# Patient Record
Sex: MALE | Race: WHITE | Employment: OTHER | URBAN - METROPOLITAN AREA
[De-identification: names, ages, dates, MRNs, and addresses within clinical notes are randomized per-mention and may not be internally consistent; named-entity substitution may affect disease eponyms.]

---

## 2020-01-13 ENCOUNTER — OFFICE VISIT (OUTPATIENT)
Dept: UROLOGY | Age: 70
End: 2020-01-13
Payer: MEDICARE

## 2020-01-13 VITALS
HEART RATE: 69 BPM | HEIGHT: 68 IN | WEIGHT: 222.8 LBS | DIASTOLIC BLOOD PRESSURE: 71 MMHG | BODY MASS INDEX: 33.77 KG/M2 | SYSTOLIC BLOOD PRESSURE: 139 MMHG

## 2020-01-13 PROCEDURE — 3017F COLORECTAL CA SCREEN DOC REV: CPT | Performed by: UROLOGY

## 2020-01-13 PROCEDURE — G8484 FLU IMMUNIZE NO ADMIN: HCPCS | Performed by: UROLOGY

## 2020-01-13 PROCEDURE — G8428 CUR MEDS NOT DOCUMENT: HCPCS | Performed by: UROLOGY

## 2020-01-13 PROCEDURE — 99204 OFFICE O/P NEW MOD 45 MIN: CPT | Performed by: UROLOGY

## 2020-01-13 PROCEDURE — 1123F ACP DISCUSS/DSCN MKR DOCD: CPT | Performed by: UROLOGY

## 2020-01-13 PROCEDURE — G8417 CALC BMI ABV UP PARAM F/U: HCPCS | Performed by: UROLOGY

## 2020-01-13 PROCEDURE — 4040F PNEUMOC VAC/ADMIN/RCVD: CPT | Performed by: UROLOGY

## 2020-01-13 PROCEDURE — 4004F PT TOBACCO SCREEN RCVD TLK: CPT | Performed by: UROLOGY

## 2020-01-13 RX ORDER — BUPROPION HYDROCHLORIDE 200 MG/1
200 TABLET, EXTENDED RELEASE ORAL
COMMUNITY

## 2020-01-13 RX ORDER — ASPIRIN 325 MG
325 TABLET ORAL
COMMUNITY

## 2020-01-13 RX ORDER — TRAZODONE HYDROCHLORIDE 150 MG/1
150 TABLET ORAL NIGHTLY
COMMUNITY
Start: 2019-12-14

## 2020-01-13 RX ORDER — MIRTAZAPINE 45 MG/1
TABLET, FILM COATED ORAL
Status: ON HOLD | COMMUNITY
Start: 2019-12-14 | End: 2022-04-25 | Stop reason: ALTCHOICE

## 2020-01-13 RX ORDER — CLONAZEPAM 2 MG/1
TABLET ORAL
COMMUNITY
Start: 2019-12-14

## 2020-01-13 RX ORDER — CIPROFLOXACIN 500 MG/1
500 TABLET, FILM COATED ORAL 2 TIMES DAILY
Qty: 6 TABLET | Refills: 0 | Status: SHIPPED | OUTPATIENT
Start: 2020-01-13 | End: 2020-01-16

## 2020-01-13 RX ORDER — TAMSULOSIN HYDROCHLORIDE 0.4 MG/1
CAPSULE ORAL
Status: ON HOLD | COMMUNITY
Start: 2019-12-14 | End: 2020-03-16 | Stop reason: HOSPADM

## 2020-01-13 ASSESSMENT — ENCOUNTER SYMPTOMS
SHORTNESS OF BREATH: 0
CONSTIPATION: 0
WHEEZING: 0
DIARRHEA: 0
COUGH: 0
EYE REDNESS: 0
VOMITING: 0
EYE PAIN: 0
NAUSEA: 0
ABDOMINAL PAIN: 0
BACK PAIN: 0

## 2020-01-13 NOTE — PROGRESS NOTES
I-PSS SCORE[de-identified] 1  How would you feel if you were to spend the rest of your life with your urinary condition?: Pleased    Last BUN and creatinine:  Lab Results   Component Value Date    BUN 12 01/06/2012     Lab Results   Component Value Date    CREATININE 0.69 01/06/2012       Additional Lab/Culture results: none    Imaging Reviewed during this Office Visit: none  (results were independently reviewed by physician and radiology report verified)    PAST MEDICAL, FAMILY AND SOCIAL HISTORY:  Past Medical History:   Diagnosis Date    Anxiety     Hyperlipidemia      Past Surgical History:   Procedure Laterality Date    FOOT TENDON SURGERY      KNEE SURGERY      SPINE SURGERY      WRIST SURGERY       Family History   Problem Relation Age of Onset    Cancer Father     Diabetes Paternal Grandmother      Outpatient Medications Marked as Taking for the 1/13/20 encounter (Office Visit) with Lila Justice MD   Medication Sig Dispense Refill    traZODone (DESYREL) 150 MG tablet TAKE 1 TABLET BY MOUTH AT BEDTIME      tamsulosin (FLOMAX) 0.4 MG capsule TAKE 1 CAPSULE BY MOUTH ONCE DAILY      mirtazapine (REMERON) 45 MG tablet TAKE 1 TABLET BY MOUTH AT BEDTIME      clonazePAM (KLONOPIN) 2 MG tablet TAKE 1 TABLET BY MOUTH TWICE DAILY      buPROPion (WELLBUTRIN SR) 200 MG extended release tablet Take 200 mg by mouth      MAGNESIUM PO Take 1 tablet by mouth      aspirin 325 MG tablet Take 325 mg by mouth      Calcium Carbonate Antacid (CALCIUM CARBONATE PO) Take 1 tablet by mouth      Multiple Vitamins-Minerals (MULTIVITAMIN ADULT PO) Take 1 capsule by mouth      SIMVASTATIN PO Take 40 mg by mouth      ciprofloxacin (CIPRO) 500 MG tablet Take 1 tablet by mouth 2 times daily for 3 days Start one day prior to prostate biopsy 6 tablet 0        Hydrocodone-acetaminophen  Social History     Tobacco Use   Smoking Status Current Every Day Smoker   Smokeless Tobacco Never Used      (If patient a smoker, smoking cessation counseling offered)   Social History     Substance and Sexual Activity   Alcohol Use Not Currently       REVIEW OF SYSTEMS:  Review of Systems    Physical Exam:    This a 77 y.o. male   Vitals:    01/13/20 0923   BP: 139/71   Pulse: 69     Body mass index is 33.88 kg/m². Physical Exam  Constitutional: Patient in no acute distress. Neuro: Alert and oriented to person, place and time. Psych: Mood normal, affect normal  Skin: No rash noted  HEENT: Head: Normocephalic and atraumatic  Conjunctivae and EOM are normal. Pupils are equal, round  Nose: Normal  Right External Ear: Normal; Left External Ear: Normal  Mouth: Mucosa Moist  Neck: Supple  Lungs:Respiratory effort is normal  Cardiovascular: Warm & Pink  Abdomen: Soft, non-tender, non-distendedwith no CVA,  No flank tenderness,  Orhepatosplenomegaly   Lymphatics: No palpable lymphadenopathy. Bladder non-tender and not distended. Musculoskeletal: Normal gait and station  Penis normal and circumcised  Urethral meatus normal  Scrotal exam normal  Testicles normal bilaterally  Epididymis normal bilaterally  No evidence of inguinal hernia  Normal rectal tone with no masses  Prostate:nodule at apex      Assessment and Plan      1. Elevated PSA    2. Nodular prostate    3. Benign prostatic hyperplasia with nocturia           Plan:   Prostate biopsy in the office       Prescriptions Ordered:  Orders Placed This Encounter   Medications    ciprofloxacin (CIPRO) 500 MG tablet     Sig: Take 1 tablet by mouth 2 times daily for 3 days Start one day prior to prostate biopsy     Dispense:  6 tablet     Refill:  0      Orders Placed:  No orders of the defined types were placed in this encounter. Nathaly Hernandez MD    Agree with the ROS entered by the MA.

## 2020-01-16 ENCOUNTER — TELEPHONE (OUTPATIENT)
Dept: UROLOGY | Age: 70
End: 2020-01-16

## 2020-01-27 ENCOUNTER — HOSPITAL ENCOUNTER (OUTPATIENT)
Age: 70
Setting detail: SPECIMEN
Discharge: HOME | End: 2020-01-27
Payer: MEDICARE

## 2020-01-27 ENCOUNTER — PROCEDURE VISIT (OUTPATIENT)
Dept: UROLOGY | Age: 70
End: 2020-01-27
Payer: COMMERCIAL

## 2020-01-27 VITALS
HEIGHT: 68 IN | WEIGHT: 222.88 LBS | DIASTOLIC BLOOD PRESSURE: 78 MMHG | SYSTOLIC BLOOD PRESSURE: 132 MMHG | BODY MASS INDEX: 33.78 KG/M2 | TEMPERATURE: 98.7 F

## 2020-01-27 PROCEDURE — 76872 US TRANSRECTAL: CPT | Performed by: UROLOGY

## 2020-01-27 PROCEDURE — 55700 PR BIOPSY OF PROSTATE,NEEDLE/PUNCH: CPT | Performed by: UROLOGY

## 2020-01-30 LAB — SURGICAL PATHOLOGY REPORT: NORMAL

## 2020-02-03 ENCOUNTER — OFFICE VISIT (OUTPATIENT)
Dept: UROLOGY | Age: 70
End: 2020-02-03
Payer: MEDICARE

## 2020-02-03 VITALS
WEIGHT: 222.88 LBS | HEIGHT: 68 IN | SYSTOLIC BLOOD PRESSURE: 118 MMHG | DIASTOLIC BLOOD PRESSURE: 70 MMHG | TEMPERATURE: 98.4 F | BODY MASS INDEX: 33.78 KG/M2

## 2020-02-03 PROCEDURE — 3017F COLORECTAL CA SCREEN DOC REV: CPT | Performed by: UROLOGY

## 2020-02-03 PROCEDURE — G8417 CALC BMI ABV UP PARAM F/U: HCPCS | Performed by: UROLOGY

## 2020-02-03 PROCEDURE — G8427 DOCREV CUR MEDS BY ELIG CLIN: HCPCS | Performed by: UROLOGY

## 2020-02-03 PROCEDURE — 99214 OFFICE O/P EST MOD 30 MIN: CPT | Performed by: UROLOGY

## 2020-02-03 PROCEDURE — 4040F PNEUMOC VAC/ADMIN/RCVD: CPT | Performed by: UROLOGY

## 2020-02-03 PROCEDURE — 4004F PT TOBACCO SCREEN RCVD TLK: CPT | Performed by: UROLOGY

## 2020-02-03 PROCEDURE — G8484 FLU IMMUNIZE NO ADMIN: HCPCS | Performed by: UROLOGY

## 2020-02-03 PROCEDURE — 1123F ACP DISCUSS/DSCN MKR DOCD: CPT | Performed by: UROLOGY

## 2020-02-03 ASSESSMENT — ENCOUNTER SYMPTOMS
EYE REDNESS: 0
VOMITING: 0
CONSTIPATION: 0
NAUSEA: 0
BACK PAIN: 0
WHEEZING: 0
ABDOMINAL PAIN: 0
COUGH: 0
SHORTNESS OF BREATH: 0
EYE PAIN: 0
DIARRHEA: 0

## 2020-02-03 NOTE — PROGRESS NOTES
Review of Systems   Constitutional: Negative for appetite change, chills and fatigue. Eyes: Negative for pain, redness and visual disturbance. Respiratory: Negative for cough, shortness of breath and wheezing. Cardiovascular: Negative for chest pain and leg swelling. Gastrointestinal: Negative for abdominal pain, constipation, diarrhea, nausea and vomiting. Genitourinary: Negative for difficulty urinating, dysuria, flank pain, frequency, hematuria and urgency. Musculoskeletal: Negative for back pain, joint swelling and myalgias. Skin: Negative for rash and wound. Neurological: Negative for dizziness, weakness and numbness. Hematological: Does not bruise/bleed easily.
verified)    PAST MEDICAL, FAMILY AND SOCIAL HISTORY UPDATE:  Past Medical History:   Diagnosis Date    Anxiety     Hyperlipidemia      Past Surgical History:   Procedure Laterality Date    FOOT TENDON SURGERY      KNEE SURGERY      SPINE SURGERY      WRIST SURGERY       Family History   Problem Relation Age of Onset    Cancer Father     Diabetes Paternal Grandmother      Outpatient Medications Marked as Taking for the 2/3/20 encounter (Office Visit) with Jose R Chen MD   Medication Sig Dispense Refill    traZODone (DESYREL) 150 MG tablet TAKE 1 TABLET BY MOUTH AT BEDTIME      tamsulosin (FLOMAX) 0.4 MG capsule TAKE 1 CAPSULE BY MOUTH ONCE DAILY      mirtazapine (REMERON) 45 MG tablet TAKE 1 TABLET BY MOUTH AT BEDTIME      clonazePAM (KLONOPIN) 2 MG tablet TAKE 1 TABLET BY MOUTH TWICE DAILY      buPROPion (WELLBUTRIN SR) 200 MG extended release tablet Take 200 mg by mouth      MAGNESIUM PO Take 1 tablet by mouth      aspirin 325 MG tablet Take 325 mg by mouth      Calcium Carbonate Antacid (CALCIUM CARBONATE PO) Take 1 tablet by mouth      Multiple Vitamins-Minerals (MULTIVITAMIN ADULT PO) Take 1 capsule by mouth      SIMVASTATIN PO Take 40 mg by mouth         Hydrocodone-acetaminophen  Social History     Tobacco Use   Smoking Status Current Every Day Smoker   Smokeless Tobacco Never Used     (Ifpatient a smoker, smoking cessation counseling offered)    Social History     Substance and Sexual Activity   Alcohol Use Not Currently       REVIEW OF SYSTEMS:  Review of Systems    Physical Exam:      Vitals:    02/03/20 0927   BP: 118/70   Temp: 98.4 °F (36.9 °C)     Body mass index is 33.9 kg/m². Patient is a 77 y.o. male in no acute distress and alert and oriented to person, place and time. Physical Exam  Constitutional: Patient in no acute distress. Neuro: Alert and oriented to person, place and time.   Psych: Mood normal, affect normal  Skin: No rash noted  HEENT: Head: Normocephalic

## 2020-02-24 ENCOUNTER — OFFICE VISIT (OUTPATIENT)
Dept: UROLOGY | Age: 70
End: 2020-02-24
Payer: MEDICARE

## 2020-02-24 ENCOUNTER — TELEPHONE (OUTPATIENT)
Dept: UROLOGY | Age: 70
End: 2020-02-24

## 2020-02-24 VITALS
HEIGHT: 68 IN | HEART RATE: 62 BPM | DIASTOLIC BLOOD PRESSURE: 75 MMHG | TEMPERATURE: 97.9 F | SYSTOLIC BLOOD PRESSURE: 129 MMHG | WEIGHT: 222.66 LBS | BODY MASS INDEX: 33.75 KG/M2

## 2020-02-24 PROCEDURE — 1123F ACP DISCUSS/DSCN MKR DOCD: CPT | Performed by: UROLOGY

## 2020-02-24 PROCEDURE — 4004F PT TOBACCO SCREEN RCVD TLK: CPT | Performed by: UROLOGY

## 2020-02-24 PROCEDURE — G8427 DOCREV CUR MEDS BY ELIG CLIN: HCPCS | Performed by: UROLOGY

## 2020-02-24 PROCEDURE — G8417 CALC BMI ABV UP PARAM F/U: HCPCS | Performed by: UROLOGY

## 2020-02-24 PROCEDURE — G8484 FLU IMMUNIZE NO ADMIN: HCPCS | Performed by: UROLOGY

## 2020-02-24 PROCEDURE — 99214 OFFICE O/P EST MOD 30 MIN: CPT | Performed by: UROLOGY

## 2020-02-24 PROCEDURE — 4040F PNEUMOC VAC/ADMIN/RCVD: CPT | Performed by: UROLOGY

## 2020-02-24 PROCEDURE — 3017F COLORECTAL CA SCREEN DOC REV: CPT | Performed by: UROLOGY

## 2020-02-24 ASSESSMENT — ENCOUNTER SYMPTOMS
EYE PAIN: 0
ABDOMINAL PAIN: 0
NAUSEA: 0
SHORTNESS OF BREATH: 0
VOMITING: 0
COLOR CHANGE: 0
COUGH: 0
EYE REDNESS: 0
WHEEZING: 0
BACK PAIN: 0

## 2020-02-24 NOTE — PROGRESS NOTES
mirtazapine (REMERON) 45 MG tablet TAKE 1 TABLET BY MOUTH AT BEDTIME      clonazePAM (KLONOPIN) 2 MG tablet TAKE 1 TABLET BY MOUTH TWICE DAILY      buPROPion (WELLBUTRIN SR) 200 MG extended release tablet Take 200 mg by mouth      MAGNESIUM PO Take 1 tablet by mouth      aspirin 325 MG tablet Take 325 mg by mouth      Calcium Carbonate Antacid (CALCIUM CARBONATE PO) Take 1 tablet by mouth      Multiple Vitamins-Minerals (MULTIVITAMIN ADULT PO) Take 1 capsule by mouth      SIMVASTATIN PO Take 40 mg by mouth         Hydrocodone-acetaminophen  Social History     Tobacco Use   Smoking Status Current Every Day Smoker   Smokeless Tobacco Never Used     (Ifpatient a smoker, smoking cessation counseling offered)    Social History     Substance and Sexual Activity   Alcohol Use Not Currently       REVIEW OF SYSTEMS:  Review of Systems    Physical Exam:      Vitals:    02/24/20 0846   BP: 129/75   Pulse: 62   Temp: 97.9 °F (36.6 °C)     Body mass index is 33.86 kg/m². Patient is a 77 y.o. male in no acute distress and alert and oriented to person, place and time. Physical Exam  Constitutional: Patient in no acute distress. Neuro: Alert and oriented to person, place and time. Psych: Mood normal, affect normal  Skin: No rash noted  HEENT: Head: Normocephalic andatraumatic  Conjunctivae and EOM are normal. Pupils are equal, round  Nose:Normal  Right External Ear: Normal; Left External Ear: Normal  Mouth: Mucosa Moist  Neck: Supple  Lungs: Respiratory effort is normal  Cardiovascular: Warm & Pink  Abdomen: Soft, non-tender, non-distended with no CVA,  No flank tenderness,  Or hepatosplenomegaly   Lymphatics: No palpablelymphadenopathy. Bladder non-tender and not distended. Musculoskeletal: Normal gait and station      Assessment and Plan      1.  Prostate cancer ([facility] [address]    2. Elevated PSA           Plan: spent greater than 25 min discussing mgmt of prostate cancer  Pt has elected for robotic prostatectomy and bilateral

## 2020-03-05 ENCOUNTER — OFFICE VISIT (OUTPATIENT)
Dept: UROLOGY | Age: 70
End: 2020-03-05
Payer: MEDICARE

## 2020-03-05 ENCOUNTER — TELEPHONE (OUTPATIENT)
Dept: UROLOGY | Age: 70
End: 2020-03-05

## 2020-03-05 ENCOUNTER — HOSPITAL ENCOUNTER (OUTPATIENT)
Dept: PREADMISSION TESTING | Age: 70
Discharge: HOME | End: 2020-03-09
Payer: MEDICARE

## 2020-03-05 VITALS
HEART RATE: 65 BPM | WEIGHT: 223.55 LBS | DIASTOLIC BLOOD PRESSURE: 83 MMHG | OXYGEN SATURATION: 94 % | TEMPERATURE: 98.1 F | HEIGHT: 70 IN | RESPIRATION RATE: 20 BRPM | SYSTOLIC BLOOD PRESSURE: 129 MMHG | BODY MASS INDEX: 32 KG/M2

## 2020-03-05 VITALS
BODY MASS INDEX: 33.75 KG/M2 | TEMPERATURE: 98.6 F | SYSTOLIC BLOOD PRESSURE: 110 MMHG | WEIGHT: 222.66 LBS | HEIGHT: 68 IN | DIASTOLIC BLOOD PRESSURE: 68 MMHG

## 2020-03-05 LAB
-: NORMAL
ABO/RH: NORMAL
AMORPHOUS: NORMAL
ANTIBODY SCREEN: NEGATIVE
ARM BAND NUMBER: NORMAL
BACTERIA: NORMAL
BILIRUBIN URINE: NEGATIVE
CASTS UA: NORMAL /LPF (ref 0–8)
COLOR: YELLOW
COMMENT UA: ABNORMAL
CRYSTALS, UA: NORMAL /HPF
EPITHELIAL CELLS UA: NORMAL /HPF (ref 0–5)
EXPIRATION DATE: NORMAL
GLUCOSE URINE: NEGATIVE
HCT VFR BLD CALC: 45.4 % (ref 40.7–50.3)
HEMOGLOBIN: 15.3 G/DL (ref 13–17)
KETONES, URINE: NEGATIVE
LEUKOCYTE ESTERASE, URINE: ABNORMAL
MUCUS: NORMAL
NITRITE, URINE: NEGATIVE
OTHER OBSERVATIONS UA: NORMAL
PH UA: 5.5 (ref 5–8)
PROTEIN UA: NEGATIVE
RBC UA: NORMAL /HPF (ref 0–4)
RENAL EPITHELIAL, UA: NORMAL /HPF
SPECIFIC GRAVITY UA: 1.02 (ref 1–1.03)
TRICHOMONAS: NORMAL
TURBIDITY: CLEAR
URINE HGB: NEGATIVE
UROBILINOGEN, URINE: NORMAL
WBC UA: NORMAL /HPF (ref 0–5)
YEAST: NORMAL

## 2020-03-05 PROCEDURE — 93005 ELECTROCARDIOGRAM TRACING: CPT | Performed by: ANESTHESIOLOGY

## 2020-03-05 PROCEDURE — 81001 URINALYSIS AUTO W/SCOPE: CPT

## 2020-03-05 PROCEDURE — G8484 FLU IMMUNIZE NO ADMIN: HCPCS | Performed by: NURSE PRACTITIONER

## 2020-03-05 PROCEDURE — 36415 COLL VENOUS BLD VENIPUNCTURE: CPT

## 2020-03-05 PROCEDURE — 4004F PT TOBACCO SCREEN RCVD TLK: CPT | Performed by: NURSE PRACTITIONER

## 2020-03-05 PROCEDURE — 1123F ACP DISCUSS/DSCN MKR DOCD: CPT | Performed by: NURSE PRACTITIONER

## 2020-03-05 PROCEDURE — 85018 HEMOGLOBIN: CPT

## 2020-03-05 PROCEDURE — 86850 RBC ANTIBODY SCREEN: CPT

## 2020-03-05 PROCEDURE — 87086 URINE CULTURE/COLONY COUNT: CPT

## 2020-03-05 PROCEDURE — 86900 BLOOD TYPING SEROLOGIC ABO: CPT

## 2020-03-05 PROCEDURE — 4040F PNEUMOC VAC/ADMIN/RCVD: CPT | Performed by: NURSE PRACTITIONER

## 2020-03-05 PROCEDURE — 99214 OFFICE O/P EST MOD 30 MIN: CPT | Performed by: NURSE PRACTITIONER

## 2020-03-05 PROCEDURE — G8417 CALC BMI ABV UP PARAM F/U: HCPCS | Performed by: NURSE PRACTITIONER

## 2020-03-05 PROCEDURE — G8427 DOCREV CUR MEDS BY ELIG CLIN: HCPCS | Performed by: NURSE PRACTITIONER

## 2020-03-05 PROCEDURE — 85014 HEMATOCRIT: CPT

## 2020-03-05 PROCEDURE — 86901 BLOOD TYPING SEROLOGIC RH(D): CPT

## 2020-03-05 PROCEDURE — 3017F COLORECTAL CA SCREEN DOC REV: CPT | Performed by: NURSE PRACTITIONER

## 2020-03-05 RX ORDER — PHENOL 1.4 %
1 AEROSOL, SPRAY (ML) MUCOUS MEMBRANE DAILY PRN
COMMUNITY

## 2020-03-05 RX ORDER — SODIUM CHLORIDE, SODIUM LACTATE, POTASSIUM CHLORIDE, CALCIUM CHLORIDE 600; 310; 30; 20 MG/100ML; MG/100ML; MG/100ML; MG/100ML
1000 INJECTION, SOLUTION INTRAVENOUS CONTINUOUS
Status: CANCELLED | OUTPATIENT
Start: 2020-03-05

## 2020-03-05 ASSESSMENT — ENCOUNTER SYMPTOMS
CONSTIPATION: 0
EYE PAIN: 0
VOMITING: 0
WHEEZING: 0
EYE REDNESS: 0
SHORTNESS OF BREATH: 0
NAUSEA: 0
BACK PAIN: 0
DIARRHEA: 0
COUGH: 0
ABDOMINAL PAIN: 0

## 2020-03-05 NOTE — H&P
History and Physical    Pt Name: Jennifer Farmer  MRN: [id number]  YOB: 1950  Date of evaluation: 3/5/2020    SUBJECTIVE:     History of Chief Complaint:    Patient complains of a history of decreased urinary flow for several years which has been managed with Flomax. More recently his PSA was elevated and a biopsy was positive for prostate cancer. He has been scheduled for a prostatectomy. Past Medical History    has a past medical history of Anxiety, Cancer ([facility] [address] Hyperlipidemia, Wears partial dentures, and Wellness examination. Past Surgical History   has a past surgical history that includes Spine surgery; knee surgery; Foot Tendon Surgery; Wrist surgery; Prostate Biopsy; back surgery; and Colonoscopy. Medications    Current Outpatient Medications:     calcium carbonate 600 MG TABS tablet, Take 1 tablet by mouth 2 times daily, Disp: , Rfl:     traZODone (DESYREL) 150 MG tablet, TAKE 1 TABLET BY MOUTH AT BEDTIME, Disp: , Rfl:     tamsulosin (FLOMAX) 0.4 MG capsule, TAKE 1 CAPSULE BY MOUTH ONCE DAILY, Disp: , Rfl:     mirtazapine (REMERON) 45 MG tablet, TAKE 1 TABLET BY MOUTH AT BEDTIME, Disp: , Rfl:     clonazePAM (KLONOPIN) 2 MG tablet, TAKE 1 TABLET BY MOUTH TWICE DAILY, Disp: , Rfl:     buPROPion (WELLBUTRIN SR) 200 MG extended release tablet, Take 200 mg by mouth, Disp: , Rfl:     aspirin 325 MG tablet, Take 325 mg by mouth, Disp: , Rfl:     Multiple Vitamins-Minerals (MULTIVITAMIN ADULT PO), Take 1 capsule by mouth, Disp: , Rfl:     SIMVASTATIN PO, Take 40 mg by mouth, Disp: , Rfl:   Allergies  is allergic to hydrocodone-acetaminophen. Family History  family history includes Cancer in his father; Diabetes in his paternal grandmother. Social History   reports that he has been smoking. He has been smoking about 0.50 packs per day. He has never used smokeless tobacco.   reports previous alcohol use. reports no history of drug use.     Marital Status:   Children: two children,

## 2020-03-05 NOTE — PATIENT INSTRUCTIONS
Plan:   1. Start kegels exercise to help with post op incontinence: do not do kegel or quick flick with cath in- do pre-operation and after catheteri is removed  Kegel exercise: squeeze and hold 3 seconds, relax and release 3 seconds, repeat 10-15 times in a row, at lease 6 times  per day  Quick flicks: after kegel, squeeze, release no hold, 12-15 times in a row, at least 6 times per day    2. Pre-op bowel cleanse as ordered- sheet reviewed     3. Avoid post-op constipation as discussed    4. Follow-up Cystograms scheduled 1 week after surgery 3/19/18 , start antibiotic the day before the cystogram 3/18/19 , take the day off and complete the day after, call the office if you did not get a prescription for antibiotic at discharge. After cystogram, come directly to office for post-op appt    5. Call with questions or concerns    6.  Will order pump

## 2020-03-05 NOTE — PROGRESS NOTES
Anesthesia Focused Assessment    STOP-BANG Sleep Apnea Questionnaire    SNORE loudly (heard through closed doors)? No  TIRED, fatigued, sleepy during daytime? No  OBSERVED stopping breathing during sleep? No  High blood PRESSURE being treated? No    BMI over 35? No  AGE over 48? Yes  NECK circumference over 16\"? No  GENDER (male)? Yes             Total 2  High risk 5-8  Intermediate risk 3-4  Low risk 0-2    Obstructive Sleep Apnea: no  If YES, machine used: no     Type 1 DM:   no  T2DM:  no    Coronary Artery Disease:  no  Hypertension:  no    Active smoker:  1/2 ppd since age 16  Drinks Alcohol:  no    Dentition: upper and lower partials    Defib / AICD / Pacemaker: no      Renal Failure/dialysis:  no    Patient was evaluated in PAT & anesthesia guidelines were applied. NPO guidelines, medication instructions and scheduled arrival time were reviewed with patient.     Hx of anesthesia complications:  no  Family hx of anesthesia complications:  no                                                                                                                     Anesthesia contacted:   no  Medical or cardiac clearance ordered: yes    Myron PERSON, LEENA-C  3/5/20  1:27 PM

## 2020-03-05 NOTE — PROGRESS NOTES
[facility]  [facility] - [facility]  Nisha Hoffman [address] [facility].  Dept: [address] [facility] U[facility] Office Note - Established    Patient:  Tomy Zelaya  YOB: 1950  Date: 3/5/2020    The patient is a 77 y.o. male who presents todayfor evaluation of the following problems:   Chief Complaint   Patient presents with    Prostate Cancer     pre-teaching Robotic prostatectomy        HPI    This patient presents today for education regarding his prostate cancer treatment, post-op care and rehabilitation for incontinence and penile health      I met with Tomy Zelaya for 30 minutes to discuss prostate cancer treatment including basic surgery information, pre-op instructions, and post-op care: catheter care and cleaning, application of thigh and overnight bag, Kegels exercise to begin now, to stop day of surgery and then to resume after catheter removal, constipation prevention, post op impotence and possible use of vacuum erection pump, activity restrictions and post op testing and appointments. Verbal, written and visual materials presented. Patient verbalized understanding of all materials presented, and handouts given for Kegels, constipation prevention and catheter care. All questions answered. Patient encouraged to call with any further questions or problems. Summary of old records: N/A    Additional History: N/A    Procedures Today: N/A    Urinalysis today:  No results found for this visit on 03/05/20.   Last several PSA's:  No results found for: PSA  Last total testosterone:  No results found for: TESTOSTERONE    AUA Symptom Score (3/5/2020):  INCOMPLETE EMPTYING: How often have you had the sensation of not emptying your bladder?: Not at all  FREQUENCY: How often do you have to urinate less than every two hours?: Not at all  INTERMITTENCY: How often have you found you stopped and started again several times when you urinated?: Not at all  URGENCY: How often have you found it difficult to postpone urination?: Not at all  WEAK STREAM: How often have you had a weak urinary stream?: Not at all  STRAINING: How often have you had to strain to start  urination?: Not at all  NOCTURIA: How many times did you typically get up at night to uriniate?: 1 Time  TOTAL I-PSS SCORE[de-identified] 1  How would you feel if you were to spend the rest of your life with your urinary condition?: Unhappy    Last BUN and creatinine:  Lab Results   Component Value Date    BUN 12 01/06/2012     Lab Results   Component Value Date    CREATININE 0.69 01/06/2012       Additional Lab/Culture results:     Imaging Reviewed during this Office Visit:   (results were independently reviewed by physician and radiology report verified)    PAST MEDICAL, FAMILY AND SOCIAL HISTORY UPDATE:  Past Medical History:   Diagnosis Date    Anxiety     Hyperlipidemia      Past Surgical History:   Procedure Laterality Date    FOOT TENDON SURGERY      KNEE SURGERY      SPINE SURGERY      WRIST SURGERY       Family History   Problem Relation Age of Onset    Cancer Father     Diabetes Paternal Grandmother      Outpatient Medications Marked as Taking for the 3/5/20 encounter (Office Visit) with ANGELO Vidal CNP   Medication Sig Dispense Refill    traZODone (DESYREL) 150 MG tablet TAKE 1 TABLET BY MOUTH AT BEDTIME      tamsulosin (FLOMAX) 0.4 MG capsule TAKE 1 CAPSULE BY MOUTH ONCE DAILY      mirtazapine (REMERON) 45 MG tablet TAKE 1 TABLET BY MOUTH AT BEDTIME      clonazePAM (KLONOPIN) 2 MG tablet TAKE 1 TABLET BY MOUTH TWICE DAILY      buPROPion (WELLBUTRIN SR) 200 MG extended release tablet Take 200 mg by mouth      MAGNESIUM PO Take 1 tablet by mouth      aspirin 325 MG tablet Take 325 mg by mouth      Calcium Carbonate Antacid (CALCIUM CARBONATE PO) Take 1 tablet by mouth      Multiple Vitamins-Minerals (MULTIVITAMIN ADULT PO) Take 1 capsule by mouth      SIMVASTATIN PO Take 40 mg by mouth         Hydrocodone-acetaminophen  Social History     Tobacco Use   Smoking Status Current Every Day Smoker   Smokeless Tobacco Never Used     (Ifpatient a smoker, smoking cessation counseling offered)    Social History     Substance and Sexual Activity   Alcohol Use Not Currently       REVIEW OF SYSTEMS:  Review of Systems    Physical Exam:      Vitals:    03/05/20 1149   BP: 110/68   Temp: 98.6 °F (37 °C)     Body mass index is 33.86 kg/m². Patient is a 77 y.o. male in no acute distress and alert and oriented to person, place and time. Physical Exam  Constitutional: Patient in no acute distress. Neuro: Alert and oriented to person, place and time. Psych: Mood normal, affect normal  Skin: warm, pink, No rash noted  HEENT: Head: Normocephalic andatraumatic  Conjunctivae and EOM are normal. Pupils are equal, round  Nose:Normal  Right External Ear: Normal; Left External Ear: Normal  Mouth: Mucosa Moist  Neck: Supple  Lungs: Respiratory effort is normal  Cardiovascular: strong and reg  Abdomen: Soft, non-tender, non-distended  Bladder non-tender and not distended. Musculoskeletal: Normal gait and station        Assessment and Plan      1. Prostate cancer ([facility] [address]           Plan:   1. Start kegels exercise to help with post op incontinence: do not do kegel or quick flick with cath in- do pre-operation and after catheteri is removed  Kegel exercise: squeeze and hold 3 seconds, relax and release 3 seconds, repeat 10-15 times in a row, at lease 6 times  per day  Quick flicks: after kegel, squeeze, release no hold, 12-15 times in a row, at least 6 times per day    2. Pre-op bowel cleanse as ordered- sheet reviewed     3. Avoid post-op constipation as discussed    4. Follow-up Cystograms scheduled 1 week after surgery 3/19/18 , start antibiotic the day before the cystogram 3/18/19 , take the day off and complete the day after, call the office if you did not get a prescription for antibiotic at discharge.  After

## 2020-03-05 NOTE — LETTER
[address]  Dept: [phone]  Dept Fax: [phone]        3/5/20    Patient: John Alaniz  YOB: 1950    Dear Mimi Mckeon MD,    I had the pleasure of seeing one of your patients, Chilango Amaya today in the office today. Below are the relevant portions of my assessment and plan of care. IMPRESSION:  1. Prostate cancer ([facility] [address]        PLAN:   Plan:   1. Start kegels exercise to help with post op incontinence: do not do kegel or quick flick with cath in- do pre-operation and after catheteri is removed  Kegel exercise: squeeze and hold 3 seconds, relax and release 3 seconds, repeat 10-15 times in a row, at lease 6 times  per day  Quick flicks: after kegel, squeeze, release no hold, 12-15 times in a row, at least 6 times per day    2. Pre-op bowel cleanse as ordered- sheet reviewed     3. Avoid post-op constipation as discussed    4. Follow-up Cystograms scheduled 1 week after surgery 3/19/18 , start antibiotic the day before the cystogram 3/18/19 , take the day off and complete the day after, call the office if you did not get a prescription for antibiotic at discharge. After cystogram, come directly to office for post-op appt    5. Call with questions or concerns    6. Will order pump  Thank you for allowing me to participate in the care of this patient. I will keep you updated on this patient's follow up and I look forward to serving you and your patients again in the future.     Vincenzo Alcantar, ANGELO - CNP

## 2020-03-06 LAB
CULTURE: NO GROWTH
EKG ATRIAL RATE: 64 BPM
EKG P AXIS: 56 DEGREES
EKG P-R INTERVAL: 188 MS
EKG Q-T INTERVAL: 418 MS
EKG QRS DURATION: 92 MS
EKG QTC CALCULATION (BAZETT): 431 MS
EKG R AXIS: -38 DEGREES
EKG T AXIS: 27 DEGREES
EKG VENTRICULAR RATE: 64 BPM
Lab: NORMAL
SPECIMEN DESCRIPTION: NORMAL

## 2020-03-13 ENCOUNTER — ANESTHESIA (OUTPATIENT)
Dept: OPERATING ROOM | Age: 70
DRG: 707 | End: 2020-03-13
Payer: MEDICARE

## 2020-03-13 ENCOUNTER — ANESTHESIA EVENT (OUTPATIENT)
Dept: OPERATING ROOM | Age: 70
DRG: 707 | End: 2020-03-13
Payer: MEDICARE

## 2020-03-13 ENCOUNTER — HOSPITAL ENCOUNTER (INPATIENT)
Age: 70
LOS: 2 days | Discharge: HOME | DRG: 707 | End: 2020-03-17
Attending: UROLOGY | Admitting: UROLOGY
Payer: MEDICARE

## 2020-03-13 VITALS — TEMPERATURE: 97.7 F | SYSTOLIC BLOOD PRESSURE: 191 MMHG | DIASTOLIC BLOOD PRESSURE: 97 MMHG | OXYGEN SATURATION: 91 %

## 2020-03-13 PROBLEM — C61 PROSTATE CANCER (HCC): Status: ACTIVE | Noted: 2020-03-13

## 2020-03-13 LAB
-: NORMAL
ANION GAP SERPL CALCULATED.3IONS-SCNC: 17 MMOL/L (ref 9–17)
BUN BLDV-MCNC: 13 MG/DL (ref 8–23)
BUN/CREAT BLD: ABNORMAL (ref 9–20)
CALCIUM SERPL-MCNC: 8.6 MG/DL (ref 8.6–10.4)
CHLORIDE BLD-SCNC: 106 MMOL/L (ref 98–107)
CO2: 17 MMOL/L (ref 20–31)
CREAT SERPL-MCNC: 0.9 MG/DL (ref 0.7–1.2)
GFR AFRICAN AMERICAN: >60 ML/MIN
GFR NON-AFRICAN AMERICAN: >60 ML/MIN
GFR SERPL CREATININE-BSD FRML MDRD: ABNORMAL ML/MIN/{1.73_M2}
GFR SERPL CREATININE-BSD FRML MDRD: ABNORMAL ML/MIN/{1.73_M2}
GLUCOSE BLD-MCNC: 115 MG/DL (ref 70–99)
HCT VFR BLD CALC: 48 % (ref 40.7–50.3)
HEMOGLOBIN: 14.9 G/DL (ref 13–17)
MCH RBC QN AUTO: 31.8 PG (ref 25.2–33.5)
MCHC RBC AUTO-ENTMCNC: 31 G/DL (ref 28.4–34.8)
MCV RBC AUTO: 102.3 FL (ref 82.6–102.9)
NRBC AUTOMATED: 0.1 PER 100 WBC
PDW BLD-RTO: 12 % (ref 11.8–14.4)
PLATELET # BLD: 206 K/UL (ref 138–453)
PMV BLD AUTO: 10.1 FL (ref 8.1–13.5)
POTASSIUM SERPL-SCNC: 4.6 MMOL/L (ref 3.7–5.3)
RBC # BLD: 4.69 M/UL (ref 4.21–5.77)
REASON FOR REJECTION: NORMAL
SODIUM BLD-SCNC: 140 MMOL/L (ref 135–144)
WBC # BLD: 19.3 K/UL (ref 3.5–11.3)
ZZ NTE CLEAN UP: ORDERED TEST: NORMAL
ZZ NTE WITH NAME CLEAN UP: SPECIMEN SOURCE: NORMAL

## 2020-03-13 PROCEDURE — 6360000002 HC RX W HCPCS: Performed by: PHYSICIAN ASSISTANT

## 2020-03-13 PROCEDURE — 6370000000 HC RX 637 (ALT 250 FOR IP): Performed by: UROLOGY

## 2020-03-13 PROCEDURE — 2709999900 HC NON-CHARGEABLE SUPPLY: Performed by: UROLOGY

## 2020-03-13 PROCEDURE — 88307 TISSUE EXAM BY PATHOLOGIST: CPT

## 2020-03-13 PROCEDURE — 85027 COMPLETE CBC AUTOMATED: CPT

## 2020-03-13 PROCEDURE — 3700000000 HC ANESTHESIA ATTENDED CARE: Performed by: UROLOGY

## 2020-03-13 PROCEDURE — 2580000003 HC RX 258: Performed by: ANESTHESIOLOGY

## 2020-03-13 PROCEDURE — 3600000019 HC SURGERY ROBOT ADDTL 15MIN: Performed by: UROLOGY

## 2020-03-13 PROCEDURE — 8E0W4CZ ROBOTIC ASSISTED PROCEDURE OF TRUNK REGION, PERCUTANEOUS ENDOSCOPIC APPROACH: ICD-10-PCS | Performed by: UROLOGY

## 2020-03-13 PROCEDURE — 6360000002 HC RX W HCPCS

## 2020-03-13 PROCEDURE — 2500000003 HC RX 250 WO HCPCS

## 2020-03-13 PROCEDURE — 87086 URINE CULTURE/COLONY COUNT: CPT

## 2020-03-13 PROCEDURE — 80048 BASIC METABOLIC PNL TOTAL CA: CPT

## 2020-03-13 PROCEDURE — 0VTQ4ZZ RESECTION OF BILATERAL VAS DEFERENS, PERCUTANEOUS ENDOSCOPIC APPROACH: ICD-10-PCS | Performed by: UROLOGY

## 2020-03-13 PROCEDURE — 3700000001 HC ADD 15 MINUTES (ANESTHESIA): Performed by: UROLOGY

## 2020-03-13 PROCEDURE — 0VT34ZZ RESECTION OF BILATERAL SEMINAL VESICLES, PERCUTANEOUS ENDOSCOPIC APPROACH: ICD-10-PCS | Performed by: UROLOGY

## 2020-03-13 PROCEDURE — 2580000003 HC RX 258: Performed by: PHYSICIAN ASSISTANT

## 2020-03-13 PROCEDURE — 6370000000 HC RX 637 (ALT 250 FOR IP): Performed by: PHYSICIAN ASSISTANT

## 2020-03-13 PROCEDURE — S2900 ROBOTIC SURGICAL SYSTEM: HCPCS | Performed by: UROLOGY

## 2020-03-13 PROCEDURE — 3600000009 HC SURGERY ROBOT BASE: Performed by: UROLOGY

## 2020-03-13 PROCEDURE — 2500000003 HC RX 250 WO HCPCS: Performed by: UROLOGY

## 2020-03-13 PROCEDURE — 2720000010 HC SURG SUPPLY STERILE: Performed by: UROLOGY

## 2020-03-13 PROCEDURE — 7100000001 HC PACU RECOVERY - ADDTL 15 MIN: Performed by: UROLOGY

## 2020-03-13 PROCEDURE — 6360000002 HC RX W HCPCS: Performed by: STUDENT IN AN ORGANIZED HEALTH CARE EDUCATION/TRAINING PROGRAM

## 2020-03-13 PROCEDURE — 0VT04ZZ RESECTION OF PROSTATE, PERCUTANEOUS ENDOSCOPIC APPROACH: ICD-10-PCS | Performed by: UROLOGY

## 2020-03-13 PROCEDURE — 07BC4ZZ EXCISION OF PELVIS LYMPHATIC, PERCUTANEOUS ENDOSCOPIC APPROACH: ICD-10-PCS | Performed by: UROLOGY

## 2020-03-13 PROCEDURE — G0378 HOSPITAL OBSERVATION PER HR: HCPCS

## 2020-03-13 PROCEDURE — 88309 TISSUE EXAM BY PATHOLOGIST: CPT

## 2020-03-13 PROCEDURE — 2580000003 HC RX 258

## 2020-03-13 PROCEDURE — 7100000000 HC PACU RECOVERY - FIRST 15 MIN: Performed by: UROLOGY

## 2020-03-13 PROCEDURE — 36415 COLL VENOUS BLD VENIPUNCTURE: CPT

## 2020-03-13 RX ORDER — FENTANYL CITRATE 50 UG/ML
INJECTION, SOLUTION INTRAMUSCULAR; INTRAVENOUS PRN
Status: DISCONTINUED | OUTPATIENT
Start: 2020-03-13 | End: 2020-03-13 | Stop reason: SDUPTHER

## 2020-03-13 RX ORDER — ONDANSETRON 2 MG/ML
INJECTION INTRAMUSCULAR; INTRAVENOUS PRN
Status: DISCONTINUED | OUTPATIENT
Start: 2020-03-13 | End: 2020-03-13 | Stop reason: SDUPTHER

## 2020-03-13 RX ORDER — PROPOFOL 10 MG/ML
INJECTION, EMULSION INTRAVENOUS PRN
Status: DISCONTINUED | OUTPATIENT
Start: 2020-03-13 | End: 2020-03-13 | Stop reason: SDUPTHER

## 2020-03-13 RX ORDER — ONDANSETRON 2 MG/ML
4 INJECTION INTRAMUSCULAR; INTRAVENOUS EVERY 6 HOURS PRN
Status: DISCONTINUED | OUTPATIENT
Start: 2020-03-13 | End: 2020-03-17 | Stop reason: HOSPADM

## 2020-03-13 RX ORDER — ROCURONIUM BROMIDE 10 MG/ML
INJECTION, SOLUTION INTRAVENOUS PRN
Status: DISCONTINUED | OUTPATIENT
Start: 2020-03-13 | End: 2020-03-13 | Stop reason: SDUPTHER

## 2020-03-13 RX ORDER — SODIUM CHLORIDE 0.9 % (FLUSH) 0.9 %
10 SYRINGE (ML) INJECTION PRN
Status: DISCONTINUED | OUTPATIENT
Start: 2020-03-13 | End: 2020-03-17 | Stop reason: HOSPADM

## 2020-03-13 RX ORDER — NEOSTIGMINE METHYLSULFATE 5 MG/5 ML
SYRINGE (ML) INTRAVENOUS PRN
Status: DISCONTINUED | OUTPATIENT
Start: 2020-03-13 | End: 2020-03-13 | Stop reason: SDUPTHER

## 2020-03-13 RX ORDER — SODIUM CHLORIDE 9 MG/ML
INJECTION, SOLUTION INTRAVENOUS CONTINUOUS PRN
Status: DISCONTINUED | OUTPATIENT
Start: 2020-03-13 | End: 2020-03-13 | Stop reason: SDUPTHER

## 2020-03-13 RX ORDER — GLYCOPYRROLATE 1 MG/5 ML
SYRINGE (ML) INTRAVENOUS PRN
Status: DISCONTINUED | OUTPATIENT
Start: 2020-03-13 | End: 2020-03-13 | Stop reason: SDUPTHER

## 2020-03-13 RX ORDER — MORPHINE SULFATE 2 MG/ML
2 INJECTION, SOLUTION INTRAMUSCULAR; INTRAVENOUS
Status: DISCONTINUED | OUTPATIENT
Start: 2020-03-13 | End: 2020-03-16

## 2020-03-13 RX ORDER — SODIUM CHLORIDE, SODIUM LACTATE, POTASSIUM CHLORIDE, CALCIUM CHLORIDE 600; 310; 30; 20 MG/100ML; MG/100ML; MG/100ML; MG/100ML
1000 INJECTION, SOLUTION INTRAVENOUS CONTINUOUS
Status: DISCONTINUED | OUTPATIENT
Start: 2020-03-13 | End: 2020-03-13

## 2020-03-13 RX ORDER — MORPHINE SULFATE 4 MG/ML
4 INJECTION, SOLUTION INTRAMUSCULAR; INTRAVENOUS
Status: DISCONTINUED | OUTPATIENT
Start: 2020-03-13 | End: 2020-03-16

## 2020-03-13 RX ORDER — LIDOCAINE HYDROCHLORIDE 10 MG/ML
INJECTION, SOLUTION EPIDURAL; INFILTRATION; INTRACAUDAL; PERINEURAL PRN
Status: DISCONTINUED | OUTPATIENT
Start: 2020-03-13 | End: 2020-03-13 | Stop reason: SDUPTHER

## 2020-03-13 RX ORDER — WATER 1000 ML/1000ML
INJECTION, SOLUTION INTRAVENOUS PRN
Status: DISCONTINUED | OUTPATIENT
Start: 2020-03-13 | End: 2020-03-13 | Stop reason: ALTCHOICE

## 2020-03-13 RX ORDER — PROMETHAZINE HYDROCHLORIDE 25 MG/1
12.5 TABLET ORAL EVERY 6 HOURS PRN
Status: DISCONTINUED | OUTPATIENT
Start: 2020-03-13 | End: 2020-03-17 | Stop reason: HOSPADM

## 2020-03-13 RX ORDER — ACETAMINOPHEN 325 MG/1
650 TABLET ORAL EVERY 6 HOURS
Status: DISCONTINUED | OUTPATIENT
Start: 2020-03-13 | End: 2020-03-17 | Stop reason: HOSPADM

## 2020-03-13 RX ORDER — BUPROPION HYDROCHLORIDE 100 MG/1
200 TABLET, EXTENDED RELEASE ORAL DAILY
Status: DISCONTINUED | OUTPATIENT
Start: 2020-03-13 | End: 2020-03-17 | Stop reason: HOSPADM

## 2020-03-13 RX ORDER — SODIUM CHLORIDE 0.9 % (FLUSH) 0.9 %
10 SYRINGE (ML) INJECTION EVERY 12 HOURS SCHEDULED
Status: DISCONTINUED | OUTPATIENT
Start: 2020-03-13 | End: 2020-03-17 | Stop reason: HOSPADM

## 2020-03-13 RX ORDER — CLONAZEPAM 1 MG/1
2 TABLET ORAL 2 TIMES DAILY
Status: DISCONTINUED | OUTPATIENT
Start: 2020-03-13 | End: 2020-03-17 | Stop reason: HOSPADM

## 2020-03-13 RX ORDER — OXYCODONE HYDROCHLORIDE 5 MG/1
5 TABLET ORAL EVERY 4 HOURS PRN
Status: DISCONTINUED | OUTPATIENT
Start: 2020-03-13 | End: 2020-03-17 | Stop reason: HOSPADM

## 2020-03-13 RX ORDER — POLYETHYLENE GLYCOL 3350 17 G/17G
17 POWDER, FOR SOLUTION ORAL DAILY
Status: DISCONTINUED | OUTPATIENT
Start: 2020-03-13 | End: 2020-03-17 | Stop reason: HOSPADM

## 2020-03-13 RX ORDER — DOCUSATE SODIUM 100 MG/1
100 CAPSULE, LIQUID FILLED ORAL DAILY
Status: DISCONTINUED | OUTPATIENT
Start: 2020-03-13 | End: 2020-03-17 | Stop reason: HOSPADM

## 2020-03-13 RX ORDER — ULTRASOUND COUPLING MEDIUM
GEL (GRAM) TOPICAL PRN
Status: DISCONTINUED | OUTPATIENT
Start: 2020-03-13 | End: 2020-03-13 | Stop reason: ALTCHOICE

## 2020-03-13 RX ORDER — SODIUM CHLORIDE 9 MG/ML
INJECTION, SOLUTION INTRAVENOUS CONTINUOUS
Status: DISCONTINUED | OUTPATIENT
Start: 2020-03-13 | End: 2020-03-15

## 2020-03-13 RX ORDER — OXYCODONE HYDROCHLORIDE 5 MG/1
10 TABLET ORAL EVERY 4 HOURS PRN
Status: DISCONTINUED | OUTPATIENT
Start: 2020-03-13 | End: 2020-03-17 | Stop reason: HOSPADM

## 2020-03-13 RX ORDER — FENTANYL CITRATE 50 UG/ML
25 INJECTION, SOLUTION INTRAMUSCULAR; INTRAVENOUS EVERY 5 MIN PRN
Status: DISCONTINUED | OUTPATIENT
Start: 2020-03-13 | End: 2020-03-13 | Stop reason: HOSPADM

## 2020-03-13 RX ORDER — FENTANYL CITRATE 50 UG/ML
50 INJECTION, SOLUTION INTRAMUSCULAR; INTRAVENOUS EVERY 5 MIN PRN
Status: DISCONTINUED | OUTPATIENT
Start: 2020-03-13 | End: 2020-03-13 | Stop reason: HOSPADM

## 2020-03-13 RX ADMIN — MIRTAZAPINE 45 MG: 15 TABLET, FILM COATED ORAL at 22:14

## 2020-03-13 RX ADMIN — DOCUSATE SODIUM 100 MG: 100 CAPSULE, LIQUID FILLED ORAL at 22:14

## 2020-03-13 RX ADMIN — FENTANYL CITRATE 50 MCG: 50 INJECTION, SOLUTION INTRAMUSCULAR; INTRAVENOUS at 14:28

## 2020-03-13 RX ADMIN — BUPROPION HYDROCHLORIDE 200 MG: 100 TABLET, FILM COATED, EXTENDED RELEASE ORAL at 22:15

## 2020-03-13 RX ADMIN — SODIUM CHLORIDE: 9 INJECTION, SOLUTION INTRAVENOUS at 22:19

## 2020-03-13 RX ADMIN — MORPHINE SULFATE 4 MG: 4 INJECTION INTRAVENOUS at 20:21

## 2020-03-13 RX ADMIN — ROCURONIUM BROMIDE 30 MG: 10 INJECTION INTRAVENOUS at 14:28

## 2020-03-13 RX ADMIN — MORPHINE SULFATE 4 MG: 4 INJECTION INTRAVENOUS at 23:40

## 2020-03-13 RX ADMIN — PROPOFOL 200 MG: 10 INJECTION, EMULSION INTRAVENOUS at 17:08

## 2020-03-13 RX ADMIN — Medication 0.8 MG: at 17:23

## 2020-03-13 RX ADMIN — FENTANYL CITRATE 100 MCG: 50 INJECTION, SOLUTION INTRAMUSCULAR; INTRAVENOUS at 15:10

## 2020-03-13 RX ADMIN — PROPOFOL 50 MG: 10 INJECTION, EMULSION INTRAVENOUS at 14:28

## 2020-03-13 RX ADMIN — PROPOFOL 150 MG: 10 INJECTION, EMULSION INTRAVENOUS at 13:52

## 2020-03-13 RX ADMIN — ENOXAPARIN SODIUM 40 MG: 40 INJECTION SUBCUTANEOUS at 13:33

## 2020-03-13 RX ADMIN — Medication 5 MG: at 17:23

## 2020-03-13 RX ADMIN — CEFAZOLIN 2 G: 1 INJECTION, POWDER, FOR SOLUTION INTRAMUSCULAR; INTRAVENOUS at 17:09

## 2020-03-13 RX ADMIN — ONDANSETRON 4 MG: 2 INJECTION, SOLUTION INTRAMUSCULAR; INTRAVENOUS at 17:37

## 2020-03-13 RX ADMIN — OXYCODONE HYDROCHLORIDE 10 MG: 5 TABLET ORAL at 22:13

## 2020-03-13 RX ADMIN — FENTANYL CITRATE 100 MCG: 50 INJECTION, SOLUTION INTRAMUSCULAR; INTRAVENOUS at 17:30

## 2020-03-13 RX ADMIN — CEFAZOLIN 2 G: 1 INJECTION, POWDER, FOR SOLUTION INTRAMUSCULAR; INTRAVENOUS at 14:08

## 2020-03-13 RX ADMIN — PROPOFOL 100 MG: 10 INJECTION, EMULSION INTRAVENOUS at 14:30

## 2020-03-13 RX ADMIN — SODIUM CHLORIDE, PRESERVATIVE FREE 10 ML: 5 INJECTION INTRAVENOUS at 22:24

## 2020-03-13 RX ADMIN — FENTANYL CITRATE 100 MCG: 50 INJECTION, SOLUTION INTRAMUSCULAR; INTRAVENOUS at 13:52

## 2020-03-13 RX ADMIN — CLONAZEPAM 2 MG: 1 TABLET ORAL at 22:14

## 2020-03-13 RX ADMIN — LIDOCAINE HYDROCHLORIDE 50 MG: 10 INJECTION, SOLUTION EPIDURAL; INFILTRATION; INTRACAUDAL; PERINEURAL at 13:52

## 2020-03-13 RX ADMIN — Medication 600 MG: at 22:14

## 2020-03-13 RX ADMIN — ACETAMINOPHEN 650 MG: 325 TABLET ORAL at 22:12

## 2020-03-13 RX ADMIN — ROCURONIUM BROMIDE 30 MG: 10 INJECTION INTRAVENOUS at 16:16

## 2020-03-13 RX ADMIN — SODIUM CHLORIDE, POTASSIUM CHLORIDE, SODIUM LACTATE AND CALCIUM CHLORIDE 1000 ML: 600; 310; 30; 20 INJECTION, SOLUTION INTRAVENOUS at 12:50

## 2020-03-13 RX ADMIN — SODIUM CHLORIDE, POTASSIUM CHLORIDE, SODIUM LACTATE AND CALCIUM CHLORIDE: 600; 310; 30; 20 INJECTION, SOLUTION INTRAVENOUS at 13:43

## 2020-03-13 RX ADMIN — ROCURONIUM BROMIDE 50 MG: 10 INJECTION INTRAVENOUS at 13:52

## 2020-03-13 RX ADMIN — POLYETHYLENE GLYCOL 3350 17 G: 17 POWDER, FOR SOLUTION ORAL at 22:23

## 2020-03-13 RX ADMIN — SODIUM CHLORIDE: 9 INJECTION, SOLUTION INTRAVENOUS at 14:07

## 2020-03-13 RX ADMIN — SODIUM CHLORIDE: 9 INJECTION, SOLUTION INTRAVENOUS at 17:46

## 2020-03-13 RX ADMIN — FENTANYL CITRATE 100 MCG: 50 INJECTION, SOLUTION INTRAMUSCULAR; INTRAVENOUS at 13:58

## 2020-03-13 ASSESSMENT — PULMONARY FUNCTION TESTS
PIF_VALUE: 16
PIF_VALUE: 30
PIF_VALUE: 29
PIF_VALUE: 24
PIF_VALUE: 25
PIF_VALUE: 27
PIF_VALUE: 19
PIF_VALUE: 25
PIF_VALUE: 26
PIF_VALUE: 19
PIF_VALUE: 16
PIF_VALUE: 6
PIF_VALUE: 4
PIF_VALUE: 18
PIF_VALUE: 19
PIF_VALUE: 25
PIF_VALUE: 0
PIF_VALUE: 17
PIF_VALUE: 25
PIF_VALUE: 18
PIF_VALUE: 21
PIF_VALUE: 27
PIF_VALUE: 21
PIF_VALUE: 16
PIF_VALUE: 25
PIF_VALUE: 16
PIF_VALUE: 26
PIF_VALUE: 3
PIF_VALUE: 25
PIF_VALUE: 2
PIF_VALUE: 26
PIF_VALUE: 24
PIF_VALUE: 26
PIF_VALUE: 17
PIF_VALUE: 27
PIF_VALUE: 27
PIF_VALUE: 26
PIF_VALUE: 20
PIF_VALUE: 0
PIF_VALUE: 19
PIF_VALUE: 32
PIF_VALUE: 27
PIF_VALUE: 25
PIF_VALUE: 31
PIF_VALUE: 24
PIF_VALUE: 25
PIF_VALUE: 20
PIF_VALUE: 27
PIF_VALUE: 25
PIF_VALUE: 27
PIF_VALUE: 2
PIF_VALUE: 16
PIF_VALUE: 21
PIF_VALUE: 19
PIF_VALUE: 26
PIF_VALUE: 26
PIF_VALUE: 27
PIF_VALUE: 3
PIF_VALUE: 16
PIF_VALUE: 27
PIF_VALUE: 32
PIF_VALUE: 26
PIF_VALUE: 25
PIF_VALUE: 16
PIF_VALUE: 25
PIF_VALUE: 31
PIF_VALUE: 26
PIF_VALUE: 25
PIF_VALUE: 28
PIF_VALUE: 4
PIF_VALUE: 16
PIF_VALUE: 20
PIF_VALUE: 1
PIF_VALUE: 26
PIF_VALUE: 29
PIF_VALUE: 19
PIF_VALUE: 27
PIF_VALUE: 16
PIF_VALUE: 14
PIF_VALUE: 24
PIF_VALUE: 16
PIF_VALUE: 20
PIF_VALUE: 17
PIF_VALUE: 27
PIF_VALUE: 21
PIF_VALUE: 27
PIF_VALUE: 26
PIF_VALUE: 24
PIF_VALUE: 27
PIF_VALUE: 24
PIF_VALUE: 19
PIF_VALUE: 29
PIF_VALUE: 33
PIF_VALUE: 17
PIF_VALUE: 27
PIF_VALUE: 4
PIF_VALUE: 31
PIF_VALUE: 26
PIF_VALUE: 20
PIF_VALUE: 1
PIF_VALUE: 30
PIF_VALUE: 16
PIF_VALUE: 25
PIF_VALUE: 25
PIF_VALUE: 2
PIF_VALUE: 16
PIF_VALUE: 25
PIF_VALUE: 27
PIF_VALUE: 30
PIF_VALUE: 27
PIF_VALUE: 2
PIF_VALUE: 25
PIF_VALUE: 29
PIF_VALUE: 19
PIF_VALUE: 2
PIF_VALUE: 19
PIF_VALUE: 26
PIF_VALUE: 17
PIF_VALUE: 25
PIF_VALUE: 29
PIF_VALUE: 26
PIF_VALUE: 16
PIF_VALUE: 25
PIF_VALUE: 16
PIF_VALUE: 31
PIF_VALUE: 26
PIF_VALUE: 18
PIF_VALUE: 27
PIF_VALUE: 26
PIF_VALUE: 27
PIF_VALUE: 26
PIF_VALUE: 25
PIF_VALUE: 25
PIF_VALUE: 36
PIF_VALUE: 26
PIF_VALUE: 17
PIF_VALUE: 25
PIF_VALUE: 17
PIF_VALUE: 26
PIF_VALUE: 19
PIF_VALUE: 16
PIF_VALUE: 26
PIF_VALUE: 19
PIF_VALUE: 31
PIF_VALUE: 26
PIF_VALUE: 25
PIF_VALUE: 21
PIF_VALUE: 15
PIF_VALUE: 27
PIF_VALUE: 16
PIF_VALUE: 16
PIF_VALUE: 21
PIF_VALUE: 24
PIF_VALUE: 26
PIF_VALUE: 3
PIF_VALUE: 17
PIF_VALUE: 26
PIF_VALUE: 21
PIF_VALUE: 26
PIF_VALUE: 27
PIF_VALUE: 27
PIF_VALUE: 26
PIF_VALUE: 27
PIF_VALUE: 27
PIF_VALUE: 26
PIF_VALUE: 27
PIF_VALUE: 0
PIF_VALUE: 4
PIF_VALUE: 28
PIF_VALUE: 3
PIF_VALUE: 26
PIF_VALUE: 27
PIF_VALUE: 17
PIF_VALUE: 27
PIF_VALUE: 27
PIF_VALUE: 25
PIF_VALUE: 26
PIF_VALUE: 26
PIF_VALUE: 15
PIF_VALUE: 28
PIF_VALUE: 30
PIF_VALUE: 16
PIF_VALUE: 22
PIF_VALUE: 16
PIF_VALUE: 4
PIF_VALUE: 27
PIF_VALUE: 25
PIF_VALUE: 16
PIF_VALUE: 24
PIF_VALUE: 17
PIF_VALUE: 27
PIF_VALUE: 24
PIF_VALUE: 26
PIF_VALUE: 25
PIF_VALUE: 20
PIF_VALUE: 29
PIF_VALUE: 26
PIF_VALUE: 31
PIF_VALUE: 16
PIF_VALUE: 29
PIF_VALUE: 14
PIF_VALUE: 22
PIF_VALUE: 4
PIF_VALUE: 29
PIF_VALUE: 27
PIF_VALUE: 19
PIF_VALUE: 25
PIF_VALUE: 25
PIF_VALUE: 26
PIF_VALUE: 27
PIF_VALUE: 25
PIF_VALUE: 25
PIF_VALUE: 31
PIF_VALUE: 25
PIF_VALUE: 17
PIF_VALUE: 21
PIF_VALUE: 26
PIF_VALUE: 27
PIF_VALUE: 16
PIF_VALUE: 19
PIF_VALUE: 26
PIF_VALUE: 27
PIF_VALUE: 17
PIF_VALUE: 14
PIF_VALUE: 19
PIF_VALUE: 17
PIF_VALUE: 14
PIF_VALUE: 16
PIF_VALUE: 27
PIF_VALUE: 25
PIF_VALUE: 27
PIF_VALUE: 25
PIF_VALUE: 26

## 2020-03-13 ASSESSMENT — PAIN SCALES - GENERAL
PAINLEVEL_OUTOF10: 8
PAINLEVEL_OUTOF10: 10
PAINLEVEL_OUTOF10: 7
PAINLEVEL_OUTOF10: 9
PAINLEVEL_OUTOF10: 7
PAINLEVEL_OUTOF10: 8

## 2020-03-13 ASSESSMENT — PAIN - FUNCTIONAL ASSESSMENT: PAIN_FUNCTIONAL_ASSESSMENT: 0-10

## 2020-03-13 NOTE — H&P
History and Physical Update    Pt Name: Hernesto Redd  MRN: [id number]  YOB: 1950  Date of evaluation: 3/13/2020    [x] I have examined the patient and reviewed the H&P/Consult and there are no changes to the patient or plans. He reports he was given a cardiac clearance yesterday . [] I have examined the patient and reviewed the H&P/Consult and have noted the following changes:        Jolie [facility]  electronically signed 3/13/2020 at 11:49 AM      History and Physical     Pt Name: Hernesto Redd  MRN: [id number]  YOB: 1950  Date of evaluation: 3/5/2020     SUBJECTIVE:      History of Chief Complaint:    Patient complains of a history of decreased urinary flow for several years which has been managed with Flomax. More recently his PSA was elevated and a biopsy was positive for prostate cancer. He has been scheduled for a prostatectomy. Past Medical History    has a past medical history of Anxiety, Cancer ([address] Hyperlipidemia, Wears partial dentures, and Wellness examination. Past Surgical History   has a past surgical history that includes Spine surgery; knee surgery; Foot Tendon Surgery; Wrist surgery; Prostate Biopsy; back surgery; and Colonoscopy.   Medications    Current Medication      Current Outpatient Medications:     calcium carbonate 600 MG TABS tablet, Take 1 tablet by mouth 2 times daily, Disp: , Rfl:     traZODone (DESYREL) 150 MG tablet, TAKE 1 TABLET BY MOUTH AT BEDTIME, Disp: , Rfl:     tamsulosin (FLOMAX) 0.4 MG capsule, TAKE 1 CAPSULE BY MOUTH ONCE DAILY, Disp: , Rfl:     mirtazapine (REMERON) 45 MG tablet, TAKE 1 TABLET BY MOUTH AT BEDTIME, Disp: , Rfl:     clonazePAM (KLONOPIN) 2 MG tablet, TAKE 1 TABLET BY MOUTH TWICE DAILY, Disp: , Rfl:     buPROPion (WELLBUTRIN SR) 200 MG extended release tablet, Take 200 mg by mouth, Disp: , Rfl:     aspirin 325 MG tablet, Take 325 mg by mouth, Disp: , Rfl:     Multiple Vitamins-Minerals (MULTIVITAMIN ADULT PO),
hydrocodone-acetaminophen. Family History  family history includes Cancer in his father; Diabetes in his paternal grandmother. Social History   reports that he has been smoking. He has been smoking about 0.50 packs per day. He has never used smokeless tobacco.   reports previous alcohol use. reports no history of drug use.     Marital Status:   Children: two children, four grand children and \"a lot\" of great grandchildren  Occupation: retired     Review of Systems  CONSTITUTIONAL:  negative   EYES:  negative   HENT:  negative   RESPIRATORY:  negative   CARDIOVASCULAR:  negative   GASTROINTESTINAL:  negative   GENITOURINARY:  negative   INTEGUMENT/BREAST:  negative   HEMATOLOGIC/LYMPHATIC:  negative   ALLERGIC/IMMUNOLOGIC:  negative   ENDOCRINE:  negative   MUSCULOSKELETAL:  negative   NEUROLOGICAL:  negative   BEHAVIOR/PSYCH:  negative      OBJECTIVE:      VITALS:  height is 5' 9.5\" (1.765 m) and weight is 223 lb 8.7 oz (101.4 kg). His temporal temperature is 98.1 °F (36.7 °C). His blood pressure is 129/83 and his pulse is 65. His respiration is 20 and oxygen saturation is 94%. CONSTITUTIONAL: Alert & oriented x 3, no acute distress. SKIN:  Warm and dry, no rash or erythema. HEAD:  Normocephalic, atraumatic. EYES: PERRLA. EOMs intact. EARS:  Hearing grossly normal.    NOSE:  Nares patent. Septum midline. No rhinorrhea   MOUTH/THROAT:  Unremarkable   NECK: Supple with no lymphadenopathy. LUNGS: Clear to auscultation throughout. No wheezes, rales or rhonchi. CARDIOVASCULAR: Heart rate regular. Rhythm without murmur, click, gallop or rub. ABDOMEN: Soft, non tender, non distended, no masses or organomegaly. EXTREMITIES: No clubbing, cyanosis or edema.       TESTING:      EKG: NSR, left axis deviation, inferior infarct - age undetermined 3/5/2020  Labs pending: drawn 3/5/2020      IMPRESSION:   1. Prostate cancer  2.    Past Medical History in prose (no negatives)    has a past medical

## 2020-03-13 NOTE — OP NOTE
Dr. Jose R Chen MD      [address] [facility]. [place]  Date: 3/13/2020    Patient:  Deja Urbano  MRN: [id number]  YOB: 1950    Surgeon: Jose R Chen MD  Assistant: Governor Suni MD    Pre-Operative Diagnosis: Adenocarcinoma of the prostate. Post-Operative Diagnosis: Adenocarcinoma of the prostate. Procedure: Robotic assisted laparoscopic radical prostatectomy with bilateral pelvic lymph node dissection. Anesthesia: General  Complications: None  EBL: 100 (units unknown)  Fluids: Cystalloids  Drains: arcos catheter  Specimens:   ID Type Source Tests Collected by Time Destination   1 : URINE FOR CULTURE Urine Urine, indwelling catheter CULTURE, URINE Jose R Chen MD 3/13/2020 1422    A : PROSTATE AND SEMINAL VESICLES Tissue Prostate SURGICAL PATHOLOGY Jose R Chen MD 3/13/2020 1504    B : BILATERAL PELVIC LYMPH NODES Tissue Lymph Node SURGICAL PATHOLOGY Jose R Chen MD 3/13/2020 1613          INDICATIONS FOR PROCEDURE  Deja Urbano is a 77 y.o. male with a history of prostate cancer. Prostate biopsy demonstrated 1/12 cores positive for  maryanne 4 + 4 in the right base. PSA was 5. He is high risk. The various treatment options were discussed and he elected for radical prostatectomy. The risks and benefits of the procedure as well as alternatives and complications were discussed and he consented. DETAILS OF PROCEDURE   After informed consent was obtained in the preoperative area, the patient was taken back to the operating room. He was transferred to the operating table in the supine position. EPC cuffs were placed. The machine was turned on. Anesthesia was induced and antibiotics were started. His arms were tucked. All areas were appropriately padded. He had received heparin subcu preoperatively. He was sterilely prepped and draped in a standard fashion. A sterile catheter was placed on the field.  At this time we made a 1cm supraumbilical incision. Using a Veress needle we obtained pneumoperitoneum. Once the abdomen insufflated, an 8 mm robotic port was placed in the midline. The camera was introduced. The abdomen was inspected. There was no evidence of traumatic Veress needle insertion. We then placed the remainder of our ports. This included 3 further 8 mm robotic ports; a 5 mm assistant port; and a 12 mm assistant port. The instruments were placed under visual guidance after which the robot was docked. Once this was completed, we began to drop the bladder. Once the bladder was dropped. The endopelvic fascia was cleared off. The superficial DVC was ligated using bipolar energy. The endopelvic fascia was then incised. The pelvic floor muscles were cleaned off of the sidewall of the prostate. The DVC was ligated using a 0 V-Lock suture. Attention was turned to the bladder neck. A median lobe was not present. The prostato-vesicle junction was located. Using monopolar energy the bladder neck dissection was completed. The anterior Denonvilliers fascia was incised. The bilateral seminal vesicles and vasa were seen. These were carefully dissected out and then elevated anteriorly. The posterior Denonvilliers fascia was incised and the rectum was gently swept off the posterior aspect of the prostate. A nerve spare was not performed. When this was completed,  the pedicles were ligated using Weck hem-o-lock clips. Attention was then turned to the anterior dissection. Using monopolar energy the DVC was freed. The urethra was transected using cold energy. The prostate was freed. The lymph node dissection was then started. The external iliac vein was located. It was freed of its lymphatic tissues. The  nerve was identified bilaterally. This was preserved. The lymph node packet was then removed from the bifurcation of the common iliac to Coopers ligament, and from the bladder to the pelvic sidewall laterally.  Attention was turned to the bladder neck.   The anastomosis was then completed by first placing a 3-0 Vicryl Pb stitch. The running anastomosis was completed with a 3-0 Monocryl V-lock  suture. The anastomosis was tested using 120 cc of sterile irrigation. LEORA drain was not placed. A new catheter was also placed. The robot was undocked. The specimen was then extracted through the midline incision. The abdominal fascia was reapproximated in the midline using 0 Vicryl sutures in Figure-of-8 manner. All wounds were irrigated. The skin was closed using 4-0 Monocryl suture. Patient was awakened, extubated, and discharged back to the PACU in good and stable condition. Follow-Up: Patient will undergo our routine postoperative prostatectomy clinical pathway. He will have stat labs. He will be given a clear liquid diet. Any deviation from a normal postoperative course will be reflected the discharge summary.     Kayleigh Laboy  Electronically signed on 3/13/2020 at 7:52 PM

## 2020-03-13 NOTE — ANESTHESIA PRE PROCEDURE
Department of Anesthesiology  Preprocedure Note       Name:  Noah Oliva   Age:  77 y.o.  :  1950                                          MRN:  [id number]         Date:  3/13/2020      Surgeon: Mary Ellen Howell):  Stefan Parker MD    Procedure: XI LAPAROSCOPIC ROBOTIC PROSTATECTOMY, PELVIC LYMPHNODE DISSECTION (N/A )    Medications prior to admission:   Prior to Admission medications    Medication Sig Start Date End Date Taking? Authorizing Provider   calcium carbonate 600 MG TABS tablet Take 1 tablet by mouth 2 times daily   Yes Historical Provider, MD   traZODone (DESYREL) 150 MG tablet TAKE 1 TABLET BY MOUTH AT BEDTIME 19  Yes Historical Provider, MD   tamsulosin (FLOMAX) 0.4 MG capsule TAKE 1 CAPSULE BY MOUTH ONCE DAILY 19  Yes Historical Provider, MD   mirtazapine (REMERON) 45 MG tablet TAKE 1 TABLET BY MOUTH AT BEDTIME 19  Yes Historical Provider, MD   clonazePAM (KLONOPIN) 2 MG tablet TAKE 1 TABLET BY MOUTH TWICE DAILY 19  Yes Historical Provider, MD   buPROPion (WELLBUTRIN SR) 200 MG extended release tablet Take 200 mg by mouth   Yes Historical Provider, MD   Multiple Vitamins-Minerals (MULTIVITAMIN ADULT PO) Take 1 capsule by mouth   Yes Historical Provider, MD   SIMVASTATIN PO Take 40 mg by mouth   Yes Historical Provider, MD   aspirin 325 MG tablet Take 325 mg by mouth    Historical Provider, MD       Current medications:    Current Facility-Administered Medications   Medication Dose Route Frequency Provider Last Rate Last Dose    ceFAZolin (ANCEF) 2 g in dextrose 5 % 50 mL IVPB  2 g Intravenous On Call to Walt Hutchison MD        lactated ringers infusion 1,000 mL  1,000 mL Intravenous Continuous Zuri Gordon MD 50 mL/hr at 20 1250 1,000 mL at 20 1250    enoxaparin (LOVENOX) injection 40 mg  40 mg Subcutaneous Once Ramon Stafford PA-C           Allergies:     Allergies   Allergen Reactions    Hydrocodone-Acetaminophen      Vicodin/ Other reaction(s): Hives       Problem List:  There is no problem list on file for this patient. Past Medical History:        Diagnosis Date    Anxiety     Dr. Alatorre Swanton [facility])     Hyperlipidemia     Dr. Sheeba Downs Wears partial dentures     upper lower    Wellness examination     Dr. Cecilia Crump -PCP last seen 12/31/2019       Past Surgical History:        Procedure Laterality Date    BACK SURGERY      COLONOSCOPY      FOOT TENDON SURGERY      KNEE SURGERY      PROSTATE BIOPSY      SPINE SURGERY      WRIST SURGERY         Social History:    Social History     Tobacco Use    Smoking status: Current Every Day Smoker     Packs/day: 0.50    Smokeless tobacco: Never Used   Substance Use Topics    Alcohol use: Not Currently                                Ready to quit: Not Answered  Counseling given: Not Answered      Vital Signs (Current):   Vitals:    03/13/20 1206 03/13/20 1221   BP:  138/77   Pulse:  82   Resp:  20   Temp:  97.9 °F (36.6 °C)   TempSrc:  Temporal   SpO2:  98%   Weight: 216 lb 0.8 oz (98 kg)    Height: 5' 9.5\" (1.765 m)                                               BP Readings from Last 3 Encounters:   03/13/20 138/77   03/05/20 129/83   03/05/20 110/68       NPO Status: Time of last liquid consumption: 1600                        Time of last solid consumption: 2000                        Date of last liquid consumption: 03/12/20                        Date of last solid food consumption: 03/11/20    BMI:   Wt Readings from Last 3 Encounters:   03/13/20 216 lb 0.8 oz (98 kg)   03/05/20 223 lb 8.7 oz (101.4 kg)   03/05/20 222 lb 10.6 oz (101 kg)     Body mass index is 31.45 kg/m².     CBC:   Lab Results   Component Value Date    WBC 8.1 01/06/2012    RBC 4.44 01/06/2012    HGB 15.3 03/05/2020    HCT 45.4 03/05/2020    MCV 93.6 01/06/2012    RDW 13.2 01/06/2012     01/06/2012       CMP:   Lab Results   Component Value Date     01/06/2012    K 4.4 01/06/2012     01/06/2012 CO2 27 01/06/2012    BUN 12 01/06/2012    CREATININE 0.69 01/06/2012    GFRAA >60 01/06/2012    LABGLOM >60 01/06/2012    GLUCOSE 90 01/06/2012    CALCIUM 9.9 01/06/2012       POC Tests: No results for input(s): POCGLU, POCNA, POCK, POCCL, POCBUN, POCHEMO, POCHCT in the last 72 hours. Coags: No results found for: PROTIME, INR, APTT    HCG (If Applicable): No results found for: PREGTESTUR, PREGSERUM, HCG, HCGQUANT     ABGs: No results found for: PHART, PO2ART, QTO0VCD, PLO2DFN, BEART, B9BOKTGQ     Type & Screen (If Applicable):  No results found for: LABABO, [address]    Anesthesia Evaluation  Patient summary reviewed and Nursing notes reviewed no history of anesthetic complications:   Airway: Mallampati: II  TM distance: >3 FB   Neck ROM: full  Mouth opening: > = 3 FB Dental:          Pulmonary:Negative Pulmonary ROS and normal exam                               Cardiovascular:  Exercise tolerance: good (>4 METS),   (+) hyperlipidemia    (-) past MI, CAD, CABG/stent, dysrhythmias and  angina      Rhythm: regular             Beta Blocker:  Not on Beta Blocker         Neuro/Psych:   Negative Neuro/Psych ROS              GI/Hepatic/Renal: Neg GI/Hepatic/Renal ROS            Endo/Other:    (+) malignancy/cancer. Abdominal:           Vascular:                                      Anesthesia Plan      general     ASA 3       Induction: intravenous. MIPS: Postoperative opioids intended and Prophylactic antiemetics administered. Anesthetic plan and risks discussed with patient. Use of blood products discussed with patient whom consented to blood products.    Plan discussed with CRNA and surgical team.                  Jonathan Li MD   3/13/2020

## 2020-03-14 LAB
ANION GAP SERPL CALCULATED.3IONS-SCNC: 13 MMOL/L (ref 9–17)
BUN BLDV-MCNC: 10 MG/DL (ref 8–23)
BUN/CREAT BLD: ABNORMAL (ref 9–20)
CALCIUM SERPL-MCNC: 8.2 MG/DL (ref 8.6–10.4)
CHLORIDE BLD-SCNC: 108 MMOL/L (ref 98–107)
CO2: 19 MMOL/L (ref 20–31)
CREAT SERPL-MCNC: 0.82 MG/DL (ref 0.7–1.2)
CULTURE: NO GROWTH
GFR AFRICAN AMERICAN: >60 ML/MIN
GFR NON-AFRICAN AMERICAN: >60 ML/MIN
GFR SERPL CREATININE-BSD FRML MDRD: ABNORMAL ML/MIN/{1.73_M2}
GFR SERPL CREATININE-BSD FRML MDRD: ABNORMAL ML/MIN/{1.73_M2}
GLUCOSE BLD-MCNC: 89 MG/DL (ref 70–99)
HCT VFR BLD CALC: 45 % (ref 40.7–50.3)
HEMOGLOBIN: 14.4 G/DL (ref 13–17)
Lab: NORMAL
MCH RBC QN AUTO: 31.6 PG (ref 25.2–33.5)
MCHC RBC AUTO-ENTMCNC: 32 G/DL (ref 28.4–34.8)
MCV RBC AUTO: 98.7 FL (ref 82.6–102.9)
NRBC AUTOMATED: 0 PER 100 WBC
PDW BLD-RTO: 12.1 % (ref 11.8–14.4)
PLATELET # BLD: 193 K/UL (ref 138–453)
PMV BLD AUTO: 10.1 FL (ref 8.1–13.5)
POTASSIUM SERPL-SCNC: 4.4 MMOL/L (ref 3.7–5.3)
RBC # BLD: 4.56 M/UL (ref 4.21–5.77)
SODIUM BLD-SCNC: 140 MMOL/L (ref 135–144)
SPECIMEN DESCRIPTION: NORMAL
WBC # BLD: 12 K/UL (ref 3.5–11.3)

## 2020-03-14 PROCEDURE — 6370000000 HC RX 637 (ALT 250 FOR IP): Performed by: PHYSICIAN ASSISTANT

## 2020-03-14 PROCEDURE — 96374 THER/PROPH/DIAG INJ IV PUSH: CPT

## 2020-03-14 PROCEDURE — 6360000002 HC RX W HCPCS: Performed by: PHYSICIAN ASSISTANT

## 2020-03-14 PROCEDURE — 2580000003 HC RX 258: Performed by: PHYSICIAN ASSISTANT

## 2020-03-14 PROCEDURE — 96376 TX/PRO/DX INJ SAME DRUG ADON: CPT

## 2020-03-14 PROCEDURE — G0378 HOSPITAL OBSERVATION PER HR: HCPCS

## 2020-03-14 PROCEDURE — 85027 COMPLETE CBC AUTOMATED: CPT

## 2020-03-14 PROCEDURE — 80048 BASIC METABOLIC PNL TOTAL CA: CPT

## 2020-03-14 PROCEDURE — 36415 COLL VENOUS BLD VENIPUNCTURE: CPT

## 2020-03-14 RX ORDER — POLYETHYLENE GLYCOL 3350 17 G/17G
17 POWDER, FOR SOLUTION ORAL DAILY
Qty: 255 G | Refills: 0 | Status: SHIPPED | OUTPATIENT
Start: 2020-03-14 | End: 2020-03-29

## 2020-03-14 RX ORDER — OXYCODONE HYDROCHLORIDE 5 MG/1
5 TABLET ORAL EVERY 6 HOURS PRN
Qty: 20 TABLET | Refills: 0 | Status: SHIPPED | OUTPATIENT
Start: 2020-03-14 | End: 2020-03-19

## 2020-03-14 RX ORDER — CIPROFLOXACIN 500 MG/1
500 TABLET, FILM COATED ORAL 2 TIMES DAILY
Qty: 6 TABLET | Refills: 0 | Status: SHIPPED | OUTPATIENT
Start: 2020-03-14 | End: 2020-03-17

## 2020-03-14 RX ADMIN — ACETAMINOPHEN 650 MG: 325 TABLET ORAL at 21:05

## 2020-03-14 RX ADMIN — DEXTROSE MONOHYDRATE 2 G: 50 INJECTION, SOLUTION INTRAVENOUS at 01:47

## 2020-03-14 RX ADMIN — OXYCODONE HYDROCHLORIDE 10 MG: 5 TABLET ORAL at 07:49

## 2020-03-14 RX ADMIN — MORPHINE SULFATE 4 MG: 4 INJECTION INTRAVENOUS at 05:17

## 2020-03-14 RX ADMIN — OXYCODONE HYDROCHLORIDE 10 MG: 5 TABLET ORAL at 14:40

## 2020-03-14 RX ADMIN — ACETAMINOPHEN 650 MG: 325 TABLET ORAL at 05:17

## 2020-03-14 RX ADMIN — MIRTAZAPINE 45 MG: 15 TABLET, FILM COATED ORAL at 21:05

## 2020-03-14 RX ADMIN — OXYCODONE HYDROCHLORIDE 10 MG: 5 TABLET ORAL at 03:05

## 2020-03-14 RX ADMIN — CLONAZEPAM 2 MG: 1 TABLET ORAL at 09:48

## 2020-03-14 RX ADMIN — MORPHINE SULFATE 4 MG: 4 INJECTION INTRAVENOUS at 01:47

## 2020-03-14 RX ADMIN — ACETAMINOPHEN 650 MG: 325 TABLET ORAL at 14:40

## 2020-03-14 RX ADMIN — SODIUM CHLORIDE, PRESERVATIVE FREE 10 ML: 5 INJECTION INTRAVENOUS at 21:07

## 2020-03-14 RX ADMIN — DEXTROSE MONOHYDRATE 2 G: 50 INJECTION, SOLUTION INTRAVENOUS at 09:48

## 2020-03-14 RX ADMIN — CLONAZEPAM 2 MG: 1 TABLET ORAL at 21:05

## 2020-03-14 ASSESSMENT — PAIN SCALES - GENERAL
PAINLEVEL_OUTOF10: 8
PAINLEVEL_OUTOF10: 7
PAINLEVEL_OUTOF10: 6
PAINLEVEL_OUTOF10: 10
PAINLEVEL_OUTOF10: 5
PAINLEVEL_OUTOF10: 8
PAINLEVEL_OUTOF10: 9
PAINLEVEL_OUTOF10: 9
PAINLEVEL_OUTOF10: 8
PAINLEVEL_OUTOF10: 5
PAINLEVEL_OUTOF10: 9

## 2020-03-14 ASSESSMENT — PAIN DESCRIPTION - PAIN TYPE
TYPE: SURGICAL PAIN

## 2020-03-14 ASSESSMENT — PAIN DESCRIPTION - LOCATION: LOCATION: ABDOMEN

## 2020-03-14 NOTE — CARE COORDINATION
Case Management Initial Discharge Plan  Sobeida Lobato             Met with:patient to discuss discharge plans. Information verified: address, contacts, phone number, , insurance Yes  PCP: Sonia Forman MD  Date of last visit: 1 year    Insurance Provider: medicare    Discharge Planning    Living Arrangements:  Spouse/Significant Other   Support Systems:  Spouse/Significant Other    Home has 1 stories  3 stairs to climb to get into front door, no stairs to climb to reach second floor  Location of bedroom/bathroom in home main    Patient able to perform ADL's:Independent    Current Services (outpatient & in home) none  DME equipment: walker doesn't use  DME provider: none      Potential Assistance Needed:  N/A    Patient agreeable to home care: No  [place] of choice provided:  no    Prior SNF/Rehab Placement and Facility: none  Agreeable to SNF/Rehab: No  [place] of choice provided: no   Evaluation: n/a    Expected Discharge date:  20  Patient expects to be discharged to:  home  Follow Up Appointment: Best Day/ Time: Tuesday AM    Transportation provider: trish sandhu  Transportation arrangements needed for discharge: No    Readmission Risk              Risk of Unplanned Readmission:        0             Does patient have a readmission risk score greater than 14?: No  If yes, follow-up appointment must be made within 7 days of discharge.      Goals of Care: able to do self care      Discharge Plan: home independent with wife          Electronically signed by Corry Neri RN on 3/14/20 at 12:19 PM EDT

## 2020-03-14 NOTE — ANESTHESIA POSTPROCEDURE EVALUATION
Department of Anesthesiology  Postprocedure Note    Patient: Rubio Last  MRN: [id number]  YOB: 1950  Date of evaluation: 3/13/2020  Time:  10:46 PM     Procedure Summary     Date:  03/13/20 Room / Location:  [address]    Anesthesia Start:  230 1741 Anesthesia Stop:  9453    Procedure:  XI LAPAROSCOPIC ROBOTIC PROSTATECTOMY, PELVIC LYMPHNODE DISSECTION (N/A ) Diagnosis:  (PROSTATE CANCER)    Surgeon:  Brad García MD Responsible Provider:  Inge Manzano MD    Anesthesia Type:  general ASA Status:  3          Anesthesia Type: general    Kaity Phase I: Kaity Score: 8    Kaity Phase II:      Last vitals: Reviewed and per EMR flowsheets.        Anesthesia Post Evaluation    Patient location during evaluation: PACU  Patient participation: complete - patient participated  Level of consciousness: awake and alert  Pain score: 1  Airway patency: patent  Nausea & Vomiting: no nausea and no vomiting  Complications: no  Cardiovascular status: hemodynamically stable  Respiratory status: acceptable  Hydration status: euvolemic

## 2020-03-14 NOTE — PROGRESS NOTES
Urology - Progress Note      Subjective:   No acute events overnight. Denies F/C/N/V/CP/SOA. Bowel function: no flatus, no BMs  Diet: Clear liquids, tolerating  Pain: controlled, requiring IV medication  No ambulation, trying to get up presently to chair     UOP (24h):  Jones - 1525 cc    Patient Vitals for the past 24 hrs:   BP Temp Temp src Pulse Resp SpO2 Height Weight   03/14/20 0400 110/67 98.3 °F (36.8 °C) Oral 87 15 93 % -- --   03/14/20 0000 125/65 97.6 °F (36.4 °C) -- 100 15 94 % -- --   03/13/20 2000 -- -- -- 95 -- -- -- --   03/13/20 1907 104/77 98.1 °F (36.7 °C) -- 96 16 91 % -- --   03/13/20 1845 111/65 -- -- 98 21 93 % -- --   03/13/20 1830 120/60 -- -- 96 23 93 % -- --   03/13/20 1815 133/69 -- -- 97 25 91 % -- --   03/13/20 1800 (!) 151/67 -- -- 101 24 94 % -- --   03/13/20 1750 (!) 155/81 97.2 °F (36.2 °C) -- 106 22 94 % -- --   03/13/20 1221 138/77 97.9 °F (36.6 °C) Temporal 82 20 98 % -- --   03/13/20 1206 -- -- -- -- -- -- 5' 9.5\" (1.765 m) 216 lb 0.8 oz (98 kg)       Intake/Output Summary (Last 24 hours) at 3/14/2020 0750  Last data filed at 3/14/2020 0529  Gross per 24 hour   Intake 2300 ml   Output 1625 ml   Net 675 ml       Recent Labs     03/13/20 2110 03/14/20  0626   WBC 19.3* 12.0*   HGB 14.9 14.4   HCT 48.0 45.0   .3 98.7    193     Recent Labs     03/13/20 2110 03/14/20  0626    140   K 4.6 4.4    108*   CO2 17* 19*   BUN 13 10   CREATININE 0.90 0.82       No results for input(s): COLORU, PHUR, LABCAST, WBCUA, RBCUA, MUCUS, TRICHOMONAS, YEAST, BACTERIA, CLARITYU, SPECGRAV, LEUKOCYTESUR, UROBILINOGEN, BILIRUBINUR, BLOODU in the last 72 hours.     Invalid input(s): NITRATE, GLUCOSEUKETONESUAMORPHOUS    Additional Lab/culture results: None    Physical Exam:   NAD, AOx3  NLB, equal chest rise B/L  RRR, 2+ radial pulses  ABD S/ND/appropriate postop TTP, incisions C/D/I  Jones draining clear yellow urine  No LEORA  Warm extremities, no calf tenderness    Interval Imaging Findings: None     Assessment:  Elian Macias is a 77 y.o. male POD#1 s/p RALP/PLND    Plan:   Regular diet  Maintain arcos through discharge   Hgb stable 14.4 from 14.9  Encourage ambulation, IS use, EPCs  Pain control, wean to PO  Home meds  Ancef ppx    Al Pantoja MD  PGY-3, [address] Department of Urology   7:50 AM 3/14/2020

## 2020-03-15 LAB
ANION GAP SERPL CALCULATED.3IONS-SCNC: 11 MMOL/L (ref 9–17)
BUN BLDV-MCNC: 8 MG/DL (ref 8–23)
BUN/CREAT BLD: ABNORMAL (ref 9–20)
CALCIUM SERPL-MCNC: 8.4 MG/DL (ref 8.6–10.4)
CHLORIDE BLD-SCNC: 102 MMOL/L (ref 98–107)
CO2: 23 MMOL/L (ref 20–31)
CREAT SERPL-MCNC: 0.7 MG/DL (ref 0.7–1.2)
GFR AFRICAN AMERICAN: >60 ML/MIN
GFR NON-AFRICAN AMERICAN: >60 ML/MIN
GFR SERPL CREATININE-BSD FRML MDRD: ABNORMAL ML/MIN/{1.73_M2}
GFR SERPL CREATININE-BSD FRML MDRD: ABNORMAL ML/MIN/{1.73_M2}
GLUCOSE BLD-MCNC: 103 MG/DL (ref 70–99)
HCT VFR BLD CALC: 42.4 % (ref 40.7–50.3)
HEMOGLOBIN: 14.1 G/DL (ref 13–17)
MCH RBC QN AUTO: 32.6 PG (ref 25.2–33.5)
MCHC RBC AUTO-ENTMCNC: 33.3 G/DL (ref 28.4–34.8)
MCV RBC AUTO: 97.9 FL (ref 82.6–102.9)
NRBC AUTOMATED: 0 PER 100 WBC
PDW BLD-RTO: 11.9 % (ref 11.8–14.4)
PLATELET # BLD: 176 K/UL (ref 138–453)
PMV BLD AUTO: 10.6 FL (ref 8.1–13.5)
POTASSIUM SERPL-SCNC: 3.9 MMOL/L (ref 3.7–5.3)
RBC # BLD: 4.33 M/UL (ref 4.21–5.77)
SODIUM BLD-SCNC: 136 MMOL/L (ref 135–144)
WBC # BLD: 11.7 K/UL (ref 3.5–11.3)

## 2020-03-15 PROCEDURE — 2580000003 HC RX 258: Performed by: PHYSICIAN ASSISTANT

## 2020-03-15 PROCEDURE — 36415 COLL VENOUS BLD VENIPUNCTURE: CPT

## 2020-03-15 PROCEDURE — 6360000002 HC RX W HCPCS: Performed by: STUDENT IN AN ORGANIZED HEALTH CARE EDUCATION/TRAINING PROGRAM

## 2020-03-15 PROCEDURE — 97530 THERAPEUTIC ACTIVITIES: CPT

## 2020-03-15 PROCEDURE — 97535 SELF CARE MNGMENT TRAINING: CPT

## 2020-03-15 PROCEDURE — G0378 HOSPITAL OBSERVATION PER HR: HCPCS

## 2020-03-15 PROCEDURE — 1200000000 HC SEMI PRIVATE

## 2020-03-15 PROCEDURE — 80048 BASIC METABOLIC PNL TOTAL CA: CPT

## 2020-03-15 PROCEDURE — 97166 OT EVAL MOD COMPLEX 45 MIN: CPT

## 2020-03-15 PROCEDURE — 6370000000 HC RX 637 (ALT 250 FOR IP): Performed by: PHYSICIAN ASSISTANT

## 2020-03-15 PROCEDURE — 85027 COMPLETE CBC AUTOMATED: CPT

## 2020-03-15 PROCEDURE — 97162 PT EVAL MOD COMPLEX 30 MIN: CPT

## 2020-03-15 RX ORDER — HEPARIN SODIUM 5000 [USP'U]/ML
5000 INJECTION, SOLUTION INTRAVENOUS; SUBCUTANEOUS EVERY 8 HOURS SCHEDULED
Status: DISCONTINUED | OUTPATIENT
Start: 2020-03-15 | End: 2020-03-17 | Stop reason: HOSPADM

## 2020-03-15 RX ADMIN — OXYCODONE HYDROCHLORIDE 10 MG: 5 TABLET ORAL at 13:38

## 2020-03-15 RX ADMIN — CLONAZEPAM 2 MG: 1 TABLET ORAL at 21:56

## 2020-03-15 RX ADMIN — ACETAMINOPHEN 650 MG: 325 TABLET ORAL at 21:57

## 2020-03-15 RX ADMIN — OXYCODONE HYDROCHLORIDE 10 MG: 5 TABLET ORAL at 17:53

## 2020-03-15 RX ADMIN — HEPARIN SODIUM 5000 UNITS: 5000 INJECTION INTRAVENOUS; SUBCUTANEOUS at 15:11

## 2020-03-15 RX ADMIN — MIRTAZAPINE 45 MG: 15 TABLET, FILM COATED ORAL at 21:56

## 2020-03-15 RX ADMIN — OXYCODONE HYDROCHLORIDE 10 MG: 5 TABLET ORAL at 21:56

## 2020-03-15 RX ADMIN — OXYCODONE HYDROCHLORIDE 10 MG: 5 TABLET ORAL at 09:34

## 2020-03-15 RX ADMIN — HEPARIN SODIUM 5000 UNITS: 5000 INJECTION INTRAVENOUS; SUBCUTANEOUS at 09:34

## 2020-03-15 RX ADMIN — ACETAMINOPHEN 650 MG: 325 TABLET ORAL at 06:29

## 2020-03-15 RX ADMIN — ACETAMINOPHEN 650 MG: 325 TABLET ORAL at 15:10

## 2020-03-15 RX ADMIN — CLONAZEPAM 2 MG: 1 TABLET ORAL at 09:34

## 2020-03-15 RX ADMIN — HEPARIN SODIUM 5000 UNITS: 5000 INJECTION INTRAVENOUS; SUBCUTANEOUS at 21:56

## 2020-03-15 RX ADMIN — Medication 600 MG: at 21:56

## 2020-03-15 RX ADMIN — SODIUM CHLORIDE, PRESERVATIVE FREE 10 ML: 5 INJECTION INTRAVENOUS at 21:57

## 2020-03-15 ASSESSMENT — PAIN SCALES - GENERAL
PAINLEVEL_OUTOF10: 8
PAINLEVEL_OUTOF10: 7
PAINLEVEL_OUTOF10: 9
PAINLEVEL_OUTOF10: 8
PAINLEVEL_OUTOF10: 3
PAINLEVEL_OUTOF10: 8
PAINLEVEL_OUTOF10: 7
PAINLEVEL_OUTOF10: 9
PAINLEVEL_OUTOF10: 7
PAINLEVEL_OUTOF10: 8
PAINLEVEL_OUTOF10: 8

## 2020-03-15 ASSESSMENT — PAIN DESCRIPTION - PAIN TYPE: TYPE: SURGICAL PAIN

## 2020-03-15 ASSESSMENT — PAIN DESCRIPTION - LOCATION
LOCATION: ABDOMEN;PENIS
LOCATION: ABDOMEN

## 2020-03-15 ASSESSMENT — PAIN DESCRIPTION - ORIENTATION
ORIENTATION: LOWER
ORIENTATION: LOWER

## 2020-03-15 NOTE — PLAN OF CARE
Problem: Falls - Risk of:  Goal: Will remain free from falls  Description: Will remain free from falls  3/15/2020 0357 by Kamila Spencer RN  Outcome: Ongoing  3/14/2020 1726 by Thomas Jackson RN  Outcome: Ongoing  Goal: Absence of physical injury  Description: Absence of physical injury  3/15/2020 0357 by Kamila Spencer RN  Outcome: Ongoing  3/14/2020 1726 by Thomas Jackson RN  Outcome: Ongoing

## 2020-03-15 NOTE — PROGRESS NOTES
ft with a RW x min assist      Balance  Posture: Good  Sitting - Static: Fair  Sitting - Dynamic: Poor  Standing - Static: Poor  Standing - Dynamic: Poor      Plan   Plan  Times per week: 5-6x wk  Current Treatment Recommendations: Strengthening, Endurance Training, Gait Training, Functional Mobility Training, Stair training, Safety Education & Training  Safety Devices  Type of devices: Left in bed, Nurse notified, Call light within reach, Patient at risk for falls    G-Code     OutComes Score     AM-PAC Score  AM-PAC Inpatient Mobility Raw Score : 15 (03/15/20 1554)  AM-PAC Inpatient T-Scale Score : 39.45 (03/15/20 1554)  Mobility Inpatient CMS 0-100% Score: 57.7 (03/15/20 1554)  Mobility Inpatient CMS G-Code Modifier : CK (03/15/20 1554)     Goals  Short term goals  Time Frame for Short term goals: 10 visits  Short term goal 1: transfers with SBA  Short term goal 2: amb 150 ft with a RW x SBA  Short term goal 3: ascend/descend 4 steps with SBA  Short term goal 4: exercise program x SBA  Patient Goals   Patient goals : Return home     Therapy Time   Individual Concurrent Group Co-treatment   Time In 6349         Time Out 1438         Minutes 24             1 of [facility], PT

## 2020-03-15 NOTE — PROGRESS NOTES
Occupational Therapy   Occupational Therapy Initial Assessment  Date: 3/15/2020   Patient Name: Deangelo Louis  MRN: [id number]     : 1950    Date of Service: 3/15/2020    Discharge Recommendations:  Patient would benefit from continued therapy after discharge     Copied from General Surgery: Patient complains of a history of decreased urinary flow for several years which has been managed with Flomax. More recently his PSA was elevated and a biopsy was positive for prostate cancer. He has been scheduled for a prostatectomy  Assessment    Pt lying supine in bed upon entrance to room. Pt completed bed mobility with min assistance. Pt sat at eob 15 minutes with fair unsupported sitting balance. Sit to stand with min assistance. Pt completed dynamic mob in room with min assistance. Pt moves very slowly and requires an extended time to complete all tasks. Please refer to below assist levels for adl completion. Pt ed on OT POC, safety awareness tech, proper hand placement for transfers, and energy conservation tech with proper breathing tech with fair return. Pt retired supine in bed with call light and phone in reach. All needs met upon exit. Performance deficits / Impairments: Decreased functional mobility ; Decreased strength;Decreased endurance;Decreased ADL status; Decreased safe awareness;Decreased high-level IADLs;Decreased posture;Decreased balance  Treatment Diagnosis: Prostate CA  Prognosis: Fair  Decision Making: Medium Complexity  REQUIRES OT FOLLOW UP: Yes  Activity Tolerance  Activity Tolerance: Patient limited by pain  Safety Devices  Safety Devices in place: Yes  Type of devices: Call light within reach; Left in bed        Patient Diagnosis(es): The encounter diagnosis was Postoperative pain. has a past medical history of Anxiety, Cancer ([address] Hyperlipidemia, Wears partial dentures, and Wellness examination. has a past surgical history that includes Spine surgery; knee surgery;  Foot Tendon Surgery; Wrist surgery; Prostate Biopsy; back surgery; Colonoscopy; and Prostatectomy (03/13/2020). Treatment Diagnosis: Prostate CA      Restrictions  Restrictions/Precautions  Restrictions/Precautions: Fall Risk  Required Braces or Orthoses?: No  Position Activity Restriction  Other position/activity restrictions: Up in chair, amb pt, Up with assist    Subjective   General  Patient assessed for rehabilitation services?: Yes  Family / Caregiver Present: No  Patient Currently in Pain: Yes  Pain Assessment  Pain Assessment: 0-10  Pain Level: 8  Pain Type: Surgical pain  Pain Location: Abdomen;Penis  Pain Orientation: Lower  Non-Pharmaceutical Pain Intervention(s): Distraction; Therapeutic presence; Ambulation/Increased Activity;Repositioned  Vital Signs  Patient Currently in Pain: Yes  Oxygen Therapy  O2 Device: Nasal cannula  O2 Flow Rate (L/min): 1 L/min  Patient Observation  Observations: pt reports no 02 at home.  Pts 02 drops to 88% during activity on 1L 02  Social/Functional History  Social/Functional History  Lives With: Spouse  Type of Home: House  Home Layout: One level  Home Access: Stairs to enter with rails  Entrance Stairs - Number of Steps: 3 steps to enter  Entrance Stairs - Rails: Left  Bathroom Shower/Tub: Tub/Shower unit, Doors  Bathroom Toilet: Standard  Home Equipment: Rolling walker(not using walker currently-no DME used currently)  Receives Help From: Family(supportive spouse)  ADL Assistance: Independent  Homemaking Assistance: Independent  Homemaking Responsibilities: Yes  Meal Prep Responsibility: Primary(spouse completes)  Laundry Responsibility: Primary(spouse completes)  Cleaning Responsibility: Secondary(shared with spouse)  Bill Paying/Finance Responsibility: Primary  Shopping Responsibility: Secondary(shared with spouse)  Dependent Care Responsibility: Secondary(shared with spouse-Gayla Vang)  Ambulation Assistance: Independent  Transfer Assistance: Independent  Active : WFL  Right Hand PROM (degrees)  Right Hand PROM: WFL  Right Hand AROM (degrees)  Right Hand AROM: WFL  LUE Strength  Gross LUE Strength: WFL  L Hand General: 4-/5  LUE Strength Comment: 4-/5 overall muscle strength  RUE Strength  Gross RUE Strength: WFL  R Hand General: 4-/5  RUE Strength Comment: 4-/5 overall muscle strength      Plan   Plan  Times per week: 3-4 x week    AM-PAC Score  AM-PAC Inpatient Daily Activity Raw Score: 17 (03/15/20 1612)  AM-PAC Inpatient ADL T-Scale Score : 37.26 (03/15/20 1612)  ADL Inpatient CMS 0-100% Score: 50.11 (03/15/20 1612)  ADL Inpatient CMS G-Code Modifier : CK (03/15/20 1612)    Goals  Short term goals  Time Frame for Short term goals: Pt will, by discharge:   Short term goal 1: Dem I with adl performance  Short term goal 2: Dem I with all functional transfers  Short term goal 3: Dem I with dynamic mobility for adl completion  Short term goal 4: Dem good safety awareness tech for all transfers/mobility  Short term goal 5: Dem a 10 min dynamic standing task with cga to increase activity tolerance  Short term goal 6: Dem pursed lip breathing tech for adl completion       Therapy Time   Individual Concurrent Group Co-treatment   Time In 1415         Time Out 1510         Minutes 55         Timed Code Treatment Minutes: 40 Minutes       LOIS MARLEY OTR/L

## 2020-03-16 ENCOUNTER — APPOINTMENT (OUTPATIENT)
Dept: GENERAL RADIOLOGY | Age: 70
DRG: 707 | End: 2020-03-16
Attending: UROLOGY
Payer: MEDICARE

## 2020-03-16 LAB
ANION GAP SERPL CALCULATED.3IONS-SCNC: 8 MMOL/L (ref 9–17)
BUN BLDV-MCNC: 8 MG/DL (ref 8–23)
BUN/CREAT BLD: ABNORMAL (ref 9–20)
CALCIUM SERPL-MCNC: 8.5 MG/DL (ref 8.6–10.4)
CHLORIDE BLD-SCNC: 103 MMOL/L (ref 98–107)
CO2: 30 MMOL/L (ref 20–31)
CREAT SERPL-MCNC: 0.9 MG/DL (ref 0.7–1.2)
GFR AFRICAN AMERICAN: >60 ML/MIN
GFR NON-AFRICAN AMERICAN: >60 ML/MIN
GFR SERPL CREATININE-BSD FRML MDRD: ABNORMAL ML/MIN/{1.73_M2}
GFR SERPL CREATININE-BSD FRML MDRD: ABNORMAL ML/MIN/{1.73_M2}
GLUCOSE BLD-MCNC: 97 MG/DL (ref 70–99)
HCT VFR BLD CALC: 41.5 % (ref 40.7–50.3)
HEMOGLOBIN: 13.5 G/DL (ref 13–17)
MCH RBC QN AUTO: 32 PG (ref 25.2–33.5)
MCHC RBC AUTO-ENTMCNC: 32.5 G/DL (ref 28.4–34.8)
MCV RBC AUTO: 98.3 FL (ref 82.6–102.9)
NRBC AUTOMATED: 0 PER 100 WBC
PDW BLD-RTO: 12 % (ref 11.8–14.4)
PLATELET # BLD: 170 K/UL (ref 138–453)
PMV BLD AUTO: 10.5 FL (ref 8.1–13.5)
POTASSIUM SERPL-SCNC: 4.1 MMOL/L (ref 3.7–5.3)
RBC # BLD: 4.22 M/UL (ref 4.21–5.77)
SODIUM BLD-SCNC: 141 MMOL/L (ref 135–144)
WBC # BLD: 11.8 K/UL (ref 3.5–11.3)

## 2020-03-16 PROCEDURE — 6360000002 HC RX W HCPCS: Performed by: STUDENT IN AN ORGANIZED HEALTH CARE EDUCATION/TRAINING PROGRAM

## 2020-03-16 PROCEDURE — 85027 COMPLETE CBC AUTOMATED: CPT

## 2020-03-16 PROCEDURE — 97535 SELF CARE MNGMENT TRAINING: CPT

## 2020-03-16 PROCEDURE — 6370000000 HC RX 637 (ALT 250 FOR IP): Performed by: PHYSICIAN ASSISTANT

## 2020-03-16 PROCEDURE — 97530 THERAPEUTIC ACTIVITIES: CPT

## 2020-03-16 PROCEDURE — 2580000003 HC RX 258: Performed by: STUDENT IN AN ORGANIZED HEALTH CARE EDUCATION/TRAINING PROGRAM

## 2020-03-16 PROCEDURE — 36415 COLL VENOUS BLD VENIPUNCTURE: CPT

## 2020-03-16 PROCEDURE — 97116 GAIT TRAINING THERAPY: CPT

## 2020-03-16 PROCEDURE — 2580000003 HC RX 258: Performed by: PHYSICIAN ASSISTANT

## 2020-03-16 PROCEDURE — 71045 X-RAY EXAM CHEST 1 VIEW: CPT

## 2020-03-16 PROCEDURE — 80048 BASIC METABOLIC PNL TOTAL CA: CPT

## 2020-03-16 PROCEDURE — 1200000000 HC SEMI PRIVATE

## 2020-03-16 RX ORDER — SODIUM CHLORIDE 9 MG/ML
INJECTION, SOLUTION INTRAVENOUS CONTINUOUS
Status: DISCONTINUED | OUTPATIENT
Start: 2020-03-16 | End: 2020-03-17 | Stop reason: HOSPADM

## 2020-03-16 RX ORDER — KETOROLAC TROMETHAMINE 15 MG/ML
15 INJECTION, SOLUTION INTRAMUSCULAR; INTRAVENOUS EVERY 6 HOURS
Status: COMPLETED | OUTPATIENT
Start: 2020-03-16 | End: 2020-03-17

## 2020-03-16 RX ADMIN — KETOROLAC TROMETHAMINE 15 MG: 15 INJECTION, SOLUTION INTRAMUSCULAR; INTRAVENOUS at 09:07

## 2020-03-16 RX ADMIN — ACETAMINOPHEN 650 MG: 325 TABLET ORAL at 22:05

## 2020-03-16 RX ADMIN — Medication 600 MG: at 22:04

## 2020-03-16 RX ADMIN — OXYCODONE HYDROCHLORIDE 10 MG: 5 TABLET ORAL at 09:06

## 2020-03-16 RX ADMIN — KETOROLAC TROMETHAMINE 15 MG: 15 INJECTION, SOLUTION INTRAMUSCULAR; INTRAVENOUS at 16:21

## 2020-03-16 RX ADMIN — POLYETHYLENE GLYCOL 3350 17 G: 17 POWDER, FOR SOLUTION ORAL at 09:07

## 2020-03-16 RX ADMIN — BUPROPION HYDROCHLORIDE 200 MG: 100 TABLET, FILM COATED, EXTENDED RELEASE ORAL at 09:07

## 2020-03-16 RX ADMIN — HEPARIN SODIUM 5000 UNITS: 5000 INJECTION INTRAVENOUS; SUBCUTANEOUS at 22:06

## 2020-03-16 RX ADMIN — DOCUSATE SODIUM 100 MG: 100 CAPSULE, LIQUID FILLED ORAL at 09:06

## 2020-03-16 RX ADMIN — SODIUM CHLORIDE: 9 INJECTION, SOLUTION INTRAVENOUS at 21:49

## 2020-03-16 RX ADMIN — OXYCODONE HYDROCHLORIDE 10 MG: 5 TABLET ORAL at 05:03

## 2020-03-16 RX ADMIN — SODIUM CHLORIDE, PRESERVATIVE FREE 10 ML: 5 INJECTION INTRAVENOUS at 22:06

## 2020-03-16 RX ADMIN — ACETAMINOPHEN 650 MG: 325 TABLET ORAL at 05:03

## 2020-03-16 RX ADMIN — ACETAMINOPHEN 650 MG: 325 TABLET ORAL at 09:06

## 2020-03-16 RX ADMIN — MIRTAZAPINE 45 MG: 15 TABLET, FILM COATED ORAL at 22:04

## 2020-03-16 RX ADMIN — ACETAMINOPHEN 650 MG: 325 TABLET ORAL at 16:21

## 2020-03-16 RX ADMIN — Medication 600 MG: at 09:06

## 2020-03-16 RX ADMIN — HEPARIN SODIUM 5000 UNITS: 5000 INJECTION INTRAVENOUS; SUBCUTANEOUS at 05:03

## 2020-03-16 RX ADMIN — OXYCODONE HYDROCHLORIDE 10 MG: 5 TABLET ORAL at 13:08

## 2020-03-16 RX ADMIN — SODIUM CHLORIDE, PRESERVATIVE FREE 10 ML: 5 INJECTION INTRAVENOUS at 09:07

## 2020-03-16 RX ADMIN — KETOROLAC TROMETHAMINE 15 MG: 15 INJECTION, SOLUTION INTRAMUSCULAR; INTRAVENOUS at 22:05

## 2020-03-16 RX ADMIN — OXYCODONE HYDROCHLORIDE 10 MG: 5 TABLET ORAL at 17:31

## 2020-03-16 RX ADMIN — CLONAZEPAM 2 MG: 1 TABLET ORAL at 09:06

## 2020-03-16 RX ADMIN — CLONAZEPAM 2 MG: 1 TABLET ORAL at 22:04

## 2020-03-16 RX ADMIN — HEPARIN SODIUM 5000 UNITS: 5000 INJECTION INTRAVENOUS; SUBCUTANEOUS at 13:08

## 2020-03-16 RX ADMIN — OXYCODONE HYDROCHLORIDE 10 MG: 5 TABLET ORAL at 22:01

## 2020-03-16 ASSESSMENT — PAIN SCALES - GENERAL
PAINLEVEL_OUTOF10: 5
PAINLEVEL_OUTOF10: 8
PAINLEVEL_OUTOF10: 8
PAINLEVEL_OUTOF10: 7
PAINLEVEL_OUTOF10: 7
PAINLEVEL_OUTOF10: 8
PAINLEVEL_OUTOF10: 8
PAINLEVEL_OUTOF10: 7
PAINLEVEL_OUTOF10: 4
PAINLEVEL_OUTOF10: 5
PAINLEVEL_OUTOF10: 8
PAINLEVEL_OUTOF10: 9
PAINLEVEL_OUTOF10: 7
PAINLEVEL_OUTOF10: 7
PAINLEVEL_OUTOF10: 8

## 2020-03-16 ASSESSMENT — PAIN DESCRIPTION - LOCATION
LOCATION: ABDOMEN
LOCATION: PENIS;GROIN

## 2020-03-16 ASSESSMENT — PAIN DESCRIPTION - PAIN TYPE
TYPE: SURGICAL PAIN
TYPE: SURGICAL PAIN

## 2020-03-16 ASSESSMENT — PAIN DESCRIPTION - FREQUENCY: FREQUENCY: CONTINUOUS

## 2020-03-16 NOTE — PROGRESS NOTES
SBA  Patient Goals   Patient goals : Return home    Plan    Plan  Times per week: 5-6x wk  Current Treatment Recommendations: Strengthening, Endurance Training, Gait Training, Functional Mobility Training, Stair training, Safety Education & Training  Safety Devices  Type of devices:  All fall risk precautions in place, Bed alarm in place, Call light within reach, Gait belt, Patient at risk for falls, Left in bed, Nurse notified  Restraints  Initially in place: No     Therapy Time   Individual Concurrent Group Co-treatment   Time In 0921         Time Out 1006         Minutes 45         Timed Code Treatment Minutes: [address] [facility]

## 2020-03-16 NOTE — PROGRESS NOTES
Yes  Family / Caregiver Present: No  Pain Assessment  Pain Assessment: 0-10  Pain Level: 8  Pain Type: Surgical pain  Pain Location: Abdomen  Non-Pharmaceutical Pain Intervention(s): Shower; Therapeutic presence; Rest  Vital Signs  Patient Currently in Pain: Yes  Oxygen Therapy  SpO2: 93 %  O2 Device: Nasal cannula  O2 Flow Rate (L/min): 2 L/min   Orientation  Orientation  Overall Orientation Status: Within Functional Limits  Objective    ADL  Grooming: Stand by assistance;Setup; Increased time to complete(oral care completed standing at sink)  UE Bathing: Stand by assistance;Setup; Increased time to complete  LE Bathing: Stand by assistance;Setup; Increased time to complete  UE Dressing: Stand by assistance;Setup; Increased time to complete(to doff/don gown)  LE Dressing: Setup;Stand by assistance; Increased time to complete(to doff/don socks)  Additional Comments: bathing completed standing in shower, pt declinning utilizing shower chair  Balance  Sitting Balance: Stand by assistance  Standing Balance: Stand by assistance  Standing Balance  Time: pt tolerated approx 30 min  Activity: during ADLs and mobility  Comment: utilizing RW and grab bars  Functional Mobility  Functional - Mobility Device: Rolling Walker  Activity: To/from bathroom  Assist Level: Minimal assistance  Functional Mobility Comments: req slight assist with RW  Toilet Transfers  Toilet - Technique: Ambulating  Equipment Used: Raised toilet seat with rails  Toilet Transfer: Contact guard assistance  Shower Transfers  Shower - Transfer From: [employer]li [employer] - Transfer Type: To and From  Shower - Transfer To:  Standing  Shower - Technique: Ambulating  Shower Transfers: Minimal assistance  Shower Transfers Comments: 1 LOB noted  Bed mobility  Sit to Supine: Minimal assistance(req assistance with BLE)  Scooting: Contact guard assistance  Transfers  Stand Step Transfers: Contact guard assistance  Sit to stand: Contact guard assistance  Stand to sit: Contact guard assistance  Transfer Comments: 1 LOB noted when pt exiting shower utilizing grab bars, pt able to self correct  Cognition  Overall Cognitive Status: [facility]    RN and pt agreeable to therapy this day. ADL tasks of dressing, bathing and grooming completed see above for LOF. Increased unsteadiness noted with increased mobility. Pt req max verbal instructions for safety throughout session during mobility. Pt noted spiders on wall and seeing objects move, RN notified. At session end pt supine in bed with call light in reach and bed alarm on.   Plan   Plan  Times per week: 3-4 x week   Cont POC    Goals  Short term goals  Time Frame for Short term goals: Pt will, by discharge:   Short term goal 1: Dem I with adl performance  Short term goal 2: Dem I with all functional transfers  Short term goal 3: Dem I with dynamic mobility for adl completion  Short term goal 4: Dem good safety awareness tech for all transfers/mobility  Short term goal 5: Dem a 10 min dynamic standing task with cga to increase activity tolerance  Short term goal 6: Dem pursed lip breathing tech for adl completion       Therapy Time   Individual Concurrent Group Co-treatment   Time In 1104         Time Out 1210         Minutes 66         Timed Code Treatment Minutes: Kimberly 36, SHABAZZ/L

## 2020-03-17 VITALS
RESPIRATION RATE: 18 BRPM | SYSTOLIC BLOOD PRESSURE: 106 MMHG | TEMPERATURE: 98.6 F | BODY MASS INDEX: 30.93 KG/M2 | HEIGHT: 70 IN | DIASTOLIC BLOOD PRESSURE: 62 MMHG | WEIGHT: 216.05 LBS | OXYGEN SATURATION: 92 % | HEART RATE: 64 BPM

## 2020-03-17 PROBLEM — J96.00 ACUTE RESPIRATORY FAILURE (HCC): Status: ACTIVE | Noted: 2020-03-17

## 2020-03-17 PROBLEM — R09.02 HYPOXIA: Status: ACTIVE | Noted: 2020-03-17

## 2020-03-17 PROBLEM — J98.11 ATELECTASIS: Status: ACTIVE | Noted: 2020-03-17

## 2020-03-17 LAB
ANION GAP SERPL CALCULATED.3IONS-SCNC: 8 MMOL/L (ref 9–17)
BUN BLDV-MCNC: 16 MG/DL (ref 8–23)
BUN/CREAT BLD: ABNORMAL (ref 9–20)
CALCIUM SERPL-MCNC: 8.3 MG/DL (ref 8.6–10.4)
CHLORIDE BLD-SCNC: 102 MMOL/L (ref 98–107)
CO2: 28 MMOL/L (ref 20–31)
CREAT SERPL-MCNC: 0.82 MG/DL (ref 0.7–1.2)
GFR AFRICAN AMERICAN: >60 ML/MIN
GFR NON-AFRICAN AMERICAN: >60 ML/MIN
GFR SERPL CREATININE-BSD FRML MDRD: ABNORMAL ML/MIN/{1.73_M2}
GFR SERPL CREATININE-BSD FRML MDRD: ABNORMAL ML/MIN/{1.73_M2}
GLUCOSE BLD-MCNC: 112 MG/DL (ref 70–99)
HCT VFR BLD CALC: 38.2 % (ref 40.7–50.3)
HEMOGLOBIN: 12.4 G/DL (ref 13–17)
MCH RBC QN AUTO: 32.2 PG (ref 25.2–33.5)
MCHC RBC AUTO-ENTMCNC: 32.5 G/DL (ref 28.4–34.8)
MCV RBC AUTO: 99.2 FL (ref 82.6–102.9)
NRBC AUTOMATED: 0 PER 100 WBC
PDW BLD-RTO: 11.9 % (ref 11.8–14.4)
PLATELET # BLD: 190 K/UL (ref 138–453)
PMV BLD AUTO: 11 FL (ref 8.1–13.5)
POTASSIUM SERPL-SCNC: 3.9 MMOL/L (ref 3.7–5.3)
RBC # BLD: 3.85 M/UL (ref 4.21–5.77)
SODIUM BLD-SCNC: 138 MMOL/L (ref 135–144)
SURGICAL PATHOLOGY REPORT: NORMAL
WBC # BLD: 9.8 K/UL (ref 3.5–11.3)

## 2020-03-17 PROCEDURE — 97535 SELF CARE MNGMENT TRAINING: CPT

## 2020-03-17 PROCEDURE — 85027 COMPLETE CBC AUTOMATED: CPT

## 2020-03-17 PROCEDURE — 6360000002 HC RX W HCPCS: Performed by: STUDENT IN AN ORGANIZED HEALTH CARE EDUCATION/TRAINING PROGRAM

## 2020-03-17 PROCEDURE — 80048 BASIC METABOLIC PNL TOTAL CA: CPT

## 2020-03-17 PROCEDURE — 97110 THERAPEUTIC EXERCISES: CPT

## 2020-03-17 PROCEDURE — 6370000000 HC RX 637 (ALT 250 FOR IP): Performed by: PHYSICIAN ASSISTANT

## 2020-03-17 PROCEDURE — 36415 COLL VENOUS BLD VENIPUNCTURE: CPT

## 2020-03-17 PROCEDURE — 97116 GAIT TRAINING THERAPY: CPT

## 2020-03-17 PROCEDURE — 94618 PULMONARY STRESS TESTING: CPT

## 2020-03-17 RX ADMIN — OXYCODONE HYDROCHLORIDE 10 MG: 5 TABLET ORAL at 08:21

## 2020-03-17 RX ADMIN — DOCUSATE SODIUM 100 MG: 100 CAPSULE, LIQUID FILLED ORAL at 08:21

## 2020-03-17 RX ADMIN — POLYETHYLENE GLYCOL 3350 17 G: 17 POWDER, FOR SOLUTION ORAL at 08:21

## 2020-03-17 RX ADMIN — ACETAMINOPHEN 650 MG: 325 TABLET ORAL at 08:25

## 2020-03-17 RX ADMIN — CLONAZEPAM 2 MG: 1 TABLET ORAL at 08:21

## 2020-03-17 RX ADMIN — OXYCODONE HYDROCHLORIDE 10 MG: 5 TABLET ORAL at 17:22

## 2020-03-17 RX ADMIN — OXYCODONE HYDROCHLORIDE 10 MG: 5 TABLET ORAL at 13:21

## 2020-03-17 RX ADMIN — ACETAMINOPHEN 650 MG: 325 TABLET ORAL at 04:22

## 2020-03-17 RX ADMIN — BUPROPION HYDROCHLORIDE 200 MG: 100 TABLET, FILM COATED, EXTENDED RELEASE ORAL at 08:22

## 2020-03-17 RX ADMIN — ACETAMINOPHEN 650 MG: 325 TABLET ORAL at 13:21

## 2020-03-17 RX ADMIN — Medication 600 MG: at 08:21

## 2020-03-17 RX ADMIN — KETOROLAC TROMETHAMINE 15 MG: 15 INJECTION, SOLUTION INTRAMUSCULAR; INTRAVENOUS at 04:23

## 2020-03-17 RX ADMIN — HEPARIN SODIUM 5000 UNITS: 5000 INJECTION INTRAVENOUS; SUBCUTANEOUS at 06:30

## 2020-03-17 ASSESSMENT — PAIN DESCRIPTION - LOCATION
LOCATION: ABDOMEN
LOCATION: ABDOMEN

## 2020-03-17 ASSESSMENT — PAIN SCALES - GENERAL
PAINLEVEL_OUTOF10: 7
PAINLEVEL_OUTOF10: 7
PAINLEVEL_OUTOF10: 5
PAINLEVEL_OUTOF10: 7
PAINLEVEL_OUTOF10: 7
PAINLEVEL_OUTOF10: 6
PAINLEVEL_OUTOF10: 7

## 2020-03-17 ASSESSMENT — PAIN DESCRIPTION - ORIENTATION: ORIENTATION: LOWER

## 2020-03-17 ASSESSMENT — PAIN DESCRIPTION - PAIN TYPE
TYPE: SURGICAL PAIN
TYPE: SURGICAL PAIN

## 2020-03-17 NOTE — DISCHARGE SUMMARY
DISCHARGE SUMMARY NOTE:      Patient Identification  PATIENT: Deepa Mo is a 77 y.o. male. MRN: [id number]  :  1950  Admit Date:  3/13/2020  Discharge date:   20                                  Disposition: home  Discharged Condition:  fair  Discharge Diagnoses:   Patient Active Problem List   Diagnosis    Prostate cancer ([facility] [address]    Hypoxia    Acute respiratory failure ([facility] [address]    Atelectasis   Feli 3+4 = 7 adenocarcinoma the prostate. Surgical margins negative, lymph nodes negative. pT2 pN0    Consults: none    Surgery: XI LAPAROSCOPIC ROBOTIC PROSTATECTOMY, PELVIC LYMPHNODE DISSECTION     Patient Instructions: Activity: no lifting more than 10 lbs, no driving under the influence of pain medications, no driving until arcos catheter is removed and no tub baths or submerging of wounds under water  Diet: As tolerated  Patient told to follow up with Keven Reich MD in 2 day(s). Discharge Medications:    Florencia Wallace   Home Medication Instructions [facility]:[id number]    Printed on:20 0439   Medication Information                      aspirin 325 MG tablet  Take 325 mg by mouth             buPROPion (WELLBUTRIN SR) 200 MG extended release tablet  Take 200 mg by mouth             calcium carbonate 600 MG TABS tablet  Take 1 tablet by mouth 2 times daily             ciprofloxacin (CIPRO) 500 MG tablet  Take 1 tablet by mouth 2 times daily for 3 days Take one tablet twice daily the day before, day of, and day after catheter removal appt. clonazePAM (KLONOPIN) 2 MG tablet  TAKE 1 TABLET BY MOUTH TWICE DAILY             mirtazapine (REMERON) 45 MG tablet  TAKE 1 TABLET BY MOUTH AT BEDTIME             Multiple Vitamins-Minerals (MULTIVITAMIN ADULT PO)  Take 1 capsule by mouth             oxyCODONE (ROXICODONE) 5 MG immediate release tablet  Take 1 tablet by mouth every 6 hours as needed for Pain for up to 5 days. Intended supply: 3 days.  Take lowest dose possible to manage pain             polyethylene glycol (GLYCOLAX) powder  Take 17 g by mouth daily for 15 days Take daily while on pain medication and not having bowel movements. Hold for loose stool. SIMVASTATIN PO  Take 40 mg by mouth             traZODone (DESYREL) 150 MG tablet  TAKE 1 TABLET BY MOUTH AT BEDTIME                 Hospital course: Artemio Jain is an 77 y.o. that underwent a  Robotic Assisted Laparoscopic Prostatectomy by Dulce Ellis MD  on  3/13/2020. For more details please see the operative report. The patient did well in their hospital course. The diet was advanced to regular. The patient had pain controlled with PO meds, tolerated diet, and was ambulating appropriately. He did have some difficulty ambulating on his own at first but was able to ambulate in the hallway with assistance. Home health or SNF was offered as options but he declined. He did have oxygen desaturations down to the 80s requiring 2 L of nasal cannula oxygen. Chest x-ray showed atelectasis. Hemoglobin was stable on discharge at 12.4. The patient was afebrile for 24 hrs before discharge. They were given a prescription for antibiotic to take starting the day prior to Jones removal. The patient agrees to discharge, understands discharge instructions, and agrees to follow up.        Eulalio Mattson  5:36 PM 3/17/2020

## 2020-03-17 NOTE — PROGRESS NOTES
Physical Therapy  Facility/Department: [facility] RENAL//MED SURG  Daily Treatment Note  NAME: Jeniffer Astorga  : 1950  MRN: [id number]    Date of Service: 3/17/2020    Discharge Recommendations:  Patient would benefit from continued therapy after discharge   PT Equipment Recommendations  Equipment Needed: No    Assessment   Body structures, Functions, Activity limitations: Decreased functional mobility ; Decreased endurance;Decreased balance;Decreased strength;Decreased safe awareness;Decreased posture  Assessment: Pt amb 125 ft wtih RW and CGA . Would beneifit from continued PT ater d/c  on home health basiss  Prognosis: Good  REQUIRES PT FOLLOW UP: Yes  Activity Tolerance  Activity Tolerance: Patient limited by pain; Patient limited by endurance     Patient Diagnosis(es): The encounter diagnosis was Postoperative pain. has a past medical history of Anxiety, Cancer ([facility] [address] Hyperlipidemia, Wears partial dentures, and Wellness examination. has a past surgical history that includes Spine surgery; knee surgery; Foot Tendon Surgery; Wrist surgery; Prostate Biopsy; back surgery; Colonoscopy; Prostatectomy (2020); and Prostatectomy (N/A, 3/13/2020). Restrictions  Restrictions/Precautions  Restrictions/Precautions: Fall Risk, Up as Tolerated  Required Braces or Orthoses?: No  Position Activity Restriction  Other position/activity restrictions: amb pt; up with assist  Subjective   General  Response To Previous Treatment: Patient with no complaints from previous session. Family / Caregiver Present: Yes  Subjective  Subjective: Pt agreeable to PT with encouagment.    Pain Screening  Patient Currently in Pain: Yes  Pain Assessment  Pain Assessment: 0-10  Pain Level: 6  Pain Type: Surgical pain  Pain Location: Abdomen  Non-Pharmaceutical Pain Intervention(s): Repositioned  Response to Pain Intervention: Patient Satisfied  Vital Signs  Patient Currently in Pain: Yes       Orientation  Orientation  Overall

## 2020-03-17 NOTE — PLAN OF CARE
Problem: Falls - Risk of:  Goal: Will remain free from falls  Description: Will remain free from falls  Outcome: Ongoing  Goal: Absence of physical injury  Description: Absence of physical injury  Outcome: Ongoing     Problem: Falls - Risk of:  Goal: Will remain free from falls  Description: Will remain free from falls  Outcome: Ongoing  Goal: Absence of physical injury  Description: Absence of physical injury  Outcome: Ongoing     Problem: Falls - Risk of:  Goal: Will remain free from falls  Description: Will remain free from falls  Outcome: Ongoing  Goal: Absence of physical injury  Description: Absence of physical injury  Outcome: Ongoing     Problem: Falls - Risk of:  Goal: Will remain free from falls  Description: Will remain free from falls  Outcome: Ongoing  Goal: Absence of physical injury  Description: Absence of physical injury  Outcome: Ongoing     Problem: Falls - Risk of:  Goal: Will remain free from falls  Description: Will remain free from falls  Outcome: Ongoing  Goal: Absence of physical injury  Description: Absence of physical injury  Outcome: Ongoing

## 2020-03-17 NOTE — PROGRESS NOTES
Occupational Therapy  Facility/Department: [facility] RENAL//MED SURG  Daily Treatment Note  NAME: María Elena Matt  : 1950  MRN: [id number]    Date of Service: 3/17/2020    Discharge Recommendations:  Patient would benefit from continued therapy after discharge     Copied from General Surgery: Patient complains of a history of decreased urinary flow for several years which has been managed with Flomax.  More recently his PSA was elevated and a biopsy was positive for prostate cancer. Stacie Cerrato has been scheduled for a prostatectomy  Assessment    Pt lying supine in bed upon entrance to room. Pt completed bed mobility with min assistance. Pt sat at eob 15 minutes with fair unsupported sitting balance. Sit to stand with min assistance. Pt completed dynamic mob in room with min assistance. Pt moves very slowly and requires an extended time to complete all tasks. Please refer to below assist levels for adl completion. Pt ed on OT POC, safety awareness tech, proper hand placement for transfers, and energy conservation tech with proper breathing tech with fair return. Pt retired supine in bed with call light and phone in reach. All needs met upon exit. Assessment   Performance deficits / Impairments: Decreased functional mobility ; Decreased endurance;Decreased strength;Decreased ADL status; Decreased safe awareness;Decreased high-level IADLs;Decreased balance;Decreased cognition  Treatment Diagnosis: Prostate CA  Prognosis: Good  REQUIRES OT FOLLOW UP: Yes  Safety Devices  Safety Devices in place: Yes  Type of devices: Left in bed;Call light within reach; Bed alarm in place     Pt would benefit from a shower seat to increase I and safety in the home  Patient Diagnosis(es): The encounter diagnosis was Postoperative pain. has a past medical history of Anxiety, Cancer ([address] Hyperlipidemia, Wears partial dentures, and Wellness examination. has a past surgical history that includes Spine surgery; knee surgery;  Foot Tendon Surgery; Wrist surgery; Prostate Biopsy; back surgery; Colonoscopy; Prostatectomy (03/13/2020); and Prostatectomy (N/A, 3/13/2020). Restrictions  Restrictions/Precautions  Restrictions/Precautions: Fall Risk, Up as Tolerated  Required Braces or Orthoses?: No  Position Activity Restriction  Other position/activity restrictions: amb pt; up with assist  Subjective   General  Patient assessed for rehabilitation services?: Yes  Family / Caregiver Present: Yes(spouse present )  Pain Assessment  Pain Assessment: 0-10  Pain Level: 7  Pain Type: Surgical pain  Pain Location: Abdomen  Pain Orientation: Lower  Non-Pharmaceutical Pain Intervention(s): Distraction; Therapeutic presence; Ambulation/Increased Activity;Repositioned  Vital Signs  Patient Currently in Pain: Yes  Oxygen Therapy  O2 Device: Nasal cannula  O2 Flow Rate (L/min): 2 L/min  Patient Observation  Observations: Pt reports no 02 at home. Pt currently on 2 L 02, home 02 eval completed this date and qualifies for home 02   Orientation  Orientation  Overall Orientation Status: Within Functional Limits(Yet pt is talking off topc during education on DME for home safety. Pt talking about gas stations and lottery. Spouse reports this is new and belives it is medication related)  Objective    ADL  Feeding: Independent;Setup  Grooming: Stand by assistance;Setup; Increased time to complete  UE Bathing: Stand by assistance;Setup; Increased time to complete  LE Bathing: Stand by assistance;Setup; Increased time to complete  UE Dressing: Stand by assistance;Setup; Increased time to complete  LE Dressing: Setup;Stand by assistance; Increased time to complete  Toileting: Minimal assistance     Balance  Sitting Balance: Stand by assistance  Standing Balance: Stand by assistance  Functional Mobility  Functional - Mobility Device: Rolling Walker(spouse reports having a r walker at home)  Pt with flexed posture  Activity: To/from bathroom  Assist Level: Contact guard assistance  Toilet Transfers  Toilet - Technique: Ambulating  Equipment Used: Raised toilet seat with rails  Toilet Transfer: Contact guard assistance  Bed mobility  Supine to Sit: Contact guard assistance  Sit to Supine: Minimal assistance(requires assist for BLE management )  Comment: HOB elevated, use of grab bars  Transfers  Stand Step Transfers: Contact guard assistance  Sit to stand: Contact guard assistance  Stand to sit: Contact guard assistance     Cognition  Overall Cognitive Status: Exceptions  Arousal/Alertness: Delayed responses to stimuli  Following Commands:  Follows one step commands with repetition  Initiation: Requires cues for some  Sequencing: Requires cues for some      Plan   Plan  Times per week: 3-4 x week    AM-PAC Score  AM-PAC Inpatient Daily Activity Raw Score: 17 (03/15/20 1612)  AM-PAC Inpatient ADL T-Scale Score : 37.26 (03/15/20 1612)  ADL Inpatient CMS 0-100% Score: 50.11 (03/15/20 1612)  ADL Inpatient CMS G-Code Modifier : CK (03/15/20 1612)    Goals  Short term goals  Time Frame for Short term goals: Pt will, by discharge:   Short term goal 1: Dem I with adl performance  Short term goal 2: Dem I with all functional transfers  Short term goal 3: Dem I with dynamic mobility for adl completion  Short term goal 4: Dem good safety awareness tech for all transfers/mobility  Short term goal 5: Dem a 10 min dynamic standing task with cga to increase activity tolerance  Short term goal 6: Dem pursed lip breathing tech for adl completion       Therapy Time   Individual Concurrent Group Co-treatment   Time In 1042         Time Out 1121         Minutes 39         Timed Code Treatment Minutes: [address], OTR/L

## 2020-03-17 NOTE — CARE COORDINATION
Urology    Pt is POD4 from robotic prostatectomy on 3/13/2020. He had home O2 evaluation this afternoon and was found to be only 84% on room air with exercise. Given this and his CXR result of bibasilar atelectasis, he has post-operative hypoxia requiring home oxygen at time of discharge. He could require home O2 for 6 months and should follow up with his PCP and determine if a pulmonology consult is warranted.      López Lyons PA-C  Urology Service   3/17/2020 2:47 PM

## 2020-03-17 NOTE — CARE COORDINATION
Message left with [facility] therapist [phone], patient needs home O2 eval, no answer at [phone] with Alexandra Jones in respiratory she is on her way to do eval    1035 Eval complete, patient chooses [facility], referral for Home O2 faxed and [facility] notified it was sent.      [address] list provided to patient and wife, will obtain choice and make referral, plan is home with O2 and homecare, patient and wife are agreeable    [phone] Per [facility] patient does not qualify for home O2 with present diagnosis, Dr Melonie Samayoa notified    [phone] Home O2 orders sent to Luca Coombs    [phone] with patient and wife, choice is [facility] home [facility], referral sent    0621 O2 Delivered per Luca Coombs

## 2020-03-18 ENCOUNTER — OFFICE VISIT (OUTPATIENT)
Dept: UROLOGY | Age: 70
End: 2020-03-18

## 2020-03-18 VITALS — HEIGHT: 69 IN | BODY MASS INDEX: 32 KG/M2 | TEMPERATURE: 98.4 F | WEIGHT: 216.05 LBS

## 2020-03-18 PROCEDURE — 99024 POSTOP FOLLOW-UP VISIT: CPT | Performed by: NURSE PRACTITIONER

## 2020-03-18 ASSESSMENT — ENCOUNTER SYMPTOMS
CONSTIPATION: 1
WHEEZING: 0
ABDOMINAL PAIN: 0
EYE REDNESS: 0
NAUSEA: 0
COUGH: 0
VOMITING: 0
COLOR CHANGE: 0
BACK PAIN: 0
SHORTNESS OF BREATH: 1
EYE PAIN: 0

## 2020-03-18 NOTE — PATIENT INSTRUCTIONS
Reviewed path report     Got O2 tank working appropriately with adjustments     Did not have incentive spirometer at home, stated was not packed with belongings at [address] ferraro and pt demonstrated understanding of use and will continue to do at least 10 per hour.     Encouraged to be up and around the house ambulating     Has cystogram scheduled for today.  Has started antibiotic today for cystogram and cath removal prep.       will f/u with PCP as scheduled.           all questions answered

## 2020-03-18 NOTE — PROGRESS NOTES
tablet TAKE 1 TABLET BY MOUTH TWICE DAILY      buPROPion (WELLBUTRIN SR) 200 MG extended release tablet Take 200 mg by mouth      Multiple Vitamins-Minerals (MULTIVITAMIN ADULT PO) Take 1 capsule by mouth      SIMVASTATIN PO Take 40 mg by mouth         Hydrocodone-acetaminophen  Social History     Tobacco Use   Smoking Status Current Every Day Smoker    Packs/day: 0.50   Smokeless Tobacco Never Used     (Ifpatient a smoker, smoking cessation counseling offered)    Social History     Substance and Sexual Activity   Alcohol Use Not Currently       REVIEW OF SYSTEMS:  Review of Systems    Physical Exam:      Vitals:    03/18/20 1000   Temp: 98.4 °F (36.9 °C)     Body mass index is 31.46 kg/m². Patient is a 77 y.o. male in no acute distress and alert and oriented to person, place and time. Physical Exam  Constitutional: Patient in no acute distress. Neuro: Alert and oriented to person, place and time. Psych: Mood normal, affect normal  Skin: warm, pink, No rash noted  HEENT: Head: Normocephalic andatraumatic  Conjunctivae and EOM are normal. Pupils are equal, round  Nose:Normal  Right External Ear: Normal; Left External Ear: Normal  Mouth: Mucosa Moist  Neck: Supple  Lungs: Respiratory effort is normal, O2 @ 3L per NC  Cardiovascular: Warm & pink  Abdomen: Soft, non-tender, non-distended. incisions well approximated, no induration or erythema  Bladder non-tender and not distended. thigh bag to gravity draining bryon colored urine   Musculoskeletal: walker      Assessment and Plan      1. Prostate cancer ([address]    2. S/P prostatectomy           Plan:     Reviewed path report    Got O2 tank working appropriately with adjustments    Did not have incentive spirometer at home, stated was not packed with belongings at [address]  obtained and pt demonstrated understanding of use and will continue to do at least 10 per hour.     Encouraged to be up and around the house ambulating    Has cystogram

## 2020-03-19 ENCOUNTER — HOSPITAL ENCOUNTER (OUTPATIENT)
Dept: GENERAL RADIOLOGY | Age: 70
Discharge: HOME | End: 2020-03-21
Payer: MEDICARE

## 2020-03-19 PROCEDURE — 6360000004 HC RX CONTRAST MEDICATION: Performed by: UROLOGY

## 2020-03-19 PROCEDURE — 51600 INJECTION FOR BLADDER X-RAY: CPT

## 2020-03-19 PROCEDURE — 74430 CONTRAST X-RAY BLADDER: CPT

## 2020-03-19 RX ADMIN — IOPAMIDOL 200 ML: 510 INJECTION, SOLUTION INTRAVASCULAR at 11:43

## 2020-03-20 ENCOUNTER — TELEPHONE (OUTPATIENT)
Dept: UROLOGY | Age: 70
End: 2020-03-20

## 2020-03-20 NOTE — TELEPHONE ENCOUNTER
Spoke with patient's wife and she states PT checked SaO2 while at the home today and was 93% and 91% without portable oxygen. She states that the patient was advised he didn't need to use oxygen if levels remained above 90 %, and he has recently decided to \"take himself off\". He states he reports no shortness of breath and SaO2 remains stable without the use of portable oxygen. Left voicemail with [facility] Coordinator and requested a call back if they need any further assistance.

## 2020-03-24 ENCOUNTER — TELEPHONE (OUTPATIENT)
Dept: UROLOGY | Age: 70
End: 2020-03-24

## 2020-03-24 NOTE — TELEPHONE ENCOUNTER
Pt wife called stating \" Pt had surgery done a week ago, he was sent home with oxygen. The oxygen smelled so bad. He had PT today and didn't even get winded. Can Ashley Godinez have someone pick it up. \" advised pt wife to contact PCP office. Verbal understanding given call ended.

## 2020-03-27 ENCOUNTER — TELEPHONE (OUTPATIENT)
Dept: UROLOGY | Age: 70
End: 2020-03-27

## 2020-03-31 ENCOUNTER — TELEPHONE (OUTPATIENT)
Dept: UROLOGY | Age: 70
End: 2020-03-31

## 2020-04-01 ENCOUNTER — TELEPHONE (OUTPATIENT)
Dept: UROLOGY | Age: 70
End: 2020-04-01

## 2020-04-01 RX ORDER — NYSTATIN 100000 [USP'U]/G
POWDER TOPICAL
Qty: 1 BOTTLE | Refills: 2 | Status: SHIPPED | OUTPATIENT
Start: 2020-04-01 | End: 2022-04-11

## 2020-04-01 NOTE — TELEPHONE ENCOUNTER
Due to dysuria and UTI s/s, Urine culture was ordered yesterday per Dr Sofie Amor protocol. Gualberto Lugo from [facility] called stating Claudio Cobb had hoped to collect an adequate amount for specimen due to the patient's constant incontinence\". Also noting \"a lot of inflammation and redness to the groin and around the penis, looking yeasty\". She is requesting orders for Diflucan or home remedy pt can get started on right away. She also expressed that patient would like more pain pills, mostly likely due to the discomfort from yeast infection. Please advise.

## 2020-04-06 ENCOUNTER — TELEPHONE (OUTPATIENT)
Dept: UROLOGY | Age: 70
End: 2020-04-06

## 2020-04-06 RX ORDER — INCONTINENCE PAD,LINER,DISP
20 EACH MISCELLANEOUS DAILY
Qty: 600 EACH | Refills: 11 | Status: CANCELLED | OUTPATIENT
Start: 2020-04-06

## 2020-04-06 NOTE — TELEPHONE ENCOUNTER
Please order pads, pullups size large  , and bed liners for this patient the home nurse Mary Bedolla called he is having a lot of incontinence since surgery  Please sent to [facility] 10 per day or what ever the max is that is allowed         All orders are pended

## 2020-04-08 RX ORDER — DIAPER,BRIEF,ADULT, DISPOSABLE
EACH MISCELLANEOUS
Qty: 300 BOTTLE | Refills: 3 | Status: SHIPPED | OUTPATIENT
Start: 2020-04-08

## 2020-04-08 NOTE — TELEPHONE ENCOUNTER
I have given an written order- this may not be a covered benefit. If not [facility] carries these products- a script was sent there for the pads but not the chux- I do not think they carry chux. If he puts a guard inside the brief he shouldn't need a bedpad? . Stress importance of kegels and quick flicks. . This level of incontinence is not uncommon after prostatectomy surgery.      Kegel exercise: squeeze and hold 3 seconds, relax and release 3 seconds, repeat 10-15 times in a row, at lease 6 times  per day    Quick flicks: after kegel, squeeze, release no hold, 12-15 times in a row, at least 6 times per day    Be sure he has a televisit f/u with Dr Rodman Essex please- 6 weeks after his last appt with a PSA

## 2020-06-01 ENCOUNTER — OFFICE VISIT (OUTPATIENT)
Dept: UROLOGY | Age: 70
End: 2020-06-01

## 2020-06-01 VITALS — WEIGHT: 216 LBS | BODY MASS INDEX: 31.99 KG/M2 | TEMPERATURE: 98.5 F | HEIGHT: 69 IN

## 2020-06-01 PROCEDURE — 99024 POSTOP FOLLOW-UP VISIT: CPT | Performed by: UROLOGY

## 2020-06-01 ASSESSMENT — ENCOUNTER SYMPTOMS
EYE PAIN: 0
EYE REDNESS: 0
VOMITING: 0
WHEEZING: 0
BACK PAIN: 0
SHORTNESS OF BREATH: 0
COUGH: 0
NAUSEA: 0
COLOR CHANGE: 0
ABDOMINAL PAIN: 0

## 2020-06-04 ENCOUNTER — HOSPITAL ENCOUNTER (OUTPATIENT)
Dept: PHYSICAL THERAPY | Facility: CLINIC | Age: 70
Setting detail: THERAPIES SERIES
Discharge: HOME | End: 2020-06-04
Payer: MEDICARE

## 2020-06-04 PROCEDURE — 97161 PT EVAL LOW COMPLEX 20 MIN: CPT

## 2020-06-04 PROCEDURE — 97110 THERAPEUTIC EXERCISES: CPT

## 2020-06-08 NOTE — FLOWSHEET NOTE
Koby Fall Risk Assessment    Patient Name:  Diamond Renee  : 1950    Risk Factor Scale  Score   History of Falls [] Yes  [x] No 25  0 0   Secondary Diagnosis [] Yes  [x] No 15  0 0   Ambulatory Aid [] Furniture  [] Crutches/cane/walker  [] None/bedrest/wheelchair/nurse 30  15  0 0   IV/Heparin Lock [] Yes  [x] No 20  0 0   Gait/Transferring [] Impaired  [] Weak  [x] Normal/bedrest/immobile 20  10  0 0   Mental Status [] Forgets limitations  [x] Oriented to own ability 15  0 0      Total:0     Based on the Assessment score: check the appropriate box.     [x]  No intervention needed   Low =   Score of 0-24    []  Use standard prevention interventions Moderate =  Score of 24-44   [] Give patient handout and discuss fall prevention strategies   [] Establish goal of education for patient/family RE: fall prevention strategies    []  Use high risk prevention interventions High = Score of 45 and higher   [] Give patient handout and discuss fall prevention strategies   [] Establish goal of education for patient/family Re: fall prevention strategies   [] Discuss lifeline / other resources    Electronically signed by:   Georgette Cunha PT  Date: 2020

## 2020-06-08 NOTE — CONSULTS
Internal clock  X          Muscle bulk    X   decreased    Muscle Power    X   1-2/5    Muscle Endurance    X   2 sec with muscle flickering   Quality of Contraction    X   slow rise, decreased hold    Specificity    X   Mod overflow of glutes, adductors    Coordination    X       Sensation  X          FUNCTION  Normal  Difficult  Unable        Cough/Sneeze    X         Supine-Sit    X         Squatting    X         Bending/ stooping    X         Stairs    X         Hopping    X         Jumping    X         Running    X         Lifting/carrying    X                Functional Test: LUZMARIA-6 Score: 37.5% functionally impaired     Comments:    Assessment:   Patient noted with weak PF musculature as well as inability to use correct musculature to regain control. Pt  would benefit from skilled physical therapy services in order to improve function and PF strength via biofeedback, ES, exercise and education as appropriate. May need home e-stim unit. Problems:    [] ? Pain:  [] ? ROM:  [x] ? Strength:  [x] ? Function:  [] Other:      STG: (to be met in 4 treatments)  1. PF contraction long enough to inhibit bladder contraction  2. Able to isolate PF musculature  3. ? Strength: PF 3/5  4. Able to maintain voiding schedule of 3-4 hours  5. Patient to be independent with home exercise program as demonstrated by performance with correct form without cues. LTG: (to be met in 8 treatments)  1. Cough, sneeze, laugh without incontinence  2.  Perform basic ADL's without leaking    Patient goals:Stop the leaking    Rehab Potential:  [x] Good  [] Fair  [] Poor   Suggested Professional Referral:  [x] No  [] Yes:  Barriers to Goal Achievement:  [x] No  [] Yes:  Domestic Concerns:  [x] No  [] Yes:    Pt. Education:  [x] Plans/Goals, Risks/Benefits discussed  [x] Home exercise program  Method of Education: [x] Verbal  [x] Demo  [x] Written(Male PF picture explanation, urine stop test, PF ex short and long holds, [facility] ex)  Comprehension of Education:  [x] Verbalizes understanding. [x] Demonstrates understanding. [] Needs Review. [] Demonstrates/verbalizes understanding of HEP/Ed previously given.     Treatment Plan:  [x] Therapeutic Exercise   14508  [] Iontophoresis: 4 mg/mL Dexamethasone Sodium Phosphate  mAmin  70199   [] Therapeutic Activity  20173 [] Vasopneumatic cold with compression  12611    [] Gait Training   82778 [] Ultrasound   94091   [x] Neuromuscular Re-education  78285 [x] Electrical Stimulation Unattended  61936   [] Manual Therapy  97888 [] Electrical Stimulation Attended  38071   [x] Instruction in HEP  [] Lumbar/Cervical Traction  41008   [] Aquatic Therapy   22698 [] Cold/hotpack    [] Massage   97360      [] Dry Needling, 1 or 2 muscles  39626   [x] Biofeedback, first 15 minutes   55582  [] Biofeedback, additional 15 minutes   60406 [] Dry Needling, 3 or more muscles  02493     Frequency:  1 x/week for 8 visits    Todays Treatment:  Modalities:   Exercises:  Exercise Reps/ Time Weight/ Level Comments   Urine stop test      PF ex: short holds      PF ex: long holds      The knack ex            Other:    Specific Instructions for next treatment:Biofeedback, ES as needed    Evaluation Complexity:  History (Personal factors, comorbidities) [] 0 [x] 1-2 [] 3+   Exam (limitations, restrictions) [x] 1-2 [] 3 [] 4+   Clinical presentation (progression) [x] Stable [] Evolving  [] Unstable   Decision Making [x] Low [] Moderate [] High    [x] Low Complexity [] Moderate Complexity [] High Complexity       Treatment Charges: Mins Units   [x] Evaluation       [x]  Low       []  Moderate       []  High 20 1   []  Modalities     [x]  Ther Exercise 15 1   []  Manual Therapy     []  Ther Activities     []  Aquatics     []  Vasocompression     []  Other       TOTAL TREATMENT TIME: 35    Time in:1500     Time YHT:7637    Electronically signed by: Alvino Alcantara PT    Physician

## 2020-06-11 ENCOUNTER — APPOINTMENT (OUTPATIENT)
Dept: PHYSICAL THERAPY | Facility: CLINIC | Age: 70
End: 2020-06-11
Payer: MEDICARE

## 2020-06-23 ENCOUNTER — HOSPITAL ENCOUNTER (OUTPATIENT)
Dept: PHYSICAL THERAPY | Facility: CLINIC | Age: 70
Setting detail: THERAPIES SERIES
Discharge: HOME | End: 2020-06-23
Payer: MEDICARE

## 2020-06-23 PROCEDURE — 90912 BFB TRAINING 1ST 15 MIN: CPT

## 2020-06-23 PROCEDURE — G0283 ELEC STIM OTHER THAN WOUND: HCPCS

## 2020-06-23 PROCEDURE — 97110 THERAPEUTIC EXERCISES: CPT

## 2020-07-01 ENCOUNTER — HOSPITAL ENCOUNTER (OUTPATIENT)
Dept: PHYSICAL THERAPY | Facility: CLINIC | Age: 70
Setting detail: THERAPIES SERIES
Discharge: HOME | End: 2020-07-01
Payer: MEDICARE

## 2020-07-01 PROCEDURE — 90912 BFB TRAINING 1ST 15 MIN: CPT

## 2020-07-01 PROCEDURE — 97110 THERAPEUTIC EXERCISES: CPT

## 2020-07-01 PROCEDURE — G0283 ELEC STIM OTHER THAN WOUND: HCPCS

## 2020-07-22 ENCOUNTER — HOSPITAL ENCOUNTER (OUTPATIENT)
Dept: PHYSICAL THERAPY | Facility: CLINIC | Age: 70
Setting detail: THERAPIES SERIES
Discharge: HOME | End: 2020-07-22
Payer: MEDICARE

## 2020-07-22 PROCEDURE — 90912 BFB TRAINING 1ST 15 MIN: CPT

## 2020-07-22 PROCEDURE — 97110 THERAPEUTIC EXERCISES: CPT

## 2020-07-22 PROCEDURE — G0283 ELEC STIM OTHER THAN WOUND: HCPCS

## 2020-07-27 ENCOUNTER — OFFICE VISIT (OUTPATIENT)
Dept: UROLOGY | Age: 70
End: 2020-07-27
Payer: MEDICARE

## 2020-07-27 VITALS — TEMPERATURE: 98.6 F

## 2020-07-27 PROCEDURE — 99214 OFFICE O/P EST MOD 30 MIN: CPT | Performed by: UROLOGY

## 2020-07-27 PROCEDURE — 1123F ACP DISCUSS/DSCN MKR DOCD: CPT | Performed by: UROLOGY

## 2020-07-27 PROCEDURE — 4040F PNEUMOC VAC/ADMIN/RCVD: CPT | Performed by: UROLOGY

## 2020-07-27 PROCEDURE — G8417 CALC BMI ABV UP PARAM F/U: HCPCS | Performed by: UROLOGY

## 2020-07-27 PROCEDURE — 4004F PT TOBACCO SCREEN RCVD TLK: CPT | Performed by: UROLOGY

## 2020-07-27 PROCEDURE — 3017F COLORECTAL CA SCREEN DOC REV: CPT | Performed by: UROLOGY

## 2020-07-27 PROCEDURE — G8427 DOCREV CUR MEDS BY ELIG CLIN: HCPCS | Performed by: UROLOGY

## 2020-07-27 RX ORDER — OXYBUTYNIN CHLORIDE 10 MG/1
10 TABLET, EXTENDED RELEASE ORAL DAILY
Status: ON HOLD | COMMUNITY
Start: 2020-06-11 | End: 2022-04-25 | Stop reason: ALTCHOICE

## 2020-07-27 ASSESSMENT — ENCOUNTER SYMPTOMS
VOMITING: 0
WHEEZING: 0
CONSTIPATION: 0
COUGH: 0
EYE PAIN: 0
BACK PAIN: 0
EYE REDNESS: 0
SHORTNESS OF BREATH: 0
DIARRHEA: 0
ABDOMINAL PAIN: 0
NAUSEA: 0

## 2020-07-27 NOTE — PROGRESS NOTES
[facility]  [facility] UROLOGY SPECIALISTS - [facility]  [address] [address]  Dept: [address] [facility] Urology Office Note - Established    Patient:  Cheri Morris  YOB: 1950  Date: 7/27/2020    The patient is a 77 y.o. male who presents todayfor evaluation of the following problems:   Chief Complaint   Patient presents with    Prostate Cancer     PSA in care everywhere    Incontinence       HPI  Pt has h/o prostate cancer. Had dvp 3/2020. Has had improvement in tye. Down to 4-5 pull ups per day. Having slow steady improvement. Has been doing pft with Caroline Medina. Has ED, which worsened with surgery. Summary of old records: N/A    Additional History: N/A    Procedures Today: N/A    Urinalysis today:  No results found for this visit on 07/27/20.   Last several PSA's:  No results found for: PSA  Last total testosterone:  No results found for: TESTOSTERONE    AUA Symptom Score (7/27/2020):  INCOMPLETE EMPTYING: How often have you had the sensation of not emptying your bladder?: Less than Half the time  FREQUENCY: How often do you have to urinate less than every two hours?: Not at all  INTERMITTENCY: How often have you found you stopped and started again several times when you urinated?: Not at all  URGENCY: How often have you found it difficult to postpone urination?: More than Half the time  WEAK STREAM: How often have you had a weak urinary stream?: Not at all  STRAINING: How often have you had to strain to start  urination?: Not at all  NOCTURIA: How many times did you typically get up at night to uriniate?: NONE  TOTAL I-PSS SCORE[de-identified] 6  How would you feel if you were to spend the rest of your life with your urinary condition?: Terrible    Last BUN and creatinine:  Lab Results   Component Value Date    BUN 16 03/17/2020     Lab Results   Component Value Date    CREATININE 0.82 03/17/2020       Additional Lab/Culture results: PSA 0    Imaging Multiple Vitamins-Minerals (MULTIVITAMIN ADULT PO) Take 1 capsule by mouth      SIMVASTATIN PO Take 40 mg by mouth         Hydrocodone-acetaminophen  Social History     Tobacco Use   Smoking Status Current Every Day Smoker    Packs/day: 0.50   Smokeless Tobacco Never Used     (Ifpatient a smoker, smoking cessation counseling offered)    Social History     Substance and Sexual Activity   Alcohol Use Not Currently       REVIEW OF SYSTEMS:  Review of Systems    Physical Exam:      Vitals:    07/27/20 0948   Temp: 98.6 °F (37 °C)     There is no height or weight on file to calculate BMI. Patient is a 77 y.o. male in no acute distress and alert and oriented to person, place and time. Physical Exam  Constitutional: Patient in no acute distress. Neuro: Alert and oriented to person, place and time. Psych: Mood normal, affect normal  Skin: No rash noted  HEENT: Head: Normocephalic andatraumatic  Conjunctivae and EOM are normal. Pupils are equal, round  Nose:Normal  Right External Ear: Normal; Left External Ear: Normal  Mouth: Mucosa Moist  Neck: Supple  Lungs: Respiratory effort is normal  Cardiovascular: Warm & Pink  Abdomen: Soft, non-tender, non-distended with no CVA,  No flank tenderness,  Or hepatosplenomegaly   Lymphatics: No palpablelymphadenopathy. Assessment and Plan      1. History of malignant neoplasm of prostate    2. Stress incontinence, male    3. Erectile dysfunction after radical prostatectomy           Plan:   F/u 3 mo psa  Cont kegels and pelvic exercises        Return in about 3 months (around 10/27/2020) for psa. Prescriptions Ordered:  No orders of the defined types were placed in this encounter. Orders Placed:  Orders Placed This Encounter   Procedures    PSA, Diagnostic     Standing Status:   Future     Standing Expiration Date:   7/27/2021           Sally Carlos MD    Agree with the ROS entered by the MA.

## 2020-08-05 ENCOUNTER — HOSPITAL ENCOUNTER (OUTPATIENT)
Dept: PHYSICAL THERAPY | Facility: CLINIC | Age: 70
Setting detail: THERAPIES SERIES
Discharge: HOME | End: 2020-08-05
Payer: MEDICARE

## 2020-08-05 PROCEDURE — G0283 ELEC STIM OTHER THAN WOUND: HCPCS

## 2020-08-05 PROCEDURE — 90912 BFB TRAINING 1ST 15 MIN: CPT

## 2020-08-26 ENCOUNTER — HOSPITAL ENCOUNTER (OUTPATIENT)
Dept: PHYSICAL THERAPY | Facility: CLINIC | Age: 70
Setting detail: THERAPIES SERIES
Discharge: HOME | End: 2020-08-26
Payer: MEDICARE

## 2020-09-03 ENCOUNTER — HOSPITAL ENCOUNTER (OUTPATIENT)
Dept: PHYSICAL THERAPY | Facility: CLINIC | Age: 70
Setting detail: THERAPIES SERIES
Discharge: HOME | End: 2020-09-03
Payer: MEDICARE

## 2020-09-03 PROCEDURE — G0283 ELEC STIM OTHER THAN WOUND: HCPCS

## 2020-09-03 PROCEDURE — 90912 BFB TRAINING 1ST 15 MIN: CPT

## 2020-09-13 NOTE — FLOWSHEET NOTE
[] [facility] &  Therapy  [address]  [phone]  F: [phone] [] [address]  [facility]   [address]  P: [phone]  F: [phone] [x] [address] &  Therapy  [address] [facility]  P: [phone]  F: [phone] [] [address]  [facility]   [address]   [place]: [phone]  F: [phone]      Physical Therapy Daily Treatment Note    Date:  2020  Patient Name:  Anderson Martinez    :  1950  MRN: [id number]  Physician: Benjamin [address]: [employer]  Medical Diagnosis: Pelvic Floor Dysfunction                         Rehab Codes: N39.3, M62.50  Onset Date: 3/13/2020                                 Next 's appt: PRN    Visit# / total visits:  Progress note for Medicare patient due at visit 8     Cancels/No Shows: 1    Subjective:    Pain:  [] Yes  [x] No Location: N/A Pain Rating: (0-10 scale) 0/10  Pain altered Tx:  [x] No  [] Yes  Action:  Comments:Pt states he is still \"squirting\" a lot especially when standing. Feeling very frustrated. Objective:  Modalities:   Precautions:  Exercises:  Exercise Reps/ Time Weight/ Level Comments   Urine stop test         PF ex: short holds         PF ex: long holds         The knack ex                   Bridges w/PF  20x     3 way clam shell w/ pf and t-band 20x     Other:      Treatment Charges: Mins Units   [x]  Modalities: ES 15 1   [x]  Ther Exercise 10 1   []  Manual Therapy     []  Ther Activities     []  Aquatics     []  Vasocompression     [x]  Other: BF 15 1   Total Treatment time 40 3       Assessment: [x] Progressing toward goals. [] No change. [x] Other:Hooked pt up to biofeedback via internal sensor. Improved resting tone. Cont with Min overflow of abd/glutes.  Hold time noted of about 2-3 seconds then muscular reengagement to attempt to maintain the contraction. Fair coordination with quick contractions. Worked with the nenita graphic for force closure, hold time and coordination. Improved understanding. Continued with ES treatment for PF with Internal sensor x 15 min at 5:10 cycle time. Pt contracted with machine for last 10 min of the session. Instructed pt in new ex for bridges and 3 way clam shell ex w/ PF. Handout provided. Reviewed HEP. Will see in about 2 weeks for recheck and advancement. Post session, pt returned to PT after using the restroom and stated he was able to feel the urge and had normal stream. Encouragement provided that his symptoms with continue to improve. [x] Patient would continue to benefit from skilled physical therapy services in order to: improve strength and improve function. STG: (to be met in 4 treatments)  1. PF contraction long enough to inhibit bladder contraction  2. Able to isolate PF musculature  3. ? Strength: PF 3/5  4. Able to maintain voiding schedule of 3-4 hours  5. Patient to be independent with home exercise program as demonstrated by performance with correct form without cues. LTG: (to be met in 8 treatments)  1. Cough, sneeze, laugh without incontinence  2. Perform basic ADL's without leaking     Patient goals:Stop the leaking    Pt. Education:  [x] Yes  [] No  [x] Reviewed Prior HEP/Ed  Method of Education: [x] Verbal  [x] Demo  [] Written  Comprehension of Education:  [x] Verbalizes understanding. [x] Demonstrates understanding. [] Needs review. [] Demonstrates/verbalizes HEP/Ed previously given. Plan: [x] Continue current frequency toward long and short term goals.     [x] Specific Instructions for subsequent treatments: BF, ES      Time In: 1100           Time Out: 1150    Electronically signed by:  Sharath Kulkarni, PT

## 2020-09-13 NOTE — FLOWSHEET NOTE
overflow of abd/glutes. Hold time noted of about 2-3 seconds then muscular reengagement to attempt to maintain the contraction. Poor coordination with quick contractions. Worked with the nenita graphic for force closure, hold time and coordination. Improved understanding. Continued with ES treatment for PF with Internal sensor x 15 min at 5:10 cycle time. Pt contracted with machine for last 5 min of the session. Reviewed HEP. Will see in about 2 weeks for recheck and advancement. [x] Patient would continue to benefit from skilled physical therapy services in order to: improve strength and improve function. STG: (to be met in 4 treatments)  1. PF contraction long enough to inhibit bladder contraction  2. Able to isolate PF musculature  3. ? Strength: PF 3/5  4. Able to maintain voiding schedule of 3-4 hours  5. Patient to be independent with home exercise program as demonstrated by performance with correct form without cues. LTG: (to be met in 8 treatments)  1. Cough, sneeze, laugh without incontinence  2. Perform basic ADL's without leaking     Patient goals:Stop the leaking    Pt. Education:  [x] Yes  [] No  [x] Reviewed Prior HEP/Ed  Method of Education: [x] Verbal  [x] Demo  [] Written  Comprehension of Education:  [x] Verbalizes understanding. [x] Demonstrates understanding. [] Needs review. [] Demonstrates/verbalizes HEP/Ed previously given. Plan: [x] Continue current frequency toward long and short term goals. [x] Specific Instructions for subsequent treatments: BF, ES, bridge and clam shell ex.       Time In: 1100           Time Out: 1150    Electronically signed by:  Sandra Cali PT

## 2020-09-13 NOTE — FLOWSHEET NOTE
[] [facility] &  Therapy  [address]  [phone]  F: [phone] [] [address]  [facility]   [address]  P: [phone]  F: [phone] [x] [address]  Therapy  [address] [facility]  P: [phone]  F: [phone] [] [address]  [facility]   [address]   [place]: [phone]  F: [phone]      Physical Therapy Daily Treatment Note    Date:  2020  Patient Name:  Marixa Cervantes    :  1950  MRN: [id number]  Physician: Benjamin Key St: [employer]  Medical Diagnosis: Pelvic Floor Dysfunction                         Rehab Codes: N39.3, M62.50  Onset Date: 3/13/2020                                 Next 's appt: PRN    Visit# / total visits:  Progress note for Medicare patient due at visit 8     Cancels/No Shows: 1    Subjective:    Pain:  [] Yes  [x] No Location: N/A Pain Rating: (0-10 scale) 0/10  Pain altered Tx:  [x] No  [] Yes  Action:  Comments:Pt states he is noting Improvement with waking up and feeling urge. Mostly dry at night. Still \"squirts\" during every sit to stand. Objective:  Modalities:   Precautions:  Exercises:  Exercise Reps/ Time Weight/ Level Comments   Urine stop test         PF ex: short holds         PF ex: long holds         The knack ex                   Bridges w/PF  20x     3 way clam shell w/ pf and t-band 20x     Other:      Treatment Charges: Mins Units   [x]  Modalities: ES 15 1   []  Ther Exercise 5 0   []  Manual Therapy     []  Ther Activities     []  Aquatics     []  Vasocompression     [x]  Other: BF 15 1   Total Treatment time 35 2       Assessment: [x] Progressing toward goals. [] No change. [x] Other:Hooked pt up to biofeedback via internal sensor. Good resting tone.  Cont with

## 2020-09-13 NOTE — FLOWSHEET NOTE
time noted of about 3-4 seconds then muscular reengagement to attempt to maintain the contraction. Fair coordination with quick contractions. Cont work with the nenita graphic for force closure, hold time and coordination. Improved understanding. Continued with ES treatment for PF with Internal sensor x 15 min at 5:10 cycle time. Pt contracted with machine for last 10 min of the session. Reviewed HEP. Will see in about 2 weeks for recheck and advancement. [x] Patient would continue to benefit from skilled physical therapy services in order to: improve strength and improve function. STG: (to be met in 4 treatments)  1. PF contraction long enough to inhibit bladder contraction  2. Able to isolate PF musculature  3. ? Strength: PF 3/5  4. Able to maintain voiding schedule of 3-4 hours  5. Patient to be independent with home exercise program as demonstrated by performance with correct form without cues. LTG: (to be met in 8 treatments)  1. Cough, sneeze, laugh without incontinence  2. Perform basic ADL's without leaking     Patient goals:Stop the leaking    Pt. Education:  [x] Yes  [] No  [x] Reviewed Prior HEP/Ed  Method of Education: [x] Verbal  [x] Demo  [] Written  Comprehension of Education:  [x] Verbalizes understanding. [x] Demonstrates understanding. [] Needs review. [] Demonstrates/verbalizes HEP/Ed previously given. Plan: [x] Continue current frequency toward long and short term goals.     [x] Specific Instructions for subsequent treatments: BF, ES      Time In: 1100           Time Out: 1486    Electronically signed by:  Nash Mahmood PT

## 2020-09-13 NOTE — FLOWSHEET NOTE
[] [facility] &  Therapy  [address]  [phone]  F: [phone] [] [address]  [facility]   [address]  P: [phone]  F: [phone] [x] [address] [facility]  Therapy  [address] [facility]  P: [phone]  F: [phone] [] [address]  [facility]   [address]   [place]: [phone]  F: [phone]      Physical Therapy Daily Treatment Note    Date:  9/3/2020  Patient Name:  Jeremiah Finney    :  1950  MRN: [id number]  Physician: Benjamin [address] St: [employer]  Medical Diagnosis: Pelvic Floor Dysfunction                         Rehab Codes: N39.3, M62.50  Onset Date: 3/13/2020                                 Next 's appt: PRN    Visit# / total visits:  Progress note for Medicare patient due at visit 8     Cancels/No Shows: 1    Subjective:    Pain:  [] Yes  [x] No Location: N/A Pain Rating: (0-10 scale) 0/10  Pain altered Tx:  [x] No  [] Yes  Action:  Comments:Pt states he saw his physician. States good PSA score so that makes him feel better. Dr. Rossi Cea for pt to cont with PT. Pt states he has been working with home ES and cont with ex at home. Objective:  Modalities:   Precautions:  Exercises:  Exercise Reps/ Time Weight/ Level Comments   Urine stop test         PF ex: short holds         PF ex: long holds         The knack ex                   Bridges w/PF  20x     3 way clam shell w/ pf and t-band 20x     Other:      Treatment Charges: Mins Units   [x]  Modalities: ES 15 1   []  Ther Exercise 5 0   []  Manual Therapy     []  Ther Activities     []  Aquatics     []  Vasocompression     [x]  Other: BF 15 1   Total Treatment time 35 2       Assessment: [x] Progressing toward goals. [] No change.      [x] Other:Hooked pt up to biofeedback via internal sensor. Good resting tone. Cont with Min overflow of abd/glutes. Hold time improved about 4-5 seconds then muscular reengagement to attempt to maintain the contraction. Fair coordination with quick contractions but improving. Continued with ES treatment for PF with Internal sensor x 15 min at 5:10 cycle time. Pt contracted with machine for last 10 min of the session. Reviewed HEP. Will see in about 4 weeks for recheck and advancement. [x] Patient would continue to benefit from skilled physical therapy services in order to: improve strength and improve function. STG: (to be met in 4 treatments)  1. PF contraction long enough to inhibit bladder contraction  2. Able to isolate PF musculature  3. ? Strength: PF 3/5  4. Able to maintain voiding schedule of 3-4 hours  5. Patient to be independent with home exercise program as demonstrated by performance with correct form without cues. LTG: (to be met in 8 treatments)  1. Cough, sneeze, laugh without incontinence  2. Perform basic ADL's without leaking     Patient goals:Stop the leaking    Pt. Education:  [x] Yes  [] No  [x] Reviewed Prior HEP/Ed  Method of Education: [x] Verbal  [x] Demo  [] Written  Comprehension of Education:  [x] Verbalizes understanding. [x] Demonstrates understanding. [] Needs review. [] Demonstrates/verbalizes HEP/Ed previously given. Plan: [x] Continue current frequency toward long and short term goals.     [x] Specific Instructions for subsequent treatments: BF, ES      Time In: 1000          Time Out: 7005    Electronically signed by:  Yamilet Lara PT

## 2020-09-30 ENCOUNTER — HOSPITAL ENCOUNTER (OUTPATIENT)
Dept: PHYSICAL THERAPY | Facility: CLINIC | Age: 70
Setting detail: THERAPIES SERIES
Discharge: HOME | End: 2020-09-30
Payer: MEDICARE

## 2020-09-30 PROCEDURE — 90912 BFB TRAINING 1ST 15 MIN: CPT

## 2020-09-30 PROCEDURE — G0283 ELEC STIM OTHER THAN WOUND: HCPCS

## 2020-10-21 ENCOUNTER — HOSPITAL ENCOUNTER (OUTPATIENT)
Dept: PHYSICAL THERAPY | Facility: CLINIC | Age: 70
Setting detail: THERAPIES SERIES
Discharge: HOME | End: 2020-10-21
Payer: MEDICARE

## 2020-10-21 PROCEDURE — 90912 BFB TRAINING 1ST 15 MIN: CPT

## 2020-10-21 PROCEDURE — G0283 ELEC STIM OTHER THAN WOUND: HCPCS

## 2020-10-26 ENCOUNTER — OFFICE VISIT (OUTPATIENT)
Dept: UROLOGY | Age: 70
End: 2020-10-26
Payer: MEDICARE

## 2020-10-26 VITALS
TEMPERATURE: 98.2 F | WEIGHT: 216 LBS | BODY MASS INDEX: 31.99 KG/M2 | HEART RATE: 58 BPM | DIASTOLIC BLOOD PRESSURE: 76 MMHG | HEIGHT: 69 IN | SYSTOLIC BLOOD PRESSURE: 130 MMHG

## 2020-10-26 PROCEDURE — 4004F PT TOBACCO SCREEN RCVD TLK: CPT | Performed by: UROLOGY

## 2020-10-26 PROCEDURE — 99214 OFFICE O/P EST MOD 30 MIN: CPT | Performed by: UROLOGY

## 2020-10-26 PROCEDURE — 4040F PNEUMOC VAC/ADMIN/RCVD: CPT | Performed by: UROLOGY

## 2020-10-26 PROCEDURE — G8417 CALC BMI ABV UP PARAM F/U: HCPCS | Performed by: UROLOGY

## 2020-10-26 PROCEDURE — 1123F ACP DISCUSS/DSCN MKR DOCD: CPT | Performed by: UROLOGY

## 2020-10-26 PROCEDURE — 3017F COLORECTAL CA SCREEN DOC REV: CPT | Performed by: UROLOGY

## 2020-10-26 PROCEDURE — G8427 DOCREV CUR MEDS BY ELIG CLIN: HCPCS | Performed by: UROLOGY

## 2020-10-26 PROCEDURE — G8484 FLU IMMUNIZE NO ADMIN: HCPCS | Performed by: UROLOGY

## 2020-10-26 RX ORDER — MELOXICAM 7.5 MG/1
TABLET ORAL
COMMUNITY
Start: 2020-10-13 | End: 2022-04-11

## 2020-10-26 ASSESSMENT — ENCOUNTER SYMPTOMS
BACK PAIN: 0
EYE PAIN: 0
CONSTIPATION: 0
EYE REDNESS: 0
ABDOMINAL PAIN: 0
COUGH: 0
VOMITING: 0
NAUSEA: 0
WHEEZING: 0
SHORTNESS OF BREATH: 0
DIARRHEA: 0

## 2020-10-26 NOTE — PROGRESS NOTES
[address] [address]  Dept: [address] [facility] Urology Office Note - Established    Patient:  Gwen Soriano  YOB: 1950  Date: 10/26/2020    The patient is a 77 y.o. male who presents todayfor evaluation of the following problems:   Chief Complaint   Patient presents with    Prostate Cancer     3 months with PSA done at [facility] [place]        HPI  This is a very pleasant 56-year-old gentleman with a history of prostate cancer. He did have a robot-assisted laparoscopic radical prostatectomy in March 2020. He does continue to have stress incontinence but this is improving. He has been wearing a pad but usually dry. Only leaks when bladder full and he strains. He did do pelvic floor therapy. He does also continue to have erectile dysfunction. He has been unable to achieve erections since surgery. psa 0 on 10/15/2020. Summary of old records: N/A    Additional History: N/A    Procedures Today: N/A    Urinalysis today:  No results found for this visit on 10/26/20.   Last several PSA's:  No results found for: PSA  Last total testosterone:  No results found for: TESTOSTERONE    AUA Symptom Score (10/26/2020):  INCOMPLETE EMPTYING: How often have you had the sensation of not emptying your bladder?: Less than 1 to 5 times  FREQUENCY: How often do you have to urinate less than every two hours?: Not at all  INTERMITTENCY: How often have you found you stopped and started again several times when you urinated?: Not at all  URGENCY: How often have you found it difficult to postpone urination?: Not at all  WEAK STREAM: How often have you had a weak urinary stream?: Not at all  STRAINING: How often have you had to strain to start  urination?: Less than 1 to 5 times  NOCTURIA: How many times did you typically get up at night to uriniate?: NONE  TOTAL I-PSS SCORE[de-identified] 2  How would you feel if you were to spend the rest of your life with your urinary condition?: Mostly Satisfied    Last BUN and creatinine:  Lab Results   Component Value Date    BUN 16 03/17/2020     Lab Results   Component Value Date    CREATININE 0.82 03/17/2020       Additional Lab/Culture results: none    Imaging Reviewed during this Office Visit: none  (results were independently reviewed by physician and radiology report verified)    PAST MEDICAL, FAMILY AND SOCIAL HISTORY UPDATE:  Past Medical History:   Diagnosis Date    Anxiety     Dr. Salinas Rang [facility])     Hyperlipidemia     Dr. Giana Staton Wears partial dentures     upper lower    Wellness examination     Dr. Grady Farrell -PCP last seen 12/31/2019     Past Surgical History:   Procedure Laterality Date    BACK SURGERY      COLONOSCOPY      FOOT TENDON SURGERY      KNEE SURGERY      PROSTATE BIOPSY      PROSTATECTOMY  03/13/2020    LAPAROSCOPIC ROBOTIC PROSTATECTOMY, PELVIC LYMPHNODE DISSECTION     PROSTATECTOMY N/A 3/13/2020    XI LAPAROSCOPIC ROBOTIC PROSTATECTOMY, PELVIC LYMPHNODE DISSECTION performed by Jose Vyas MD at [place] [address].       Family History   Problem Relation Age of Onset    Cancer Father     Diabetes Paternal Grandmother      Outpatient Medications Marked as Taking for the 10/26/20 encounter (Office Visit) with Jose Vyas MD   Medication Sig Dispense Refill    meloxicam (MOBIC) 7.5 MG tablet TAKE 1 TABLET BY MOUTH TWICE A DAY      oxybutynin (DITROPAN-XL) 10 MG extended release tablet Take 10 mg by mouth daily      Incontinence Supply Disposable (DISPOSABLE BRIEF LARGE) MISC 10 briefs per day 300 Bottle 3    Incontinence Supply Disposable (ADHERES INCONTINENCE PAD) MISC Mens incontinence guards 5 per day 150 Bottle 3    nystatin (MYCOSTATIN) 428178 UNIT/GM powder Apply to freshly cleaned and dried skin- 1-2 times per day.  1 Bottle 2    calcium carbonate 600 MG TABS tablet Take 1 tablet by mouth 2 times daily      traZODone (DESYREL) 150 MG tablet TAKE 1 TABLET BY MOUTH AT BEDTIME      mirtazapine (REMERON) 45 MG tablet TAKE 1 TABLET BY MOUTH AT BEDTIME      clonazePAM (KLONOPIN) 2 MG tablet TAKE 1 TABLET BY MOUTH TWICE DAILY      buPROPion (WELLBUTRIN SR) 200 MG extended release tablet Take 200 mg by mouth      aspirin 325 MG tablet Take 325 mg by mouth      Multiple Vitamins-Minerals (MULTIVITAMIN ADULT PO) Take 1 capsule by mouth      SIMVASTATIN PO Take 40 mg by mouth         Hydrocodone-acetaminophen  Social History     Tobacco Use   Smoking Status Current Every Day Smoker    Packs/day: 0.50   Smokeless Tobacco Never Used     (Ifpatient a smoker, smoking cessation counseling offered)    Social History     Substance and Sexual Activity   Alcohol Use Not Currently       REVIEW OF SYSTEMS:  Review of Systems    Physical Exam:      Vitals:    10/26/20 0946   BP: 130/76   Pulse: 58   Temp: 98.2 °F (36.8 °C)     Body mass index is 31.9 kg/m². Patient is a 77 y.o. male in no acute distress and alert and oriented to person, place and time. Physical Exam  Constitutional: Patient in no acute distress. Neuro: Alert and oriented to person, place and time. Psych: Mood normal, affect normal  Skin: No rash noted  HEENT: Head: Normocephalic andatraumatic  Conjunctivae and EOM are normal. Pupils are equal, round  Nose:Normal  Right External Ear: Normal; Left External Ear: Normal  Mouth: Mucosa Moist  Neck: Supple  Lungs: Respiratory effort is normal  Cardiovascular: Warm & Pink  Abdomen: Soft, non-tender, non-distended with no CVA,  No flank tenderness,  Or hepatosplenomegaly       Assessment and Plan      1. History of malignant neoplasm of prostate    2. Stress incontinence, male    3. Erectile dysfunction after radical prostatectomy    4. Pelvic floor dysfunction           Plan:   F/u 3 mo psa  Cont pelvic floor exercises      Return in about 3 months (around 1/26/2021) for psa.     Prescriptions Ordered:  No orders of the defined types were placed in this encounter. Orders Placed:  Orders Placed This Encounter   Procedures    PSA, Diagnostic     Standing Status:   Future     Standing Expiration Date:   10/26/2021           Suhas Alcala MD    Agree with the ROS entered by the MA.

## 2020-11-18 ENCOUNTER — HOSPITAL ENCOUNTER (OUTPATIENT)
Dept: PHYSICAL THERAPY | Facility: CLINIC | Age: 70
Setting detail: THERAPIES SERIES
Discharge: HOME | End: 2020-11-18
Payer: MEDICARE

## 2020-11-18 PROCEDURE — 90912 BFB TRAINING 1ST 15 MIN: CPT

## 2020-12-23 ENCOUNTER — HOSPITAL ENCOUNTER (OUTPATIENT)
Dept: PHYSICAL THERAPY | Facility: CLINIC | Age: 70
Setting detail: THERAPIES SERIES
Discharge: HOME | End: 2020-12-23
Payer: MEDICARE

## 2020-12-23 PROCEDURE — G0283 ELEC STIM OTHER THAN WOUND: HCPCS

## 2020-12-23 PROCEDURE — 90912 BFB TRAINING 1ST 15 MIN: CPT

## 2021-01-20 ENCOUNTER — APPOINTMENT (OUTPATIENT)
Dept: PHYSICAL THERAPY | Facility: CLINIC | Age: 71
End: 2021-01-20
Payer: MEDICARE

## 2021-01-25 ENCOUNTER — HOSPITAL ENCOUNTER (OUTPATIENT)
Dept: PHYSICAL THERAPY | Facility: CLINIC | Age: 71
Setting detail: THERAPIES SERIES
Discharge: HOME | End: 2021-01-25
Payer: MEDICARE

## 2021-01-25 PROCEDURE — G0283 ELEC STIM OTHER THAN WOUND: HCPCS

## 2021-01-25 PROCEDURE — 90912 BFB TRAINING 1ST 15 MIN: CPT

## 2021-01-26 ENCOUNTER — HOSPITAL ENCOUNTER (OUTPATIENT)
Age: 71
Discharge: HOME | End: 2021-01-26
Payer: MEDICARE

## 2021-01-26 DIAGNOSIS — Z85.46 HISTORY OF MALIGNANT NEOPLASM OF PROSTATE: ICD-10-CM

## 2021-01-26 LAB — PROSTATE SPECIFIC ANTIGEN: <0.01 UG/L

## 2021-01-26 PROCEDURE — 84153 ASSAY OF PSA TOTAL: CPT

## 2021-01-26 PROCEDURE — 36415 COLL VENOUS BLD VENIPUNCTURE: CPT

## 2021-02-01 ENCOUNTER — OFFICE VISIT (OUTPATIENT)
Dept: UROLOGY | Age: 71
End: 2021-02-01
Payer: MEDICARE

## 2021-02-01 VITALS
BODY MASS INDEX: 31.99 KG/M2 | HEART RATE: 86 BPM | SYSTOLIC BLOOD PRESSURE: 137 MMHG | RESPIRATION RATE: 18 BRPM | WEIGHT: 216 LBS | DIASTOLIC BLOOD PRESSURE: 72 MMHG | HEIGHT: 69 IN | TEMPERATURE: 98.1 F

## 2021-02-01 DIAGNOSIS — N52.31 ERECTILE DYSFUNCTION AFTER RADICAL PROSTATECTOMY: ICD-10-CM

## 2021-02-01 DIAGNOSIS — N39.3 STRESS INCONTINENCE, MALE: ICD-10-CM

## 2021-02-01 DIAGNOSIS — Z85.46 HISTORY OF MALIGNANT NEOPLASM OF PROSTATE: Primary | ICD-10-CM

## 2021-02-01 DIAGNOSIS — Z90.79 S/P PROSTATECTOMY: ICD-10-CM

## 2021-02-01 PROCEDURE — 3017F COLORECTAL CA SCREEN DOC REV: CPT | Performed by: UROLOGY

## 2021-02-01 PROCEDURE — 1123F ACP DISCUSS/DSCN MKR DOCD: CPT | Performed by: UROLOGY

## 2021-02-01 PROCEDURE — 4040F PNEUMOC VAC/ADMIN/RCVD: CPT | Performed by: UROLOGY

## 2021-02-01 PROCEDURE — 4004F PT TOBACCO SCREEN RCVD TLK: CPT | Performed by: UROLOGY

## 2021-02-01 PROCEDURE — G8428 CUR MEDS NOT DOCUMENT: HCPCS | Performed by: UROLOGY

## 2021-02-01 PROCEDURE — 99214 OFFICE O/P EST MOD 30 MIN: CPT | Performed by: UROLOGY

## 2021-02-01 PROCEDURE — G8417 CALC BMI ABV UP PARAM F/U: HCPCS | Performed by: UROLOGY

## 2021-02-01 PROCEDURE — G8484 FLU IMMUNIZE NO ADMIN: HCPCS | Performed by: UROLOGY

## 2021-02-01 ASSESSMENT — ENCOUNTER SYMPTOMS
CONSTIPATION: 0
EYE PAIN: 0
COUGH: 0
DIARRHEA: 0
VOMITING: 0
BACK PAIN: 1
ABDOMINAL PAIN: 0
SHORTNESS OF BREATH: 0
WHEEZING: 0
NAUSEA: 0

## 2021-02-01 NOTE — PROGRESS NOTES
Review of Systems   Constitutional: Negative for appetite change, chills, fatigue and fever. Eyes: Negative for pain and visual disturbance. Respiratory: Negative for cough, shortness of breath and wheezing. Cardiovascular: Negative for chest pain and leg swelling. Gastrointestinal: Negative for abdominal pain, constipation, diarrhea, nausea and vomiting. Genitourinary: Negative for difficulty urinating, dysuria, frequency, hematuria, penile pain and testicular pain. Musculoskeletal: Positive for back pain. Negative for myalgias. Neurological: Negative for dizziness, tremors, weakness, light-headedness, numbness and headaches. Hematological: Negative for adenopathy. Does not bruise/bleed easily.

## 2021-02-01 NOTE — PROGRESS NOTES
NOCTURIA: How many times did you typically get up at night to uriniate?: NONE  TOTAL I-PSS SCORE[de-identified] 6       Last BUN and creatinine:  Lab Results   Component Value Date    BUN 16 03/17/2020     Lab Results   Component Value Date    CREATININE 0.82 03/17/2020       Additional Lab/Culture results: none    Imaging Reviewed during this Office Visit: none  (results were independently reviewed by physician and radiology report verified)    PAST MEDICAL, FAMILY AND SOCIAL HISTORY UPDATE:  Past Medical History:   Diagnosis Date    Anxiety     Dr. Tonya Murdock [facility])     Hyperlipidemia     Dr. Moisés Max Wears partial dentures     upper lower    Wellness examination     Dr. Krys Guzmán -PCP last seen 12/31/2019     Past Surgical History:   Procedure Laterality Date    BACK SURGERY      COLONOSCOPY      FOOT TENDON SURGERY      KNEE SURGERY      PROSTATE BIOPSY      PROSTATECTOMY  03/13/2020    LAPAROSCOPIC ROBOTIC PROSTATECTOMY, PELVIC LYMPHNODE DISSECTION     PROSTATECTOMY N/A 3/13/2020    XI LAPAROSCOPIC ROBOTIC PROSTATECTOMY, PELVIC LYMPHNODE DISSECTION performed by Lindsey Whittington MD at [place] [address].       Family History   Problem Relation Age of Onset    Cancer Father     Diabetes Paternal Grandmother      Outpatient Medications Marked as Taking for the 2/1/21 encounter (Office Visit) with Lindsey Whittington MD   Medication Sig Dispense Refill    meloxicam (MOBIC) 7.5 MG tablet TAKE 1 TABLET BY MOUTH TWICE A DAY      oxybutynin (DITROPAN-XL) 10 MG extended release tablet Take 10 mg by mouth daily      Incontinence Supply Disposable (DISPOSABLE BRIEF LARGE) MISC 10 briefs per day 300 Bottle 3    Incontinence Supply Disposable (ADHERES INCONTINENCE PAD) MISC Mens incontinence guards 5 per day 150 Bottle 3    nystatin (MYCOSTATIN) 999702 UNIT/GM powder Apply to freshly cleaned and dried skin- 1-2 times per day.  1 Bottle 2  calcium carbonate 600 MG TABS tablet Take 1 tablet by mouth 2 times daily      traZODone (DESYREL) 150 MG tablet TAKE 1 TABLET BY MOUTH AT BEDTIME      mirtazapine (REMERON) 45 MG tablet TAKE 1 TABLET BY MOUTH AT BEDTIME      clonazePAM (KLONOPIN) 2 MG tablet TAKE 1 TABLET BY MOUTH TWICE DAILY      buPROPion (WELLBUTRIN SR) 200 MG extended release tablet Take 200 mg by mouth      aspirin 325 MG tablet Take 325 mg by mouth      Multiple Vitamins-Minerals (MULTIVITAMIN ADULT PO) Take 1 capsule by mouth      SIMVASTATIN PO Take 40 mg by mouth         Hydrocodone-acetaminophen  Social History     Tobacco Use   Smoking Status Current Every Day Smoker    Packs/day: 0.50   Smokeless Tobacco Never Used     (Ifpatient a smoker, smoking cessation counseling offered)    Social History     Substance and Sexual Activity   Alcohol Use Not Currently       REVIEW OF SYSTEMS:  Review of Systems    Physical Exam:      Vitals:    02/01/21 0900   BP: 137/72   Pulse: 86   Resp: 18   Temp: 98.1 °F (36.7 °C)     Body mass index is 31.9 kg/m². Patient is a 72 y.o. male in no acute distress and alert and oriented to person, place and time. Physical Exam  Constitutional: Patient in no acute distress. Neuro: Alert and oriented to person, place and time. Psych: Mood normal, affect normal  Skin: No rash noted  HEENT: Head: Normocephalic andatraumatic  Conjunctivae and EOM are normal. Pupils are equal, round  Nose:Normal  Right External Ear: Normal; Left External Ear: Normal  Mouth: Mucosa Moist  Neck: Supple  Lungs: Respiratory effort is normal  Cardiovascular: Warm & Pink  Abdomen: Soft, non-tender, non-distended with no CVA,  No flank tenderness,  Or hepatosplenomegaly   Lymphatics: No palpablelymphadenopathy. Assessment and Plan      1. History of malignant neoplasm of prostate    2. Stress incontinence, male    3. Erectile dysfunction after radical prostatectomy    4.  S/P prostatectomy           Plan:   F/u 6 mo psa Cont pelvic floor exercises (pt doing PFT)      Return in about 6 months (around 8/1/2021) for psa. Prescriptions Ordered:  No orders of the defined types were placed in this encounter. Orders Placed:  Orders Placed This Encounter   Procedures    PSA, Diagnostic     Standing Status:   Future     Standing Expiration Date:   2/1/2022           Salma Acevedo MD    Agree with the ROS entered by the MA.

## 2021-02-17 ENCOUNTER — HOSPITAL ENCOUNTER (OUTPATIENT)
Dept: PHYSICAL THERAPY | Facility: CLINIC | Age: 71
Setting detail: THERAPIES SERIES
Discharge: HOME | End: 2021-02-17
Payer: MEDICARE

## 2021-02-17 PROCEDURE — 90912 BFB TRAINING 1ST 15 MIN: CPT

## 2021-02-17 PROCEDURE — G0283 ELEC STIM OTHER THAN WOUND: HCPCS

## 2021-03-17 ENCOUNTER — HOSPITAL ENCOUNTER (OUTPATIENT)
Dept: PHYSICAL THERAPY | Facility: CLINIC | Age: 71
Setting detail: THERAPIES SERIES
Discharge: HOME | End: 2021-03-17
Payer: MEDICARE

## 2021-03-17 PROCEDURE — G0283 ELEC STIM OTHER THAN WOUND: HCPCS

## 2021-03-17 PROCEDURE — 90912 BFB TRAINING 1ST 15 MIN: CPT

## 2021-05-20 ENCOUNTER — HOSPITAL ENCOUNTER (OUTPATIENT)
Age: 71
Setting detail: SPECIMEN
Discharge: HOME | End: 2021-05-20
Payer: MEDICARE

## 2021-05-20 LAB
ANION GAP SERPL CALCULATED.3IONS-SCNC: 12 MMOL/L (ref 9–17)
BUN BLDV-MCNC: 19 MG/DL (ref 8–23)
BUN/CREAT BLD: ABNORMAL (ref 9–20)
CALCIUM SERPL-MCNC: 8.4 MG/DL (ref 8.6–10.4)
CHLORIDE BLD-SCNC: 104 MMOL/L (ref 98–107)
CO2: 25 MMOL/L (ref 20–31)
CREAT SERPL-MCNC: 0.78 MG/DL (ref 0.7–1.2)
GFR AFRICAN AMERICAN: >60 ML/MIN
GFR NON-AFRICAN AMERICAN: >60 ML/MIN
GFR SERPL CREATININE-BSD FRML MDRD: ABNORMAL ML/MIN/{1.73_M2}
GFR SERPL CREATININE-BSD FRML MDRD: ABNORMAL ML/MIN/{1.73_M2}
GLUCOSE BLD-MCNC: 82 MG/DL (ref 70–99)
HCT VFR BLD CALC: 41.9 % (ref 40.7–50.3)
HEMOGLOBIN: 13.7 G/DL (ref 13–17)
MCH RBC QN AUTO: 31.4 PG (ref 25.2–33.5)
MCHC RBC AUTO-ENTMCNC: 32.7 G/DL (ref 28.4–34.8)
MCV RBC AUTO: 96.1 FL (ref 82.6–102.9)
NRBC AUTOMATED: 0 PER 100 WBC
PDW BLD-RTO: 12.8 % (ref 11.8–14.4)
PLATELET # BLD: 186 K/UL (ref 138–453)
PMV BLD AUTO: 11.2 FL (ref 8.1–13.5)
POTASSIUM SERPL-SCNC: 4 MMOL/L (ref 3.7–5.3)
RBC # BLD: 4.36 M/UL (ref 4.21–5.77)
SODIUM BLD-SCNC: 141 MMOL/L (ref 135–144)
WBC # BLD: 11 K/UL (ref 3.5–11.3)

## 2021-05-20 PROCEDURE — 85027 COMPLETE CBC AUTOMATED: CPT

## 2021-05-20 PROCEDURE — 36415 COLL VENOUS BLD VENIPUNCTURE: CPT

## 2021-05-20 PROCEDURE — 80048 BASIC METABOLIC PNL TOTAL CA: CPT

## 2021-05-20 PROCEDURE — P9603 ONE-WAY ALLOW PRORATED MILES: HCPCS

## 2021-05-26 ENCOUNTER — HOSPITAL ENCOUNTER (OUTPATIENT)
Age: 71
Setting detail: SPECIMEN
Discharge: HOME | End: 2021-05-26
Payer: MEDICARE

## 2021-05-26 LAB
ALBUMIN SERPL-MCNC: 3.3 G/DL (ref 3.5–5.2)
ALBUMIN/GLOBULIN RATIO: 1.2 (ref 1–2.5)
ALP BLD-CCNC: 61 U/L (ref 40–129)
ALT SERPL-CCNC: 17 U/L (ref 5–41)
ANION GAP SERPL CALCULATED.3IONS-SCNC: 10 MMOL/L (ref 9–17)
AST SERPL-CCNC: 17 U/L
BILIRUB SERPL-MCNC: 0.56 MG/DL (ref 0.3–1.2)
BUN BLDV-MCNC: 14 MG/DL (ref 8–23)
BUN/CREAT BLD: ABNORMAL (ref 9–20)
CALCIUM SERPL-MCNC: 8.5 MG/DL (ref 8.6–10.4)
CHLORIDE BLD-SCNC: 101 MMOL/L (ref 98–107)
CO2: 30 MMOL/L (ref 20–31)
CREAT SERPL-MCNC: 0.93 MG/DL (ref 0.7–1.2)
GFR AFRICAN AMERICAN: >60 ML/MIN
GFR NON-AFRICAN AMERICAN: >60 ML/MIN
GFR SERPL CREATININE-BSD FRML MDRD: ABNORMAL ML/MIN/{1.73_M2}
GFR SERPL CREATININE-BSD FRML MDRD: ABNORMAL ML/MIN/{1.73_M2}
GLUCOSE BLD-MCNC: 93 MG/DL (ref 70–99)
HCT VFR BLD CALC: 40.4 % (ref 40.7–50.3)
HEMOGLOBIN: 13 G/DL (ref 13–17)
MCH RBC QN AUTO: 31.3 PG (ref 25.2–33.5)
MCHC RBC AUTO-ENTMCNC: 32.2 G/DL (ref 28.4–34.8)
MCV RBC AUTO: 97.1 FL (ref 82.6–102.9)
NRBC AUTOMATED: 0 PER 100 WBC
PDW BLD-RTO: 12.5 % (ref 11.8–14.4)
PLATELET # BLD: 315 K/UL (ref 138–453)
PMV BLD AUTO: 10.5 FL (ref 8.1–13.5)
POTASSIUM SERPL-SCNC: 3.6 MMOL/L (ref 3.7–5.3)
RBC # BLD: 4.16 M/UL (ref 4.21–5.77)
SODIUM BLD-SCNC: 141 MMOL/L (ref 135–144)
TOTAL PROTEIN: 6.1 G/DL (ref 6.4–8.3)
WBC # BLD: 17.1 K/UL (ref 3.5–11.3)

## 2021-05-26 PROCEDURE — 85027 COMPLETE CBC AUTOMATED: CPT

## 2021-05-26 PROCEDURE — 36415 COLL VENOUS BLD VENIPUNCTURE: CPT

## 2021-05-26 PROCEDURE — 80053 COMPREHEN METABOLIC PANEL: CPT

## 2021-05-26 PROCEDURE — P9603 ONE-WAY ALLOW PRORATED MILES: HCPCS

## 2021-07-28 ENCOUNTER — HOSPITAL ENCOUNTER (OUTPATIENT)
Age: 71
Setting detail: SPECIMEN
Discharge: HOME | End: 2021-07-28
Payer: MEDICARE

## 2021-07-28 LAB
BILIRUBIN URINE: NEGATIVE
COLOR: YELLOW
COMMENT UA: NORMAL
GLUCOSE URINE: NEGATIVE
KETONES, URINE: NEGATIVE
LEUKOCYTE ESTERASE, URINE: NEGATIVE
NITRITE, URINE: NEGATIVE
PH UA: 5.5 (ref 5–8)
PROTEIN UA: NEGATIVE
SPECIFIC GRAVITY UA: 1.02 (ref 1–1.03)
TURBIDITY: CLEAR
URINE HGB: NEGATIVE
UROBILINOGEN, URINE: NORMAL

## 2021-07-28 PROCEDURE — 81003 URINALYSIS AUTO W/O SCOPE: CPT

## 2021-07-29 ENCOUNTER — HOSPITAL ENCOUNTER (OUTPATIENT)
Age: 71
Discharge: HOME | End: 2021-07-29
Payer: MEDICARE

## 2021-07-29 DIAGNOSIS — Z85.46 HISTORY OF MALIGNANT NEOPLASM OF PROSTATE: ICD-10-CM

## 2021-07-29 LAB — PROSTATE SPECIFIC ANTIGEN: <0.02 UG/L

## 2021-07-29 PROCEDURE — 84153 ASSAY OF PSA TOTAL: CPT

## 2021-07-29 PROCEDURE — 36415 COLL VENOUS BLD VENIPUNCTURE: CPT

## 2021-08-02 ENCOUNTER — OFFICE VISIT (OUTPATIENT)
Dept: UROLOGY | Age: 71
End: 2021-08-02
Payer: MEDICARE

## 2021-08-02 VITALS
HEIGHT: 69 IN | BODY MASS INDEX: 31.99 KG/M2 | RESPIRATION RATE: 17 BRPM | WEIGHT: 216 LBS | DIASTOLIC BLOOD PRESSURE: 84 MMHG | SYSTOLIC BLOOD PRESSURE: 137 MMHG | HEART RATE: 62 BPM | TEMPERATURE: 96.3 F

## 2021-08-02 DIAGNOSIS — N39.3 STRESS INCONTINENCE, MALE: ICD-10-CM

## 2021-08-02 DIAGNOSIS — Z85.46 HISTORY OF MALIGNANT NEOPLASM OF PROSTATE: Primary | ICD-10-CM

## 2021-08-02 PROCEDURE — 4040F PNEUMOC VAC/ADMIN/RCVD: CPT | Performed by: UROLOGY

## 2021-08-02 PROCEDURE — G8417 CALC BMI ABV UP PARAM F/U: HCPCS | Performed by: UROLOGY

## 2021-08-02 PROCEDURE — 3017F COLORECTAL CA SCREEN DOC REV: CPT | Performed by: UROLOGY

## 2021-08-02 PROCEDURE — 4004F PT TOBACCO SCREEN RCVD TLK: CPT | Performed by: UROLOGY

## 2021-08-02 PROCEDURE — G8427 DOCREV CUR MEDS BY ELIG CLIN: HCPCS | Performed by: UROLOGY

## 2021-08-02 PROCEDURE — 1123F ACP DISCUSS/DSCN MKR DOCD: CPT | Performed by: UROLOGY

## 2021-08-02 PROCEDURE — 99213 OFFICE O/P EST LOW 20 MIN: CPT | Performed by: UROLOGY

## 2021-08-02 ASSESSMENT — ENCOUNTER SYMPTOMS
EYE PAIN: 0
VOMITING: 0
NAUSEA: 0
BACK PAIN: 0
SHORTNESS OF BREATH: 0
DIARRHEA: 0
COUGH: 0
ABDOMINAL PAIN: 0
WHEEZING: 0
CONSTIPATION: 0

## 2021-08-02 NOTE — PROGRESS NOTES
[address] [address]  Dept:  Mel[facility] Minor [facility] Urology Office Note - Established    Patient:  Ki Stafford  YOB: 1950  Date: 8/2/2021    The patient is a 72 y.o. male who presents todayfor evaluation of the following problems:   Chief Complaint   Patient presents with    Follow-up     6 months w/ psa       HPI  H/o prostate cancer. Had dvp about 1.5 years ago. psa undetectable. Mild tye. 1 ppd. Has ED but not interested in tx. Summary of old records: N/A    Additional History: N/A    Procedures Today: N/A    Urinalysis today:  No results found for this visit on 08/02/21. Last several PSA's:  Lab Results   Component Value Date    PSA <0.02 07/29/2021    PSA <0.01 01/26/2021     Last total testosterone:  No results found for: TESTOSTERONE    AUA Symptom Score (8/2/2021):   INCOMPLETE EMPTYING: How often have you had the sensation of not emptying your bladder?: Not at all  FREQUENCY: How often do you have to urinate less than every two hours?: Not at all  INTERMITTENCY: How often have you found you stopped and started again several times when you urinated?: Not at all  URGENCY: How often have you found it difficult to postpone urination?: Not at all  WEAK STREAM: How often have you had a weak urinary stream?: Not at all  STRAINING: How often have you had to strain to start  urination?: Not at all  NOCTURIA: How many times did you typically get up at night to uriniate?: NONE  TOTAL I-PSS SCORE[de-identified] 0       Last BUN and creatinine:  Lab Results   Component Value Date    BUN 14 05/26/2021     Lab Results   Component Value Date    CREATININE 0.93 05/26/2021       Additional Lab/Culture results: none    Imaging Reviewed during this Office Visit: none  (results were independently reviewed by physician and radiology report verified)    PAST MEDICAL, 1949 [facility]:  Past Medical History:   Diagnosis Date    Anxiety     Dr. Victor Hugo [facility])     Hyperlipidemia     Dr. Damián Danielle Wears partial dentures     upper lower    Wellness examination     Dr. Gissel Lawrence -PCP last seen 12/31/2019     Past Surgical History:   Procedure Laterality Date    BACK SURGERY      COLONOSCOPY      FOOT TENDON SURGERY      KNEE SURGERY      PROSTATE BIOPSY      PROSTATECTOMY  03/13/2020    LAPAROSCOPIC ROBOTIC PROSTATECTOMY, PELVIC LYMPHNODE DISSECTION     PROSTATECTOMY N/A 3/13/2020    XI LAPAROSCOPIC ROBOTIC PROSTATECTOMY, PELVIC LYMPHNODE DISSECTION performed by Bharti Moore MD at [place] [address].       Family History   Problem Relation Age of Onset    Cancer Father     Diabetes Paternal Grandmother      Outpatient Medications Marked as Taking for the 8/2/21 encounter (Office Visit) with Bharti Moore MD   Medication Sig Dispense Refill    meloxicam (MOBIC) 7.5 MG tablet TAKE 1 TABLET BY MOUTH TWICE A DAY      oxybutynin (DITROPAN-XL) 10 MG extended release tablet Take 10 mg by mouth daily      Incontinence Supply Disposable (DISPOSABLE BRIEF LARGE) MISC 10 briefs per day 300 Bottle 3    Incontinence Supply Disposable (ADHERES INCONTINENCE PAD) MISC Mens incontinence guards 5 per day 150 Bottle 3    nystatin (MYCOSTATIN) 031814 UNIT/GM powder Apply to freshly cleaned and dried skin- 1-2 times per day.  1 Bottle 2    calcium carbonate 600 MG TABS tablet Take 1 tablet by mouth 2 times daily      traZODone (DESYREL) 150 MG tablet TAKE 1 TABLET BY MOUTH AT BEDTIME      mirtazapine (REMERON) 45 MG tablet TAKE 1 TABLET BY MOUTH AT BEDTIME      clonazePAM (KLONOPIN) 2 MG tablet TAKE 1 TABLET BY MOUTH TWICE DAILY      buPROPion (WELLBUTRIN SR) 200 MG extended release tablet Take 200 mg by mouth      aspirin 325 MG tablet Take 325 mg by mouth      Multiple Vitamins-Minerals (MULTIVITAMIN ADULT PO) Take 1 capsule by mouth      SIMVASTATIN PO Take 40 mg by mouth Hydrocodone-acetaminophen  Social History     Tobacco Use   Smoking Status Current Every Day Smoker    Packs/day: 0.50   Smokeless Tobacco Never Used     (Ifpatient a smoker, smoking cessation counseling offered)    Social History     Substance and Sexual Activity   Alcohol Use Not Currently       REVIEW OF SYSTEMS:  Review of Systems    Physical Exam:      Vitals:    08/02/21 0821   BP: 137/84   Pulse: 62   Resp: 17   Temp: 96.3 °F (35.7 °C)     Body mass index is 31.9 kg/m². Patient is a 72 y.o. male in no acute distress and alert and oriented to person, place and time. Physical Exam  Constitutional: Patient in no acute distress. Neuro: Alert and oriented to person, place and time. Psych: Mood normal, affect normal      Assessment and Plan      1. History of malignant neoplasm of prostate    2. Stress incontinence, male           Plan:   F/u 6 mo psa  Cont pelvic floor exercises for tye      Return in about 6 months (around 2/2/2022) for psa. Prescriptions Ordered:  No orders of the defined types were placed in this encounter. Orders Placed:  Orders Placed This Encounter   Procedures    PSA, Diagnostic     Standing Status:   Future     Standing Expiration Date:   8/2/2022           Gerard Lin MD    Agree with the ROS entered by the MA.

## 2021-08-02 NOTE — PROGRESS NOTES
Review of Systems   Constitutional: Negative for appetite change, chills, fatigue and fever. Eyes: Negative for pain and visual disturbance. Respiratory: Negative for cough, shortness of breath and wheezing. Cardiovascular: Negative for chest pain and leg swelling. Gastrointestinal: Negative for abdominal pain, constipation, diarrhea, nausea and vomiting. Genitourinary: Negative for difficulty urinating, dysuria, frequency, hematuria, penile pain and testicular pain. Musculoskeletal: Negative for back pain and myalgias. Neurological: Negative for dizziness, tremors, weakness, light-headedness, numbness and headaches. Hematological: Negative for adenopathy. Does not bruise/bleed easily.    none - patient states he still spotting

## 2022-02-07 ENCOUNTER — OFFICE VISIT (OUTPATIENT)
Dept: UROLOGY | Age: 72
End: 2022-02-07
Payer: MEDICARE

## 2022-02-07 VITALS
TEMPERATURE: 96.9 F | HEIGHT: 69 IN | SYSTOLIC BLOOD PRESSURE: 133 MMHG | WEIGHT: 216 LBS | RESPIRATION RATE: 17 BRPM | HEART RATE: 65 BPM | DIASTOLIC BLOOD PRESSURE: 79 MMHG | BODY MASS INDEX: 31.99 KG/M2

## 2022-02-07 DIAGNOSIS — N52.31 ERECTILE DYSFUNCTION AFTER RADICAL PROSTATECTOMY: ICD-10-CM

## 2022-02-07 DIAGNOSIS — Z85.46 HISTORY OF MALIGNANT NEOPLASM OF PROSTATE: Primary | ICD-10-CM

## 2022-02-07 DIAGNOSIS — N39.3 STRESS INCONTINENCE, MALE: ICD-10-CM

## 2022-02-07 PROCEDURE — G8484 FLU IMMUNIZE NO ADMIN: HCPCS | Performed by: UROLOGY

## 2022-02-07 PROCEDURE — 3017F COLORECTAL CA SCREEN DOC REV: CPT | Performed by: UROLOGY

## 2022-02-07 PROCEDURE — 4040F PNEUMOC VAC/ADMIN/RCVD: CPT | Performed by: UROLOGY

## 2022-02-07 PROCEDURE — 1123F ACP DISCUSS/DSCN MKR DOCD: CPT | Performed by: UROLOGY

## 2022-02-07 PROCEDURE — G8417 CALC BMI ABV UP PARAM F/U: HCPCS | Performed by: UROLOGY

## 2022-02-07 PROCEDURE — G8427 DOCREV CUR MEDS BY ELIG CLIN: HCPCS | Performed by: UROLOGY

## 2022-02-07 PROCEDURE — 4004F PT TOBACCO SCREEN RCVD TLK: CPT | Performed by: UROLOGY

## 2022-02-07 PROCEDURE — 99213 OFFICE O/P EST LOW 20 MIN: CPT | Performed by: UROLOGY

## 2022-02-07 ASSESSMENT — ENCOUNTER SYMPTOMS
WHEEZING: 0
EYE PAIN: 0
BACK PAIN: 0
CONSTIPATION: 0
COUGH: 0
SHORTNESS OF BREATH: 0
ABDOMINAL PAIN: 0
NAUSEA: 0
DIARRHEA: 0
VOMITING: 0

## 2022-02-07 NOTE — PROGRESS NOTES
Review of Systems   Constitutional: Negative for appetite change, chills, fatigue and fever. Eyes: Negative for pain and visual disturbance. Respiratory: Negative for cough, shortness of breath and wheezing. Cardiovascular: Negative for chest pain and leg swelling. Gastrointestinal: Negative for abdominal pain, constipation, diarrhea, nausea and vomiting. Genitourinary: Negative for difficulty urinating, dysuria, frequency, hematuria, penile pain and testicular pain. Musculoskeletal: Negative for back pain and myalgias. Neurological: Negative for dizziness, tremors, weakness, light-headedness, numbness and headaches. Hematological: Negative for adenopathy. Does not bruise/bleed easily.    NONE

## 2022-04-11 ENCOUNTER — HOSPITAL ENCOUNTER (OUTPATIENT)
Dept: PREADMISSION TESTING | Age: 72
Discharge: HOME | End: 2022-04-15

## 2022-04-11 VITALS — BODY MASS INDEX: 31.9 KG/M2 | HEIGHT: 69 IN

## 2022-04-11 NOTE — PROGRESS NOTES
Pre-op Instructions For Out-Patient Endoscopy Surgery    Medication Instructions:  · Please stop herbs and any supplements now (includes vitamins and minerals). · Please contact your surgeon and prescribing physician for pre-op instructions for any blood thinners. · If you have inhalers/aerosol treatments at home, please use them the morning of your surgery and bring the inhalers with you to the hospital.    · Please take the following medications the morning of your surgery with a sip of water:  Klonopin. Surgery Instructions:  1. After midnight before surgery:  Do not eat or drink anything, including water, mints, gum, and hard candy. You may brush your teeth without swallowing. No smoking, chewing tobacco, or street drugs. 2. Please shower or bathe before surgery. 3. Please do not wear any cologne, lotion, powder, jewelry, piercings, perfume, makeup, nail polish, hair accessories, or hair spray on the day of surgery. Wear loose comfortable clothing. 4. Leave your valuables at home. Bring a storage case for any glasses/contacts. 5. An adult who is responsible for you MUST drive you home and should be with you for the first 24 hours after surgery. The Day of Surgery:  · Arrive at [facility] AT [facility] Surgery Entrance at the time directed by your surgeon and check in at the desk. · If you have a living will or healthcare power of , please bring a copy. · You will be taken to the pre-op holding area where you will be prepared for surgery. A physical assessment will be performed by a nurse practitioner or house officer. Your IV will be started and you will meet your anesthesiologist.    · When you go to surgery, your family will be directed to the surgical waiting room, where the doctor should speak with them after your surgery.     · After surgery, you will be taken to the recovery room then when you are awake and stable you will go to the short stay unit for preparation to be discharged. Instructions read to Chino Pacheco wife Merlinjoy Almonteyoseph) and understanding verbalized.

## 2022-04-24 ENCOUNTER — ANESTHESIA EVENT (OUTPATIENT)
Dept: ENDOSCOPY | Age: 72
End: 2022-04-24
Payer: MEDICARE

## 2022-04-25 ENCOUNTER — ANESTHESIA (OUTPATIENT)
Dept: ENDOSCOPY | Age: 72
End: 2022-04-25
Payer: MEDICARE

## 2022-04-25 ENCOUNTER — HOSPITAL ENCOUNTER (OUTPATIENT)
Age: 72
Setting detail: OUTPATIENT SURGERY
Discharge: HOME | End: 2022-04-25
Attending: SURGERY | Admitting: SURGERY
Payer: MEDICARE

## 2022-04-25 VITALS — DIASTOLIC BLOOD PRESSURE: 66 MMHG | OXYGEN SATURATION: 95 % | SYSTOLIC BLOOD PRESSURE: 122 MMHG | TEMPERATURE: 97 F

## 2022-04-25 VITALS
HEIGHT: 69 IN | OXYGEN SATURATION: 92 % | RESPIRATION RATE: 16 BRPM | BODY MASS INDEX: 41.03 KG/M2 | TEMPERATURE: 97 F | HEART RATE: 62 BPM | DIASTOLIC BLOOD PRESSURE: 70 MMHG | SYSTOLIC BLOOD PRESSURE: 144 MMHG | WEIGHT: 277 LBS

## 2022-04-25 PROBLEM — K63.5 POLYP OF SIGMOID COLON: Status: ACTIVE | Noted: 2021-05-13

## 2022-04-25 PROBLEM — I26.92 SADDLE PULMONARY EMBOLUS (HCC): Status: ACTIVE | Noted: 2021-06-28

## 2022-04-25 PROBLEM — M54.2 NECK PAIN: Status: ACTIVE | Noted: 2017-02-13

## 2022-04-25 PROBLEM — M16.11 LOCALIZED OSTEOARTHROSIS OF RIGHT HIP: Status: ACTIVE | Noted: 2019-02-21

## 2022-04-25 PROBLEM — R94.31 ABNORMAL EKG: Status: ACTIVE | Noted: 2020-03-10

## 2022-04-25 PROBLEM — F32.A DEPRESSION: Status: ACTIVE | Noted: 2017-02-13

## 2022-04-25 PROBLEM — E66.01 SEVERE OBESITY (BMI 35.0-39.9) WITH COMORBIDITY (HCC): Status: ACTIVE | Noted: 2018-08-17

## 2022-04-25 PROBLEM — R06.02 SOBOE (SHORTNESS OF BREATH ON EXERTION): Status: ACTIVE | Noted: 2020-03-10

## 2022-04-25 PROBLEM — Z48.3 AFTERCARE FOLLOWING SURGERY FOR NEOPLASM: Status: ACTIVE | Noted: 2020-03-19

## 2022-04-25 PROBLEM — K57.30 SIGMOID DIVERTICULOSIS: Status: ACTIVE | Noted: 2021-05-05

## 2022-04-25 PROBLEM — F41.9 ANXIETY DISORDER, UNSPECIFIED: Status: ACTIVE | Noted: 2020-03-19

## 2022-04-25 PROBLEM — I26.92 ACUTE SADDLE PULMONARY EMBOLISM (HCC): Status: ACTIVE | Noted: 2021-06-28

## 2022-04-25 PROBLEM — J30.9 ATOPIC RHINITIS: Status: ACTIVE | Noted: 2017-02-13

## 2022-04-25 PROBLEM — M17.11 PRIMARY LOCALIZED OSTEOARTHRITIS OF RIGHT KNEE: Status: ACTIVE | Noted: 2019-02-21

## 2022-04-25 PROBLEM — I26.92 SADDLE PULMONARY EMBOLUS (HCC): Status: ACTIVE | Noted: 2022-04-25

## 2022-04-25 PROBLEM — E78.00 HYPERCHOLESTEREMIA: Status: ACTIVE | Noted: 2017-02-13

## 2022-04-25 PROBLEM — N39.42 URINARY INCONTINENCE WITHOUT SENSORY AWARENESS: Status: ACTIVE | Noted: 2020-06-09

## 2022-04-25 PROBLEM — N40.0 BENIGN PROSTATIC HYPERPLASIA: Status: ACTIVE | Noted: 2017-02-13

## 2022-04-25 PROBLEM — M54.50 LOW BACK PAIN: Status: ACTIVE | Noted: 2017-02-13

## 2022-04-25 PROCEDURE — 6360000002 HC RX W HCPCS: Performed by: NURSE ANESTHETIST, CERTIFIED REGISTERED

## 2022-04-25 PROCEDURE — 7100000000 HC PACU RECOVERY - FIRST 15 MIN: Performed by: SURGERY

## 2022-04-25 PROCEDURE — 7100000030 HC ASPR PHASE II RECOVERY - FIRST 15 MIN: Performed by: SURGERY

## 2022-04-25 PROCEDURE — 3700000001 HC ADD 15 MINUTES (ANESTHESIA): Performed by: SURGERY

## 2022-04-25 PROCEDURE — 7100000010 HC PHASE II RECOVERY - FIRST 15 MIN: Performed by: SURGERY

## 2022-04-25 PROCEDURE — 7100000031 HC ASPR PHASE II RECOVERY - ADDTL 15 MIN: Performed by: SURGERY

## 2022-04-25 PROCEDURE — 2709999900 HC NON-CHARGEABLE SUPPLY: Performed by: SURGERY

## 2022-04-25 PROCEDURE — 2580000003 HC RX 258: Performed by: ANESTHESIOLOGY

## 2022-04-25 PROCEDURE — 3700000000 HC ANESTHESIA ATTENDED CARE: Performed by: SURGERY

## 2022-04-25 PROCEDURE — 2720000010 HC SURG SUPPLY STERILE: Performed by: SURGERY

## 2022-04-25 PROCEDURE — 3609027000 HC COLONOSCOPY: Performed by: SURGERY

## 2022-04-25 PROCEDURE — 7100000011 HC PHASE II RECOVERY - ADDTL 15 MIN: Performed by: SURGERY

## 2022-04-25 PROCEDURE — 2500000003 HC RX 250 WO HCPCS: Performed by: NURSE ANESTHETIST, CERTIFIED REGISTERED

## 2022-04-25 PROCEDURE — 7100000001 HC PACU RECOVERY - ADDTL 15 MIN: Performed by: SURGERY

## 2022-04-25 RX ORDER — LIDOCAINE HYDROCHLORIDE 10 MG/ML
INJECTION, SOLUTION EPIDURAL; INFILTRATION; INTRACAUDAL; PERINEURAL PRN
Status: DISCONTINUED | OUTPATIENT
Start: 2022-04-25 | End: 2022-04-25 | Stop reason: SDUPTHER

## 2022-04-25 RX ORDER — SODIUM CHLORIDE, SODIUM LACTATE, POTASSIUM CHLORIDE, CALCIUM CHLORIDE 600; 310; 30; 20 MG/100ML; MG/100ML; MG/100ML; MG/100ML
INJECTION, SOLUTION INTRAVENOUS CONTINUOUS
Status: DISCONTINUED | OUTPATIENT
Start: 2022-04-25 | End: 2022-04-25 | Stop reason: HOSPADM

## 2022-04-25 RX ORDER — SODIUM CHLORIDE 0.9 % (FLUSH) 0.9 %
5-40 SYRINGE (ML) INJECTION PRN
Status: DISCONTINUED | OUTPATIENT
Start: 2022-04-25 | End: 2022-04-25 | Stop reason: HOSPADM

## 2022-04-25 RX ORDER — SODIUM CHLORIDE 9 MG/ML
INJECTION, SOLUTION INTRAVENOUS PRN
Status: DISCONTINUED | OUTPATIENT
Start: 2022-04-25 | End: 2022-04-25 | Stop reason: HOSPADM

## 2022-04-25 RX ORDER — SODIUM CHLORIDE 0.9 % (FLUSH) 0.9 %
5-40 SYRINGE (ML) INJECTION EVERY 12 HOURS SCHEDULED
Status: DISCONTINUED | OUTPATIENT
Start: 2022-04-25 | End: 2022-04-25 | Stop reason: HOSPADM

## 2022-04-25 RX ORDER — DIPHENHYDRAMINE HYDROCHLORIDE 50 MG/ML
12.5 INJECTION INTRAMUSCULAR; INTRAVENOUS
Status: DISCONTINUED | OUTPATIENT
Start: 2022-04-25 | End: 2022-04-25 | Stop reason: HOSPADM

## 2022-04-25 RX ORDER — PROPOFOL 10 MG/ML
INJECTION, EMULSION INTRAVENOUS PRN
Status: DISCONTINUED | OUTPATIENT
Start: 2022-04-25 | End: 2022-04-25 | Stop reason: SDUPTHER

## 2022-04-25 RX ORDER — MIDAZOLAM HYDROCHLORIDE 1 MG/ML
INJECTION INTRAMUSCULAR; INTRAVENOUS PRN
Status: DISCONTINUED | OUTPATIENT
Start: 2022-04-25 | End: 2022-04-25 | Stop reason: SDUPTHER

## 2022-04-25 RX ORDER — EPHEDRINE SULFATE/0.9% NACL/PF 50 MG/5 ML
SYRINGE (ML) INTRAVENOUS PRN
Status: DISCONTINUED | OUTPATIENT
Start: 2022-04-25 | End: 2022-04-25 | Stop reason: SDUPTHER

## 2022-04-25 RX ORDER — FENTANYL CITRATE 50 UG/ML
INJECTION, SOLUTION INTRAMUSCULAR; INTRAVENOUS PRN
Status: DISCONTINUED | OUTPATIENT
Start: 2022-04-25 | End: 2022-04-25 | Stop reason: SDUPTHER

## 2022-04-25 RX ORDER — DEXAMETHASONE SODIUM PHOSPHATE 4 MG/ML
INJECTION, SOLUTION INTRA-ARTICULAR; INTRALESIONAL; INTRAMUSCULAR; INTRAVENOUS; SOFT TISSUE PRN
Status: DISCONTINUED | OUTPATIENT
Start: 2022-04-25 | End: 2022-04-25 | Stop reason: SDUPTHER

## 2022-04-25 RX ORDER — LIDOCAINE HYDROCHLORIDE 10 MG/ML
1 INJECTION, SOLUTION EPIDURAL; INFILTRATION; INTRACAUDAL; PERINEURAL
Status: DISCONTINUED | OUTPATIENT
Start: 2022-04-25 | End: 2022-04-25 | Stop reason: HOSPADM

## 2022-04-25 RX ORDER — ONDANSETRON 2 MG/ML
INJECTION INTRAMUSCULAR; INTRAVENOUS PRN
Status: DISCONTINUED | OUTPATIENT
Start: 2022-04-25 | End: 2022-04-25 | Stop reason: SDUPTHER

## 2022-04-25 RX ORDER — ONDANSETRON 2 MG/ML
4 INJECTION INTRAMUSCULAR; INTRAVENOUS
Status: DISCONTINUED | OUTPATIENT
Start: 2022-04-25 | End: 2022-04-25 | Stop reason: HOSPADM

## 2022-04-25 RX ADMIN — MIDAZOLAM 2 MG: 1 INJECTION INTRAMUSCULAR; INTRAVENOUS at 07:05

## 2022-04-25 RX ADMIN — Medication 20 MG: at 07:16

## 2022-04-25 RX ADMIN — LIDOCAINE HYDROCHLORIDE 40 MG: 10 INJECTION, SOLUTION EPIDURAL; INFILTRATION; INTRACAUDAL; PERINEURAL at 07:09

## 2022-04-25 RX ADMIN — ONDANSETRON 4 MG: 2 INJECTION INTRAMUSCULAR; INTRAVENOUS at 07:14

## 2022-04-25 RX ADMIN — SODIUM CHLORIDE, POTASSIUM CHLORIDE, SODIUM LACTATE AND CALCIUM CHLORIDE: 600; 310; 30; 20 INJECTION, SOLUTION INTRAVENOUS at 06:22

## 2022-04-25 RX ADMIN — FENTANYL CITRATE 100 MCG: 50 INJECTION, SOLUTION INTRAMUSCULAR; INTRAVENOUS at 07:09

## 2022-04-25 RX ADMIN — PROPOFOL 200 MG: 10 INJECTION, EMULSION INTRAVENOUS at 07:09

## 2022-04-25 RX ADMIN — DEXAMETHASONE SODIUM PHOSPHATE 4 MG: 4 INJECTION, SOLUTION INTRAMUSCULAR; INTRAVENOUS at 07:14

## 2022-04-25 ASSESSMENT — PULMONARY FUNCTION TESTS
PIF_VALUE: 16
PIF_VALUE: 15
PIF_VALUE: 16
PIF_VALUE: 16
PIF_VALUE: 1
PIF_VALUE: 1
PIF_VALUE: 0
PIF_VALUE: 10
PIF_VALUE: 16
PIF_VALUE: 1
PIF_VALUE: 10
PIF_VALUE: 16
PIF_VALUE: 0
PIF_VALUE: 16
PIF_VALUE: 0
PIF_VALUE: 16
PIF_VALUE: 0
PIF_VALUE: 16
PIF_VALUE: 16
PIF_VALUE: 10
PIF_VALUE: 16
PIF_VALUE: 13
PIF_VALUE: 6
PIF_VALUE: 16
PIF_VALUE: 15
PIF_VALUE: 16
PIF_VALUE: 16
PIF_VALUE: 1
PIF_VALUE: 0

## 2022-04-25 ASSESSMENT — ENCOUNTER SYMPTOMS
SHORTNESS OF BREATH: 0
VOICE CHANGE: 1
DYSPNEA ACTIVITY LEVEL: NO INTERVAL CHANGE
COUGH: 0
SHORTNESS OF BREATH: 1
TROUBLE SWALLOWING: 0
RHINORRHEA: 0
SORE THROAT: 0
WHEEZING: 0

## 2022-04-25 ASSESSMENT — PAIN - FUNCTIONAL ASSESSMENT: PAIN_FUNCTIONAL_ASSESSMENT: 0-10

## 2022-04-25 NOTE — OP NOTE
Operative Note      Patient: Maribell Lam  YOB: 1950  MRN: [id number]    Date of Procedure: 4/25/2022    PROCEDURE NOTE    DATE OF PROCEDURE: 4/25/2022    SURGEON: Marcelina Goldberg MD    ASSISTANT: None    PREOPERATIVE DIAGNOSIS: History of multiple colon polyps. Previous colectomy 2021. POSTOPERATIVE DIAGNOSIS: Grade 1 internal hemorrhoid. Sigmoid diverticulosis. Patent ileocolonic anastomosis. OPERATION: Total colonoscopy to ileocolonic anastomosis with intubation of terminal ileum. Resolution clip application x1 just distal to the anastomosis. ANESTHESIA: General    ESTIMATED BLOOD LOSS: None    COMPLICATIONS: None     SPECIMENS:  Was Not Obtained    HISTORY: The patient is a 80y.o. year old male with history of above preop diagnosis. I recommended colonoscopy with possible biopsy or polypectomy and I explained the risk, benefits, expected outcome, and alternatives to the procedure. Risks included but are not limited to bleeding, infection, respiratory distress, hypotension, and perforation of the colon and possibility of missing a lesion. The patient understands and is in agreement. PROCEDURE: The patient was given IV conscious sedation. The patient's SPO2 remained above 90% throughout the procedure. Digital rectal exam was normal.  The colonoscope was inserted through the anus into the rectum and advanced under direct vision to the cecum without difficulty. Terminal ileum was examined for approximately 2 inches. The prep was good. Findings:  Terminal ileum: normal    Cecum/Ascending colon: Previous right and transverse colectomy for multiple colon polyps with patent ileocolonic anastomosis. Just distal to the anastomosis at the acute angulation in the colon there was some irritation noted with minimal oozing and resolution clip application performed. Could be prep related or scope related. No active bleeding.     Transverse colon: As above    Descending/Sigmoid colon: abnormal: Sigmoid diverticulosis    Rectum/Anus: examined in normal and retroflexed positions and was abnormal: Grade 1 internal hemorrhoid    Withdrawal Time was (minutes): 18      Next screening colonoscopy: 3 years. If screening is less than 10 years the recommended reason is due:History of multiple colon polyps    The colon was decompressed. While withdrawing the scope the above findings were verified and the scope was removed. The patient tolerated the procedure and conscious sedation without unusual events. In the recovery room patient was examined and remains hemodynamically stable. Discharge home when criteria met. Recommendations/Plan:   1. F/U In Office as instructed  2. Discussed with the family  3. High fiber diet   4.  Precautions to avoid constipation    Electronically signed by Hannah Davis MD  on 4/25/2022 at 7:15 AM

## 2022-04-25 NOTE — ANESTHESIA PRE PROCEDURE
Department of Anesthesiology  Preprocedure Note       Name:  Andrei Jackson   Age:  80 y.o.  :  1950                                          MRN:  [id number]         Date:  2022      Surgeon: Nury Valdes):  Idania Cohen MD    Procedure: Procedure(s):  COLONOSCOPY DIAGNOSTIC    Medications prior to admission:   Prior to Admission medications    Medication Sig Start Date End Date Taking? Authorizing Provider   NONFORMULARY Take 1 caplet by mouth daily Takes over the counter medication daily for weight loss.     Historical Provider, MD   Incontinence Supply Disposable (DISPOSABLE BRIEF LARGE) MISC 10 briefs per day 20   ANGELO Harrington - CAMI   calcium carbonate 600 MG TABS tablet Take 1 tablet by mouth daily as needed     Historical Provider, MD   traZODone (DESYREL) 150 MG tablet 150 mg nightly  19   Historical Provider, MD   clonazePAM (KLONOPIN) 2 MG tablet TAKE 1 TABLET BY MOUTH TWICE DAILY 19   Historical Provider, MD   buPROPion (WELLBUTRIN SR) 200 MG extended release tablet Take 200 mg by mouth    Historical Provider, MD   aspirin 325 MG tablet Take 325 mg by mouth Instructed to stop 7 days before procedure on 22    Historical Provider, MD   Multiple Vitamins-Minerals (MULTIVITAMIN ADULT PO) Take 1 capsule by mouth    Historical Provider, MD   SIMVASTATIN PO Take 40 mg by mouth    Historical Provider, MD       Current medications:    Current Facility-Administered Medications   Medication Dose Route Frequency Provider Last Rate Last Admin    lidocaine PF 1 % injection 1 mL  1 mL IntraDERmal Once PRN Irwin Grullon MD        lactated ringers infusion   IntraVENous Continuous [place] MD Markos 125 mL/hr at 22 0622 New Bag at 22 0622    sodium chloride flush 0.9 % injection 5-40 mL  5-40 mL IntraVENous 2 times per day Irwin Grullon MD        sodium chloride flush 0.9 % injection 5-40 mL  5-40 mL IntraVENous PRN Love Burrows MD        0.9 % sodium chloride infusion   IntraVENous PRN Itzel Fontanez MD           Allergies: Allergies   Allergen Reactions    Hydrocodone-Acetaminophen      Vicodin/ Other reaction(s): Hives       Problem List:    Patient Active Problem List   Diagnosis Code    Prostate cancer ([facility] C61    Hypoxia R09.02    Acute respiratory failure ([facility] J96.00    Atelectasis J98.11    Aftercare following surgery for neoplasm Z48.3    Anxiety disorder, unspecified F41.9    Abnormal EKG R94.31    Atopic rhinitis J30.9    Benign prostatic hyperplasia N40.0    Depression F32. A    Hypercholesteremia E78.00    Localized osteoarthrosis of right hip M16.11    Low back pain M54.50    Neck pain M54.2    Polyp of sigmoid colon K63.5    Primary localized osteoarthritis of right knee M17.11    Severe obesity (BMI 35.0-39. 9) with comorbidity (HCC) E66.01    Sigmoid diverticulosis K57.30    SOBOE (shortness of breath on exertion) R06.02    Urinary incontinence without sensory awareness N39.42    Acute saddle pulmonary embolism (HCC) I26.92       Past Medical History:        Diagnosis Date    Abnormal EKG     Acute respiratory failure (HCC) 03/17/2020    Anxiety     Dr. Hampton [facility])     Prostate    Colon polyps     Hx of blood clots 06/28/2021    [facility]- [facility]    Hyperlipidemia     Dr. Graciela Madrigal    Pulmonary emboli ([facility] 06/28/2021    Per CTA chest, 6-28-21-\" Large saddle pulmonary embolism with extensive thrombus load and severe right heart strain. \"    SOB (shortness of breath) on exertion     Wears partial dentures     upper & lower    Wellness examination     Dr. Graciela Madrigal- PCP       Past Surgical History:        Procedure Laterality Date    BACK SURGERY      x2, hx of hardware exchange- patient states no longer has hardware    CERVICAL SPINE SURGERY      x5    COLONOSCOPY      CYST REMOVAL      FOOT TENDON SURGERY Right     ENDOSCOPIC FASCIOTOMY PLANTAR FOOT;    HEMICOLECTOMY  05/13/2021    Procedure: HEMICOLECTOMY RIGHT EXTENDED, PARTIAL OMENTECTOMY; Surgeon: Rohith Graf MD; Location: [place] [address]; Service: General; Laterality: Right    KNEE SURGERY Right     LIPOMA RESECTION      Multiple    PROSTATE BIOPSY      PROSTATECTOMY  2020    LAPAROSCOPIC ROBOTIC PROSTATECTOMY, PELVIC LYMPHNODE DISSECTION     PROSTATECTOMY N/A 2020    XI LAPAROSCOPIC ROBOTIC PROSTATECTOMY, PELVIC LYMPHNODE DISSECTION performed by Andrei Almeida MD at [facility]      AS 21 YR. OLD    WRIST SURGERY         Social History:    Social History     Tobacco Use    Smoking status: Former Smoker     Packs/day: 0.50     Start date:      Quit date: 2021     Years since quittin.9    Smokeless tobacco: Never Used   Substance Use Topics    Alcohol use: Not Currently                                Counseling given: Not Answered      Vital Signs (Current):   Vitals:    22 0603   BP: (!) 160/80   Pulse: 62   Resp: 18   Temp: 97.6 °F (36.4 °C)   TempSrc: Infrared   SpO2: 96%   Weight: 277 lb (125.6 kg)   Height: 5' 9\" (1.753 m)                                              BP Readings from Last 3 Encounters:   22 (!) 160/80   22 133/79   21 137/84       NPO Status: Time of last liquid consumption:                         Time of last solid consumption:                         Date of last liquid consumption: 22                        Date of last solid food consumption: 22    BMI:   Wt Readings from Last 3 Encounters:   22 277 lb (125.6 kg)   22 216 lb (98 kg)   21 216 lb (98 kg)     Body mass index is 40.91 kg/m².     CBC:   Lab Results   Component Value Date    WBC 17.1 2021    RBC 4.16 2021    RBC 4.44 2012    HGB 13.0 2021    HCT 40.4 2021    MCV 97.1 2021    RDW 12.5 2021     2021     2012       CMP:   Lab Results   Component Value Date     05/26/2021    K 3.6 05/26/2021     05/26/2021    CO2 30 05/26/2021    BUN 14 05/26/2021    CREATININE 0.93 05/26/2021    GFRAA >60 05/26/2021    LABGLOM >60 05/26/2021    GLUCOSE 93 05/26/2021    GLUCOSE 90 01/06/2012    PROT 6.1 05/26/2021    CALCIUM 8.5 05/26/2021    BILITOT 0.56 05/26/2021    ALKPHOS 61 05/26/2021    AST 17 05/26/2021    ALT 17 05/26/2021       POC Tests: No results for input(s): POCGLU, POCNA, POCK, POCCL, POCBUN, POCHEMO, POCHCT in the last 72 hours. Coags: No results found for: PROTIME, INR, APTT    HCG (If Applicable): No results found for: PREGTESTUR, PREGSERUM, HCG, HCGQUANT     ABGs: No results found for: PHART, PO2ART, PTN2OGT, UBJ3FGM, BEART, A6LIFJRD     Type & Screen (If Applicable):  No results found for: LABABO, LABRH    Drug/Infectious Status (If Applicable):  No results found for: HIV, HEPCAB    COVID-19 Screening (If Applicable): No results found for: COVID19        Anesthesia Evaluation  Patient summary reviewed and Nursing notes reviewed no history of anesthetic complications:   Airway: Mallampati: III  TM distance: >3 FB   Neck ROM: full  Mouth opening: > = 3 FB Dental:    (+) partials      Pulmonary:normal exam  breath sounds clear to auscultation  (+) shortness of breath:                             Cardiovascular:    (+) COBURN: no interval change,       ECG reviewed  Rhythm: regular  Rate: normal  Echocardiogram reviewed                  Neuro/Psych:   (+) psychiatric history:            GI/Hepatic/Renal:   (+) bowel prep, morbid obesity          Endo/Other:    (+) malignancy/cancer. Abdominal:             Vascular:   + PE.        ROS comment: Saddle PE June 2021, off anticoagulation since September 2021. Other Findings:             Anesthesia Plan      general     ASA 3       Induction: intravenous. MIPS: Postoperative opioids intended and Prophylactic antiemetics administered. Anesthetic plan and risks discussed with patient.       Plan discussed with CRNA.                   Frederic Larkin MD   4/25/2022

## 2022-04-25 NOTE — H&P
HISTORY and [facility] [address] [address]       NAME:  Arleth Carreno  MRN: [id number]   YOB: 1950   Date: 4/25/2022   Age: 80 y.o. Gender: male     COMPLAINT AND PRESENT HISTORY:   Arleth Carreno is 80 y.o.,  male, undergoing COLONOSCOPY DIAGNOSTIC for HISTORY OF COLON POLYPS.    COLONOSCOPY QUESTIONNAIRE  LAST COLONOSCOPY: 4-15-21. Hx of hemicolectomy 5-13-21- Procedure: HEMICOLECTOMY RIGHT EXTENDED, PARTIAL OMENTECTOMY; Surgeon: Wilma Oneill MD; Location: [address] [place] [address]; Service: General; Laterality: Right. HISTORY OF COLON POLYPS: Yes. ABD PAIN: Denies. CONSTIPATION: Denies. DIARRHEA (ASIDE FROM COLONOSCOPY PREP): Denies. BLOOD IN STOOL/BLACK, TARRY STOOLS: Denies. NAUSEA/VOMITING/HEMATEMESIS: Denies. FEVER/CHILLS: Denies. APPETITE CHANGE: Denies. RECENT UNINTENDED WEIGHT LOSS: Denies. + gain. DIFFICULTY SWALLOWING/PHARYNGITIS/VOICE CHANGE: Denies except states the longer he talks, he started to lose his voice since colectomy- states he has discussed w/providers. Review of additional significant medical hx:  HLD: Denies current chest pain, palpitations, SOB, dizziness, leg swelling, headache. Current medications r/t condition: SIMVASTATIN  ECHO, 1-28-22:  Echo complete W/O contrast    Anatomical Region Laterality Modality   Chest -- Ultrasound       Narrative  Performed by Brenda Arreola  Left Ventricle: Systolic function is normal with an ejection fraction   of 55-60%.      The left ventricle appears normal in size, normal LV wall thickness,   normal LV wall motions   Normal LV systolic function, ejection fraction more than 55%   Grade 1 LV diastolic dysfunction   The right ventricle appears normal in size and function   The left and right atrium appears normal in size   No obvious structural valvular abnormality noted   Normal aortic root dimension   No significant pericardial effusion seen   The IVC not well visualized     SOBOE, HX OF RESPIRATORY FAILURE, HX OF PE: Hx of saddle PE- 6/2021. Patient states paternal uncle also had a blood clot. Denies any known personal or family hx of clotting disorder. Patient states he believes only episode of blood clot was 6/2021. Current medications r/t condition: ASA  CTA, 6-28-21:  Narrative  Performed by [facility]  CT CTA CHEST:  6/28/2021 3:52 PM     CLINICAL INDICATION:  Shortness of breath and chest pain. Technique: Automatic radiation exposure lowering techniques were utilized.  Following the intravenous injection of 100 cc of Omnipaque 350 a CTA of the chest and 3-D reformats including  3 -D Maximum intensity projection reconstructions constructed under concurrent physician supervision on a   independent workstation . 3 D images obtained to improve visualization of vascular detail. All CT scans at this facility use dose modulation, iterative reconstruction, and/or weight based dosing when appropriate to reduce radiation dose to as low as reasonably achievable. Josh Wisdom FINDINGS: Comparison: CT dated 9/15/2004. Valentine Mater saddle pulmonary embolism with large amount of thrombus filling and almost occluding the main right pulmonary artery, and involving but not occluding the left main and left lower pulmonary arteries.  There is severe right heart strain and enlargement   of the right atrium and right ventricle with reflux of contrast into the IVC indicating right heart failure.  The main pulmonary artery measures 3.6 cm.  Multiple small peripheral wedge-shaped opacities in the lungs likely represent infarcts.  No pleural or pericardial effusions.  There is moderate   to severe coronary artery calcifications.  The ascending aorta measures 3.8 cm.  No enlarged lymph nodes. IMPRESSION:     1.  Large saddle pulmonary embolism with extensive thrombus load and severe right heart strain. Findings discussed in person with emergency staff at the time of this dictation.      NPO status: Patient states they have been NPO since before midnight. Medications taken TODAY (with sip of water): Clonazepam at 04:00. Anticoagulation status:  mg QD held x1 week. Prep fully completed: YES. Pertinent family hx: Denies family hx of esophageal and colon CA. Denies personal hx of MRSA infection. Denies any personal or family hx of previous complications w/anesthesia except states it was difficult for him to go under anesthesia x1. Nicotine status: Former. PAST MEDICAL HISTORY     Past Medical History:   Diagnosis Date    Abnormal EKG     Acute respiratory failure (HCC) 03/17/2020    Anxiety     Dr. Cant[facility])     Prostate    Colon polyps     Hx of blood clots 06/28/2021    Saddle [facility]    Hyperlipidemia     Dr. Sona Alcocer    Pulmonary emboli ([facility] 06/28/2021    Per CTA chest, 6-28-21-\" Large saddle pulmonary embolism with extensive thrombus load and severe right heart strain. \"    SOB (shortness of breath) on exertion     Wears partial dentures     upper & lower    Wellness examination     Dr. Sona Alcocer- PCP       SURGICAL HISTORY       Past Surgical History:   Procedure Laterality Date    BACK SURGERY      x2, hx of hardware exchange- patient states no longer has hardware    CERVICAL SPINE SURGERY      x5    COLONOSCOPY      CYST REMOVAL      FOOT TENDON SURGERY Right     ENDOSCOPIC FASCIOTOMY PLANTAR FOOT;    HEMICOLECTOMY  05/13/2021    Procedure: HEMICOLECTOMY RIGHT EXTENDED, PARTIAL OMENTECTOMY; Surgeon: Marcelina Goldberg MD; Location: [place] [address]; Service: General; Laterality: Right    KNEE SURGERY Right     LIPOMA RESECTION      Multiple    PROSTATE BIOPSY      PROSTATECTOMY  03/13/2020    LAPAROSCOPIC ROBOTIC PROSTATECTOMY, PELVIC LYMPHNODE DISSECTION     PROSTATECTOMY N/A 03/13/2020    XI LAPAROSCOPIC ROBOTIC PROSTATECTOMY, PELVIC LYMPHNODE DISSECTION performed by Meg Cha MD at [place] [address]      AS 21 YR.  OLD    WRIST SURGERY         SOCIAL HISTORY       Social History     Socioeconomic History    Marital status:      Spouse name: None    Number of children: None    Years of education: None    Highest education level: None   Occupational History    None   Tobacco Use    Smoking status: Former Smoker     Packs/day: 0.50     Start date:      Quit date: 2021     Years since quittin.9    Smokeless tobacco: Never Used   Vaping Use    Vaping Use: Never used   Substance and Sexual Activity    Alcohol use: Not Currently    Drug use: Never    Sexual activity: Yes   Other Topics Concern    None   Social History Narrative    None     Social Determinants of Health     Financial Resource Strain:     Difficulty of Paying Living Expenses: Not on file   Food Insecurity:     Worried About Running Out of Food in the Last Year: Not on file    Parth of Food in the Last Year: Not on file   Transportation Needs:     Lack of Transportation (Medical): Not on file    Lack of Transportation (Non-Medical):  Not on file   Physical Activity:     Days of Exercise per Week: Not on file    Minutes of Exercise per Session: Not on file   Stress:     Feeling of Stress : Not on file   Social Connections:     Frequency of Communication with Friends and Family: Not on file    Frequency of Social Gatherings with Friends and Family: Not on file    Attends Yazidi Services: Not on file    Active Member of [address] [place] [address] or Organizations: Not on file    Attends Club or Organization Meetings: Not on file    Marital Status: Not on file   Intimate Partner Violence:     Fear of Current or Ex-Partner: Not on file    Emotionally Abused: Not on file    Physically Abused: Not on file    Sexually Abused: Not on file   Housing Stability:     Unable to Pay for Housing in the Last Year: Not on file    Number of [place] in the Last Year: Not on file    Unstable Housing in the Last Year: Not on file       REVIEW OF SYSTEMS      Allergies   Allergen Reactions    Hydrocodone-Acetaminophen      Vicodin/ Other reaction(s): Hives       No current facility-administered medications on file prior to encounter. Current Outpatient Medications on File Prior to Encounter   Medication Sig Dispense Refill    oxybutynin (DITROPAN-XL) 10 MG extended release tablet Take 10 mg by mouth daily      Incontinence Supply Disposable (DISPOSABLE BRIEF LARGE) MISC 10 briefs per day 300 Bottle 3    calcium carbonate 600 MG TABS tablet Take 1 tablet by mouth daily as needed       traZODone (DESYREL) 150 MG tablet TAKE 1 TABLET BY MOUTH AT BEDTIME      clonazePAM (KLONOPIN) 2 MG tablet TAKE 1 TABLET BY MOUTH TWICE DAILY      buPROPion (WELLBUTRIN SR) 200 MG extended release tablet Take 200 mg by mouth      aspirin 325 MG tablet Take 325 mg by mouth Instructed to stop 7 days before procedure on 4-25-22      Multiple Vitamins-Minerals (MULTIVITAMIN ADULT PO) Take 1 capsule by mouth      SIMVASTATIN PO Take 40 mg by mouth       (Notation: Medications listed above are not currently reconciled at the signing of this H&P note, to be reconciled in pre-op per RN)    Review of Systems   Constitutional: Negative for chills and fever. HENT: Positive for voice change. Negative for congestion, ear pain, rhinorrhea, sore throat and trouble swallowing. Respiratory: Negative for cough, shortness of breath and wheezing. Cardiovascular: Negative for chest pain, palpitations and leg swelling. Gastrointestinal:        SEE HPI. Genitourinary: Negative for dysuria and frequency. Musculoskeletal: Positive for arthralgias and joint swelling (Right knee). Skin: Positive for rash (Red bumps to b/l UE's, chest since being on anticoagulation (no longer on)- denies itching, states if he squeezes, they bleed- discussed w/doctors). Neurological: Negative for dizziness and headaches. Hematological: Does not bruise/bleed easily.        GENERAL PHYSICAL EXAM     Vitals: Review current vital signs per RN flow sheet. GENERAL APPEARANCE:   Tani Valentine is 80 y.o.,  male, moderately obese, nourished, conscious, alert. Does not appear to be in any distress or pain at this time. Physical Exam  Vitals reviewed. Constitutional:       General: He is not in acute distress. Appearance: He is well-developed. He is not ill-appearing, toxic-appearing or diaphoretic. HENT:      Head: Normocephalic. Right Ear: External ear normal.      Left Ear: External ear normal.      Nose: Nose normal.      Mouth/Throat:      Dentition: Has dentures (Partial, uppers and lower). Pharynx: No oropharyngeal exudate or posterior oropharyngeal erythema. Tonsils: No tonsillar abscesses. Eyes:      General:         Right eye: No discharge. Left eye: No discharge. Conjunctiva/sclera: Conjunctivae normal.      Pupils: Pupils are equal, round, and reactive to light. Cardiovascular:      Rate and Rhythm: Normal rate and regular rhythm. Pulses: Intact distal pulses. Heart sounds: Normal heart sounds. Pulmonary:      Effort: Pulmonary effort is normal. No accessory muscle usage or respiratory distress. Breath sounds: Normal breath sounds. No decreased breath sounds, wheezing, rhonchi or rales. Abdominal:      General: Bowel sounds are normal. There is no distension. Palpations: Abdomen is soft. There is no mass. Tenderness: There is no abdominal tenderness. There is no guarding or rebound. Musculoskeletal:      Right knee: Swelling present. Tenderness present. Right lower leg: Swelling present. No tenderness. No edema. Left lower leg: Swelling present. No tenderness. No edema. Comments: Negative Billie's sign b/l. Trace swelling to b/l LE's, RLE worse than LLE, no erythema/warmth. Lymphadenopathy:      Cervical: No cervical adenopathy. Skin:     General: Skin is warm and dry.       Findings: Rash (Scattered, blanchable slightly erythematous papules to b/l upper arms and chest, advised con't f/u with provider) present. Neurological:      Mental Status: He is alert and oriented to person, place, and time.    Psychiatric:         Behavior: Behavior normal.         RECENT LAB WORK- CBC, CMP per Care Everywhere, [facility] chart     Comprehensive metabolic panel  Specimen:  Serum   Ref Range & Units 3 mo ago Comments   Sodium 134 - 146 mmol/L 142     Potassium, Bld 3.5 - 5.0 mmol/L 4.5     Chloride 98 - 109 mmol/L 104     CO2 22 - 32 mmol/L 26     Anion gap 5 - 15 mmol/L 12     BUN 5 - 27 mg/dL 20     Creatinine 0.60 - 1.30 mg/dL 1.18  METHOD TRACEABLE TO IDMS STANDARD   Glucose 65 - 99 mg/dL 109 High      Calcium 8.5 - 10.5 mg/dL 9.6     Total Protein 6.0 - 8.0 g/dL 7.4     Albumin 3.2 - 5.3 g/dL 4.3     Alkaline Phosphatase 39 - 130 U/L 101     AST 0 - 41 U/L 24     ALT 0 - 40 U/L 26     Total bilirubin 0.3 - 1.2 mg/dL 0.7     GFR MDRD Non Af Amer >59 ml/min/1.73sq.m >60     GFR MDRD Af Amer >59 ml/min/1.73sq.m >60     Resulting [facility] LAB    Specimen Collected: 01/06/22  9:43 AM Last Resulted: 01/06/22  2:37 PM   Received From: [employer]  Result Received: 02/02/22 11:03 AM    View Encounter        Recent Data from [employer]  Related to Comprehensive metabolic panel  Component 01/06/22 06/28/21 06/28/21 05/19/21 05/19/21 05/18/21    Sodium 142 139 140 -- 140 --   Potassium, Bld 4.5 5.2 High  5.5 High  4.2 3.8 3.7   Chloride 104 103 104 -- 105 --   CO2 26 18 Low  21 Low  -- 29 --   Anion gap 12 18 High  15 -- 6 --   BUN 20 31 High  31 High  -- 21 --   Creatinine 1.18  1.50 High   1.41 High   -- 0.84  --   Glucose 109 129 High  132 High  -- 95 --   Calcium 9.6 9.1 9.6 -- 8.1 Low  --   Total Protein 7.4 -- -- -- -- --   Albumin 4.3 -- -- -- -- --   Alkaline Phosphatase 101 -- -- -- -- --   AST 24 -- -- -- -- --   ALT 26 -- -- -- -- --   Total bilirubin 0.7 -- -- -- -- --   GFR MDRD Non Af Amer >60 46 Low  50 Low  -- >60 --   GFR MDRD Af Amer >60 56 Low  >60 -- >60 --   View all related data           CBC auto differential  Specimen:  Serum   Ref Range & Units 3 mo ago   White Blood Cells 4.0 - 11.0 X10E9/L 8.1    RBC count 4.10 - 5.70 X10E12/L 4.71    Hemoglobin 13.0 - 17.0 g/dL 14.8    Hematocrit 39 - 49 % 43.7    MCV 80 - 100 fL 93    MCH 27 - 34 pg 31.4    MCHC 32 - 36 g/dL 33.9    RDW 11.5 - 15.0 % 13.3    Platelets 128 - 784 H96T6/H 238    MPV 7 - 12 fL 8.8    % neutrophils % 58.9    % lymphocytes % 27.3    % monocytes % 11.2    % eosinophils % 1.7    % Basophils % 0.9    Neutrophils Absolute (A) 1.5 - 6.6 X10E9/L 4.8    Lymphocytes Absolute 1.0 - 3.5 X10E9/L 2.2    Monocytes Absolute 0 - 0.9 X10E9/L 0.9    Eosinophils Absolute 0.0 - 0.4 X10E9/L 0.1    Basophils Absolute 0.0 - 0.2 X10E9/L 0.1    Resulting [facility] LAB   Specimen Collected: 01/06/22  9:43 AM Last Resulted: 01/06/22  1:15 PM   Received From: [employer]  Result Received: 02/02/22 11:03 AM    View Encounter      Recent Data from [employer]  Related to CBC auto differential  Component 01/06/22 06/28/21 06/28/21 05/19/21 05/18/21 05/17/21    White Blood Cells 8.1 12.1 High  11.1 High  15.9 High  8.4 8.0   RBC count 4.71 4.60 4.83 4.40 3.96 Low  3.87 Low    Hemoglobin 14.8 14.1 14.9 14.1 12.7 Low  12.7 Low    Hematocrit 43.7 42.6 44.9 42.0 37.6 Low  36.8 Low    MCV 93 93 93 95 95 95   MCH 31.4 30.7 30.8 32.1 32.1 32.9   MCHC 33.9 33.2 33.1 33.6 33.8 34.5   RDW 13.3 14.0 13.9 13.1 13.3 13.0   Platelets 275 855 506 247 194 183   MPV 8.8 8.3 9.7 8.6 8.0 7.8   % neutrophils 58.9 72.7 75.7 -- 68.3 70.5   % lymphocytes 27.3 17.7 15.6 -- 13.0 9.6   % monocytes 11.2 8.9 8.3 -- 16.1 16.3   % eosinophils 1.7 0.1 0.0 -- 2.3 3.3   % Basophils 0.9 0.6 0.4 -- 0.3 0.3   Neutrophils Absolute (A) 4.8 8.8 High  8.4 High  -- 5.8 5.7   Lymphocytes Absolute 2.2 2.1 1.7 1.9 1.1 0.8 Low  Monocytes Absolute 0.9 1.1 High  0.9 1.4 High  1.4 High  1.3 High    Eosinophils Absolute 0.1 0.0 0.0 0.2 0.2 0.3   Basophils Absolute 0.1 0.1 0.0 -- 0.0 0.0            PROVISIONAL DIAGNOSES / SURGERY:      HISTORY OF COLON POLYPS      COLONOSCOPY DIAGNOSTIC    Patient Active Problem List    Diagnosis Date Noted    [facility] 06/28/2021    Polyp of sigmoid colon 05/13/2021    Sigmoid diverticulosis 05/05/2021    Urinary incontinence without sensory awareness 06/09/2020    Aftercare following surgery for neoplasm 03/19/2020    Anxiety disorder, unspecified 03/19/2020    Abnormal EKG 03/10/2020    SOBOE (shortness of breath on exertion) 03/10/2020    Localized osteoarthrosis of right hip 02/21/2019    Primary localized osteoarthritis of right knee 02/21/2019    Severe obesity (BMI 35.0-39. 9) with comorbidity ([facility] 08/17/2018    Atopic rhinitis 02/13/2017    Benign prostatic hyperplasia 02/13/2017    Depression 02/13/2017    Hypercholesteremia 02/13/2017    Low back pain 02/13/2017    Neck pain 02/13/2017    Hypoxia 03/17/2020    Acute respiratory failure ([facility] 03/17/2020    Atelectasis 03/17/2020    Prostate cancer ([facility] 03/13/2020           ANGELO Parnell - CNP on 4/25/2022 at 6:01 AM

## 2022-08-15 ENCOUNTER — HOSPITAL ENCOUNTER (OUTPATIENT)
Age: 72
Discharge: HOME | End: 2022-08-15
Payer: MEDICARE

## 2022-08-15 DIAGNOSIS — Z85.46 HISTORY OF MALIGNANT NEOPLASM OF PROSTATE: ICD-10-CM

## 2022-08-15 LAB — PROSTATE SPECIFIC ANTIGEN: <0.02 NG/ML

## 2022-08-15 PROCEDURE — 36415 COLL VENOUS BLD VENIPUNCTURE: CPT

## 2022-08-15 PROCEDURE — 84153 ASSAY OF PSA TOTAL: CPT

## 2022-08-22 ENCOUNTER — OFFICE VISIT (OUTPATIENT)
Dept: UROLOGY | Age: 72
End: 2022-08-22
Payer: MEDICARE

## 2022-08-22 VITALS
OXYGEN SATURATION: 97 % | HEIGHT: 69 IN | BODY MASS INDEX: 41.18 KG/M2 | DIASTOLIC BLOOD PRESSURE: 70 MMHG | SYSTOLIC BLOOD PRESSURE: 122 MMHG | HEART RATE: 78 BPM | WEIGHT: 278 LBS | TEMPERATURE: 97.3 F

## 2022-08-22 DIAGNOSIS — Z85.46 HISTORY OF MALIGNANT NEOPLASM OF PROSTATE: Primary | ICD-10-CM

## 2022-08-22 DIAGNOSIS — Z90.79 S/P PROSTATECTOMY: ICD-10-CM

## 2022-08-22 DIAGNOSIS — N39.3 STRESS INCONTINENCE, MALE: ICD-10-CM

## 2022-08-22 PROCEDURE — G8427 DOCREV CUR MEDS BY ELIG CLIN: HCPCS | Performed by: UROLOGY

## 2022-08-22 PROCEDURE — 1036F TOBACCO NON-USER: CPT | Performed by: UROLOGY

## 2022-08-22 PROCEDURE — G8417 CALC BMI ABV UP PARAM F/U: HCPCS | Performed by: UROLOGY

## 2022-08-22 PROCEDURE — 3017F COLORECTAL CA SCREEN DOC REV: CPT | Performed by: UROLOGY

## 2022-08-22 PROCEDURE — 99213 OFFICE O/P EST LOW 20 MIN: CPT | Performed by: UROLOGY

## 2022-08-22 PROCEDURE — 1123F ACP DISCUSS/DSCN MKR DOCD: CPT | Performed by: UROLOGY

## 2022-08-22 ASSESSMENT — ENCOUNTER SYMPTOMS
NAUSEA: 0
DIARRHEA: 1
SHORTNESS OF BREATH: 0
COUGH: 0
WHEEZING: 0
SORE THROAT: 0
VOMITING: 0

## 2022-08-22 NOTE — PROGRESS NOTES
Review of Systems   Constitutional:  Negative for chills, fatigue and fever. HENT:  Negative for congestion and sore throat. Respiratory:  Negative for cough, shortness of breath and wheezing. Cardiovascular:  Negative for chest pain and palpitations. Gastrointestinal:  Positive for diarrhea. Negative for nausea and vomiting. Genitourinary:  Negative for difficulty urinating, dysuria, frequency, hematuria and urgency.

## 2022-08-22 NOTE — PROGRESS NOTES
[address] [address]  Dept: [address] [facility] Office Note - Established    Patient:  Barbara Vega  YOB: 1950  Date: 8/22/2022    The patient is a 80 y.o. male who presents todayfor evaluation of the following problems:   Chief Complaint   Patient presents with    6 Month Follow-Up     History of malignant neoplasm of prostate with PSA        HPI  This is a very pleasant 45-year-old gentleman who had a robotic prostatectomy in March 2020. His PSA remains undetectable. Very mild stress incontinence but this is only when his bladder is full and he strains. Otherwise he is doing well. Pt wears 1ppd for insurance. Summary of old records: N/A    Additional History: N/A    Procedures Today: N/A    Urinalysis today:  No results found for this visit on 08/22/22. Last several PSA's:  Lab Results   Component Value Date    PSA <0.02 08/15/2022    PSA <0.02 07/29/2021    PSA <0.01 01/26/2021     Last total testosterone:  No results found for: TESTOSTERONE    AUA Symptom Score (8/22/2022):                                Last BUN and creatinine:  Lab Results   Component Value Date    BUN 14 05/26/2021     Lab Results   Component Value Date    CREATININE 0.93 05/26/2021       Additional Lab/Culture results: none    Imaging Reviewed during this Office Visit: none  (results were independently reviewed by physician and radiology report verified)    PAST MEDICAL, FAMILY AND SOCIAL HISTORY UPDATE:  Past Medical History:   Diagnosis Date    Abnormal EKG     Acute respiratory failure ([facility] 03/17/2020    Anxiety     Dr. Chau Record [facility])     Prostate    Colon polyps     Hx of blood clots 06/28/2021    Saddle PE- [facility]    Hyperlipidemia     Dr. Elana Oconnor    Pulmonary emboli [facility]) 06/28/2021    Per CTA chest, 6-28-21-\" Large saddle pulmonary embolism with extensive thrombus load and severe right heart strain. \"    SOB (shortness of breath) on exertion     Wears partial dentures     upper & lower    Wellness examination     Dr. Keisha Johnson- PCP     Past Surgical History:   Procedure Laterality Date    BACK SURGERY      x2, hx of hardware exchange- patient states no longer has hardware    CERVICAL SPINE SURGERY      x5    COLONOSCOPY      COLONOSCOPY N/A 4/25/2022    COLONOSCOPY DIAGNOSTIC w/ CLIP APPLICATION IN DISTAL TRANSVERSE COLON performed by Oxana Bermudez MD at [address] [facility] Right     ENDOSCOPIC FASCIOTOMY PLANTAR FOOT;    HEMICOLECTOMY  05/13/2021    Procedure: HEMICOLECTOMY RIGHT EXTENDED, PARTIAL OMENTECTOMY; Surgeon: Oxana Bermudez MD; Location: [place] [address]; Service: General; Laterality: Right    KNEE SURGERY Right     LIPOMA RESECTION      Multiple    PROSTATE BIOPSY      PROSTATECTOMY  03/13/2020    LAPAROSCOPIC ROBOTIC PROSTATECTOMY, PELVIC LYMPHNODE DISSECTION     PROSTATECTOMY N/A 03/13/2020    XI LAPAROSCOPIC ROBOTIC PROSTATECTOMY, PELVIC LYMPHNODE DISSECTION performed by Evens Finnegan MD at [address] [facility]      AS 21 YR. OLD    WRIST SURGERY       Family History   Problem Relation Age of Onset    Cancer Father     Diabetes Paternal Grandmother     Other Paternal Uncle         Blood clot post op     Outpatient Medications Marked as Taking for the 8/22/22 encounter (Office Visit) with Evens Finnegan MD   Medication Sig Dispense Refill    NONFORMULARY Take 1 caplet by mouth daily Takes over the counter medication daily for weight loss.       Incontinence Supply Disposable (DISPOSABLE BRIEF LARGE) MISC 10 briefs per day 300 Bottle 3    calcium carbonate 600 MG TABS tablet Take 1 tablet by mouth daily as needed       traZODone (DESYREL) 150 MG tablet 150 mg nightly       clonazePAM (KLONOPIN) 2 MG tablet TAKE 1 TABLET BY MOUTH TWICE DAILY      buPROPion (WELLBUTRIN SR) 200 MG extended release tablet Take 200 mg by mouth      aspirin 325 MG tablet Take 325 mg by mouth Instructed to stop 7 days before procedure on 22      Multiple Vitamins-Minerals (MULTIVITAMIN ADULT PO) Take 1 capsule by mouth      SIMVASTATIN PO Take 40 mg by mouth         Hydrocodone-acetaminophen  Social History     Tobacco Use   Smoking Status Former    Packs/day: 0.50    Types: Cigarettes    Start date:     Quit date: 2021    Years since quittin.2   Smokeless Tobacco Never     (Ifpatient a smoker, smoking cessation counseling offered)    Social History     Substance and Sexual Activity   Alcohol Use Not Currently       REVIEW OF SYSTEMS:  Review of Systems    Physical Exam:      Vitals:    22 1119   BP: 122/70   Pulse: 78   Temp: 97.3 °F (36.3 °C)   SpO2: 97%     Body mass index is 41.05 kg/m². Patient is a 80 y.o. male in no acute distress and alert and oriented to person, place and time. Physical Exam  Constitutional: Patient in no acute distress. Neuro: Alert and oriented to person, place and time. Psych: Mood normal, affect normal      Assessment and Plan      1. History of malignant neoplasm of prostate    2. Stress incontinence, male    3. S/P prostatectomy           Plan:   F/yasmine 6 mo psa      Return in about 6 months (around 2023) for psa. Prescriptions Ordered:  No orders of the defined types were placed in this encounter. Orders Placed:  Orders Placed This Encounter   Procedures    PSA, Diagnostic     Standing Status:   Future     Standing Expiration Date:   2023             Destin Tejeda MD    Agree with the ROS entered by the MA.

## 2023-03-21 ENCOUNTER — HOSPITAL ENCOUNTER (OUTPATIENT)
Age: 73
Discharge: HOME | End: 2023-03-21
Payer: MEDICARE

## 2023-03-21 DIAGNOSIS — Z85.46 HISTORY OF MALIGNANT NEOPLASM OF PROSTATE: ICD-10-CM

## 2023-03-21 LAB — PROSTATE SPECIFIC ANTIGEN: <0.02 NG/ML

## 2023-03-21 PROCEDURE — 84153 ASSAY OF PSA TOTAL: CPT

## 2023-03-21 PROCEDURE — 36415 COLL VENOUS BLD VENIPUNCTURE: CPT

## 2023-03-27 ENCOUNTER — OFFICE VISIT (OUTPATIENT)
Dept: UROLOGY | Age: 73
End: 2023-03-27
Payer: MEDICARE

## 2023-03-27 VITALS
HEIGHT: 69 IN | HEART RATE: 64 BPM | SYSTOLIC BLOOD PRESSURE: 146 MMHG | TEMPERATURE: 97.2 F | DIASTOLIC BLOOD PRESSURE: 80 MMHG | BODY MASS INDEX: 41.05 KG/M2

## 2023-03-27 DIAGNOSIS — Z90.79 S/P PROSTATECTOMY: ICD-10-CM

## 2023-03-27 DIAGNOSIS — N39.3 STRESS INCONTINENCE, MALE: ICD-10-CM

## 2023-03-27 DIAGNOSIS — Z85.46 HISTORY OF MALIGNANT NEOPLASM OF PROSTATE: Primary | ICD-10-CM

## 2023-03-27 PROCEDURE — 1036F TOBACCO NON-USER: CPT | Performed by: UROLOGY

## 2023-03-27 PROCEDURE — 3017F COLORECTAL CA SCREEN DOC REV: CPT | Performed by: UROLOGY

## 2023-03-27 PROCEDURE — G8427 DOCREV CUR MEDS BY ELIG CLIN: HCPCS | Performed by: UROLOGY

## 2023-03-27 PROCEDURE — 1123F ACP DISCUSS/DSCN MKR DOCD: CPT | Performed by: UROLOGY

## 2023-03-27 PROCEDURE — G8484 FLU IMMUNIZE NO ADMIN: HCPCS | Performed by: UROLOGY

## 2023-03-27 PROCEDURE — 99213 OFFICE O/P EST LOW 20 MIN: CPT | Performed by: UROLOGY

## 2023-03-27 PROCEDURE — G8417 CALC BMI ABV UP PARAM F/U: HCPCS | Performed by: UROLOGY

## 2023-03-27 ASSESSMENT — ENCOUNTER SYMPTOMS
SHORTNESS OF BREATH: 0
WHEEZING: 0
CONSTIPATION: 0
EYE REDNESS: 0
COUGH: 0
DIARRHEA: 0
NAUSEA: 0
ABDOMINAL PAIN: 0
BACK PAIN: 0
EYE PAIN: 0
VOMITING: 0

## 2023-03-27 NOTE — PROGRESS NOTES
Review of Systems   Constitutional:  Negative for chills, fatigue and fever. Eyes:  Negative for pain, redness and visual disturbance. Respiratory:  Negative for cough, shortness of breath and wheezing. Cardiovascular:  Negative for chest pain and leg swelling. Gastrointestinal:  Negative for abdominal pain, constipation, diarrhea, nausea and vomiting. Genitourinary:  Negative for difficulty urinating, dysuria, flank pain, frequency, hematuria, scrotal swelling, testicular pain and urgency. Musculoskeletal:  Negative for back pain, joint swelling and myalgias. Skin:  Negative for rash and wound. Neurological:  Negative for dizziness, tremors, weakness and numbness. Hematological:  Does not bruise/bleed easily.
Instructed to stop 7 days before procedure on 22      Multiple Vitamins-Minerals (MULTIVITAMIN ADULT PO) Take 1 capsule by mouth      SIMVASTATIN PO Take 40 mg by mouth         Hydrocodone-acetaminophen  Social History     Tobacco Use   Smoking Status Former    Packs/day: 0.50    Types: Cigarettes    Start date:     Quit date: 2021    Years since quittin.8   Smokeless Tobacco Never     (Ifpatient a smoker, smoking cessation counseling offered)    Social History     Substance and Sexual Activity   Alcohol Use Not Currently       REVIEW OF SYSTEMS:  Review of Systems    Physical Exam:      Vitals:    23 1110   BP: (!) 146/80   Pulse: 64   Temp: 97.2 °F (36.2 °C)     Body mass index is 41.05 kg/m². Patient is a 71 y.o. male in no acute distress and alert and oriented to person, place and time. Physical Exam  Constitutional: Patient in no acute distress. Neuro: Alert and oriented to person, place and time. Psych: Mood normal, affect normal  Skin: No rash noted  HEENT: Head: Normocephalic andatraumatic  Conjunctivae and EOM are normal. Pupils are equal, round  Nose:Normal      Assessment and Plan      1. History of malignant neoplasm of prostate    2. Stress incontinence, male    3. S/P prostatectomy           Plan:   Cont kegels  F/u 6 mo psa      Return in about 6 months (around 2023) for psa. Prescriptions Ordered:  No orders of the defined types were placed in this encounter. Orders Placed:  Orders Placed This Encounter   Procedures    PSA, Diagnostic     Standing Status:   Future     Standing Expiration Date:   3/26/2024             Geoff Chavez MD    Agree with the ROS entered by the MA.

## 2023-09-28 ENCOUNTER — HOSPITAL ENCOUNTER (OUTPATIENT)
Dept: WOMENS IMAGING | Age: 73
Discharge: HOME | End: 2023-09-30
Payer: MEDICARE

## 2023-09-28 ENCOUNTER — HOSPITAL ENCOUNTER (OUTPATIENT)
Age: 73
Discharge: HOME | End: 2023-09-28
Payer: MEDICARE

## 2023-09-28 VITALS — HEIGHT: 69 IN | BODY MASS INDEX: 41.18 KG/M2 | WEIGHT: 278 LBS

## 2023-09-28 DIAGNOSIS — N63.22 UNSPECIFIED LUMP IN THE LEFT BREAST, UPPER INNER QUADRANT: ICD-10-CM

## 2023-09-28 DIAGNOSIS — N63.22 LUMP IN UPPER INNER QUADRANT OF LEFT BREAST: ICD-10-CM

## 2023-09-28 DIAGNOSIS — Z85.46 HISTORY OF MALIGNANT NEOPLASM OF PROSTATE: ICD-10-CM

## 2023-09-28 DIAGNOSIS — N63.0 BREAST MASS IN MALE: ICD-10-CM

## 2023-09-28 LAB — PSA SERPL-MCNC: <0.03 NG/ML (ref 0–4)

## 2023-09-28 PROCEDURE — 76642 ULTRASOUND BREAST LIMITED: CPT

## 2023-09-28 PROCEDURE — 36415 COLL VENOUS BLD VENIPUNCTURE: CPT

## 2023-09-28 PROCEDURE — 84153 ASSAY OF PSA TOTAL: CPT

## 2023-09-28 PROCEDURE — G0279 TOMOSYNTHESIS, MAMMO: HCPCS

## 2023-10-02 ENCOUNTER — OFFICE VISIT (OUTPATIENT)
Dept: UROLOGY | Age: 73
End: 2023-10-02
Payer: MEDICARE

## 2023-10-02 VITALS
SYSTOLIC BLOOD PRESSURE: 148 MMHG | BODY MASS INDEX: 41.18 KG/M2 | DIASTOLIC BLOOD PRESSURE: 89 MMHG | HEIGHT: 69 IN | TEMPERATURE: 97 F | HEART RATE: 60 BPM | WEIGHT: 278 LBS

## 2023-10-02 DIAGNOSIS — Z85.46 HISTORY OF MALIGNANT NEOPLASM OF PROSTATE: Primary | ICD-10-CM

## 2023-10-02 DIAGNOSIS — Z90.79 S/P PROSTATECTOMY: ICD-10-CM

## 2023-10-02 DIAGNOSIS — N39.3 STRESS INCONTINENCE, MALE: ICD-10-CM

## 2023-10-02 DIAGNOSIS — N52.31 ERECTILE DYSFUNCTION AFTER RADICAL PROSTATECTOMY: ICD-10-CM

## 2023-10-02 PROCEDURE — 3017F COLORECTAL CA SCREEN DOC REV: CPT | Performed by: UROLOGY

## 2023-10-02 PROCEDURE — 99213 OFFICE O/P EST LOW 20 MIN: CPT | Performed by: UROLOGY

## 2023-10-02 PROCEDURE — G8417 CALC BMI ABV UP PARAM F/U: HCPCS | Performed by: UROLOGY

## 2023-10-02 PROCEDURE — 1036F TOBACCO NON-USER: CPT | Performed by: UROLOGY

## 2023-10-02 PROCEDURE — 1123F ACP DISCUSS/DSCN MKR DOCD: CPT | Performed by: UROLOGY

## 2023-10-02 PROCEDURE — G8484 FLU IMMUNIZE NO ADMIN: HCPCS | Performed by: UROLOGY

## 2023-10-02 PROCEDURE — G8427 DOCREV CUR MEDS BY ELIG CLIN: HCPCS | Performed by: UROLOGY

## 2023-10-02 ASSESSMENT — ENCOUNTER SYMPTOMS
COUGH: 0
EYE REDNESS: 0
SHORTNESS OF BREATH: 0
EYES NEGATIVE: 1
VOMITING: 0
NAUSEA: 0
RESPIRATORY NEGATIVE: 1
GASTROINTESTINAL NEGATIVE: 1
ABDOMINAL PAIN: 0
EYE PAIN: 0
BACK PAIN: 0
DIARRHEA: 0
CONSTIPATION: 0
WHEEZING: 0

## 2023-10-02 NOTE — PROGRESS NOTES
[address]  [address] [facility] [address]  Dept: [address] [place] [address] Urology Office Note - Established    Patient:  Bonnie Flood  YOB: 1950  Date: 10/2/2023    The patient is a 71 y.o. male who presents todayfor evaluation of the following problems:   Chief Complaint   Patient presents with    6 Month Follow-Up     6 months with PSA       HPI  This is a 78-year-old gentleman who has a history of prostate cancer. He did have a robotic prostatectomy 3-1/2 years ago and continues to have an undetectable PSA. He does have rare episodes of stress incontinence but does continue to wear a pull-up for insurance. He is apprehensive about going out of the house without any kind of protection. Summary of old records: N/A    Additional History: N/A    Procedures Today: N/A    Urinalysis today:  No results found for this visit on 10/02/23. Last several PSA's:  Lab Results   Component Value Date    PSA <0.03 09/28/2023    PSA <0.02 03/21/2023    PSA <0.02 08/15/2022     Last total testosterone:  No results found for: \"TESTOSTERONE\"    AUA Symptom Score (10/2/2023):                                Last BUN and creatinine:  Lab Results   Component Value Date    BUN 14 05/26/2021     Lab Results   Component Value Date    CREATININE 0.93 05/26/2021       Additional Lab/Culture results: none    Imaging Reviewed during this Office Visit: none  (results were independently reviewed by physician and radiology report verified)    PAST MEDICAL, FAMILY AND SOCIAL HISTORY UPDATE:  Past Medical History:   Diagnosis Date    Abnormal EKG     Acute respiratory failure ([address]) 03/17/2020    Anxiety     Dr. Olga Lafleur [facility])     Prostate    Colon polyps     Hx of blood clots 06/28/2021    [facility]    Hyperlipidemia     Dr. Gurdeep Bryant    Prostate [facility])     Pulmonary emboli ([address]) 06/28/2021    Per CTA chest,

## 2023-10-11 RX ORDER — SIMVASTATIN 40 MG
TABLET ORAL
COMMUNITY
Start: 2023-09-30

## 2023-10-11 RX ORDER — MELOXICAM 15 MG/1
15 TABLET ORAL EVERY MORNING
COMMUNITY
Start: 2023-09-02

## 2023-10-11 RX ORDER — TRAMADOL HYDROCHLORIDE 50 MG/1
50 TABLET ORAL EVERY 8 HOURS PRN
COMMUNITY
Start: 2023-05-04

## 2023-10-12 NOTE — H&P (VIEW-ONLY)
HISTORY and [id number]       NAME:  Eleazar Mazariegos  MRN: [id number]   YOB: 1950   Date: 10/13/2023   Age: 71 y.o. Gender: male       COMPLAINT AND PRESENT HISTORY:       Eleazar Mazariegos is 71 y.o. , male, Preadmission Testing for Benign lipomatous neoplasm of skin and subcutaneous tissue of trunk     Patient will be having: LEFT MEDIAL BREAST X 3  LIPOMAS  AND RIGHT ARM LATERALLY Left   ARM LESION BIOPSY EXCISION RIGHT     Patient has hx of Lipoma that was discovered  2 mos ago,     Patient has had similar lesions  before over generalized body that were recovered  Patient reports that the lipoma has increased in size. Patient states that the lesion is growing laterally. Patient denies any pain, redness, itchiness, swelling   no  or drainage from the lipoma site. Pt has not done any interventions. Pt has not seen dermatologist.   Patient denies any hx skin cancer. Patient had bilateral mammogram as well as ultrasound done on 9/29/2023. Patient denies any injury or trauma to the area. Pt denies any hx of MRSA in the past.     Significant medical history: abnormal EKG, PE, SOB, hx of blood clots, hx of acute respiratory failure, HLD    Associated medications: aspirin    BP Readings from Last 3 Encounters:   10/13/23 (!) 159/85   10/02/23 (!) 148/89   03/27/23 (!) 146/80     Pt denies any recent chest pain, SOB. No recent URI, fever or chills. Functional Capacity per patient:              1. Patient is not able to walk 2 city blocks on level ground without SOB. 2. Patient is not able to climb 2 flights of stairs without SOB. Denies any hx of blood clots  Any blood thinners /anticoagulants:  aspirin, mobic, vitamins    Patient has been NPO since midnight. No blood thinners in the past 5-7 days. Patient denies any personal or family problems with anesthesia.      PAST MEDICAL HISTORY     Past Medical History:   Diagnosis Date Symmetrical, no pretibial edema. No calf tenderness. No discoloration or ulcerations. NEUROLOGIC:  The patient is conscious, alert, oriented,Cranial nerve II-XII intact, taste and smell were not examined. No apparent focal sensory or motor deficits. LAB REVIEW      Lab Results   Component Value Date    WBC 5.9 10/13/2023    RBC 4.69 10/13/2023    HGB 14.8 10/13/2023    HCT 44.3 10/13/2023    MCV 94.5 10/13/2023    MCH 31.6 10/13/2023    MCHC 33.4 10/13/2023    RDW 13.7 10/13/2023     10/13/2023    MPV 8.5 10/13/2023        Lab Results   Component Value Date     10/13/2023    K 4.5 10/13/2023     10/13/2023    CO2 25 10/13/2023    BUN 22 10/13/2023    CREATININE 0.8 10/13/2023    GLUCOSE 121 (H) 10/13/2023    CALCIUM 8.5 (L) 10/13/2023    PROT 6.1 (L) 05/26/2021    LABALBU 3.3 (L) 05/26/2021    BILITOT 0.56 05/26/2021    ALKPHOS 61 05/26/2021    AST 17 05/26/2021    ALT 17 05/26/2021                                         EKG REVIEW        Preliminary EKG was done on today reading normal sinus rhythm. Left axis deviation. Inferior infarct. Abnormal ECG. .                PROVISIONAL DIAGNOSES / SURGERY:      LEFT MEDIAL BREAST X 3  LIPOMAS  AND RIGHT ARM LATERALLY Left   ARM LESION BIOPSY EXCISION RIGHT Right     Benign lipomatous neoplasm of skin and subcutaneous tissue of trunk     Patient Active Problem List    Diagnosis Date Noted    Acute saddle pulmonary embolism ([address]) 06/28/2021    Polyp of sigmoid colon 05/13/2021    Sigmoid diverticulosis 05/05/2021    Urinary incontinence without sensory awareness 06/09/2020    Aftercare following surgery for neoplasm 03/19/2020    Anxiety disorder, unspecified 03/19/2020    Abnormal EKG 03/10/2020    SOBOE (shortness of breath on exertion) 03/10/2020    Localized osteoarthrosis of right hip 02/21/2019    Primary localized osteoarthritis of right knee 02/21/2019    Severe obesity (BMI

## 2023-10-12 NOTE — H&P
Symmetrical, no pretibial edema. No calf tenderness. No discoloration or ulcerations. NEUROLOGIC:  The patient is conscious, alert, oriented,Cranial nerve II-XII intact, taste and smell were not examined. No apparent focal sensory or motor deficits. LAB REVIEW      Lab Results   Component Value Date    WBC 5.9 10/13/2023    RBC 4.69 10/13/2023    HGB 14.8 10/13/2023    HCT 44.3 10/13/2023    MCV 94.5 10/13/2023    MCH 31.6 10/13/2023    MCHC 33.4 10/13/2023    RDW 13.7 10/13/2023     10/13/2023    MPV 8.5 10/13/2023        Lab Results   Component Value Date     10/13/2023    K 4.5 10/13/2023     10/13/2023    CO2 25 10/13/2023    BUN 22 10/13/2023    CREATININE 0.8 10/13/2023    GLUCOSE 121 (H) 10/13/2023    CALCIUM 8.5 (L) 10/13/2023    PROT 6.1 (L) 05/26/2021    LABALBU 3.3 (L) 05/26/2021    BILITOT 0.56 05/26/2021    ALKPHOS 61 05/26/2021    AST 17 05/26/2021    ALT 17 05/26/2021                                         EKG REVIEW        Preliminary EKG was done on today reading normal sinus rhythm. Left axis deviation. Inferior infarct. Abnormal ECG. .                PROVISIONAL DIAGNOSES / SURGERY:      LEFT MEDIAL BREAST X 3  LIPOMAS  AND RIGHT ARM LATERALLY Left   ARM LESION BIOPSY EXCISION RIGHT Right     Benign lipomatous neoplasm of skin and subcutaneous tissue of trunk     Patient Active Problem List    Diagnosis Date Noted    Acute saddle pulmonary embolism ([address]) 06/28/2021    Polyp of sigmoid colon 05/13/2021    Sigmoid diverticulosis 05/05/2021    Urinary incontinence without sensory awareness 06/09/2020    Aftercare following surgery for neoplasm 03/19/2020    Anxiety disorder, unspecified 03/19/2020    Abnormal EKG 03/10/2020    SOBOE (shortness of breath on exertion) 03/10/2020    Localized osteoarthrosis of right hip 02/21/2019    Primary localized osteoarthritis of right knee 02/21/2019    Severe obesity (BMI

## 2023-10-13 ENCOUNTER — ANESTHESIA EVENT (OUTPATIENT)
Dept: OPERATING ROOM | Age: 73
End: 2023-10-13
Payer: MEDICARE

## 2023-10-13 ENCOUNTER — HOSPITAL ENCOUNTER (OUTPATIENT)
Dept: PREADMISSION TESTING | Age: 73
Setting detail: OUTPATIENT SURGERY
Discharge: HOME | End: 2023-10-17
Payer: MEDICARE

## 2023-10-13 VITALS
DIASTOLIC BLOOD PRESSURE: 85 MMHG | SYSTOLIC BLOOD PRESSURE: 159 MMHG | HEART RATE: 70 BPM | OXYGEN SATURATION: 94 % | RESPIRATION RATE: 20 BRPM | HEIGHT: 69 IN | TEMPERATURE: 98.1 F | WEIGHT: 246 LBS | BODY MASS INDEX: 36.43 KG/M2

## 2023-10-13 LAB
ANION GAP SERPL CALCULATED.3IONS-SCNC: 8 MMOL/L (ref 9–17)
BASOPHILS # BLD: 0.1 K/UL (ref 0–0.2)
BASOPHILS NFR BLD: 1 % (ref 0–2)
BUN SERPL-MCNC: 22 MG/DL (ref 8–23)
CALCIUM SERPL-MCNC: 8.5 MG/DL (ref 8.6–10.4)
CHLORIDE SERPL-SCNC: 106 MMOL/L (ref 98–107)
CO2 SERPL-SCNC: 25 MMOL/L (ref 20–31)
CREAT SERPL-MCNC: 0.8 MG/DL (ref 0.7–1.2)
EOSINOPHIL # BLD: 0.3 K/UL (ref 0–0.4)
EOSINOPHILS RELATIVE PERCENT: 4 % (ref 0–4)
ERYTHROCYTE [DISTWIDTH] IN BLOOD BY AUTOMATED COUNT: 13.7 % (ref 11.5–14.9)
GFR SERPL CREATININE-BSD FRML MDRD: >60 ML/MIN/1.73M2
GLUCOSE SERPL-MCNC: 121 MG/DL (ref 70–99)
HCT VFR BLD AUTO: 44.3 % (ref 41–53)
HGB BLD-MCNC: 14.8 G/DL (ref 13.5–17.5)
LYMPHOCYTES NFR BLD: 1.7 K/UL (ref 1–4.8)
LYMPHOCYTES RELATIVE PERCENT: 29 % (ref 24–44)
MCH RBC QN AUTO: 31.6 PG (ref 26–34)
MCHC RBC AUTO-ENTMCNC: 33.4 G/DL (ref 31–37)
MCV RBC AUTO: 94.5 FL (ref 80–100)
MONOCYTES NFR BLD: 0.7 K/UL (ref 0.1–1.3)
MONOCYTES NFR BLD: 12 % (ref 1–7)
NEUTROPHILS NFR BLD: 54 % (ref 36–66)
NEUTS SEG NFR BLD: 3.2 K/UL (ref 1.3–9.1)
PLATELET # BLD AUTO: 181 K/UL (ref 150–450)
PMV BLD AUTO: 8.5 FL (ref 6–12)
POTASSIUM SERPL-SCNC: 4.5 MMOL/L (ref 3.7–5.3)
RBC # BLD AUTO: 4.69 M/UL (ref 4.5–5.9)
SODIUM SERPL-SCNC: 139 MMOL/L (ref 135–144)
WBC OTHER # BLD: 5.9 K/UL (ref 3.5–11)

## 2023-10-13 PROCEDURE — 93005 ELECTROCARDIOGRAM TRACING: CPT | Performed by: ANESTHESIOLOGY

## 2023-10-13 PROCEDURE — 80048 BASIC METABOLIC PNL TOTAL CA: CPT

## 2023-10-13 PROCEDURE — 36415 COLL VENOUS BLD VENIPUNCTURE: CPT

## 2023-10-13 PROCEDURE — 85025 COMPLETE CBC W/AUTO DIFF WBC: CPT

## 2023-10-13 RX ORDER — ASPIRIN 81 MG/1
81 TABLET ORAL 2 TIMES DAILY
COMMUNITY

## 2023-10-13 NOTE — PRE-PROCEDURE INSTRUCTIONS
Nothing to eat after midnight. yes  Are you taking any blood thinners? noWhen was the last day? Make sure to use Hibiclens prior to surgery. yes  Remove any jewelry and body piercings. yes  Do you wear glasses? If so, please bring a case to store them in. Are you having any Covid symptoms? no  Do you have any new rashes, infections, etc. that we should be aware of?no  Do you have a ride home the day of surgery? Yes It cannot be a cab or medical transportation.   Verify surgery time and what time to arrive at hospital. 1000/1200

## 2023-10-13 NOTE — DISCHARGE INSTRUCTIONS
the pre-op holding area where you will be prepared for surgery. A physical assessment will be performed by a nurse practitioner or house officer. Your IV will be started and you will meet your anesthesiologist.    When you go to surgery, your family will be directed to the surgical waiting room, where the doctor should speak with them after your surgery. After surgery, you will be taken to the recovery area. When you are alert and stable, you will receive instructions and be prepared for discharge. If you use a Bi-PAP or C-PAP machine, please bring it with you and leave it in the car in case it is needed in recovery room.

## 2023-10-16 ENCOUNTER — HOSPITAL ENCOUNTER (OUTPATIENT)
Age: 73
Setting detail: OUTPATIENT SURGERY
Discharge: HOME | End: 2023-10-16
Attending: SURGERY | Admitting: SURGERY
Payer: MEDICARE

## 2023-10-16 ENCOUNTER — ANESTHESIA (OUTPATIENT)
Dept: OPERATING ROOM | Age: 73
End: 2023-10-16
Payer: MEDICARE

## 2023-10-16 VITALS
SYSTOLIC BLOOD PRESSURE: 133 MMHG | DIASTOLIC BLOOD PRESSURE: 83 MMHG | TEMPERATURE: 97.3 F | BODY MASS INDEX: 40.58 KG/M2 | OXYGEN SATURATION: 92 % | WEIGHT: 274 LBS | HEIGHT: 69 IN | RESPIRATION RATE: 16 BRPM | HEART RATE: 59 BPM

## 2023-10-16 DIAGNOSIS — D17.1 LIPOMA OF TORSO: Primary | ICD-10-CM

## 2023-10-16 DIAGNOSIS — D17.1 BENIGN LIPOMATOUS NEOPLASM OF SKIN AND SUBCUTANEOUS TISSUE OF TRUNK: ICD-10-CM

## 2023-10-16 LAB
EKG ATRIAL RATE: 63 BPM
EKG P AXIS: 35 DEGREES
EKG P-R INTERVAL: 180 MS
EKG Q-T INTERVAL: 408 MS
EKG QRS DURATION: 92 MS
EKG QTC CALCULATION (BAZETT): 417 MS
EKG R AXIS: -41 DEGREES
EKG T AXIS: 43 DEGREES
EKG VENTRICULAR RATE: 63 BPM

## 2023-10-16 PROCEDURE — 2709999900 HC NON-CHARGEABLE SUPPLY: Performed by: SURGERY

## 2023-10-16 PROCEDURE — 2500000003 HC RX 250 WO HCPCS: Performed by: NURSE ANESTHETIST, CERTIFIED REGISTERED

## 2023-10-16 PROCEDURE — 88304 TISSUE EXAM BY PATHOLOGIST: CPT

## 2023-10-16 PROCEDURE — 6360000002 HC RX W HCPCS: Performed by: NURSE ANESTHETIST, CERTIFIED REGISTERED

## 2023-10-16 PROCEDURE — 6360000002 HC RX W HCPCS: Performed by: SURGERY

## 2023-10-16 PROCEDURE — 6360000002 HC RX W HCPCS: Performed by: ANESTHESIOLOGY

## 2023-10-16 PROCEDURE — 3600000012 HC SURGERY LEVEL 2 ADDTL 15MIN: Performed by: SURGERY

## 2023-10-16 PROCEDURE — 2580000003 HC RX 258: Performed by: ANESTHESIOLOGY

## 2023-10-16 PROCEDURE — 3700000000 HC ANESTHESIA ATTENDED CARE: Performed by: SURGERY

## 2023-10-16 PROCEDURE — 3600000002 HC SURGERY LEVEL 2 BASE: Performed by: SURGERY

## 2023-10-16 PROCEDURE — 7100000030 HC ASPR PHASE II RECOVERY - FIRST 15 MIN: Performed by: SURGERY

## 2023-10-16 PROCEDURE — 7100000011 HC PHASE II RECOVERY - ADDTL 15 MIN: Performed by: SURGERY

## 2023-10-16 PROCEDURE — 7100000001 HC PACU RECOVERY - ADDTL 15 MIN: Performed by: SURGERY

## 2023-10-16 PROCEDURE — 7100000031 HC ASPR PHASE II RECOVERY - ADDTL 15 MIN: Performed by: SURGERY

## 2023-10-16 PROCEDURE — 2500000003 HC RX 250 WO HCPCS: Performed by: ANESTHESIOLOGY

## 2023-10-16 PROCEDURE — 93010 ELECTROCARDIOGRAM REPORT: CPT | Performed by: INTERNAL MEDICINE

## 2023-10-16 PROCEDURE — 7100000000 HC PACU RECOVERY - FIRST 15 MIN: Performed by: SURGERY

## 2023-10-16 PROCEDURE — 3700000001 HC ADD 15 MINUTES (ANESTHESIA): Performed by: SURGERY

## 2023-10-16 PROCEDURE — 6370000000 HC RX 637 (ALT 250 FOR IP): Performed by: ANESTHESIOLOGY

## 2023-10-16 PROCEDURE — 7100000010 HC PHASE II RECOVERY - FIRST 15 MIN: Performed by: SURGERY

## 2023-10-16 RX ORDER — SODIUM CHLORIDE 0.9 % (FLUSH) 0.9 %
5-40 SYRINGE (ML) INJECTION PRN
Status: DISCONTINUED | OUTPATIENT
Start: 2023-10-16 | End: 2023-10-16 | Stop reason: HOSPADM

## 2023-10-16 RX ORDER — FENTANYL CITRATE 0.05 MG/ML
25 INJECTION, SOLUTION INTRAMUSCULAR; INTRAVENOUS EVERY 5 MIN PRN
Status: DISCONTINUED | OUTPATIENT
Start: 2023-10-16 | End: 2023-10-16 | Stop reason: HOSPADM

## 2023-10-16 RX ORDER — ONDANSETRON 2 MG/ML
4 INJECTION INTRAMUSCULAR; INTRAVENOUS
Status: DISCONTINUED | OUTPATIENT
Start: 2023-10-16 | End: 2023-10-16 | Stop reason: HOSPADM

## 2023-10-16 RX ORDER — SODIUM CHLORIDE 0.9 % (FLUSH) 0.9 %
5-40 SYRINGE (ML) INJECTION EVERY 12 HOURS SCHEDULED
Status: DISCONTINUED | OUTPATIENT
Start: 2023-10-16 | End: 2023-10-16 | Stop reason: HOSPADM

## 2023-10-16 RX ORDER — FENTANYL CITRATE 50 UG/ML
INJECTION, SOLUTION INTRAMUSCULAR; INTRAVENOUS PRN
Status: DISCONTINUED | OUTPATIENT
Start: 2023-10-16 | End: 2023-10-16 | Stop reason: SDUPTHER

## 2023-10-16 RX ORDER — CEPHALEXIN 500 MG/1
CAPSULE ORAL
Qty: 21 CAPSULE | Refills: 0 | Status: SHIPPED | OUTPATIENT
Start: 2023-10-16

## 2023-10-16 RX ORDER — ONDANSETRON 4 MG/1
TABLET, FILM COATED ORAL
Qty: 20 TABLET | Refills: 0 | Status: SHIPPED | OUTPATIENT
Start: 2023-10-16

## 2023-10-16 RX ORDER — SODIUM CHLORIDE 9 MG/ML
INJECTION, SOLUTION INTRAVENOUS PRN
Status: DISCONTINUED | OUTPATIENT
Start: 2023-10-16 | End: 2023-10-16 | Stop reason: HOSPADM

## 2023-10-16 RX ORDER — DIPHENHYDRAMINE HYDROCHLORIDE 50 MG/ML
12.5 INJECTION INTRAMUSCULAR; INTRAVENOUS
Status: DISCONTINUED | OUTPATIENT
Start: 2023-10-16 | End: 2023-10-16 | Stop reason: HOSPADM

## 2023-10-16 RX ORDER — ONDANSETRON 2 MG/ML
INJECTION INTRAMUSCULAR; INTRAVENOUS PRN
Status: DISCONTINUED | OUTPATIENT
Start: 2023-10-16 | End: 2023-10-16 | Stop reason: SDUPTHER

## 2023-10-16 RX ORDER — SODIUM CHLORIDE, SODIUM LACTATE, POTASSIUM CHLORIDE, CALCIUM CHLORIDE 600; 310; 30; 20 MG/100ML; MG/100ML; MG/100ML; MG/100ML
INJECTION, SOLUTION INTRAVENOUS CONTINUOUS
Status: DISCONTINUED | OUTPATIENT
Start: 2023-10-16 | End: 2023-10-16 | Stop reason: HOSPADM

## 2023-10-16 RX ORDER — ROCURONIUM BROMIDE 10 MG/ML
INJECTION, SOLUTION INTRAVENOUS PRN
Status: DISCONTINUED | OUTPATIENT
Start: 2023-10-16 | End: 2023-10-16 | Stop reason: SDUPTHER

## 2023-10-16 RX ORDER — DEXAMETHASONE SODIUM PHOSPHATE 4 MG/ML
INJECTION, SOLUTION INTRA-ARTICULAR; INTRALESIONAL; INTRAMUSCULAR; INTRAVENOUS; SOFT TISSUE PRN
Status: DISCONTINUED | OUTPATIENT
Start: 2023-10-16 | End: 2023-10-16 | Stop reason: SDUPTHER

## 2023-10-16 RX ORDER — LIDOCAINE HYDROCHLORIDE 10 MG/ML
1 INJECTION, SOLUTION EPIDURAL; INFILTRATION; INTRACAUDAL; PERINEURAL
Status: COMPLETED | OUTPATIENT
Start: 2023-10-16 | End: 2023-10-16

## 2023-10-16 RX ORDER — METOCLOPRAMIDE HYDROCHLORIDE 5 MG/ML
10 INJECTION INTRAMUSCULAR; INTRAVENOUS
Status: DISCONTINUED | OUTPATIENT
Start: 2023-10-16 | End: 2023-10-16 | Stop reason: HOSPADM

## 2023-10-16 RX ORDER — OXYCODONE HYDROCHLORIDE AND ACETAMINOPHEN 5; 325 MG/1; MG/1
1 TABLET ORAL EVERY 6 HOURS PRN
Qty: 28 TABLET | Refills: 0 | Status: SHIPPED | OUTPATIENT
Start: 2023-10-16 | End: 2023-10-23

## 2023-10-16 RX ORDER — LIDOCAINE HYDROCHLORIDE 10 MG/ML
INJECTION, SOLUTION EPIDURAL; INFILTRATION; INTRACAUDAL; PERINEURAL PRN
Status: DISCONTINUED | OUTPATIENT
Start: 2023-10-16 | End: 2023-10-16 | Stop reason: SDUPTHER

## 2023-10-16 RX ORDER — GABAPENTIN 100 MG/1
100 CAPSULE ORAL ONCE
Status: COMPLETED | OUTPATIENT
Start: 2023-10-16 | End: 2023-10-16

## 2023-10-16 RX ORDER — PROPOFOL 10 MG/ML
INJECTION, EMULSION INTRAVENOUS PRN
Status: DISCONTINUED | OUTPATIENT
Start: 2023-10-16 | End: 2023-10-16 | Stop reason: SDUPTHER

## 2023-10-16 RX ADMIN — SUGAMMADEX 200 MG: 100 INJECTION, SOLUTION INTRAVENOUS at 13:03

## 2023-10-16 RX ADMIN — Medication 3000 MG: at 12:29

## 2023-10-16 RX ADMIN — LIDOCAINE HYDROCHLORIDE 1 ML: 10 INJECTION, SOLUTION EPIDURAL; INFILTRATION; INTRACAUDAL; PERINEURAL at 10:43

## 2023-10-16 RX ADMIN — LIDOCAINE HYDROCHLORIDE 50 MG: 10 INJECTION, SOLUTION EPIDURAL; INFILTRATION; INTRACAUDAL; PERINEURAL at 12:32

## 2023-10-16 RX ADMIN — FENTANYL CITRATE 50 MCG: 50 INJECTION, SOLUTION INTRAMUSCULAR; INTRAVENOUS at 12:32

## 2023-10-16 RX ADMIN — SODIUM CHLORIDE, POTASSIUM CHLORIDE, SODIUM LACTATE AND CALCIUM CHLORIDE: 600; 310; 30; 20 INJECTION, SOLUTION INTRAVENOUS at 10:43

## 2023-10-16 RX ADMIN — SUGAMMADEX 200 MG: 100 INJECTION, SOLUTION INTRAVENOUS at 12:53

## 2023-10-16 RX ADMIN — FENTANYL CITRATE 25 MCG: 0.05 INJECTION, SOLUTION INTRAMUSCULAR; INTRAVENOUS at 13:53

## 2023-10-16 RX ADMIN — DEXAMETHASONE SODIUM PHOSPHATE 4 MG: 4 INJECTION INTRA-ARTICULAR; INTRALESIONAL; INTRAMUSCULAR; INTRAVENOUS; SOFT TISSUE at 12:50

## 2023-10-16 RX ADMIN — ROCURONIUM BROMIDE 40 MG: 10 INJECTION, SOLUTION INTRAVENOUS at 12:33

## 2023-10-16 RX ADMIN — ONDANSETRON 4 MG: 2 INJECTION INTRAMUSCULAR; INTRAVENOUS at 12:50

## 2023-10-16 RX ADMIN — FENTANYL CITRATE 25 MCG: 0.05 INJECTION, SOLUTION INTRAMUSCULAR; INTRAVENOUS at 13:37

## 2023-10-16 RX ADMIN — GABAPENTIN 100 MG: 100 CAPSULE ORAL at 10:34

## 2023-10-16 RX ADMIN — PROPOFOL 200 MG: 10 INJECTION, EMULSION INTRAVENOUS at 12:32

## 2023-10-16 ASSESSMENT — ENCOUNTER SYMPTOMS: SHORTNESS OF BREATH: 1

## 2023-10-16 ASSESSMENT — PAIN DESCRIPTION - LOCATION
LOCATION: ARM;CHEST
LOCATION: CHEST;ARM

## 2023-10-16 ASSESSMENT — PAIN DESCRIPTION - DESCRIPTORS
DESCRIPTORS: BURNING
DESCRIPTORS: TENDER;PRESSURE
DESCRIPTORS: BURNING

## 2023-10-16 ASSESSMENT — PAIN - FUNCTIONAL ASSESSMENT
PAIN_FUNCTIONAL_ASSESSMENT: 0-10
PAIN_FUNCTIONAL_ASSESSMENT: PREVENTS OR INTERFERES SOME ACTIVE ACTIVITIES AND ADLS

## 2023-10-16 ASSESSMENT — PAIN SCALES - GENERAL
PAINLEVEL_OUTOF10: 4
PAINLEVEL_OUTOF10: 6

## 2023-10-16 NOTE — OP NOTE
Patient: Rachelle Spencer  YOB: 1950  MRN: [id number]    Date of Procedure: 10/16/2023        Preoperative diagnosis: Symptomatic multiple lipomas right arm and left anterior chest    Postoperative diagnosis: Same    Procedure: Excision multiple lipomas measuring up to 2 cm right arm and left anterior chest    Surgeon: Dr. Niño Burn    Asst.: None    Anesthesia: General    Preparation: Chloraprep    EBL: Less than 10 mL    Specimen: Lipomas    Procedure: Informed consent was obtained. Sites were marked and confirmed. Preoperative antibiotic was given. Patient was taken to the operating room. General anesthesia was given. Operative site was prepped and draped in usual sterile fashion. Timeout was done. Incision was made on the right lateral arm. Incision was deepened with help of Bovie cautery. Approximately 2 cm lipoma was enucleated. Specimen was submitted to pathology. Hemostasis was confirmed. Sponge needle instrument count was found to be correct. Wound was approximated using absorbable sutures. Incisions were made over the left anterior chest on the medial aspect of the left breast at the marked sites. Incisions were deepened with help of Bovie cautery. Approximately 3 lipomas were enucleated 1 from each site. Specimen was submitted to pathology. They measured anywhere between 1 and 2 cm. Wounds were explored. Hemostasis was confirmed. Sponge needle instrument count was found to be correct. All the 3 wounds were approximated using absorbable sutures. Dermabond was applied. Steri-Strips were applied at all places. Sterile dressing was applied. Patient tolerated procedure well and was transferred to the recovery room in a stable condition.  -  Recommendations: Findings discussed with the family. Postoperative course recovery restrictions follow-up were all discussed. Prescriptions called in. Discharge instructions in the chart.

## 2023-10-16 NOTE — DISCHARGE INSTRUCTIONS
DISCHARGE INSTRUCTIONS FOR GENERAL ANESTHESIA    In order to continue your care at home, please follow the instructions below. Anesthesia - Do not drink any alcoholic beverages or make any legal or important decisions for 24 hours. If your surgery was on an extremity or abdomen, do not drive or operate any machinery until your surgeon approves, otherwise do not drive or operate any machinery for 24 hours or as restricted by your surgeon. Pain Medications - Please do not drive, operate machinery, or drink alcoholic beverages while taking any prescribed pain medication. Diet -  Start out eating lightly (broth, soup, crackers, toast, etc.) advancing as tolerated to your usual diet. Try to avoid spicy and greasy/fatty foods for 24 hours. Drink plenty of fluids after surgery, unless you are on a fluid restriction. Avoid milk/milk product for several hours. Dr. Toby Sykes Orders for Outpatient    1.) Diet:    [x]  As tolerated  []  Special     2.) Activity:    [x]  No lifting more than five to ten pounds 2-3 weeks  [x]  May Shower tomorrow  [x]  No driving until off pain medications     3.) Dressing:    [x]  Remove top dressing in 48 hours   [x]  Leave steri strips on     [x]  Remove and change dressing sooner if soaked    4.) Medication:    [x]  Take medication as prescribed   []  Resume blood thinners in  days    [x]  Resume home medications per AVS Summary    5.) Office visit: Follow up with Dr. Brad Carter. Call to schedule an appointment if one has not been made. 6.) Call [phone] if you have any questions or concerns.     Electronically signed by Michell Herrera MD on 10/16/2023 at 12:38 PM

## 2023-10-16 NOTE — INTERVAL H&P NOTE
Update History & Physical    The patient's History and Physical of October 13, 2023 was reviewed with the patient and I examined the patient. There was no change. The surgical site was confirmed by the patient and me. Pt undergoing for  LEFT MEDIAL BREAST X 3  LIPOMAS  AND RIGHT ARM LATERALLY   Left        ARM LESION BIOPSY EXCISION RIGHT   per dr Yimi Sky   Pt NPO since the past midnight, pt took his am  medication today with sip of water  Pt denies fever/chills, chest pain or SOB   Pt denies hx of MRSA infection   Pt has hx of PE   Pt stopped taking ASA one week ago  Pt denies any personal or family problems with anesthesia   Physical exam remains unchanged including cardiac and pulmonary assessment  See nursing flow sheet for vial signs     Lab Results   Component Value Date    WBC 5.9 10/13/2023    HGB 14.8 10/13/2023    HCT 44.3 10/13/2023    MCV 94.5 10/13/2023     10/13/2023     Lab Results   Component Value Date/Time     10/13/2023 07:50 AM    K 4.5 10/13/2023 07:50 AM     10/13/2023 07:50 AM    CO2 25 10/13/2023 07:50 AM    BUN 22 10/13/2023 07:50 AM    CREATININE 0.8 10/13/2023 07:50 AM    GLUCOSE 121 10/13/2023 07:50 AM    GLUCOSE 90 01/06/2012 08:48 AM    CALCIUM 8.5 10/13/2023 07:50 AM    LABGLOM >60 10/13/2023 07:50 AM          Plan: The risks, benefits, expected outcome, and alternative to the recommended procedure have been discussed with the patient. Patient understands and wants to proceed with the procedure.      Electronically signed by ANGELO Varela CNP on 10/16/2023 at 9:41 AM

## 2023-10-16 NOTE — PROGRESS NOTES
CLINICAL PHARMACY NOTE: MEDS TO BEDS    Total # of Prescriptions Filled: 3   The following medications were delivered to the patient:  Cephalexin 500mg  Ondansetron 4mg  Oxycodone 5-325    Additional Documentation:   Medications picked up at pharmacy

## 2023-10-19 LAB — SURGICAL PATHOLOGY REPORT: NORMAL

## 2024-02-14 ENCOUNTER — HOSPITAL ENCOUNTER (OUTPATIENT)
Age: 74
Setting detail: SPECIMEN
Discharge: HOME | End: 2024-02-14

## 2024-02-14 DIAGNOSIS — E78.00 HYPERCHOLESTEREMIA: ICD-10-CM

## 2024-02-14 DIAGNOSIS — I10 PRIMARY HYPERTENSION: ICD-10-CM

## 2024-02-14 PROBLEM — C61 PROSTATE CANCER (HCC): Status: RESOLVED | Noted: 2020-03-13 | Resolved: 2024-02-14

## 2024-02-14 PROBLEM — I26.92 ACUTE SADDLE PULMONARY EMBOLISM (HCC): Status: RESOLVED | Noted: 2021-06-28 | Resolved: 2024-02-14

## 2024-02-14 LAB
BASOPHILS # BLD: 0.08 K/UL (ref 0–0.2)
BASOPHILS NFR BLD: 1 % (ref 0–2)
EOSINOPHIL # BLD: 0.21 K/UL (ref 0–0.44)
EOSINOPHILS RELATIVE PERCENT: 3 % (ref 1–4)
ERYTHROCYTE [DISTWIDTH] IN BLOOD BY AUTOMATED COUNT: 12.3 % (ref 11.8–14.4)
HCT VFR BLD AUTO: 46.3 % (ref 40.7–50.3)
HGB BLD-MCNC: 14.6 G/DL (ref 13–17)
IMM GRANULOCYTES # BLD AUTO: <0.03 K/UL (ref 0–0.3)
IMM GRANULOCYTES NFR BLD: 0 %
LYMPHOCYTES NFR BLD: 1.57 K/UL (ref 1.1–3.7)
LYMPHOCYTES RELATIVE PERCENT: 24 % (ref 24–43)
MCH RBC QN AUTO: 31.2 PG (ref 25.2–33.5)
MCHC RBC AUTO-ENTMCNC: 31.5 G/DL (ref 28.4–34.8)
MCV RBC AUTO: 98.9 FL (ref 82.6–102.9)
MONOCYTES NFR BLD: 0.67 K/UL (ref 0.1–1.2)
MONOCYTES NFR BLD: 10 % (ref 3–12)
NEUTROPHILS NFR BLD: 62 % (ref 36–65)
NEUTS SEG NFR BLD: 3.96 K/UL (ref 1.5–8.1)
NRBC BLD-RTO: 0 PER 100 WBC
PLATELET # BLD AUTO: 221 K/UL (ref 138–453)
PMV BLD AUTO: 10.9 FL (ref 8.1–13.5)
RBC # BLD AUTO: 4.68 M/UL (ref 4.21–5.77)
WBC OTHER # BLD: 6.5 K/UL (ref 3.5–11.3)

## 2024-02-15 LAB
ALBUMIN SERPL-MCNC: 4.2 G/DL (ref 3.5–5.2)
ALBUMIN/GLOB SERPL: 1.4 {RATIO} (ref 1–2.5)
ALP SERPL-CCNC: 87 U/L (ref 40–129)
ALT SERPL-CCNC: 25 U/L (ref 5–41)
ANION GAP SERPL CALCULATED.3IONS-SCNC: 14 MMOL/L (ref 9–17)
AST SERPL-CCNC: 33 U/L
BILIRUB SERPL-MCNC: 0.4 MG/DL (ref 0.3–1.2)
BUN SERPL-MCNC: 14 MG/DL (ref 8–23)
CALCIUM SERPL-MCNC: 9.5 MG/DL (ref 8.6–10.4)
CHLORIDE SERPL-SCNC: 104 MMOL/L (ref 98–107)
CHOLEST SERPL-MCNC: 134 MG/DL
CHOLESTEROL/HDL RATIO: 3.5
CO2 SERPL-SCNC: 23 MMOL/L (ref 20–31)
CREAT SERPL-MCNC: 0.8 MG/DL (ref 0.7–1.2)
GFR SERPL CREATININE-BSD FRML MDRD: >60 ML/MIN/1.73M2
GLUCOSE SERPL-MCNC: 102 MG/DL (ref 70–99)
HDLC SERPL-MCNC: 38 MG/DL
LDLC SERPL CALC-MCNC: 70 MG/DL (ref 0–130)
POTASSIUM SERPL-SCNC: 4.7 MMOL/L (ref 3.7–5.3)
PROT SERPL-MCNC: 7.1 G/DL (ref 6.4–8.3)
SODIUM SERPL-SCNC: 141 MMOL/L (ref 135–144)
TRIGL SERPL-MCNC: 132 MG/DL

## 2024-03-20 ENCOUNTER — HOSPITAL ENCOUNTER (OUTPATIENT)
Age: 74
Discharge: HOME | End: 2024-03-20
Payer: MEDICARE

## 2024-03-20 DIAGNOSIS — Z85.46 HISTORY OF MALIGNANT NEOPLASM OF PROSTATE: ICD-10-CM

## 2024-03-20 LAB — PSA SERPL-MCNC: <0.03 NG/ML (ref 0–4)

## 2024-03-20 PROCEDURE — 84153 ASSAY OF PSA TOTAL: CPT

## 2024-03-20 PROCEDURE — 36415 COLL VENOUS BLD VENIPUNCTURE: CPT

## 2024-04-22 ENCOUNTER — OFFICE VISIT (OUTPATIENT)
Dept: UROLOGY | Age: 74
End: 2024-04-22
Payer: MEDICARE

## 2024-04-22 VITALS
HEIGHT: 69 IN | OXYGEN SATURATION: 96 % | BODY MASS INDEX: 43.4 KG/M2 | TEMPERATURE: 97.8 F | HEART RATE: 78 BPM | DIASTOLIC BLOOD PRESSURE: 84 MMHG | SYSTOLIC BLOOD PRESSURE: 132 MMHG | WEIGHT: 293 LBS

## 2024-04-22 DIAGNOSIS — N39.3 STRESS INCONTINENCE, MALE: ICD-10-CM

## 2024-04-22 DIAGNOSIS — Z90.79 S/P PROSTATECTOMY: ICD-10-CM

## 2024-04-22 DIAGNOSIS — Z85.46 HISTORY OF MALIGNANT NEOPLASM OF PROSTATE: Primary | ICD-10-CM

## 2024-04-22 DIAGNOSIS — N52.31 ERECTILE DYSFUNCTION AFTER RADICAL PROSTATECTOMY: ICD-10-CM

## 2024-04-22 PROCEDURE — 99213 OFFICE O/P EST LOW 20 MIN: CPT | Performed by: UROLOGY

## 2024-04-22 PROCEDURE — 1036F TOBACCO NON-USER: CPT | Performed by: UROLOGY

## 2024-04-22 PROCEDURE — 1123F ACP DISCUSS/DSCN MKR DOCD: CPT | Performed by: UROLOGY

## 2024-04-22 PROCEDURE — 3017F COLORECTAL CA SCREEN DOC REV: CPT | Performed by: UROLOGY

## 2024-04-22 PROCEDURE — G8427 DOCREV CUR MEDS BY ELIG CLIN: HCPCS | Performed by: UROLOGY

## 2024-04-22 PROCEDURE — G8417 CALC BMI ABV UP PARAM F/U: HCPCS | Performed by: UROLOGY

## 2024-04-22 ASSESSMENT — ENCOUNTER SYMPTOMS
WHEEZING: 0
COUGH: 0
ABDOMINAL PAIN: 0
GASTROINTESTINAL NEGATIVE: 1
EYE PAIN: 0
VOMITING: 0
EYES NEGATIVE: 1
COLOR CHANGE: 0
RESPIRATORY NEGATIVE: 1
ALLERGIC/IMMUNOLOGIC NEGATIVE: 1
NAUSEA: 0
BACK PAIN: 0
EYE REDNESS: 0
SHORTNESS OF BREATH: 0

## 2024-04-22 NOTE — PROGRESS NOTES
[facility] PHYSICIANS [facility] UROLOGY [facility]  [address]  [facility] [address]  Dept: [phone]    [facility] Urology Office Note - Established    Patient:  Lukasz Keller  YOB: 1950  Date: 4/22/2024    The patient is a 73 y.o. male who presents todayfor evaluation of the following problems:   Chief Complaint   Patient presents with    History of malignant neoplasm of prostate     6 month follow up       HPI  This is a 73-year-old gentleman who has a history of prostate cancer.  He did have a robotic prostatectomy 4 years ago and his PSA remains undetectable.  He has very rare episodes of stress incontinence but does continue to wear briefs for insurance.  Otherwise he voices no urological complaints.    Summary of old records: N/A    Additional History: N/A    Procedures Today: N/A    Urinalysis today:  No results found for this visit on 04/22/24.  Last several PSA's:  Lab Results   Component Value Date    PSA <0.03 03/20/2024    PSA <0.03 09/28/2023    PSA <0.02 03/21/2023     Last total testosterone:  No results found for: \"TESTOSTERONE\"    AUA Symptom Score (4/22/2024):                               Last BUN and creatinine:  Lab Results   Component Value Date    BUN 14 02/14/2024     Lab Results   Component Value Date    CREATININE 0.8 02/14/2024       Additional Lab/Culture results: none    Imaging Reviewed during this Office Visit: none  (results were independently reviewed by physician and radiology report verified)    PAST MEDICAL, FAMILY AND SOCIAL HISTORY UPDATE:  Past Medical History:   Diagnosis Date    Abnormal EKG     Acute respiratory failure (HCC) 03/17/2020    Anesthesia complication 2005    woke up during neck surgery    Anxiety     Dr. Marlow    Cancer (HCC)     Prostate    Colon polyps     Hx of blood clots 06/28/2021    [facility]    Hyperlipidemia     Dr. Hoffman    Prostate CA (HCC)     Pulmonary emboli

## 2024-05-16 ENCOUNTER — TELEPHONE (OUTPATIENT)
Dept: ONCOLOGY | Age: 74
End: 2024-05-16

## 2024-05-16 PROBLEM — E66.813 OBESITY, CLASS III, BMI 40-49.9 (MORBID OBESITY): Status: ACTIVE | Noted: 2018-08-17

## 2024-05-16 PROBLEM — J96.00 ACUTE RESPIRATORY FAILURE (HCC): Status: RESOLVED | Noted: 2020-03-17 | Resolved: 2024-05-16

## 2024-05-24 ENCOUNTER — HOSPITAL ENCOUNTER (OUTPATIENT)
Dept: CT IMAGING | Age: 74
End: 2024-05-24
Attending: STUDENT IN AN ORGANIZED HEALTH CARE EDUCATION/TRAINING PROGRAM
Payer: MEDICARE

## 2024-05-24 DIAGNOSIS — Z87.891 PERSONAL HISTORY OF TOBACCO USE: ICD-10-CM

## 2024-05-24 PROCEDURE — 71271 CT THORAX LUNG CANCER SCR C-: CPT

## 2024-05-28 ENCOUNTER — TELEPHONE (OUTPATIENT)
Dept: ONCOLOGY | Age: 74
End: 2024-05-28

## 2024-06-05 ENCOUNTER — HOSPITAL ENCOUNTER (OUTPATIENT)
Dept: NUCLEAR MEDICINE | Age: 74
Discharge: HOME | End: 2024-06-07
Attending: STUDENT IN AN ORGANIZED HEALTH CARE EDUCATION/TRAINING PROGRAM
Payer: MEDICARE

## 2024-06-05 DIAGNOSIS — R91.1 SOLID NODULE OF LUNG GREATER THAN 8 MM IN DIAMETER: ICD-10-CM

## 2024-06-05 LAB — GLUCOSE BLD-MCNC: 107 MG/DL (ref 75–110)

## 2024-06-05 PROCEDURE — 82947 ASSAY GLUCOSE BLOOD QUANT: CPT

## 2024-06-05 PROCEDURE — 3430000000 HC RX DIAGNOSTIC RADIOPHARMACEUTICAL: Performed by: STUDENT IN AN ORGANIZED HEALTH CARE EDUCATION/TRAINING PROGRAM

## 2024-06-05 PROCEDURE — 2580000003 HC RX 258: Performed by: STUDENT IN AN ORGANIZED HEALTH CARE EDUCATION/TRAINING PROGRAM

## 2024-06-05 PROCEDURE — 78815 PET IMAGE W/CT SKULL-THIGH: CPT

## 2024-06-05 PROCEDURE — A9609 HC RX DIAGNOSTIC RADIOPHARMACEUTICAL: HCPCS | Performed by: STUDENT IN AN ORGANIZED HEALTH CARE EDUCATION/TRAINING PROGRAM

## 2024-06-05 RX ORDER — SODIUM CHLORIDE 0.9 % (FLUSH) 0.9 %
10 SYRINGE (ML) INJECTION ONCE
Status: COMPLETED | OUTPATIENT
Start: 2024-06-05 | End: 2024-06-05

## 2024-06-05 RX ORDER — FLUDEOXYGLUCOSE F 18 200 MCI/ML
10 INJECTION, SOLUTION INTRAVENOUS
Status: COMPLETED | OUTPATIENT
Start: 2024-06-05 | End: 2024-06-05

## 2024-06-05 RX ADMIN — SODIUM CHLORIDE, PRESERVATIVE FREE 10 ML: 5 INJECTION INTRAVENOUS at 12:48

## 2024-06-05 RX ADMIN — FLUDEOXYGLUCOSE F 18 13.18 MILLICURIE: 200 INJECTION, SOLUTION INTRAVENOUS at 12:48

## 2024-06-12 ENCOUNTER — HOSPITAL ENCOUNTER (OUTPATIENT)
Dept: SLEEP CENTER | Age: 74
Discharge: HOME | End: 2024-06-14
Attending: STUDENT IN AN ORGANIZED HEALTH CARE EDUCATION/TRAINING PROGRAM
Payer: MEDICARE

## 2024-06-12 DIAGNOSIS — G47.19 EXCESSIVE DAYTIME SLEEPINESS: ICD-10-CM

## 2024-06-12 PROCEDURE — G0399 HOME SLEEP TEST/TYPE 3 PORTA: HCPCS

## 2024-06-14 ENCOUNTER — HOSPITAL ENCOUNTER (OUTPATIENT)
Facility: CLINIC | Age: 74
End: 2024-06-14
Payer: MEDICARE

## 2024-06-14 ENCOUNTER — HOSPITAL ENCOUNTER (OUTPATIENT)
Dept: GENERAL RADIOLOGY | Facility: CLINIC | Age: 74
End: 2024-06-14
Payer: MEDICARE

## 2024-06-14 DIAGNOSIS — R91.1 SOLID NODULE OF LUNG GREATER THAN 8 MM IN DIAMETER: ICD-10-CM

## 2024-06-14 PROCEDURE — 71046 X-RAY EXAM CHEST 2 VIEWS: CPT

## 2024-06-17 ENCOUNTER — HOSPITAL ENCOUNTER (OUTPATIENT)
Age: 74
Setting detail: SPECIMEN
Discharge: HOME | End: 2024-06-17

## 2024-06-17 ENCOUNTER — HOSPITAL ENCOUNTER (OUTPATIENT)
Dept: CT IMAGING | Facility: CLINIC | Age: 74
Discharge: HOME | End: 2024-06-19
Payer: MEDICARE

## 2024-06-17 DIAGNOSIS — K11.1 PAROTID GLAND ENLARGEMENT: ICD-10-CM

## 2024-06-17 LAB — CREAT BLD-MCNC: 0.9 MG/DL (ref 0.6–1.4)

## 2024-06-17 PROCEDURE — 2580000003 HC RX 258: Performed by: STUDENT IN AN ORGANIZED HEALTH CARE EDUCATION/TRAINING PROGRAM

## 2024-06-17 PROCEDURE — 70491 CT SOFT TISSUE NECK W/DYE: CPT

## 2024-06-17 PROCEDURE — 6360000004 HC RX CONTRAST MEDICATION: Performed by: STUDENT IN AN ORGANIZED HEALTH CARE EDUCATION/TRAINING PROGRAM

## 2024-06-17 RX ORDER — SODIUM CHLORIDE 0.9 % (FLUSH) 0.9 %
10 SYRINGE (ML) INJECTION PRN
Status: DISCONTINUED | OUTPATIENT
Start: 2024-06-17 | End: 2024-06-20 | Stop reason: HOSPADM

## 2024-06-17 RX ORDER — 0.9 % SODIUM CHLORIDE 0.9 %
80 INTRAVENOUS SOLUTION INTRAVENOUS ONCE
Status: COMPLETED | OUTPATIENT
Start: 2024-06-17 | End: 2024-06-17

## 2024-06-17 RX ADMIN — SODIUM CHLORIDE 80 ML: 9 INJECTION, SOLUTION INTRAVENOUS at 09:02

## 2024-06-17 RX ADMIN — SODIUM CHLORIDE, PRESERVATIVE FREE 10 ML: 5 INJECTION INTRAVENOUS at 09:03

## 2024-06-17 RX ADMIN — IOPAMIDOL 75 ML: 755 INJECTION, SOLUTION INTRAVENOUS at 09:03

## 2024-06-21 PROBLEM — G47.19 EXCESSIVE DAYTIME SLEEPINESS: Status: ACTIVE | Noted: 2024-06-21

## 2024-06-21 LAB — STATUS: NORMAL

## 2024-06-28 LAB — STATUS: NORMAL

## 2024-08-13 DIAGNOSIS — K11.8 PAROTID MASS: Primary | ICD-10-CM

## 2024-08-19 ENCOUNTER — HOSPITAL ENCOUNTER (OUTPATIENT)
Dept: SLEEP CENTER | Age: 74
Discharge: HOME | End: 2024-08-21
Payer: MEDICARE

## 2024-08-19 DIAGNOSIS — G47.33 OSA (OBSTRUCTIVE SLEEP APNEA): ICD-10-CM

## 2024-08-19 PROCEDURE — 95811 POLYSOM 6/>YRS CPAP 4/> PARM: CPT

## 2024-08-20 VITALS
RESPIRATION RATE: 18 BRPM | OXYGEN SATURATION: 89 % | WEIGHT: 289 LBS | HEART RATE: 60 BPM | HEIGHT: 69 IN | BODY MASS INDEX: 42.8 KG/M2

## 2024-08-20 NOTE — DISCHARGE INSTRUCTIONS
DME = [facility]  On CPAP, EPR of 3  Mask = [employer] full face small/medium  Supplemental O2 was added

## 2024-08-26 ENCOUNTER — HOSPITAL ENCOUNTER (OUTPATIENT)
Dept: INTERVENTIONAL RADIOLOGY/VASCULAR | Age: 74
Discharge: HOME | End: 2024-08-28
Payer: MEDICARE

## 2024-08-26 DIAGNOSIS — K11.8 PAROTID MASS: ICD-10-CM

## 2024-08-26 LAB
CASE NUMBER:: NORMAL
SPECIMEN DESCRIPTION: NORMAL

## 2024-08-26 PROCEDURE — 10005 FNA BX W/US GDN 1ST LES: CPT

## 2024-08-26 PROCEDURE — 88305 TISSUE EXAM BY PATHOLOGIST: CPT

## 2024-08-26 PROCEDURE — 2709999900 IR BIOPSY THYROID PERC CORE NEEDLE

## 2024-08-26 PROCEDURE — 88173 CYTOPATH EVAL FNA REPORT: CPT

## 2024-08-26 NOTE — OR NURSING
Pt cleansed, numbed at site, accessed, and de-accessed appropriately with dressing applied. Pt tolerated procedure well. Pt did have sample sent for analysis. Pt verbalized understanding of discharge instructions and had no questions. Ice pack applied for comfort.

## 2024-08-27 LAB
STATUS: NORMAL
SURGICAL PATHOLOGY REPORT: NORMAL

## 2024-09-30 ENCOUNTER — HOSPITAL ENCOUNTER (OUTPATIENT)
Age: 74
Discharge: HOME | End: 2024-09-30
Payer: MEDICARE

## 2024-09-30 DIAGNOSIS — Z85.46 HISTORY OF MALIGNANT NEOPLASM OF PROSTATE: ICD-10-CM

## 2024-09-30 LAB — PSA SERPL-MCNC: <0.03 NG/ML (ref 0–4)

## 2024-09-30 PROCEDURE — 36415 COLL VENOUS BLD VENIPUNCTURE: CPT

## 2024-09-30 PROCEDURE — 84153 ASSAY OF PSA TOTAL: CPT

## 2024-10-07 ENCOUNTER — OFFICE VISIT (OUTPATIENT)
Dept: UROLOGY | Age: 74
End: 2024-10-07

## 2024-10-07 VITALS
BODY MASS INDEX: 42.8 KG/M2 | TEMPERATURE: 97.6 F | WEIGHT: 289 LBS | SYSTOLIC BLOOD PRESSURE: 128 MMHG | DIASTOLIC BLOOD PRESSURE: 82 MMHG | HEART RATE: 79 BPM | HEIGHT: 69 IN | OXYGEN SATURATION: 95 %

## 2024-10-07 DIAGNOSIS — N39.3 STRESS INCONTINENCE, MALE: ICD-10-CM

## 2024-10-07 DIAGNOSIS — Z90.79 S/P PROSTATECTOMY: ICD-10-CM

## 2024-10-07 DIAGNOSIS — Z85.46 HISTORY OF MALIGNANT NEOPLASM OF PROSTATE: Primary | ICD-10-CM

## 2024-10-07 ASSESSMENT — ENCOUNTER SYMPTOMS
WHEEZING: 0
ALLERGIC/IMMUNOLOGIC NEGATIVE: 1
ABDOMINAL PAIN: 1
SHORTNESS OF BREATH: 0
DIARRHEA: 1
NAUSEA: 0
EYE REDNESS: 0
COLOR CHANGE: 0
EYES NEGATIVE: 1
EYE PAIN: 0
BACK PAIN: 0
VOMITING: 0
RESPIRATORY NEGATIVE: 1
COUGH: 0

## 2024-10-07 NOTE — PROGRESS NOTES
[facility] PHYSICIANS [facility] UROLOGY [facility]  [address]  [facility] [address]  Dept: [phone]    [facility] Urology Office Note - Established    Patient:  Lukasz Keller  YOB: 1950  Date: 10/7/2024    The patient is a 73 y.o. male who presents todayfor evaluation of the following problems:   Chief Complaint   Patient presents with    History of malignant neoplasm of prostate     6m fu -hx prostate ca + psa         HPI  This is a 73-year-old gentleman with a history of prostate cancer.  He did have a robotic prostatectomy about 4-1/2 years ago.  His PSA remains undetectable.  He has put on quite a bit of weight and his stress incontinence has gotten a little bit worse.  It is still not significant enough that he is bothered by it but it is more noticeable.  Summary of old records: N/A    Additional History: N/A    Procedures Today: N/A    Urinalysis today:  No results found for this visit on 10/07/24.  Last several PSA's:  Lab Results   Component Value Date    PSA <0.03 09/30/2024    PSA <0.03 03/20/2024    PSA <0.03 09/28/2023     Last total testosterone:  No results found for: \"TESTOSTERONE\"    AUA Symptom Score (10/7/2024):                               Last BUN and creatinine:  Lab Results   Component Value Date    BUN 14 02/14/2024     Lab Results   Component Value Date    CREATININE 0.9 06/17/2024       Additional Lab/Culture results: none    Imaging Reviewed during this Office Visit: none  (results were independently reviewed by physician and radiology report verified)    PAST MEDICAL, FAMILY AND SOCIAL HISTORY UPDATE:  Past Medical History:   Diagnosis Date    Abnormal EKG     Acute respiratory failure 03/17/2020    Anesthesia complication 2005    woke up during neck surgery    Anxiety     Dr. Marlow    Cancer (HCC)     Prostate    Colon polyps     Hx of blood clots 06/28/2021    [facility]    Hyperlipidemia

## 2024-10-07 NOTE — PROGRESS NOTES
Review of Systems   Constitutional: Negative.  Negative for appetite change, chills and fever.   HENT: Negative.     Eyes: Negative.  Negative for pain, redness and visual disturbance.   Respiratory: Negative.  Negative for cough, shortness of breath and wheezing.    Cardiovascular: Negative.  Negative for chest pain and leg swelling.   Gastrointestinal:  Positive for abdominal pain and diarrhea. Negative for nausea and vomiting.   Endocrine: Negative.    Genitourinary: Negative.  Negative for difficulty urinating, dysuria, flank pain, frequency, hematuria, testicular pain and urgency.   Musculoskeletal: Negative.  Negative for back pain, joint swelling and myalgias.   Skin: Negative.  Negative for color change, rash and wound.   Allergic/Immunologic: Negative.    Neurological: Negative.  Negative for dizziness, tremors, weakness, numbness and headaches.   Hematological: Negative.  Negative for adenopathy. Does not bruise/bleed easily.   Psychiatric/Behavioral: Negative.

## 2024-10-15 ENCOUNTER — HOSPITAL ENCOUNTER (OUTPATIENT)
Age: 74
Setting detail: SPECIMEN
Discharge: HOME | End: 2024-10-15

## 2024-10-15 DIAGNOSIS — R35.0 URINARY FREQUENCY: ICD-10-CM

## 2024-10-15 DIAGNOSIS — E66.01 OBESITY, CLASS III, BMI 40-49.9 (MORBID OBESITY): ICD-10-CM

## 2024-10-15 PROBLEM — K43.9 ABDOMINAL WALL HERNIA: Status: ACTIVE | Noted: 2024-10-15

## 2024-10-15 PROBLEM — G47.33 SEVERE OBSTRUCTIVE SLEEP APNEA: Status: ACTIVE | Noted: 2024-10-15

## 2024-10-15 LAB
ALBUMIN SERPL-MCNC: 4.5 G/DL (ref 3.5–5.2)
ALBUMIN/GLOB SERPL: 1 {RATIO} (ref 1–2.5)
ALP SERPL-CCNC: 103 U/L (ref 40–129)
ALT SERPL-CCNC: 19 U/L (ref 10–50)
ANION GAP SERPL CALCULATED.3IONS-SCNC: 11 MMOL/L (ref 9–16)
AST SERPL-CCNC: 27 U/L (ref 10–50)
BACTERIA URNS QL MICRO: NORMAL
BASOPHILS # BLD: 0.1 K/UL (ref 0–0.2)
BASOPHILS NFR BLD: 1 % (ref 0–2)
BILIRUB SERPL-MCNC: 0.7 MG/DL (ref 0–1.2)
BILIRUB UR QL STRIP: NEGATIVE
BUN SERPL-MCNC: 18 MG/DL (ref 8–23)
CALCIUM SERPL-MCNC: 10.1 MG/DL (ref 8.6–10.4)
CASTS #/AREA URNS LPF: NORMAL /LPF (ref 0–8)
CHLORIDE SERPL-SCNC: 100 MMOL/L (ref 98–107)
CLARITY UR: ABNORMAL
CO2 SERPL-SCNC: 28 MMOL/L (ref 20–31)
COLOR UR: ABNORMAL
CREAT SERPL-MCNC: 1 MG/DL (ref 0.7–1.2)
EOSINOPHIL # BLD: 0.2 K/UL (ref 0–0.44)
EOSINOPHILS RELATIVE PERCENT: 2 % (ref 1–4)
EPI CELLS #/AREA URNS HPF: NORMAL /HPF (ref 0–5)
ERYTHROCYTE [DISTWIDTH] IN BLOOD BY AUTOMATED COUNT: 12.9 % (ref 11.8–14.4)
GFR, ESTIMATED: 82 ML/MIN/1.73M2
GLUCOSE SERPL-MCNC: 218 MG/DL (ref 74–99)
GLUCOSE UR STRIP-MCNC: ABNORMAL MG/DL
HCT VFR BLD AUTO: 54 % (ref 40.7–50.3)
HGB BLD-MCNC: 16.7 G/DL (ref 13–17)
HGB UR QL STRIP.AUTO: NEGATIVE
IMM GRANULOCYTES # BLD AUTO: 0.03 K/UL (ref 0–0.3)
IMM GRANULOCYTES NFR BLD: 0 %
KETONES UR STRIP-MCNC: ABNORMAL MG/DL
LEUKOCYTE ESTERASE UR QL STRIP: NEGATIVE
LYMPHOCYTES NFR BLD: 1.69 K/UL (ref 1.1–3.7)
LYMPHOCYTES RELATIVE PERCENT: 20 % (ref 24–43)
MCH RBC QN AUTO: 30.9 PG (ref 25.2–33.5)
MCHC RBC AUTO-ENTMCNC: 30.9 G/DL (ref 28.4–34.8)
MCV RBC AUTO: 100 FL (ref 82.6–102.9)
MONOCYTES NFR BLD: 0.72 K/UL (ref 0.1–1.2)
MONOCYTES NFR BLD: 9 % (ref 3–12)
NEUTROPHILS NFR BLD: 68 % (ref 36–65)
NEUTS SEG NFR BLD: 5.56 K/UL (ref 1.5–8.1)
NITRITE UR QL STRIP: NEGATIVE
NRBC BLD-RTO: 0 PER 100 WBC
PH UR STRIP: 5.5 [PH] (ref 5–8)
PLATELET # BLD AUTO: 201 K/UL (ref 138–453)
PMV BLD AUTO: 10.7 FL (ref 8.1–13.5)
POTASSIUM SERPL-SCNC: 4.7 MMOL/L (ref 3.7–5.3)
PROT SERPL-MCNC: 7.9 G/DL (ref 6.6–8.7)
PROT UR STRIP-MCNC: ABNORMAL MG/DL
RBC # BLD AUTO: 5.4 M/UL (ref 4.21–5.77)
RBC #/AREA URNS HPF: NORMAL /HPF (ref 0–4)
SODIUM SERPL-SCNC: 139 MMOL/L (ref 136–145)
SP GR UR STRIP: 1.03 (ref 1–1.03)
UROBILINOGEN UR STRIP-ACNC: NORMAL EU/DL (ref 0–1)
WBC #/AREA URNS HPF: NORMAL /HPF (ref 0–5)
WBC OTHER # BLD: 8.3 K/UL (ref 3.5–11.3)

## 2024-10-22 ENCOUNTER — HOSPITAL ENCOUNTER (OUTPATIENT)
Dept: ULTRASOUND IMAGING | Age: 74
Discharge: HOME | End: 2024-10-24
Attending: STUDENT IN AN ORGANIZED HEALTH CARE EDUCATION/TRAINING PROGRAM
Payer: MEDICARE

## 2024-10-22 DIAGNOSIS — K43.9 ABDOMINAL WALL HERNIA: ICD-10-CM

## 2024-10-22 PROCEDURE — 76705 ECHO EXAM OF ABDOMEN: CPT

## 2024-11-21 ENCOUNTER — HOSPITAL ENCOUNTER (OUTPATIENT)
Dept: CT IMAGING | Age: 74
Discharge: HOME | End: 2024-11-23
Attending: INTERNAL MEDICINE
Payer: MEDICARE

## 2024-11-21 DIAGNOSIS — R91.1 LUNG NODULE: ICD-10-CM

## 2024-11-21 PROCEDURE — 71250 CT THORAX DX C-: CPT

## 2024-11-26 ENCOUNTER — HOSPITAL ENCOUNTER (OUTPATIENT)
Age: 74
Setting detail: SPECIMEN
Discharge: HOME | End: 2024-11-26

## 2024-11-26 DIAGNOSIS — K11.8 PAROTID MASS: ICD-10-CM

## 2024-11-26 DIAGNOSIS — I50.32 CHRONIC HEART FAILURE WITH PRESERVED EJECTION FRACTION (HCC): ICD-10-CM

## 2024-11-26 DIAGNOSIS — G47.19 EXCESSIVE DAYTIME SLEEPINESS: ICD-10-CM

## 2024-11-26 PROBLEM — R53.82 CHRONIC FATIGUE: Status: ACTIVE | Noted: 2024-06-21

## 2024-11-26 PROBLEM — R94.31 ABNORMAL EKG: Status: RESOLVED | Noted: 2020-03-10 | Resolved: 2024-11-26

## 2024-11-26 PROBLEM — Z48.3 AFTERCARE FOLLOWING SURGERY FOR NEOPLASM: Status: RESOLVED | Noted: 2020-03-19 | Resolved: 2024-11-26

## 2024-11-26 LAB
T4 FREE SERPL-MCNC: 1 NG/DL (ref 0.9–1.7)
TSH SERPL DL<=0.05 MIU/L-ACNC: 1.87 UIU/ML (ref 0.27–4.2)

## 2024-12-03 ENCOUNTER — APPOINTMENT (OUTPATIENT)
Age: 74
DRG: 003 | End: 2024-12-03
Payer: MEDICARE

## 2024-12-03 ENCOUNTER — HOSPITAL ENCOUNTER (INPATIENT)
Age: 74
LOS: 20 days | Discharge: NURSING, REHAB OR LONG-TERM CARE | DRG: 003 | End: 2024-12-23
Attending: EMERGENCY MEDICINE | Admitting: INTERNAL MEDICINE
Payer: MEDICARE

## 2024-12-03 ENCOUNTER — APPOINTMENT (OUTPATIENT)
Dept: GENERAL RADIOLOGY | Age: 74
DRG: 003 | End: 2024-12-03
Payer: MEDICARE

## 2024-12-03 DIAGNOSIS — R57.0 CARDIOGENIC SHOCK: ICD-10-CM

## 2024-12-03 DIAGNOSIS — Z71.89 GOALS OF CARE, COUNSELING/DISCUSSION: ICD-10-CM

## 2024-12-03 DIAGNOSIS — I21.3 ST ELEVATION MYOCARDIAL INFARCTION (STEMI), UNSPECIFIED ARTERY (HCC): ICD-10-CM

## 2024-12-03 DIAGNOSIS — N17.9 AKI (ACUTE KIDNEY INJURY) (HCC): ICD-10-CM

## 2024-12-03 DIAGNOSIS — Z95.5 STENTED CORONARY ARTERY: Primary | ICD-10-CM

## 2024-12-03 DIAGNOSIS — I21.11 ST ELEVATION MYOCARDIAL INFARCTION INVOLVING RIGHT CORONARY ARTERY (HCC): ICD-10-CM

## 2024-12-03 DIAGNOSIS — I46.9 CARDIAC ARREST: ICD-10-CM

## 2024-12-03 DIAGNOSIS — R52 ACUTE PAIN: ICD-10-CM

## 2024-12-03 DIAGNOSIS — I24.9 ACUTE CORONARY SYNDROME (HCC): ICD-10-CM

## 2024-12-03 DIAGNOSIS — I21.3 STEMI (ST ELEVATION MYOCARDIAL INFARCTION) (HCC): ICD-10-CM

## 2024-12-03 LAB
ABO + RH BLD: NORMAL
ALBUMIN SERPL-MCNC: 3.6 G/DL (ref 3.5–5.2)
ALBUMIN/GLOB SERPL: 1.2 {RATIO} (ref 1–2.5)
ALLEN TEST: ABNORMAL
ALP SERPL-CCNC: 135 U/L (ref 40–129)
ALT SERPL-CCNC: 458 U/L (ref 10–50)
ANION GAP SERPL CALCULATED.3IONS-SCNC: 16 MMOL/L (ref 9–16)
ANION GAP SERPL CALCULATED.3IONS-SCNC: 23 MMOL/L (ref 9–16)
ANTI-XA UNFRAC HEPARIN: <0.1 IU/L
AST SERPL-CCNC: 725 U/L (ref 10–50)
BILIRUB DIRECT SERPL-MCNC: 0.5 MG/DL (ref 0–0.2)
BILIRUB INDIRECT SERPL-MCNC: 0.6 MG/DL (ref 0–1)
BILIRUB SERPL-MCNC: 1.1 MG/DL (ref 0–1.2)
BUN BLD-MCNC: 19 MG/DL (ref 8–26)
BUN BLD-MCNC: 28 MG/DL (ref 8–26)
BUN SERPL-MCNC: 20 MG/DL (ref 8–23)
BUN SERPL-MCNC: 20 MG/DL (ref 8–23)
CA-I BLD-SCNC: 1.09 MMOL/L (ref 1.15–1.33)
CA-I BLD-SCNC: 1.18 MMOL/L (ref 1.13–1.33)
CA-I BLD-SCNC: 1.21 MMOL/L (ref 1.15–1.33)
CALCIUM SERPL-MCNC: 8.1 MG/DL (ref 8.6–10.4)
CALCIUM SERPL-MCNC: 9.3 MG/DL (ref 8.6–10.4)
CHLORIDE BLD-SCNC: 102 MMOL/L (ref 98–107)
CHLORIDE BLD-SCNC: 111 MMOL/L (ref 98–107)
CHLORIDE SERPL-SCNC: 104 MMOL/L (ref 98–107)
CHLORIDE SERPL-SCNC: 98 MMOL/L (ref 98–107)
CO2 BLD CALC-SCNC: 18 MMOL/L (ref 22–30)
CO2 BLD CALC-SCNC: 25 MMOL/L (ref 22–30)
CO2 SERPL-SCNC: 13 MMOL/L (ref 20–31)
CO2 SERPL-SCNC: 18 MMOL/L (ref 20–31)
CREAT SERPL-MCNC: 1.7 MG/DL (ref 0.7–1.2)
CREAT SERPL-MCNC: 1.8 MG/DL (ref 0.7–1.2)
ECHO AO ROOT DIAM: 3.2 CM
ECHO AV AREA PEAK VELOCITY: 4.3 CM2
ECHO AV AREA VTI: 4.2 CM2
ECHO AV MEAN GRADIENT: 2 MMHG
ECHO AV MEAN VELOCITY: 0.6 M/S
ECHO AV PEAK GRADIENT: 4 MMHG
ECHO AV PEAK VELOCITY: 1.1 M/S
ECHO AV VELOCITY RATIO: 0.82
ECHO AV VTI: 11.2 CM
ECHO LA AREA 4C: 19.5 CM2
ECHO LA DIAMETER: 2.9 CM
ECHO LA MAJOR AXIS: 6.2 CM
ECHO LA TO AORTIC ROOT RATIO: 0.91
ECHO LA VOL MOD A4C: 47 ML (ref 18–58)
ECHO LV E' LATERAL VELOCITY: 8.81 CM/S
ECHO LV E' SEPTAL VELOCITY: 7.07 CM/S
ECHO LV EDV A4C: 42 ML
ECHO LV EF PHYSICIAN: 65 %
ECHO LV EJECTION FRACTION A4C: 67 %
ECHO LV ESV A4C: 14 ML
ECHO LV FRACTIONAL SHORTENING: 60 % (ref 28–44)
ECHO LV INTERNAL DIMENSION DIASTOLIC: 4 CM (ref 4.2–5.9)
ECHO LV INTERNAL DIMENSION SYSTOLIC: 1.6 CM
ECHO LV IVSD: 1.1 CM (ref 0.6–1)
ECHO LV MASS 2D: 145.6 G (ref 88–224)
ECHO LV POSTERIOR WALL DIASTOLIC: 1.1 CM (ref 0.6–1)
ECHO LV RELATIVE WALL THICKNESS RATIO: 0.55
ECHO LVOT AREA: 4.9 CM2
ECHO LVOT AV VTI INDEX: 0.86
ECHO LVOT DIAM: 2.5 CM
ECHO LVOT MEAN GRADIENT: 2 MMHG
ECHO LVOT PEAK GRADIENT: 3 MMHG
ECHO LVOT PEAK VELOCITY: 0.9 M/S
ECHO LVOT SV: 47.1 ML
ECHO LVOT VTI: 9.6 CM
ECHO MV A VELOCITY: 0.57 M/S
ECHO MV AREA VTI: 2.8 CM2
ECHO MV E DECELERATION TIME (DT): 112 MS
ECHO MV E VELOCITY: 0.64 M/S
ECHO MV E/A RATIO: 1.12
ECHO MV E/E' LATERAL: 7.26
ECHO MV E/E' RATIO (AVERAGED): 8.16
ECHO MV E/E' SEPTAL: 9.05
ECHO MV LVOT VTI INDEX: 1.74
ECHO MV MAX VELOCITY: 0.9 M/S
ECHO MV MEAN GRADIENT: 1 MMHG
ECHO MV MEAN VELOCITY: 0.5 M/S
ECHO MV PEAK GRADIENT: 3 MMHG
ECHO MV VTI: 16.7 CM
ECHO PV MAX VELOCITY: 0.9 M/S
ECHO PV PEAK GRADIENT: 3 MMHG
ECHO RA AREA 4C: 14.2 CM2
ECHO RA VOLUME: 38 ML
ECHO RV BASAL DIMENSION: 3.9 CM
ECHO RV FREE WALL PEAK S': 15.2 CM/S
ECHO RV MID DIMENSION: 3.1 CM
EGFR, POC: 58 ML/MIN/1.73M2
EGFR, POC: 64 ML/MIN/1.73M2
ERYTHROCYTE [DISTWIDTH] IN BLOOD BY AUTOMATED COUNT: 12.6 % (ref 11.8–14.4)
ERYTHROCYTE [DISTWIDTH] IN BLOOD BY AUTOMATED COUNT: 12.6 % (ref 11.8–14.4)
FIO2: 60
FIO2: 80
GFR, ESTIMATED: 39 ML/MIN/1.73M2
GFR, ESTIMATED: 42 ML/MIN/1.73M2
GLOBULIN SER CALC-MCNC: 3 G/DL
GLUCOSE BLD-MCNC: 162 MG/DL (ref 74–100)
GLUCOSE BLD-MCNC: 184 MG/DL (ref 75–110)
GLUCOSE BLD-MCNC: 207 MG/DL (ref 74–100)
GLUCOSE BLD-MCNC: 254 MG/DL (ref 74–100)
GLUCOSE SERPL-MCNC: 195 MG/DL (ref 74–99)
GLUCOSE SERPL-MCNC: 260 MG/DL (ref 74–99)
HCO3 VENOUS: 23.9 MMOL/L (ref 22–29)
HCT VFR BLD AUTO: 41 % (ref 41–53)
HCT VFR BLD AUTO: 42.8 % (ref 40.7–50.3)
HCT VFR BLD AUTO: 51.6 % (ref 40.7–50.3)
HCT VFR BLD AUTO: 61 % (ref 41–53)
HGB BLD-MCNC: 14 G/DL (ref 13–17)
HGB BLD-MCNC: 16.8 G/DL (ref 13–17)
INR PPP: 2
INR PPP: 2.2
INR PPP: 5.8
LACTIC ACID, SEPSIS WHOLE BLOOD: 3.3 MMOL/L (ref 0.5–1.9)
MAGNESIUM SERPL-MCNC: 2.2 MG/DL (ref 1.6–2.4)
MCH RBC QN AUTO: 31.3 PG (ref 25.2–33.5)
MCH RBC QN AUTO: 31.7 PG (ref 25.2–33.5)
MCHC RBC AUTO-ENTMCNC: 32.6 G/DL (ref 28.4–34.8)
MCHC RBC AUTO-ENTMCNC: 32.7 G/DL (ref 28.4–34.8)
MCV RBC AUTO: 96.3 FL (ref 82.6–102.9)
MCV RBC AUTO: 96.8 FL (ref 82.6–102.9)
MODE: ABNORMAL
NEGATIVE BASE EXCESS, ART: 11 MMOL/L (ref 0–2)
NEGATIVE BASE EXCESS, ART: 8.4 MMOL/L (ref 0–2)
NEGATIVE BASE EXCESS, VEN: 9.2 MMOL/L (ref 0–2)
NRBC BLD-RTO: 0 PER 100 WBC
NRBC BLD-RTO: 0.2 PER 100 WBC
O2 DELIVERY DEVICE: ABNORMAL
O2 DELIVERY DEVICE: ABNORMAL
O2 SAT, VEN: 4.6 % (ref 60–85)
PARTIAL THROMBOPLASTIN TIME: 45.1 SEC (ref 23–36.5)
PARTIAL THROMBOPLASTIN TIME: 60.5 SEC (ref 23–36.5)
PARTIAL THROMBOPLASTIN TIME: >180 SEC (ref 23–36.5)
PATIENT TEMP: 37
PCO2 VENOUS: 80.7 MM HG (ref 41–51)
PH VENOUS: 7.08 (ref 7.32–7.43)
PHOSPHATE SERPL-MCNC: 8.3 MG/DL (ref 2.5–4.5)
PLATELET # BLD AUTO: 110 K/UL (ref 138–453)
PLATELET # BLD AUTO: ABNORMAL K/UL (ref 138–453)
PLATELET, FLUORESCENCE: 146 K/UL (ref 138–453)
PLATELETS.RETICULATED NFR BLD AUTO: 4.2 % (ref 1.1–10.3)
PMV BLD AUTO: 10.3 FL (ref 8.1–13.5)
PO2 VENOUS: 8.6 MM HG (ref 30–50)
POC ANION GAP: 15 MMOL/L (ref 7–16)
POC ANION GAP: 17 MMOL/L (ref 7–16)
POC CREATININE: 1.2 MG/DL (ref 0.51–1.19)
POC CREATININE: 1.3 MG/DL (ref 0.51–1.19)
POC HCO3: 16.6 MMOL/L (ref 21–28)
POC HCO3: 18.3 MMOL/L (ref 21–28)
POC HEMOGLOBIN (CALC): 14.1 G/DL (ref 13.5–17.5)
POC HEMOGLOBIN (CALC): 20.6 G/DL (ref 13.5–17.5)
POC LACTIC ACID: 2.8 MMOL/L (ref 0.56–1.39)
POC LACTIC ACID: 4.9 MMOL/L (ref 0.56–1.39)
POC LACTIC ACID: 6.2 MMOL/L (ref 0.56–1.39)
POC O2 SATURATION: 87.9 % (ref 94–98)
POC O2 SATURATION: 89.6 % (ref 94–98)
POC PCO2: 41.1 MM HG (ref 35–48)
POC PCO2: 41.6 MM HG (ref 35–48)
POC PH: 7.21 (ref 7.35–7.45)
POC PH: 7.26 (ref 7.35–7.45)
POC PO2: 62.7 MM HG (ref 83–108)
POC PO2: 69.7 MM HG (ref 83–108)
POTASSIUM BLD-SCNC: 4.4 MMOL/L (ref 3.5–4.5)
POTASSIUM BLD-SCNC: 4.5 MMOL/L (ref 3.5–4.5)
POTASSIUM SERPL-SCNC: 5.3 MMOL/L (ref 3.7–5.3)
POTASSIUM SERPL-SCNC: 6.4 MMOL/L (ref 3.7–5.3)
PROT SERPL-MCNC: 6.6 G/DL (ref 6.6–8.7)
PROTHROMBIN TIME: 22 SEC (ref 11.7–14.9)
PROTHROMBIN TIME: 24.1 SEC (ref 11.7–14.9)
PROTHROMBIN TIME: 50 SEC (ref 11.7–14.9)
RBC # BLD AUTO: 4.42 M/UL (ref 4.21–5.77)
RBC # BLD AUTO: 5.36 M/UL (ref 4.21–5.77)
SAMPLE SITE: ABNORMAL
SAMPLE SITE: ABNORMAL
SODIUM BLD-SCNC: 143 MMOL/L (ref 138–146)
SODIUM BLD-SCNC: 143 MMOL/L (ref 138–146)
SODIUM SERPL-SCNC: 134 MMOL/L (ref 136–145)
SODIUM SERPL-SCNC: 138 MMOL/L (ref 136–145)
TROPONIN I SERPL HS-MCNC: 596 NG/L (ref 0–22)
WBC OTHER # BLD: 16.9 K/UL (ref 3.5–11.3)
WBC OTHER # BLD: 20.6 K/UL (ref 3.5–11.3)

## 2024-12-03 PROCEDURE — 2709999900 HC NON-CHARGEABLE SUPPLY: Performed by: STUDENT IN AN ORGANIZED HEALTH CARE EDUCATION/TRAINING PROGRAM

## 2024-12-03 PROCEDURE — 86901 BLOOD TYPING SEROLOGIC RH(D): CPT

## 2024-12-03 PROCEDURE — 99285 EMERGENCY DEPT VISIT HI MDM: CPT

## 2024-12-03 PROCEDURE — 2500000003 HC RX 250 WO HCPCS: Performed by: INTERNAL MEDICINE

## 2024-12-03 PROCEDURE — C9460 INJECTION, CANGRELOR: HCPCS | Performed by: STUDENT IN AN ORGANIZED HEALTH CARE EDUCATION/TRAINING PROGRAM

## 2024-12-03 PROCEDURE — 85055 RETICULATED PLATELET ASSAY: CPT

## 2024-12-03 PROCEDURE — 84100 ASSAY OF PHOSPHORUS: CPT

## 2024-12-03 PROCEDURE — 36430 TRANSFUSION BLD/BLD COMPNT: CPT

## 2024-12-03 PROCEDURE — 2720000010 HC SURG SUPPLY STERILE: Performed by: STUDENT IN AN ORGANIZED HEALTH CARE EDUCATION/TRAINING PROGRAM

## 2024-12-03 PROCEDURE — 6370000000 HC RX 637 (ALT 250 FOR IP): Performed by: STUDENT IN AN ORGANIZED HEALTH CARE EDUCATION/TRAINING PROGRAM

## 2024-12-03 PROCEDURE — 82947 ASSAY GLUCOSE BLOOD QUANT: CPT

## 2024-12-03 PROCEDURE — 86900 BLOOD TYPING SEROLOGIC ABO: CPT

## 2024-12-03 PROCEDURE — 2500000003 HC RX 250 WO HCPCS: Performed by: STUDENT IN AN ORGANIZED HEALTH CARE EDUCATION/TRAINING PROGRAM

## 2024-12-03 PROCEDURE — 027035Z DILATION OF CORONARY ARTERY, ONE ARTERY WITH TWO DRUG-ELUTING INTRALUMINAL DEVICES, PERCUTANEOUS APPROACH: ICD-10-PCS | Performed by: STUDENT IN AN ORGANIZED HEALTH CARE EDUCATION/TRAINING PROGRAM

## 2024-12-03 PROCEDURE — 93005 ELECTROCARDIOGRAM TRACING: CPT | Performed by: STUDENT IN AN ORGANIZED HEALTH CARE EDUCATION/TRAINING PROGRAM

## 2024-12-03 PROCEDURE — 71045 X-RAY EXAM CHEST 1 VIEW: CPT

## 2024-12-03 PROCEDURE — C1874 STENT, COATED/COV W/DEL SYS: HCPCS | Performed by: STUDENT IN AN ORGANIZED HEALTH CARE EDUCATION/TRAINING PROGRAM

## 2024-12-03 PROCEDURE — C1892 INTRO/SHEATH,FIXED,PEEL-AWAY: HCPCS | Performed by: STUDENT IN AN ORGANIZED HEALTH CARE EDUCATION/TRAINING PROGRAM

## 2024-12-03 PROCEDURE — 6360000002 HC RX W HCPCS

## 2024-12-03 PROCEDURE — 71260 CT THORAX DX C+: CPT

## 2024-12-03 PROCEDURE — 80048 BASIC METABOLIC PNL TOTAL CA: CPT

## 2024-12-03 PROCEDURE — 86850 RBC ANTIBODY SCREEN: CPT

## 2024-12-03 PROCEDURE — 92950 HEART/LUNG RESUSCITATION CPR: CPT

## 2024-12-03 PROCEDURE — 92920 PRQ TRLUML C ANGIOP 1ART&/BR: CPT | Performed by: STUDENT IN AN ORGANIZED HEALTH CARE EDUCATION/TRAINING PROGRAM

## 2024-12-03 PROCEDURE — 36415 COLL VENOUS BLD VENIPUNCTURE: CPT

## 2024-12-03 PROCEDURE — C1887 CATHETER, GUIDING: HCPCS | Performed by: STUDENT IN AN ORGANIZED HEALTH CARE EDUCATION/TRAINING PROGRAM

## 2024-12-03 PROCEDURE — 94761 N-INVAS EAR/PLS OXIMETRY MLT: CPT

## 2024-12-03 PROCEDURE — 4A023N7 MEASUREMENT OF CARDIAC SAMPLING AND PRESSURE, LEFT HEART, PERCUTANEOUS APPROACH: ICD-10-PCS | Performed by: STUDENT IN AN ORGANIZED HEALTH CARE EDUCATION/TRAINING PROGRAM

## 2024-12-03 PROCEDURE — 85610 PROTHROMBIN TIME: CPT

## 2024-12-03 PROCEDURE — 92978 ENDOLUMINL IVUS OCT C 1ST: CPT | Performed by: STUDENT IN AN ORGANIZED HEALTH CARE EDUCATION/TRAINING PROGRAM

## 2024-12-03 PROCEDURE — 5A1955Z RESPIRATORY VENTILATION, GREATER THAN 96 CONSECUTIVE HOURS: ICD-10-PCS | Performed by: STUDENT IN AN ORGANIZED HEALTH CARE EDUCATION/TRAINING PROGRAM

## 2024-12-03 PROCEDURE — 2500000003 HC RX 250 WO HCPCS

## 2024-12-03 PROCEDURE — 36620 INSERTION CATHETER ARTERY: CPT

## 2024-12-03 PROCEDURE — 94002 VENT MGMT INPAT INIT DAY: CPT

## 2024-12-03 PROCEDURE — 37799 UNLISTED PX VASCULAR SURGERY: CPT

## 2024-12-03 PROCEDURE — 6360000004 HC RX CONTRAST MEDICATION: Performed by: STUDENT IN AN ORGANIZED HEALTH CARE EDUCATION/TRAINING PROGRAM

## 2024-12-03 PROCEDURE — 80076 HEPATIC FUNCTION PANEL: CPT

## 2024-12-03 PROCEDURE — 2580000003 HC RX 258: Performed by: STUDENT IN AN ORGANIZED HEALTH CARE EDUCATION/TRAINING PROGRAM

## 2024-12-03 PROCEDURE — 82565 ASSAY OF CREATININE: CPT

## 2024-12-03 PROCEDURE — 84484 ASSAY OF TROPONIN QUANT: CPT

## 2024-12-03 PROCEDURE — 51702 INSERT TEMP BLADDER CATH: CPT

## 2024-12-03 PROCEDURE — 93458 L HRT ARTERY/VENTRICLE ANGIO: CPT | Performed by: STUDENT IN AN ORGANIZED HEALTH CARE EDUCATION/TRAINING PROGRAM

## 2024-12-03 PROCEDURE — 6360000002 HC RX W HCPCS: Performed by: STUDENT IN AN ORGANIZED HEALTH CARE EDUCATION/TRAINING PROGRAM

## 2024-12-03 PROCEDURE — 2000000000 HC ICU R&B

## 2024-12-03 PROCEDURE — 82803 BLOOD GASES ANY COMBINATION: CPT

## 2024-12-03 PROCEDURE — 92928 PRQ TCAT PLMT NTRAC ST 1 LES: CPT | Performed by: STUDENT IN AN ORGANIZED HEALTH CARE EDUCATION/TRAINING PROGRAM

## 2024-12-03 PROCEDURE — C1769 GUIDE WIRE: HCPCS | Performed by: STUDENT IN AN ORGANIZED HEALTH CARE EDUCATION/TRAINING PROGRAM

## 2024-12-03 PROCEDURE — 83605 ASSAY OF LACTIC ACID: CPT

## 2024-12-03 PROCEDURE — 31500 INSERT EMERGENCY AIRWAY: CPT

## 2024-12-03 PROCEDURE — C1753 CATH, INTRAVAS ULTRASOUND: HCPCS | Performed by: STUDENT IN AN ORGANIZED HEALTH CARE EDUCATION/TRAINING PROGRAM

## 2024-12-03 PROCEDURE — 87040 BLOOD CULTURE FOR BACTERIA: CPT

## 2024-12-03 PROCEDURE — 85014 HEMATOCRIT: CPT

## 2024-12-03 PROCEDURE — C1760 CLOSURE DEV, VASC: HCPCS | Performed by: STUDENT IN AN ORGANIZED HEALTH CARE EDUCATION/TRAINING PROGRAM

## 2024-12-03 PROCEDURE — 93306 TTE W/DOPPLER COMPLETE: CPT | Performed by: INTERNAL MEDICINE

## 2024-12-03 PROCEDURE — B2151ZZ FLUOROSCOPY OF LEFT HEART USING LOW OSMOLAR CONTRAST: ICD-10-PCS | Performed by: STUDENT IN AN ORGANIZED HEALTH CARE EDUCATION/TRAINING PROGRAM

## 2024-12-03 PROCEDURE — 86923 COMPATIBILITY TEST ELECTRIC: CPT

## 2024-12-03 PROCEDURE — 83735 ASSAY OF MAGNESIUM: CPT

## 2024-12-03 PROCEDURE — 2700000000 HC OXYGEN THERAPY PER DAY

## 2024-12-03 PROCEDURE — B2111ZZ FLUOROSCOPY OF MULTIPLE CORONARY ARTERIES USING LOW OSMOLAR CONTRAST: ICD-10-PCS | Performed by: STUDENT IN AN ORGANIZED HEALTH CARE EDUCATION/TRAINING PROGRAM

## 2024-12-03 PROCEDURE — 93306 TTE W/DOPPLER COMPLETE: CPT

## 2024-12-03 PROCEDURE — 74018 RADEX ABDOMEN 1 VIEW: CPT

## 2024-12-03 PROCEDURE — 85027 COMPLETE CBC AUTOMATED: CPT

## 2024-12-03 PROCEDURE — 85520 HEPARIN ASSAY: CPT

## 2024-12-03 PROCEDURE — C1894 INTRO/SHEATH, NON-LASER: HCPCS | Performed by: STUDENT IN AN ORGANIZED HEALTH CARE EDUCATION/TRAINING PROGRAM

## 2024-12-03 PROCEDURE — P9017 PLASMA 1 DONOR FRZ W/IN 8 HR: HCPCS

## 2024-12-03 PROCEDURE — 0BH17EZ INSERTION OF ENDOTRACHEAL AIRWAY INTO TRACHEA, VIA NATURAL OR ARTIFICIAL OPENING: ICD-10-PCS | Performed by: STUDENT IN AN ORGANIZED HEALTH CARE EDUCATION/TRAINING PROGRAM

## 2024-12-03 PROCEDURE — 80051 ELECTROLYTE PANEL: CPT

## 2024-12-03 PROCEDURE — C9606 PERC D-E COR REVASC W AMI S: HCPCS | Performed by: STUDENT IN AN ORGANIZED HEALTH CARE EDUCATION/TRAINING PROGRAM

## 2024-12-03 PROCEDURE — 92973 PRQ TRLUML C MCHN ASP THRMBC: CPT | Performed by: STUDENT IN AN ORGANIZED HEALTH CARE EDUCATION/TRAINING PROGRAM

## 2024-12-03 PROCEDURE — 82330 ASSAY OF CALCIUM: CPT

## 2024-12-03 PROCEDURE — C1725 CATH, TRANSLUMIN NON-LASER: HCPCS | Performed by: STUDENT IN AN ORGANIZED HEALTH CARE EDUCATION/TRAINING PROGRAM

## 2024-12-03 PROCEDURE — 2580000003 HC RX 258: Performed by: INTERNAL MEDICINE

## 2024-12-03 PROCEDURE — 85730 THROMBOPLASTIN TIME PARTIAL: CPT

## 2024-12-03 PROCEDURE — 86927 PLASMA FRESH FROZEN: CPT

## 2024-12-03 PROCEDURE — 84520 ASSAY OF UREA NITROGEN: CPT

## 2024-12-03 DEVICE — STENT ONYXNG40026UX ONYX 4.00X26RX
Type: IMPLANTABLE DEVICE | Status: FUNCTIONAL
Brand: ONYX FRONTIER™

## 2024-12-03 DEVICE — STENT ONYXNG30038UX ONYX 3.00X38RX
Type: IMPLANTABLE DEVICE | Status: FUNCTIONAL
Brand: ONYX FRONTIER™

## 2024-12-03 RX ORDER — VASOPRESSIN IN DEXTROSE 5 % 20/100 ML
0.03 PLASTIC BAG, INJECTION (ML) INTRAVENOUS CONTINUOUS
Status: DISCONTINUED | OUTPATIENT
Start: 2024-12-03 | End: 2024-12-14

## 2024-12-03 RX ORDER — ONDANSETRON 2 MG/ML
4 INJECTION INTRAMUSCULAR; INTRAVENOUS EVERY 6 HOURS PRN
Status: DISCONTINUED | OUTPATIENT
Start: 2024-12-03 | End: 2024-12-23 | Stop reason: HOSPADM

## 2024-12-03 RX ORDER — HEPARIN SODIUM 1000 [USP'U]/ML
10000 INJECTION, SOLUTION INTRAVENOUS; SUBCUTANEOUS ONCE
Status: COMPLETED | OUTPATIENT
Start: 2024-12-03 | End: 2024-12-03

## 2024-12-03 RX ORDER — SODIUM CHLORIDE 9 MG/ML
INJECTION, SOLUTION INTRAVENOUS PRN
Status: DISCONTINUED | OUTPATIENT
Start: 2024-12-03 | End: 2024-12-23 | Stop reason: HOSPADM

## 2024-12-03 RX ORDER — IOPAMIDOL 755 MG/ML
75 INJECTION, SOLUTION INTRAVASCULAR
Status: COMPLETED | OUTPATIENT
Start: 2024-12-03 | End: 2024-12-03

## 2024-12-03 RX ORDER — ENOXAPARIN SODIUM 100 MG/ML
30 INJECTION SUBCUTANEOUS 2 TIMES DAILY
Status: DISCONTINUED | OUTPATIENT
Start: 2024-12-04 | End: 2024-12-03

## 2024-12-03 RX ORDER — 0.9 % SODIUM CHLORIDE 0.9 %
1000 INTRAVENOUS SOLUTION INTRAVENOUS ONCE
Status: COMPLETED | OUTPATIENT
Start: 2024-12-03 | End: 2024-12-03

## 2024-12-03 RX ORDER — SODIUM CHLORIDE 0.9 % (FLUSH) 0.9 %
5-40 SYRINGE (ML) INJECTION PRN
Status: DISCONTINUED | OUTPATIENT
Start: 2024-12-03 | End: 2024-12-23 | Stop reason: HOSPADM

## 2024-12-03 RX ORDER — MAGNESIUM SULFATE IN WATER 40 MG/ML
2000 INJECTION, SOLUTION INTRAVENOUS PRN
Status: DISCONTINUED | OUTPATIENT
Start: 2024-12-03 | End: 2024-12-06

## 2024-12-03 RX ORDER — POTASSIUM CHLORIDE 1500 MG/1
40 TABLET, EXTENDED RELEASE ORAL PRN
Status: DISCONTINUED | OUTPATIENT
Start: 2024-12-03 | End: 2024-12-06

## 2024-12-03 RX ORDER — FENTANYL CITRATE 50 UG/ML
50 INJECTION, SOLUTION INTRAMUSCULAR; INTRAVENOUS ONCE
Status: COMPLETED | OUTPATIENT
Start: 2024-12-03 | End: 2024-12-03

## 2024-12-03 RX ORDER — MIDAZOLAM HYDROCHLORIDE 1 MG/ML
INJECTION, SOLUTION INTRAMUSCULAR; INTRAVENOUS PRN
Status: DISCONTINUED | OUTPATIENT
Start: 2024-12-03 | End: 2024-12-03 | Stop reason: HOSPADM

## 2024-12-03 RX ORDER — ASPIRIN 81 MG/1
81 TABLET, CHEWABLE ORAL DAILY
Status: DISCONTINUED | OUTPATIENT
Start: 2024-12-04 | End: 2024-12-23 | Stop reason: HOSPADM

## 2024-12-03 RX ORDER — DEXTROSE MONOHYDRATE 25 G/50ML
25 INJECTION, SOLUTION INTRAVENOUS PRN
Status: DISCONTINUED | OUTPATIENT
Start: 2024-12-03 | End: 2024-12-23 | Stop reason: HOSPADM

## 2024-12-03 RX ORDER — 0.9 % SODIUM CHLORIDE 0.9 %
INTRAVENOUS SOLUTION INTRAVENOUS CONTINUOUS PRN
Status: COMPLETED | OUTPATIENT
Start: 2024-12-03 | End: 2024-12-03

## 2024-12-03 RX ORDER — MIDAZOLAM HYDROCHLORIDE 1 MG/ML
1-10 INJECTION, SOLUTION INTRAVENOUS CONTINUOUS
Status: DISCONTINUED | OUTPATIENT
Start: 2024-12-03 | End: 2024-12-04

## 2024-12-03 RX ORDER — SODIUM CHLORIDE 9 MG/ML
INJECTION, SOLUTION INTRAVENOUS PRN
Status: DISCONTINUED | OUTPATIENT
Start: 2024-12-03 | End: 2024-12-05

## 2024-12-03 RX ORDER — BIVALIRUDIN 250 MG/5ML
INJECTION, POWDER, LYOPHILIZED, FOR SOLUTION INTRAVENOUS PRN
Status: DISCONTINUED | OUTPATIENT
Start: 2024-12-03 | End: 2024-12-03 | Stop reason: HOSPADM

## 2024-12-03 RX ORDER — IOPAMIDOL 755 MG/ML
INJECTION, SOLUTION INTRAVASCULAR PRN
Status: DISCONTINUED | OUTPATIENT
Start: 2024-12-03 | End: 2024-12-03 | Stop reason: HOSPADM

## 2024-12-03 RX ORDER — 0.9 % SODIUM CHLORIDE 0.9 %
2000 INTRAVENOUS SOLUTION INTRAVENOUS ONCE
Status: COMPLETED | OUTPATIENT
Start: 2024-12-03 | End: 2024-12-03

## 2024-12-03 RX ORDER — PHENYLEPHRINE HCL IN 0.9% NACL 50MG/250ML
10-300 PLASTIC BAG, INJECTION (ML) INTRAVENOUS CONTINUOUS
Status: DISCONTINUED | OUTPATIENT
Start: 2024-12-03 | End: 2024-12-10

## 2024-12-03 RX ORDER — VASOPRESSIN IN DEXTROSE 5 % 20/100 ML
PLASTIC BAG, INJECTION (ML) INTRAVENOUS
Status: COMPLETED
Start: 2024-12-03 | End: 2024-12-03

## 2024-12-03 RX ORDER — POLYETHYLENE GLYCOL 3350 17 G/17G
17 POWDER, FOR SOLUTION ORAL DAILY PRN
Status: DISCONTINUED | OUTPATIENT
Start: 2024-12-03 | End: 2024-12-23 | Stop reason: HOSPADM

## 2024-12-03 RX ORDER — EPINEPHRINE 1 MG/ML(1)
AMPUL (ML) INJECTION PRN
Status: DISCONTINUED | OUTPATIENT
Start: 2024-12-03 | End: 2024-12-03 | Stop reason: HOSPADM

## 2024-12-03 RX ORDER — HEPARIN SODIUM 10000 [USP'U]/100ML
5-30 INJECTION, SOLUTION INTRAVENOUS CONTINUOUS
Status: DISCONTINUED | OUTPATIENT
Start: 2024-12-03 | End: 2024-12-07

## 2024-12-03 RX ORDER — NOREPINEPHRINE BITARTRATE 0.06 MG/ML
1-100 INJECTION, SOLUTION INTRAVENOUS CONTINUOUS
Status: DISCONTINUED | OUTPATIENT
Start: 2024-12-03 | End: 2024-12-10

## 2024-12-03 RX ORDER — NOREPINEPHRINE BITARTRATE 0.06 MG/ML
INJECTION, SOLUTION INTRAVENOUS CONTINUOUS PRN
Status: COMPLETED | OUTPATIENT
Start: 2024-12-03 | End: 2024-12-03

## 2024-12-03 RX ORDER — ASPIRIN 325 MG
TABLET ORAL PRN
Status: DISCONTINUED | OUTPATIENT
Start: 2024-12-03 | End: 2024-12-03 | Stop reason: HOSPADM

## 2024-12-03 RX ORDER — SODIUM CHLORIDE 9 MG/ML
INJECTION, SOLUTION INTRAVENOUS PRN
Status: DISCONTINUED | OUTPATIENT
Start: 2024-12-03 | End: 2024-12-04

## 2024-12-03 RX ORDER — MIDAZOLAM HYDROCHLORIDE 2 MG/2ML
2 INJECTION, SOLUTION INTRAMUSCULAR; INTRAVENOUS ONCE
Status: DISCONTINUED | OUTPATIENT
Start: 2024-12-03 | End: 2024-12-18

## 2024-12-03 RX ORDER — SODIUM CHLORIDE 0.9 % (FLUSH) 0.9 %
10 SYRINGE (ML) INJECTION PRN
Status: DISCONTINUED | OUTPATIENT
Start: 2024-12-03 | End: 2024-12-04

## 2024-12-03 RX ORDER — HEPARIN SODIUM 1000 [USP'U]/ML
10000 INJECTION, SOLUTION INTRAVENOUS; SUBCUTANEOUS PRN
Status: DISCONTINUED | OUTPATIENT
Start: 2024-12-03 | End: 2024-12-07

## 2024-12-03 RX ORDER — IOPAMIDOL 755 MG/ML
75 INJECTION, SOLUTION INTRAVASCULAR
Status: DISCONTINUED | OUTPATIENT
Start: 2024-12-03 | End: 2024-12-03

## 2024-12-03 RX ORDER — SODIUM CHLORIDE 0.9 % (FLUSH) 0.9 %
10 SYRINGE (ML) INJECTION EVERY 12 HOURS SCHEDULED
Status: DISCONTINUED | OUTPATIENT
Start: 2024-12-03 | End: 2024-12-16 | Stop reason: SDUPTHER

## 2024-12-03 RX ORDER — CALCIUM CHLORIDE 100 MG/ML
INJECTION INTRAVENOUS; INTRAVENTRICULAR PRN
Status: DISCONTINUED | OUTPATIENT
Start: 2024-12-03 | End: 2024-12-03 | Stop reason: HOSPADM

## 2024-12-03 RX ORDER — POTASSIUM CHLORIDE 7.45 MG/ML
10 INJECTION INTRAVENOUS PRN
Status: DISCONTINUED | OUTPATIENT
Start: 2024-12-03 | End: 2024-12-06

## 2024-12-03 RX ORDER — HEPARIN SODIUM 1000 [USP'U]/ML
5000 INJECTION, SOLUTION INTRAVENOUS; SUBCUTANEOUS PRN
Status: DISCONTINUED | OUTPATIENT
Start: 2024-12-03 | End: 2024-12-07

## 2024-12-03 RX ORDER — PROMETHAZINE HYDROCHLORIDE 12.5 MG/1
12.5 TABLET ORAL EVERY 6 HOURS PRN
Status: DISCONTINUED | OUTPATIENT
Start: 2024-12-03 | End: 2024-12-23 | Stop reason: HOSPADM

## 2024-12-03 RX ORDER — SODIUM CHLORIDE 0.9 % (FLUSH) 0.9 %
5-40 SYRINGE (ML) INJECTION EVERY 12 HOURS SCHEDULED
Status: DISCONTINUED | OUTPATIENT
Start: 2024-12-03 | End: 2024-12-16 | Stop reason: SDUPTHER

## 2024-12-03 RX ORDER — DEXMEDETOMIDINE HYDROCHLORIDE 4 UG/ML
.1-1.5 INJECTION, SOLUTION INTRAVENOUS CONTINUOUS
Status: DISCONTINUED | OUTPATIENT
Start: 2024-12-03 | End: 2024-12-04

## 2024-12-03 RX ORDER — ATORVASTATIN CALCIUM 40 MG/1
40 TABLET, FILM COATED ORAL NIGHTLY
Status: DISCONTINUED | OUTPATIENT
Start: 2024-12-03 | End: 2024-12-23 | Stop reason: HOSPADM

## 2024-12-03 RX ADMIN — CANGRELOR 2 MCG/KG/MIN: 50 INJECTION, POWDER, LYOPHILIZED, FOR SOLUTION INTRAVENOUS at 21:34

## 2024-12-03 RX ADMIN — MIDAZOLAM HYDROCHLORIDE 2 MG: 2 INJECTION, SOLUTION INTRAMUSCULAR; INTRAVENOUS at 17:00

## 2024-12-03 RX ADMIN — SODIUM BICARBONATE: 84 INJECTION, SOLUTION INTRAVENOUS at 21:18

## 2024-12-03 RX ADMIN — HEPARIN SODIUM 15 UNITS/KG/HR: 10000 INJECTION, SOLUTION INTRAVENOUS at 22:15

## 2024-12-03 RX ADMIN — Medication 2 MG/HR: at 16:13

## 2024-12-03 RX ADMIN — HEPARIN SODIUM 10000 UNITS: 1000 INJECTION INTRAVENOUS; SUBCUTANEOUS at 22:10

## 2024-12-03 RX ADMIN — Medication 50 MCG/HR: at 16:24

## 2024-12-03 RX ADMIN — DEXTROSE MONOHYDRATE 25 G: 25 INJECTION, SOLUTION INTRAVENOUS at 19:48

## 2024-12-03 RX ADMIN — Medication 100 MEQ: at 17:01

## 2024-12-03 RX ADMIN — Medication 10 MCG/MIN: at 16:12

## 2024-12-03 RX ADMIN — SODIUM CHLORIDE, PRESERVATIVE FREE 40 MG: 5 INJECTION INTRAVENOUS at 20:06

## 2024-12-03 RX ADMIN — SODIUM CHLORIDE 1000 ML: 9 INJECTION, SOLUTION INTRAVENOUS at 19:26

## 2024-12-03 RX ADMIN — SODIUM CHLORIDE 2000 ML: 9 INJECTION, SOLUTION INTRAVENOUS at 21:24

## 2024-12-03 RX ADMIN — SODIUM CHLORIDE, PRESERVATIVE FREE 10 ML: 5 INJECTION INTRAVENOUS at 21:36

## 2024-12-03 RX ADMIN — SODIUM CHLORIDE 2000 ML: 9 INJECTION, SOLUTION INTRAVENOUS at 17:45

## 2024-12-03 RX ADMIN — VASOPRESSIN 0.03 UNITS/MIN: 0.2 INJECTION INTRAVENOUS at 16:43

## 2024-12-03 RX ADMIN — INSULIN HUMAN 10 UNITS: 100 INJECTION, SOLUTION PARENTERAL at 19:47

## 2024-12-03 RX ADMIN — SODIUM CHLORIDE 1250 MG: 9 INJECTION, SOLUTION INTRAVENOUS at 20:28

## 2024-12-03 RX ADMIN — SODIUM BICARBONATE 100 MEQ: 84 INJECTION, SOLUTION INTRAVENOUS at 17:01

## 2024-12-03 RX ADMIN — PIPERACILLIN AND TAZOBACTAM 4500 MG: 4; .5 INJECTION, POWDER, FOR SOLUTION INTRAVENOUS at 21:43

## 2024-12-03 RX ADMIN — IOPAMIDOL 75 ML: 755 INJECTION, SOLUTION INTRAVENOUS at 18:44

## 2024-12-03 RX ADMIN — DEXMEDETOMIDINE HYDROCHLORIDE 0.2 MCG/KG/HR: 4 INJECTION, SOLUTION INTRAVENOUS at 18:01

## 2024-12-03 RX ADMIN — FENTANYL CITRATE 50 MCG: 50 INJECTION, SOLUTION INTRAMUSCULAR; INTRAVENOUS at 23:01

## 2024-12-03 ASSESSMENT — PULMONARY FUNCTION TESTS
PIF_VALUE: 24
PIF_VALUE: 24
PIF_VALUE: 13
PIF_VALUE: 25
PIF_VALUE: 24
PIF_VALUE: 22
PIF_VALUE: 16
PIF_VALUE: 25
PIF_VALUE: 24
PIF_VALUE: 13
PIF_VALUE: 26
PIF_VALUE: 27
PIF_VALUE: 24
PIF_VALUE: 21
PIF_VALUE: 27
PIF_VALUE: 13
PIF_VALUE: 20
PIF_VALUE: 23
PIF_VALUE: 22
PIF_VALUE: 12
PIF_VALUE: 12
PIF_VALUE: 24
PIF_VALUE: 25
PIF_VALUE: 14
PIF_VALUE: 22
PIF_VALUE: 10
PIF_VALUE: 20
PIF_VALUE: 9
PIF_VALUE: 17

## 2024-12-03 NOTE — ED PROVIDER NOTES
[facility]     Emergency Department     Faculty Attestation  2:00 PM EST      I performed a history and physical examination of the patient and discussed management with the resident. I have reviewed and agree with the resident’s findings including all diagnostic interpretations, and treatment plans as written. Any areas of disagreement are noted on the chart. I was personally present for the key portions of any procedures. I have documented in the chart those procedures where I was not present during the key portions. I have reviewed the emergency nurses triage note. I agree with the chief complaint, past medical history, past surgical history, allergies, medications, social and family history as documented unless otherwise noted below. Documentation of the HPI, Physical Exam and Medical Decision Making performed by scribadriana is based on my personal performance of the HPI, PE and MDM. For Physician Assistant/ Nurse Practitioner cases/documentation I have personally evaluated this patient and have completed at least one if not all key elements of the E/M (history, physical exam, and MDM). Additional findings are as noted.    Patient brought in by EMS, per report wasn't feeling well, and initial prehospital EKG showed inferior stemi, patient then had vtach arrest, with 1 round of cpr, 1 epi given, patient then with Rosc, and amiodarone bolus started, repeat EKG still showing inferior stemi with non sustained v tach, patient upon arrival to room 14 coded, cpr started, and initial rhythm showesd V tach 200 J shock was delivered and CPR started again, additional pulse checked showed PEA, 2 round of epi given and 300 mg amiodarone given, after 4 rounds of CPR ROSC noted. Patient was intubated during this time.  I had sent the pre hospital EKG to interventional cardiology prior to the patient arrival and plan was to get him to cath labs quickly.   During ER cardiac

## 2024-12-03 NOTE — ED PROVIDER NOTES
[facility] CARDIAC CATH/EP LAB  Emergency Department Encounter  Emergency Medicine Resident     Pt Name:Lukasz Keller  MRN: [id number]  Birthdate 1950  Date of evaluation: 12/3/24  PCP:  Christal Jang MD  Note Started: 2:31 PM EST      CHIEF COMPLAINT       No chief complaint on file.      HISTORY OF PRESENT ILLNESS  (Location/Symptom, Timing/Onset, Context/Setting, Quality, Duration, Modifying Factors, Severity.)      Lukasz Keller is a 74 y.o. male with PMH including history of PE not noted to be on AC per chart review who presents emergency department via EMS as a STEMI.  Inferior STEMI noted on EKG.  Interventional cardiologist was paged with picture of STEMI faxed by EMS.  Initial rhythm pulseless V. tach received defibrillation x 1 via EMS.  Also received epi x 1 via EMS.  Started on amio bolus by EMS.  Obtained ROSC en route.    On arrival, patient had ROSC however shortly after lost pulses.  CPR and ACLS began.  Patient received epi x 2 and defibrillation x 1 during code with ROSC.  Junctional rhythm with a rate of 60-70 on ROSC.  Intubated pericode using etomidate and succinylcholine.  Interventional cardiologist met patient here in the emergency department immediately after the code and requested patient be transferred to the Cath Lab.  En route to the Cath Lab, patient became bradycardic and lost a pulse, he received 1 cc of push dose epi and 1 mg of atropine with ROSC.  Patient arrived to Cath Lab with ROSC.  Briefly lost pulses in Cath Lab and required epi x 1 with ROSC.  Then handed off to interventional cardiology    PAST MEDICAL / SURGICAL / SOCIAL / FAMILY HISTORY      has a past medical history of Abnormal EKG, Acute respiratory failure, Anesthesia complication, Anxiety, Cancer (HCC), Colon polyps, Hx of blood clots, Hyperlipidemia, Prostate CA (HCC), Pulmonary emboli (HCC), SOB (shortness of breath) on exertion, Wears partial dentures, and Wellness examination.

## 2024-12-03 NOTE — ED NOTES
Pt arrived to ED via EMS with concern for a STEMI  Patient reported to have one episode of pulseless v-tach en route  Pt

## 2024-12-04 ENCOUNTER — ANESTHESIA EVENT (OUTPATIENT)
Dept: OPERATING ROOM | Age: 74
End: 2024-12-04
Payer: MEDICARE

## 2024-12-04 ENCOUNTER — APPOINTMENT (OUTPATIENT)
Dept: ULTRASOUND IMAGING | Age: 74
DRG: 003 | End: 2024-12-04
Payer: MEDICARE

## 2024-12-04 ENCOUNTER — OFFICE VISIT (OUTPATIENT)
Dept: OPERATING ROOM | Age: 74
End: 2024-12-04
Attending: SURGERY

## 2024-12-04 ENCOUNTER — APPOINTMENT (OUTPATIENT)
Dept: GENERAL RADIOLOGY | Age: 74
DRG: 003 | End: 2024-12-04
Payer: MEDICARE

## 2024-12-04 ENCOUNTER — ANESTHESIA (OUTPATIENT)
Dept: OPERATING ROOM | Age: 74
End: 2024-12-04
Payer: MEDICARE

## 2024-12-04 PROBLEM — Z78.9 ADMITTED TO INTENSIVE CARE UNIT: Status: ACTIVE | Noted: 2024-12-04

## 2024-12-04 PROBLEM — E87.20 METABOLIC ACIDEMIA: Status: ACTIVE | Noted: 2024-12-04

## 2024-12-04 PROBLEM — R57.0 CARDIOGENIC SHOCK: Status: ACTIVE | Noted: 2024-12-04

## 2024-12-04 PROBLEM — I21.3 ST ELEVATION MYOCARDIAL INFARCTION (STEMI) (HCC): Status: ACTIVE | Noted: 2024-12-04

## 2024-12-04 PROBLEM — Z51.5 ENCOUNTER FOR PALLIATIVE CARE: Status: ACTIVE | Noted: 2024-12-04

## 2024-12-04 PROBLEM — E87.29 RESPIRATORY ACIDOSIS: Status: ACTIVE | Noted: 2024-12-04

## 2024-12-04 PROBLEM — Z71.89 GOALS OF CARE, COUNSELING/DISCUSSION: Status: ACTIVE | Noted: 2024-12-04

## 2024-12-04 LAB
ALBUMIN SERPL-MCNC: 2.3 G/DL (ref 3.5–5.2)
ALBUMIN SERPL-MCNC: 2.5 G/DL (ref 3.5–5.2)
ALBUMIN SERPL-MCNC: 2.9 G/DL (ref 3.5–5.2)
ALBUMIN/GLOB SERPL: 1.3 {RATIO} (ref 1–2.5)
ALBUMIN/GLOB SERPL: 1.4 {RATIO} (ref 1–2.5)
ALBUMIN/GLOB SERPL: 1.6 {RATIO} (ref 1–2.5)
ALLEN TEST: ABNORMAL
ALP SERPL-CCNC: 49 U/L (ref 40–129)
ALP SERPL-CCNC: 76 U/L (ref 40–129)
ALP SERPL-CCNC: 84 U/L (ref 40–129)
ALT SERPL-CCNC: 128 U/L (ref 10–50)
ALT SERPL-CCNC: 223 U/L (ref 10–50)
ALT SERPL-CCNC: 281 U/L (ref 10–50)
ANION GAP SERPL CALCULATED.3IONS-SCNC: 10 MMOL/L (ref 9–16)
ANION GAP SERPL CALCULATED.3IONS-SCNC: 12 MMOL/L (ref 9–16)
ANION GAP SERPL CALCULATED.3IONS-SCNC: 12 MMOL/L (ref 9–16)
ANION GAP SERPL CALCULATED.3IONS-SCNC: 13 MMOL/L (ref 9–16)
ANION GAP SERPL CALCULATED.3IONS-SCNC: 14 MMOL/L (ref 9–16)
ANTI-XA UNFRAC HEPARIN: 0.54 IU/L
ANTI-XA UNFRAC HEPARIN: 1.51 IU/L
ANTI-XA UNFRAC HEPARIN: 1.65 IU/L
ARTERIAL PATENCY WRIST A: ABNORMAL
AST SERPL-CCNC: 151 U/L (ref 10–50)
AST SERPL-CCNC: 383 U/L (ref 10–50)
AST SERPL-CCNC: 596 U/L (ref 10–50)
BACTERIA URNS QL MICRO: ABNORMAL
BASOPHILS # BLD: 0 K/UL (ref 0–0.2)
BASOPHILS NFR BLD: 0 % (ref 0–2)
BILIRUB DIRECT SERPL-MCNC: 0.4 MG/DL (ref 0–0.2)
BILIRUB DIRECT SERPL-MCNC: 0.5 MG/DL (ref 0–0.2)
BILIRUB INDIRECT SERPL-MCNC: 0.3 MG/DL (ref 0–1)
BILIRUB INDIRECT SERPL-MCNC: 0.3 MG/DL (ref 0–1)
BILIRUB SERPL-MCNC: 0.7 MG/DL (ref 0–1.2)
BILIRUB SERPL-MCNC: 0.7 MG/DL (ref 0–1.2)
BILIRUB SERPL-MCNC: 0.8 MG/DL (ref 0–1.2)
BILIRUB UR QL STRIP: NEGATIVE
BLOOD BANK BLOOD PRODUCT EXPIRATION DATE: NORMAL
BLOOD BANK DISPENSE STATUS: NORMAL
BLOOD BANK ISBT PRODUCT BLOOD TYPE: 6200
BLOOD BANK PRODUCT CODE: NORMAL
BLOOD BANK UNIT TYPE AND RH: NORMAL
BODY TEMPERATURE: 37
BPU ID: NORMAL
BUN BLD-MCNC: 18 MG/DL (ref 8–26)
BUN BLD-MCNC: 19 MG/DL (ref 8–26)
BUN BLD-MCNC: 21 MG/DL (ref 8–26)
BUN SERPL-MCNC: 19 MG/DL (ref 8–23)
BUN SERPL-MCNC: 20 MG/DL (ref 8–23)
BUN SERPL-MCNC: 21 MG/DL (ref 8–23)
C3 SERPL-MCNC: 119 MG/DL (ref 90–180)
C4 SERPL-MCNC: 13 MG/DL (ref 10–40)
CA-I BLD-SCNC: 0.87 MMOL/L (ref 1.13–1.33)
CA-I BLD-SCNC: 0.91 MMOL/L (ref 1.13–1.33)
CA-I BLD-SCNC: 0.98 MMOL/L (ref 1.13–1.33)
CA-I BLD-SCNC: 1.04 MMOL/L (ref 1.13–1.33)
CA-I BLD-SCNC: 1.04 MMOL/L (ref 1.15–1.33)
CA-I BLD-SCNC: 1.07 MMOL/L (ref 1.13–1.33)
CA-I BLD-SCNC: 1.07 MMOL/L (ref 1.15–1.33)
CA-I BLD-SCNC: 1.13 MMOL/L (ref 1.15–1.33)
CALCIUM SERPL-MCNC: 5.5 MG/DL (ref 8.6–10.4)
CALCIUM SERPL-MCNC: 6.2 MG/DL (ref 8.6–10.4)
CALCIUM SERPL-MCNC: 6.9 MG/DL (ref 8.6–10.4)
CALCIUM SERPL-MCNC: 7.1 MG/DL (ref 8.6–10.4)
CALCIUM SERPL-MCNC: 7.5 MG/DL (ref 8.6–10.4)
CASTS #/AREA URNS LPF: ABNORMAL /LPF (ref 0–8)
CHLORIDE BLD-SCNC: 111 MMOL/L (ref 98–107)
CHLORIDE BLD-SCNC: 111 MMOL/L (ref 98–107)
CHLORIDE BLD-SCNC: 112 MMOL/L (ref 98–107)
CHLORIDE SERPL-SCNC: 109 MMOL/L (ref 98–107)
CHLORIDE SERPL-SCNC: 110 MMOL/L (ref 98–107)
CHLORIDE SERPL-SCNC: 111 MMOL/L (ref 98–107)
CHLORIDE, WHOLE BLOOD: 115 MMOL/L (ref 98–110)
CLARITY UR: ABNORMAL
CLOT ANGLE.KAOLIN INDUCED BLD RES TEG: <39 DEG (ref 63–78)
CO2 BLD CALC-SCNC: 19 MMOL/L (ref 22–30)
CO2 BLD CALC-SCNC: 20 MMOL/L (ref 22–30)
CO2 BLD CALC-SCNC: 23 MMOL/L (ref 22–30)
CO2 SERPL-SCNC: 18 MMOL/L (ref 20–31)
CO2 SERPL-SCNC: 18 MMOL/L (ref 20–31)
CO2 SERPL-SCNC: 19 MMOL/L (ref 20–31)
CO2 SERPL-SCNC: 20 MMOL/L (ref 20–31)
CO2 SERPL-SCNC: 21 MMOL/L (ref 20–31)
COHGB MFR BLD: 0.7 % (ref 0–5)
COLOR UR: ABNORMAL
COMPONENT: NORMAL
CREAT SERPL-MCNC: 1.5 MG/DL (ref 0.7–1.2)
CREAT SERPL-MCNC: 1.6 MG/DL (ref 0.7–1.2)
CREAT SERPL-MCNC: 1.8 MG/DL (ref 0.7–1.2)
EGFR, POC: 53 ML/MIN/1.73M2
EGFR, POC: 53 ML/MIN/1.73M2
EGFR, POC: 58 ML/MIN/1.73M2
EKG ATRIAL RATE: 112 BPM
EKG P AXIS: 59 DEGREES
EKG P-R INTERVAL: 204 MS
EKG Q-T INTERVAL: 348 MS
EKG QRS DURATION: 110 MS
EKG QTC CALCULATION (BAZETT): 475 MS
EKG R AXIS: 79 DEGREES
EKG T AXIS: 17 DEGREES
EKG VENTRICULAR RATE: 112 BPM
EOSINOPHIL # BLD: 0 K/UL (ref 0–0.4)
EOSINOPHILS RELATIVE PERCENT: 0 % (ref 1–4)
EPI CELLS #/AREA URNS HPF: ABNORMAL /HPF (ref 0–5)
ERYTHROCYTE [DISTWIDTH] IN BLOOD BY AUTOMATED COUNT: 12.8 % (ref 11.8–14.4)
ERYTHROCYTE [DISTWIDTH] IN BLOOD BY AUTOMATED COUNT: 12.9 % (ref 11.8–14.4)
ERYTHROCYTE [DISTWIDTH] IN BLOOD BY AUTOMATED COUNT: 12.9 % (ref 11.8–14.4)
EST. AVERAGE GLUCOSE BLD GHB EST-MCNC: 186 MG/DL
FIBRINOGEN PPP-MCNC: 165 MG/DL (ref 203–521)
FIBRINOGEN PPP-MCNC: 198 MG/DL (ref 203–521)
FIBRINOGEN, FUNCTIONAL TEG: <4 MM (ref 15–32)
FIO2 ON VENT: 100 %
FIO2: 100
FIO2: 80
FREE KAPPA/LAMBDA RATIO: 1.49 (ref 0.22–1.74)
GFR, ESTIMATED: 39 ML/MIN/1.73M2
GFR, ESTIMATED: 45 ML/MIN/1.73M2
GFR, ESTIMATED: 49 ML/MIN/1.73M2
GLOBULIN SER CALC-MCNC: 2 G/DL
GLOBULIN SER CALC-MCNC: 2.1 G/DL
GLUCOSE BLD-MCNC: 178 MG/DL (ref 74–100)
GLUCOSE BLD-MCNC: 189 MG/DL (ref 74–100)
GLUCOSE BLD-MCNC: 196 MG/DL (ref 75–110)
GLUCOSE BLD-MCNC: 204 MG/DL (ref 74–100)
GLUCOSE BLD-MCNC: 229 MG/DL (ref 74–100)
GLUCOSE BLD-MCNC: 231 MG/DL (ref 74–100)
GLUCOSE BLD-MCNC: 242 MG/DL (ref 75–110)
GLUCOSE BLD-MCNC: 302 MG/DL (ref 75–110)
GLUCOSE BLD-MCNC: 396 MG/DL (ref 74–100)
GLUCOSE SERPL-MCNC: 199 MG/DL (ref 74–99)
GLUCOSE SERPL-MCNC: 222 MG/DL (ref 74–99)
GLUCOSE SERPL-MCNC: 224 MG/DL (ref 74–99)
GLUCOSE SERPL-MCNC: 234 MG/DL (ref 74–99)
GLUCOSE SERPL-MCNC: 270 MG/DL (ref 74–99)
GLUCOSE UR STRIP-MCNC: ABNORMAL MG/DL
HAV IGM SERPL QL IA: NONREACTIVE
HBA1C MFR BLD: 8.1 % (ref 4–6)
HBCO, MIXED, EXTENDED: 3.3 % (ref 0–5)
HBV CORE IGM SERPL QL IA: NONREACTIVE
HBV SURFACE AG SERPL QL IA: NONREACTIVE
HCO3 ARTERIAL: 19.4 MMOL/L (ref 22–27)
HCO3 VENOUS: 19.9 MMOL/L (ref 22–29)
HCO3 VENOUS: 22.4 MMOL/L (ref 22–29)
HCT VFR BLD AUTO: 20 % (ref 41–53)
HCT VFR BLD AUTO: 22 % (ref 41–53)
HCT VFR BLD AUTO: 25.2 % (ref 40.7–50.3)
HCT VFR BLD AUTO: 26 % (ref 40.7–50.3)
HCT VFR BLD AUTO: 38 % (ref 41–53)
HCT VFR BLD AUTO: 40 % (ref 41–53)
HCT VFR BLD AUTO: 40.6 % (ref 40.7–50.3)
HCT VFR BLD AUTO: 43 % (ref 41–53)
HCT VFR BLD CALC: 37.7 % (ref 40.7–50.3)
HCV AB SERPL QL IA: NONREACTIVE
HEMOGLOBIN, MIXED, EXTENDED: 8.8 G/DL (ref 12–18)
HEMOGLOBIN: 12.2 GM/DL (ref 13–17)
HGB BLD-MCNC: 13.4 G/DL (ref 13–17)
HGB BLD-MCNC: 8.2 G/DL (ref 13–17)
HGB BLD-MCNC: 8.2 G/DL (ref 13–17)
HGB UR QL STRIP.AUTO: ABNORMAL
IMM GRANULOCYTES # BLD AUTO: 0.28 K/UL (ref 0–0.3)
IMM GRANULOCYTES NFR BLD: 2 %
INR PPP: 3.2
INR PPP: 3.5
KAPPA LC FREE SER-MCNC: 28.4 MG/L
KETONES UR STRIP-MCNC: ABNORMAL MG/DL
KINETICS TEG: ABNORMAL MIN (ref 0.8–2.1)
LACTIC ACID, SEPSIS WHOLE BLOOD: 2.2 MMOL/L (ref 0.5–1.9)
LACTIC ACID, SEPSIS WHOLE BLOOD: 2.3 MMOL/L (ref 0.5–1.9)
LACTIC ACID, SEPSIS WHOLE BLOOD: 2.4 MMOL/L (ref 0.5–1.9)
LACTIC ACID, SEPSIS WHOLE BLOOD: 2.6 MMOL/L (ref 0.5–1.9)
LACTIC ACID, SEPSIS WHOLE BLOOD: 3 MMOL/L (ref 0.5–1.9)
LACTIC ACID, SEPSIS WHOLE BLOOD: 3.4 MMOL/L (ref 0.5–1.9)
LACTIC ACID, WHOLE BLOOD: 2 MMOL/L (ref 0.7–2.1)
LACTIC ACID, WHOLE BLOOD: 2.9 MMOL/L (ref 0.7–2.1)
LAMBDA LC FREE SERPL-MCNC: 19 MG/L (ref 4.2–27.7)
LEUKOCYTE ESTERASE UR QL STRIP: ABNORMAL
LYMPHOCYTES NFR BLD: 1.56 K/UL (ref 1–4.8)
LYMPHOCYTES RELATIVE PERCENT: 11 % (ref 24–44)
MA (MAX CLOT) TEG: <40 MM (ref 52–69)
MA(MAX CLOT) RAPID TEG: 59.1 MM (ref 52–70)
MAGNESIUM SERPL-MCNC: 1.1 MG/DL (ref 1.6–2.4)
MAGNESIUM SERPL-MCNC: 1.1 MG/DL (ref 1.6–2.4)
MCH RBC QN AUTO: 31.8 PG (ref 25.2–33.5)
MCHC RBC AUTO-ENTMCNC: 31.5 G/DL (ref 28.4–34.8)
MCHC RBC AUTO-ENTMCNC: 32.5 G/DL (ref 28.4–34.8)
MCHC RBC AUTO-ENTMCNC: 33 G/DL (ref 28.4–34.8)
MCV RBC AUTO: 100.8 FL (ref 82.6–102.9)
MCV RBC AUTO: 96.2 FL (ref 82.6–102.9)
MCV RBC AUTO: 97.7 FL (ref 82.6–102.9)
METHB, MIXED, EXTENDED: 0.8 % (ref 0–1.5)
MODE: ABNORMAL
MONOCYTES NFR BLD: 1.56 K/UL (ref 0.1–0.8)
MONOCYTES NFR BLD: 11 % (ref 1–7)
MORPHOLOGY: NORMAL
NEGATIVE BASE EXCESS, ART: 3.3 MMOL/L (ref 0–2)
NEGATIVE BASE EXCESS, ART: 5.4 MMOL/L (ref 0–2)
NEGATIVE BASE EXCESS, ART: 5.6 MMOL/L (ref 0–2)
NEGATIVE BASE EXCESS, ART: 5.9 MMOL/L (ref 0–2)
NEGATIVE BASE EXCESS, ART: 6.5 MMOL/L (ref 0–2)
NEGATIVE BASE EXCESS, ART: 7.7 MMOL/L (ref 0–2)
NEGATIVE BASE EXCESS, ART: 8.4 MMOL/L (ref 0–2)
NEGATIVE BASE EXCESS, ART: 9.3 MMOL/L (ref 0–2)
NEGATIVE BASE EXCESS, VEN: 3 MMOL/L (ref 0–2)
NEGATIVE BASE EXCESS, VEN: 6.8 MMOL/L (ref 0–2)
NEUTROPHILS NFR BLD: 76 % (ref 36–66)
NEUTS SEG NFR BLD: 10.8 K/UL (ref 1.8–7.7)
NITRITE UR QL STRIP: NEGATIVE
NRBC BLD-RTO: 0 PER 100 WBC
NRBC BLD-RTO: 0.3 PER 100 WBC
NRBC BLD-RTO: 0.3 PER 100 WBC
O2 CONTENT, MIXED, EXTENDED: 12 VOL % (ref 12–20)
O2 DELIVERY DEVICE: ABNORMAL
O2 SAT, ARTERIAL: 75.2 % (ref 94–100)
O2 SAT, VEN: 48.3 % (ref 60–85)
O2 SAT, VEN: 70.5 % (ref 60–85)
OXYGEN STATUS: ABNORMAL
PARTIAL THROMBOPLASTIN TIME: >180 SEC (ref 23–36.5)
PATIENT TEMP: 38.2
PATIENT TEMP: 38.4
PCO2 ARTERIAL: 56.5 MMHG (ref 32–45)
PCO2 VENOUS: 26.5 MM HG (ref 41–51)
PCO2 VENOUS: 61.1 MM HG (ref 41–51)
PERFORMING LOCATION: ABNORMAL
PH ARTERIAL: 7.16 (ref 7.35–7.45)
PH UR STRIP: 5 [PH] (ref 5–8)
PH VENOUS: 7.17 (ref 7.32–7.43)
PH VENOUS: 7.48 (ref 7.32–7.43)
PHOSPHATE SERPL-MCNC: 1.5 MG/DL (ref 2.5–4.5)
PLATELET # BLD AUTO: 122 K/UL (ref 138–453)
PLATELET # BLD AUTO: 75 K/UL (ref 138–453)
PLATELET # BLD AUTO: ABNORMAL K/UL (ref 138–453)
PLATELET, FLUORESCENCE: 81 K/UL (ref 138–453)
PLATELETS.RETICULATED NFR BLD AUTO: 3 % (ref 1.1–10.3)
PMV BLD AUTO: 10.8 FL (ref 8.1–13.5)
PMV BLD AUTO: 11.5 FL (ref 8.1–13.5)
PO2 ARTERIAL: 48.1 MMHG (ref 75–95)
PO2 VENOUS: 33.1 MM HG (ref 30–50)
PO2 VENOUS: 33.3 MM HG (ref 30–50)
POC ANION GAP: 12 MMOL/L (ref 7–16)
POC ANION GAP: 14 MMOL/L (ref 7–16)
POC ANION GAP: 9 MMOL/L (ref 7–16)
POC CREATININE: 1.3 MG/DL (ref 0.51–1.19)
POC CREATININE: 1.4 MG/DL (ref 0.51–1.19)
POC CREATININE: 1.4 MG/DL (ref 0.51–1.19)
POC HCO3: 17.5 MMOL/L (ref 21–28)
POC HCO3: 19.2 MMOL/L (ref 21–28)
POC HCO3: 19.3 MMOL/L (ref 21–28)
POC HCO3: 19.7 MMOL/L (ref 21–28)
POC HCO3: 20.8 MMOL/L (ref 21–28)
POC HCO3: 21.7 MMOL/L (ref 21–28)
POC HCO3: 21.9 MMOL/L (ref 21–28)
POC HEMOGLOBIN (CALC): 13.1 G/DL (ref 13.5–17.5)
POC HEMOGLOBIN (CALC): 13.5 G/DL (ref 13.5–17.5)
POC HEMOGLOBIN (CALC): 14.5 G/DL (ref 13.5–17.5)
POC HEMOGLOBIN (CALC): 6.8 G/DL (ref 13.5–17.5)
POC HEMOGLOBIN (CALC): 7.5 G/DL (ref 13.5–17.5)
POC LACTIC ACID: 2 MMOL/L (ref 0.56–1.39)
POC LACTIC ACID: 2.1 MMOL/L (ref 0.56–1.39)
POC LACTIC ACID: 2.6 MMOL/L (ref 0.56–1.39)
POC O2 SATURATION: 100 % (ref 94–98)
POC O2 SATURATION: 100 % (ref 94–98)
POC O2 SATURATION: 79 % (ref 94–98)
POC O2 SATURATION: 79.2 % (ref 94–98)
POC O2 SATURATION: 81.9 % (ref 94–98)
POC O2 SATURATION: 87.4 % (ref 94–98)
POC O2 SATURATION: 88.4 % (ref 94–98)
POC PCO2 TEMP: 46.1 MM HG
POC PCO2 TEMP: 53.4 MM HG
POC PCO2: 23.5 MM HG (ref 35–48)
POC PCO2: 33.8 MM HG (ref 35–48)
POC PCO2: 37.7 MM HG (ref 35–48)
POC PCO2: 43.7 MM HG (ref 35–48)
POC PCO2: 47.9 MM HG (ref 35–48)
POC PCO2: 50.2 MM HG (ref 35–48)
POC PCO2: 52.7 MM HG (ref 35–48)
POC PH TEMP: 7.18
POC PH TEMP: 7.24
POC PH: 7.2 (ref 7.35–7.45)
POC PH: 7.23 (ref 7.35–7.45)
POC PH: 7.25 (ref 7.35–7.45)
POC PH: 7.25 (ref 7.35–7.45)
POC PH: 7.36 (ref 7.35–7.45)
POC PH: 7.37 (ref 7.35–7.45)
POC PH: 7.48 (ref 7.35–7.45)
POC PO2 TEMP: 67.3 MM HG
POC PO2 TEMP: 74.7 MM HG
POC PO2: 44.6 MM HG (ref 83–108)
POC PO2: 50.7 MM HG (ref 83–108)
POC PO2: 543.4 MM HG (ref 83–108)
POC PO2: 55.5 MM HG (ref 83–108)
POC PO2: 601.7 MM HG (ref 83–108)
POC PO2: 62 MM HG (ref 83–108)
POC PO2: 67.9 MM HG (ref 83–108)
POTASSIUM BLD-SCNC: 4.8 MMOL/L (ref 3.5–4.5)
POTASSIUM BLD-SCNC: 4.8 MMOL/L (ref 3.5–4.5)
POTASSIUM BLD-SCNC: 4.9 MMOL/L (ref 3.5–4.5)
POTASSIUM SERPL-SCNC: 4.1 MMOL/L (ref 3.7–5.3)
POTASSIUM SERPL-SCNC: 4.5 MMOL/L (ref 3.7–5.3)
POTASSIUM SERPL-SCNC: 4.8 MMOL/L (ref 3.7–5.3)
POTASSIUM SERPL-SCNC: 5.2 MMOL/L (ref 3.7–5.3)
POTASSIUM SERPL-SCNC: 5.5 MMOL/L (ref 3.7–5.3)
POTASSIUM, WHOLE BLOOD: 4.5 MMOL/L (ref 3.6–5)
PROT SERPL-MCNC: 3.7 G/DL (ref 6.6–8.7)
PROT SERPL-MCNC: 4.5 G/DL (ref 6.6–8.7)
PROT SERPL-MCNC: 5 G/DL (ref 6.6–8.7)
PROT UR STRIP-MCNC: ABNORMAL MG/DL
PROTHROMBIN TIME: 31.7 SEC (ref 11.7–14.9)
PROTHROMBIN TIME: 34.2 SEC (ref 11.7–14.9)
RBC # BLD AUTO: 2.58 M/UL (ref 4.21–5.77)
RBC # BLD AUTO: 2.58 M/UL (ref 4.21–5.77)
RBC # BLD AUTO: 4.22 M/UL (ref 4.21–5.77)
RBC #/AREA URNS HPF: ABNORMAL /HPF (ref 0–4)
REACTION TIME TEG W HEPARIN: 10.4 MIN (ref 4.3–8.3)
REACTION TIME TEG: >17 MIN (ref 4.6–9.1)
SAMPLE SITE: ABNORMAL
SAO2 % BLDMV: 99.8 % (ref 60–80)
SODIUM BLD-SCNC: 142 MMOL/L (ref 138–146)
SODIUM BLD-SCNC: 143 MMOL/L (ref 138–146)
SODIUM BLD-SCNC: 143 MMOL/L (ref 138–146)
SODIUM SERPL-SCNC: 140 MMOL/L (ref 136–145)
SODIUM SERPL-SCNC: 141 MMOL/L (ref 136–145)
SODIUM SERPL-SCNC: 142 MMOL/L (ref 136–145)
SODIUM, WHOLE BLOOD: 140 MMOL/L (ref 136–145)
SP GR UR STRIP: 1.05 (ref 1–1.03)
TRANSFUSION STATUS: NORMAL
TROPONIN I SERPL HS-MCNC: 1005 NG/L (ref 0–22)
TROPONIN I SERPL HS-MCNC: 1132 NG/L (ref 0–22)
TROPONIN I SERPL HS-MCNC: 814 NG/L (ref 0–22)
UNIT DIVISION: 0
UNIT ISSUE DATE/TIME: NORMAL
UROBILINOGEN UR STRIP-ACNC: NORMAL EU/DL (ref 0–1)
VANCOMYCIN SERPL-MCNC: 6.6 UG/ML (ref 5–40)
WBC #/AREA URNS HPF: ABNORMAL /HPF (ref 0–5)
WBC OTHER # BLD: 14.2 K/UL (ref 3.5–11.3)
WBC OTHER # BLD: 6.5 K/UL (ref 3.5–11.3)
WBC OTHER # BLD: 6.7 K/UL (ref 3.5–11.3)

## 2024-12-04 PROCEDURE — 82436 ASSAY OF URINE CHLORIDE: CPT

## 2024-12-04 PROCEDURE — 82803 BLOOD GASES ANY COMBINATION: CPT

## 2024-12-04 PROCEDURE — 86334 IMMUNOFIX E-PHORESIS SERUM: CPT

## 2024-12-04 PROCEDURE — 33990 INSJ PERQ VAD L HRT ARTERIAL: CPT | Performed by: STUDENT IN AN ORGANIZED HEALTH CARE EDUCATION/TRAINING PROGRAM

## 2024-12-04 PROCEDURE — 6360000002 HC RX W HCPCS

## 2024-12-04 PROCEDURE — 2580000003 HC RX 258

## 2024-12-04 PROCEDURE — 84165 PROTEIN E-PHORESIS SERUM: CPT

## 2024-12-04 PROCEDURE — 6370000000 HC RX 637 (ALT 250 FOR IP)

## 2024-12-04 PROCEDURE — 81001 URINALYSIS AUTO W/SCOPE: CPT

## 2024-12-04 PROCEDURE — 83605 ASSAY OF LACTIC ACID: CPT

## 2024-12-04 PROCEDURE — 2500000003 HC RX 250 WO HCPCS

## 2024-12-04 PROCEDURE — 86038 ANTINUCLEAR ANTIBODIES: CPT

## 2024-12-04 PROCEDURE — 6360000002 HC RX W HCPCS: Performed by: NURSE ANESTHETIST, CERTIFIED REGISTERED

## 2024-12-04 PROCEDURE — 85347 COAGULATION TIME ACTIVATED: CPT

## 2024-12-04 PROCEDURE — 2709999900 HC NON-CHARGEABLE SUPPLY: Performed by: STUDENT IN AN ORGANIZED HEALTH CARE EDUCATION/TRAINING PROGRAM

## 2024-12-04 PROCEDURE — C1751 CATH, INF, PER/CENT/MIDLINE: HCPCS | Performed by: STUDENT IN AN ORGANIZED HEALTH CARE EDUCATION/TRAINING PROGRAM

## 2024-12-04 PROCEDURE — C1760 CLOSURE DEV, VASC: HCPCS | Performed by: SURGERY

## 2024-12-04 PROCEDURE — 84100 ASSAY OF PHOSPHORUS: CPT

## 2024-12-04 PROCEDURE — 6360000002 HC RX W HCPCS: Performed by: STUDENT IN AN ORGANIZED HEALTH CARE EDUCATION/TRAINING PROGRAM

## 2024-12-04 PROCEDURE — 37184 PRIM ART M-THRMBC 1ST VSL: CPT | Performed by: SURGERY

## 2024-12-04 PROCEDURE — 36014 PLACE CATHETER IN ARTERY: CPT | Performed by: SURGERY

## 2024-12-04 PROCEDURE — 5A1522G EXTRACORPOREAL OXYGENATION, MEMBRANE, PERIPHERAL VENO-ARTERIAL: ICD-10-PCS | Performed by: SURGERY

## 2024-12-04 PROCEDURE — 93503 INSERT/PLACE HEART CATHETER: CPT | Performed by: STUDENT IN AN ORGANIZED HEALTH CARE EDUCATION/TRAINING PROGRAM

## 2024-12-04 PROCEDURE — 2580000003 HC RX 258: Performed by: STUDENT IN AN ORGANIZED HEALTH CARE EDUCATION/TRAINING PROGRAM

## 2024-12-04 PROCEDURE — 37799 UNLISTED PX VASCULAR SURGERY: CPT

## 2024-12-04 PROCEDURE — C9460 INJECTION, CANGRELOR: HCPCS

## 2024-12-04 PROCEDURE — 85027 COMPLETE CBC AUTOMATED: CPT

## 2024-12-04 PROCEDURE — 80053 COMPREHEN METABOLIC PANEL: CPT

## 2024-12-04 PROCEDURE — 82330 ASSAY OF CALCIUM: CPT

## 2024-12-04 PROCEDURE — 82805 BLOOD GASES W/O2 SATURATION: CPT

## 2024-12-04 PROCEDURE — 99291 CRITICAL CARE FIRST HOUR: CPT | Performed by: INTERNAL MEDICINE

## 2024-12-04 PROCEDURE — 82570 ASSAY OF URINE CREATININE: CPT

## 2024-12-04 PROCEDURE — 93010 ELECTROCARDIOGRAM REPORT: CPT | Performed by: INTERNAL MEDICINE

## 2024-12-04 PROCEDURE — 3E033XZ INTRODUCTION OF VASOPRESSOR INTO PERIPHERAL VEIN, PERCUTANEOUS APPROACH: ICD-10-PCS | Performed by: STUDENT IN AN ORGANIZED HEALTH CARE EDUCATION/TRAINING PROGRAM

## 2024-12-04 PROCEDURE — 83735 ASSAY OF MAGNESIUM: CPT

## 2024-12-04 PROCEDURE — 84484 ASSAY OF TROPONIN QUANT: CPT

## 2024-12-04 PROCEDURE — C1757 CATH, THROMBECTOMY/EMBOLECT: HCPCS | Performed by: SURGERY

## 2024-12-04 PROCEDURE — 3700000001 HC ADD 15 MINUTES (ANESTHESIA): Performed by: SURGERY

## 2024-12-04 PROCEDURE — 6360000002 HC RX W HCPCS: Performed by: SURGERY

## 2024-12-04 PROCEDURE — 86160 COMPLEMENT ANTIGEN: CPT

## 2024-12-04 PROCEDURE — 84520 ASSAY OF UREA NITROGEN: CPT

## 2024-12-04 PROCEDURE — 94761 N-INVAS EAR/PLS OXIMETRY MLT: CPT

## 2024-12-04 PROCEDURE — 80048 BASIC METABOLIC PNL TOTAL CA: CPT

## 2024-12-04 PROCEDURE — 84132 ASSAY OF SERUM POTASSIUM: CPT

## 2024-12-04 PROCEDURE — 2500000003 HC RX 250 WO HCPCS: Performed by: STUDENT IN AN ORGANIZED HEALTH CARE EDUCATION/TRAINING PROGRAM

## 2024-12-04 PROCEDURE — 2580000003 HC RX 258: Performed by: SURGERY

## 2024-12-04 PROCEDURE — 30233K1 TRANSFUSION OF NONAUTOLOGOUS FROZEN PLASMA INTO PERIPHERAL VEIN, PERCUTANEOUS APPROACH: ICD-10-PCS | Performed by: STUDENT IN AN ORGANIZED HEALTH CARE EDUCATION/TRAINING PROGRAM

## 2024-12-04 PROCEDURE — 86225 DNA ANTIBODY NATIVE: CPT

## 2024-12-04 PROCEDURE — 99223 1ST HOSP IP/OBS HIGH 75: CPT | Performed by: INTERNAL MEDICINE

## 2024-12-04 PROCEDURE — 2500000003 HC RX 250 WO HCPCS: Performed by: NURSE ANESTHETIST, CERTIFIED REGISTERED

## 2024-12-04 PROCEDURE — 90945 DIALYSIS ONE EVALUATION: CPT

## 2024-12-04 PROCEDURE — 85610 PROTHROMBIN TIME: CPT

## 2024-12-04 PROCEDURE — 3700000000 HC ANESTHESIA ATTENDED CARE: Performed by: SURGERY

## 2024-12-04 PROCEDURE — 84155 ASSAY OF PROTEIN SERUM: CPT

## 2024-12-04 PROCEDURE — 6360000004 HC RX CONTRAST MEDICATION: Performed by: STUDENT IN AN ORGANIZED HEALTH CARE EDUCATION/TRAINING PROGRAM

## 2024-12-04 PROCEDURE — 5A1D90Z PERFORMANCE OF URINARY FILTRATION, CONTINUOUS, GREATER THAN 18 HOURS PER DAY: ICD-10-PCS | Performed by: STUDENT IN AN ORGANIZED HEALTH CARE EDUCATION/TRAINING PROGRAM

## 2024-12-04 PROCEDURE — 2700000000 HC OXYGEN THERAPY PER DAY

## 2024-12-04 PROCEDURE — 33947 ECMO/ECLS INITIATION ARTERY: CPT | Performed by: STUDENT IN AN ORGANIZED HEALTH CARE EDUCATION/TRAINING PROGRAM

## 2024-12-04 PROCEDURE — 99222 1ST HOSP IP/OBS MODERATE 55: CPT

## 2024-12-04 PROCEDURE — 82375 ASSAY CARBOXYHB QUANT: CPT

## 2024-12-04 PROCEDURE — 02CQ3ZZ EXTIRPATION OF MATTER FROM RIGHT PULMONARY ARTERY, PERCUTANEOUS APPROACH: ICD-10-PCS | Performed by: SURGERY

## 2024-12-04 PROCEDURE — 6360000002 HC RX W HCPCS: Performed by: INTERNAL MEDICINE

## 2024-12-04 PROCEDURE — C1769 GUIDE WIRE: HCPCS | Performed by: STUDENT IN AN ORGANIZED HEALTH CARE EDUCATION/TRAINING PROGRAM

## 2024-12-04 PROCEDURE — P9041 ALBUMIN (HUMAN),5%, 50ML: HCPCS

## 2024-12-04 PROCEDURE — A4217 STERILE WATER/SALINE, 500 ML: HCPCS | Performed by: SURGERY

## 2024-12-04 PROCEDURE — 86335 IMMUNFIX E-PHORSIS/URINE/CSF: CPT

## 2024-12-04 PROCEDURE — 36245 INS CATH ABD/L-EXT ART 1ST: CPT | Performed by: STUDENT IN AN ORGANIZED HEALTH CARE EDUCATION/TRAINING PROGRAM

## 2024-12-04 PROCEDURE — 02HV33Z INSERTION OF INFUSION DEVICE INTO SUPERIOR VENA CAVA, PERCUTANEOUS APPROACH: ICD-10-PCS

## 2024-12-04 PROCEDURE — 85384 FIBRINOGEN ACTIVITY: CPT

## 2024-12-04 PROCEDURE — 94003 VENT MGMT INPAT SUBQ DAY: CPT

## 2024-12-04 PROCEDURE — C1892 INTRO/SHEATH,FIXED,PEEL-AWAY: HCPCS | Performed by: SURGERY

## 2024-12-04 PROCEDURE — 2720000010 HC SURG SUPPLY STERILE: Performed by: SURGERY

## 2024-12-04 PROCEDURE — 2709999900 HC NON-CHARGEABLE SUPPLY: Performed by: SURGERY

## 2024-12-04 PROCEDURE — 84166 PROTEIN E-PHORESIS/URINE/CSF: CPT

## 2024-12-04 PROCEDURE — C1892 INTRO/SHEATH,FIXED,PEEL-AWAY: HCPCS | Performed by: STUDENT IN AN ORGANIZED HEALTH CARE EDUCATION/TRAINING PROGRAM

## 2024-12-04 PROCEDURE — 85055 RETICULATED PLATELET ASSAY: CPT

## 2024-12-04 PROCEDURE — 2500000003 HC RX 250 WO HCPCS: Performed by: INTERNAL MEDICINE

## 2024-12-04 PROCEDURE — 02CR3ZZ EXTIRPATION OF MATTER FROM LEFT PULMONARY ARTERY, PERCUTANEOUS APPROACH: ICD-10-PCS | Performed by: SURGERY

## 2024-12-04 PROCEDURE — 84156 ASSAY OF PROTEIN URINE: CPT

## 2024-12-04 PROCEDURE — 3600000007 HC SURGERY HYBRID BASE: Performed by: SURGERY

## 2024-12-04 PROCEDURE — 6370000000 HC RX 637 (ALT 250 FOR IP): Performed by: STUDENT IN AN ORGANIZED HEALTH CARE EDUCATION/TRAINING PROGRAM

## 2024-12-04 PROCEDURE — 85014 HEMATOCRIT: CPT

## 2024-12-04 PROCEDURE — C1894 INTRO/SHEATH, NON-LASER: HCPCS | Performed by: SURGERY

## 2024-12-04 PROCEDURE — C1760 CLOSURE DEV, VASC: HCPCS | Performed by: STUDENT IN AN ORGANIZED HEALTH CARE EDUCATION/TRAINING PROGRAM

## 2024-12-04 PROCEDURE — 85025 COMPLETE CBC W/AUTO DIFF WBC: CPT

## 2024-12-04 PROCEDURE — 85730 THROMBOPLASTIN TIME PARTIAL: CPT

## 2024-12-04 PROCEDURE — C1894 INTRO/SHEATH, NON-LASER: HCPCS | Performed by: STUDENT IN AN ORGANIZED HEALTH CARE EDUCATION/TRAINING PROGRAM

## 2024-12-04 PROCEDURE — 80051 ELECTROLYTE PANEL: CPT

## 2024-12-04 PROCEDURE — 84300 ASSAY OF URINE SODIUM: CPT

## 2024-12-04 PROCEDURE — 80076 HEPATIC FUNCTION PANEL: CPT

## 2024-12-04 PROCEDURE — 2580000003 HC RX 258: Performed by: NURSE ANESTHETIST, CERTIFIED REGISTERED

## 2024-12-04 PROCEDURE — 80202 ASSAY OF VANCOMYCIN: CPT

## 2024-12-04 PROCEDURE — 3600000017 HC SURGERY HYBRID ADDL 15MIN: Performed by: SURGERY

## 2024-12-04 PROCEDURE — 85576 BLOOD PLATELET AGGREGATION: CPT

## 2024-12-04 PROCEDURE — 6360000004 HC RX CONTRAST MEDICATION: Performed by: SURGERY

## 2024-12-04 PROCEDURE — 82947 ASSAY GLUCOSE BLOOD QUANT: CPT

## 2024-12-04 PROCEDURE — 71045 X-RAY EXAM CHEST 1 VIEW: CPT

## 2024-12-04 PROCEDURE — 80074 ACUTE HEPATITIS PANEL: CPT

## 2024-12-04 PROCEDURE — 33949 ECMO/ECLS DAILY MGMT ARTERY: CPT

## 2024-12-04 PROCEDURE — C1753 CATH, INTRAVAS ULTRASOUND: HCPCS | Performed by: SURGERY

## 2024-12-04 PROCEDURE — 2720000010 HC SURG SUPPLY STERILE: Performed by: STUDENT IN AN ORGANIZED HEALTH CARE EDUCATION/TRAINING PROGRAM

## 2024-12-04 PROCEDURE — 76775 US EXAM ABDO BACK WALL LIM: CPT

## 2024-12-04 PROCEDURE — 85018 HEMOGLOBIN: CPT

## 2024-12-04 PROCEDURE — 83521 IG LIGHT CHAINS FREE EACH: CPT

## 2024-12-04 PROCEDURE — 83050 HGB METHEMOGLOBIN QUAN: CPT

## 2024-12-04 PROCEDURE — 2000000000 HC ICU R&B

## 2024-12-04 PROCEDURE — 85520 HEPARIN ASSAY: CPT

## 2024-12-04 PROCEDURE — C1769 GUIDE WIRE: HCPCS | Performed by: SURGERY

## 2024-12-04 PROCEDURE — C9460 INJECTION, CANGRELOR: HCPCS | Performed by: STUDENT IN AN ORGANIZED HEALTH CARE EDUCATION/TRAINING PROGRAM

## 2024-12-04 PROCEDURE — 82565 ASSAY OF CREATININE: CPT

## 2024-12-04 PROCEDURE — 83036 HEMOGLOBIN GLYCOSYLATED A1C: CPT

## 2024-12-04 RX ORDER — MIDAZOLAM HYDROCHLORIDE 1 MG/ML
INJECTION, SOLUTION INTRAMUSCULAR; INTRAVENOUS
Status: DISCONTINUED | OUTPATIENT
Start: 2024-12-04 | End: 2024-12-04 | Stop reason: SDUPTHER

## 2024-12-04 RX ORDER — ALBUMIN HUMAN 50 G/1000ML
25 SOLUTION INTRAVENOUS ONCE
Status: COMPLETED | OUTPATIENT
Start: 2024-12-04 | End: 2024-12-04

## 2024-12-04 RX ORDER — MIDAZOLAM HYDROCHLORIDE 1 MG/ML
1-10 INJECTION, SOLUTION INTRAVENOUS CONTINUOUS
Status: DISCONTINUED | OUTPATIENT
Start: 2024-12-04 | End: 2024-12-10

## 2024-12-04 RX ORDER — DEXTROSE MONOHYDRATE 100 MG/ML
INJECTION, SOLUTION INTRAVENOUS CONTINUOUS PRN
Status: DISCONTINUED | OUTPATIENT
Start: 2024-12-04 | End: 2024-12-23 | Stop reason: HOSPADM

## 2024-12-04 RX ORDER — HEPARIN SODIUM 1000 [USP'U]/ML
INJECTION, SOLUTION INTRAVENOUS; SUBCUTANEOUS
Status: DISCONTINUED | OUTPATIENT
Start: 2024-12-04 | End: 2024-12-04 | Stop reason: SDUPTHER

## 2024-12-04 RX ORDER — HEPARIN SODIUM 1000 [USP'U]/ML
INJECTION, SOLUTION INTRAVENOUS; SUBCUTANEOUS PRN
Status: DISCONTINUED | OUTPATIENT
Start: 2024-12-04 | End: 2024-12-04 | Stop reason: HOSPADM

## 2024-12-04 RX ORDER — CALCIUM GLUCONATE 20 MG/ML
1000 INJECTION, SOLUTION INTRAVENOUS PRN
Status: DISCONTINUED | OUTPATIENT
Start: 2024-12-04 | End: 2024-12-12

## 2024-12-04 RX ORDER — INSULIN LISPRO 100 [IU]/ML
0-8 INJECTION, SOLUTION INTRAVENOUS; SUBCUTANEOUS
Status: DISCONTINUED | OUTPATIENT
Start: 2024-12-04 | End: 2024-12-04

## 2024-12-04 RX ORDER — CALCIUM GLUCONATE 20 MG/ML
2000 INJECTION, SOLUTION INTRAVENOUS ONCE
Status: COMPLETED | OUTPATIENT
Start: 2024-12-04 | End: 2024-12-04

## 2024-12-04 RX ORDER — IODIXANOL 320 MG/ML
INJECTION, SOLUTION INTRAVASCULAR PRN
Status: DISCONTINUED | OUTPATIENT
Start: 2024-12-04 | End: 2024-12-04 | Stop reason: ALTCHOICE

## 2024-12-04 RX ORDER — HEPARIN SODIUM,PORCINE/PF 10 UNIT/ML
3 SYRINGE (ML) INTRAVENOUS 2 TIMES DAILY
Status: DISCONTINUED | OUTPATIENT
Start: 2024-12-04 | End: 2024-12-05

## 2024-12-04 RX ORDER — FUROSEMIDE 10 MG/ML
40 INJECTION INTRAMUSCULAR; INTRAVENOUS ONCE
Status: COMPLETED | OUTPATIENT
Start: 2024-12-04 | End: 2024-12-04

## 2024-12-04 RX ORDER — HEPARIN SODIUM 1000 [USP'U]/ML
1000 INJECTION, SOLUTION INTRAVENOUS; SUBCUTANEOUS ONCE
Status: COMPLETED | OUTPATIENT
Start: 2024-12-04 | End: 2024-12-04

## 2024-12-04 RX ORDER — DEXMEDETOMIDINE HYDROCHLORIDE 4 UG/ML
.1-1.5 INJECTION, SOLUTION INTRAVENOUS CONTINUOUS
Status: DISCONTINUED | OUTPATIENT
Start: 2024-12-04 | End: 2024-12-10

## 2024-12-04 RX ORDER — HEPARIN SODIUM,PORCINE 10 UNIT/ML
3 VIAL (ML) INTRAVENOUS 2 TIMES DAILY
Status: DISCONTINUED | OUTPATIENT
Start: 2024-12-04 | End: 2024-12-04

## 2024-12-04 RX ORDER — HEPARIN SODIUM,PORCINE 10 UNIT/ML
3 VIAL (ML) INTRAVENOUS PRN
Status: DISCONTINUED | OUTPATIENT
Start: 2024-12-04 | End: 2024-12-04

## 2024-12-04 RX ORDER — DEXTROSE MONOHYDRATE 100 MG/ML
INJECTION, SOLUTION INTRAVENOUS CONTINUOUS PRN
Status: DISCONTINUED | OUTPATIENT
Start: 2024-12-04 | End: 2024-12-04

## 2024-12-04 RX ORDER — GLUCAGON 1 MG/ML
1 KIT INJECTION PRN
Status: DISCONTINUED | OUTPATIENT
Start: 2024-12-04 | End: 2024-12-04

## 2024-12-04 RX ORDER — 0.9 % SODIUM CHLORIDE 0.9 %
500 INTRAVENOUS SOLUTION INTRAVENOUS ONCE
Status: COMPLETED | OUTPATIENT
Start: 2024-12-04 | End: 2024-12-04

## 2024-12-04 RX ORDER — 0.9 % SODIUM CHLORIDE 0.9 %
1000 INTRAVENOUS SOLUTION INTRAVENOUS ONCE
Status: DISCONTINUED | OUTPATIENT
Start: 2024-12-04 | End: 2024-12-04

## 2024-12-04 RX ORDER — SODIUM CHLORIDE 9 MG/ML
INJECTION, SOLUTION INTRAVENOUS
Status: DISCONTINUED | OUTPATIENT
Start: 2024-12-04 | End: 2024-12-04 | Stop reason: SDUPTHER

## 2024-12-04 RX ORDER — PHENYLEPHRINE HCL IN 0.9% NACL 1 MG/10 ML
SYRINGE (ML) INTRAVENOUS
Status: DISCONTINUED | OUTPATIENT
Start: 2024-12-04 | End: 2024-12-04 | Stop reason: SDUPTHER

## 2024-12-04 RX ORDER — ROCURONIUM BROMIDE 10 MG/ML
INJECTION, SOLUTION INTRAVENOUS
Status: DISCONTINUED | OUTPATIENT
Start: 2024-12-04 | End: 2024-12-04 | Stop reason: SDUPTHER

## 2024-12-04 RX ORDER — HEPARIN SODIUM,PORCINE/PF 10 UNIT/ML
3 SYRINGE (ML) INTRAVENOUS PRN
Status: DISCONTINUED | OUTPATIENT
Start: 2024-12-04 | End: 2024-12-09

## 2024-12-04 RX ORDER — INSULIN LISPRO 100 [IU]/ML
0-4 INJECTION, SOLUTION INTRAVENOUS; SUBCUTANEOUS
Status: DISCONTINUED | OUTPATIENT
Start: 2024-12-04 | End: 2024-12-04

## 2024-12-04 RX ORDER — CALCIUM GLUCONATE 20 MG/ML
2000 INJECTION, SOLUTION INTRAVENOUS PRN
Status: DISCONTINUED | OUTPATIENT
Start: 2024-12-04 | End: 2024-12-12

## 2024-12-04 RX ORDER — IODIXANOL 320 MG/ML
INJECTION, SOLUTION INTRAVASCULAR
Status: DISPENSED
Start: 2024-12-04 | End: 2024-12-04

## 2024-12-04 RX ORDER — IOPAMIDOL 755 MG/ML
INJECTION, SOLUTION INTRAVASCULAR PRN
Status: DISCONTINUED | OUTPATIENT
Start: 2024-12-04 | End: 2024-12-04 | Stop reason: HOSPADM

## 2024-12-04 RX ORDER — GLUCAGON 1 MG/ML
1 KIT INJECTION PRN
Status: DISCONTINUED | OUTPATIENT
Start: 2024-12-04 | End: 2024-12-23 | Stop reason: HOSPADM

## 2024-12-04 RX ORDER — DEXTROSE MONOHYDRATE 25 G/50ML
12.5 INJECTION, SOLUTION INTRAVENOUS ONCE
Status: COMPLETED | OUTPATIENT
Start: 2024-12-04 | End: 2024-12-04

## 2024-12-04 RX ORDER — ACETAMINOPHEN 325 MG/1
650 TABLET ORAL EVERY 4 HOURS PRN
Status: DISCONTINUED | OUTPATIENT
Start: 2024-12-04 | End: 2024-12-23 | Stop reason: HOSPADM

## 2024-12-04 RX ORDER — CALCIUM GLUCONATE 20 MG/ML
4000 INJECTION, SOLUTION INTRAVENOUS PRN
Status: DISCONTINUED | OUTPATIENT
Start: 2024-12-04 | End: 2024-12-12

## 2024-12-04 RX ORDER — SODIUM CHLORIDE 9 MG/ML
INJECTION, SOLUTION INTRAVENOUS CONTINUOUS
Status: DISCONTINUED | OUTPATIENT
Start: 2024-12-04 | End: 2024-12-14

## 2024-12-04 RX ORDER — INSULIN GLARGINE 100 [IU]/ML
5 INJECTION, SOLUTION SUBCUTANEOUS NIGHTLY
Status: DISCONTINUED | OUTPATIENT
Start: 2024-12-04 | End: 2024-12-04

## 2024-12-04 RX ORDER — CALCIUM GLUCONATE 20 MG/ML
1000 INJECTION, SOLUTION INTRAVENOUS ONCE
Status: COMPLETED | OUTPATIENT
Start: 2024-12-04 | End: 2024-12-04

## 2024-12-04 RX ORDER — 0.9 % SODIUM CHLORIDE 0.9 %
1000 INTRAVENOUS SOLUTION INTRAVENOUS ONCE
Status: COMPLETED | OUTPATIENT
Start: 2024-12-04 | End: 2024-12-04

## 2024-12-04 RX ORDER — CALCIUM CHLORIDE, MAGNESIUM CHLORIDE, DEXTROSE MONOHYDRATE, LACTIC ACID, SODIUM CHLORIDE, SODIUM BICARBONATE AND POTASSIUM CHLORIDE 5.15; 2.03; 22; 5.4; 6.46; 3.09; .157 G/L; G/L; G/L; G/L; G/L; G/L; G/L
INJECTION INTRAVENOUS CONTINUOUS
Status: DISCONTINUED | OUTPATIENT
Start: 2024-12-04 | End: 2024-12-05

## 2024-12-04 RX ADMIN — MAGNESIUM SULFATE HEPTAHYDRATE 2000 MG: 40 INJECTION, SOLUTION INTRAVENOUS at 22:23

## 2024-12-04 RX ADMIN — VASOPRESSIN 0.03 UNITS/MIN: 0.2 INJECTION INTRAVENOUS at 19:21

## 2024-12-04 RX ADMIN — ACETAMINOPHEN 650 MG: 325 TABLET ORAL at 03:45

## 2024-12-04 RX ADMIN — Medication 50 MCG/MIN: at 16:03

## 2024-12-04 RX ADMIN — ROCURONIUM BROMIDE 50 MG: 10 INJECTION, SOLUTION INTRAVENOUS at 06:52

## 2024-12-04 RX ADMIN — SODIUM BICARBONATE: 84 INJECTION, SOLUTION INTRAVENOUS at 11:46

## 2024-12-04 RX ADMIN — SODIUM ZIRCONIUM CYCLOSILICATE 10 G: 10 POWDER, FOR SUSPENSION ORAL at 13:35

## 2024-12-04 RX ADMIN — SODIUM BICARBONATE 50 MEQ: 84 INJECTION, SOLUTION INTRAVENOUS at 08:20

## 2024-12-04 RX ADMIN — DEXMEDETOMIDINE HYDROCHLORIDE 0.4 MCG/KG/HR: 400 INJECTION, SOLUTION INTRAVENOUS at 21:54

## 2024-12-04 RX ADMIN — Medication 175 MCG/MIN: at 20:41

## 2024-12-04 RX ADMIN — Medication 30 UNITS: at 20:00

## 2024-12-04 RX ADMIN — CANGRELOR 2 MCG/KG/MIN: 50 INJECTION, POWDER, LYOPHILIZED, FOR SOLUTION INTRAVENOUS at 10:28

## 2024-12-04 RX ADMIN — SODIUM CHLORIDE 1000 ML: 9 INJECTION, SOLUTION INTRAVENOUS at 02:08

## 2024-12-04 RX ADMIN — PIPERACILLIN AND TAZOBACTAM 3375 MG: 3; .375 INJECTION, POWDER, LYOPHILIZED, FOR SOLUTION INTRAVENOUS at 19:33

## 2024-12-04 RX ADMIN — Medication 5 MG/HR: at 11:45

## 2024-12-04 RX ADMIN — Medication 125 MCG/HR: at 11:45

## 2024-12-04 RX ADMIN — Medication 100 MCG: at 07:09

## 2024-12-04 RX ADMIN — SODIUM CHLORIDE 3.2 UNITS/HR: 9 INJECTION, SOLUTION INTRAVENOUS at 23:04

## 2024-12-04 RX ADMIN — CANGRELOR 0.75 MCG/KG/MIN: 50 INJECTION, POWDER, LYOPHILIZED, FOR SOLUTION INTRAVENOUS at 21:07

## 2024-12-04 RX ADMIN — Medication 100 MCG: at 07:13

## 2024-12-04 RX ADMIN — SODIUM CHLORIDE, PRESERVATIVE FREE 10 ML: 5 INJECTION INTRAVENOUS at 21:34

## 2024-12-04 RX ADMIN — Medication 200 MCG/HR: at 20:51

## 2024-12-04 RX ADMIN — DEXMEDETOMIDINE HYDROCHLORIDE 0.2 MCG/KG/HR: 4 INJECTION, SOLUTION INTRAVENOUS at 11:44

## 2024-12-04 RX ADMIN — Medication 125 MCG/HR: at 05:42

## 2024-12-04 RX ADMIN — Medication 60 MCG/MIN: at 11:41

## 2024-12-04 RX ADMIN — DEXTROSE MONOHYDRATE 12.5 G: 25 INJECTION, SOLUTION INTRAVENOUS at 13:30

## 2024-12-04 RX ADMIN — PIPERACILLIN AND TAZOBACTAM 3375 MG: 3; .375 INJECTION, POWDER, LYOPHILIZED, FOR SOLUTION INTRAVENOUS at 02:14

## 2024-12-04 RX ADMIN — ATORVASTATIN CALCIUM 40 MG: 40 TABLET, FILM COATED ORAL at 21:33

## 2024-12-04 RX ADMIN — SODIUM CHLORIDE: 9 INJECTION, SOLUTION INTRAVENOUS at 06:47

## 2024-12-04 RX ADMIN — Medication 100 MCG: at 07:21

## 2024-12-04 RX ADMIN — INSULIN HUMAN 10 UNITS: 100 INJECTION, SOLUTION PARENTERAL at 13:30

## 2024-12-04 RX ADMIN — CANGRELOR 2 MCG/KG/MIN: 50 INJECTION, POWDER, LYOPHILIZED, FOR SOLUTION INTRAVENOUS at 00:28

## 2024-12-04 RX ADMIN — Medication 30 MCG/MIN: at 10:12

## 2024-12-04 RX ADMIN — HEPARIN SODIUM 12 UNITS/KG/HR: 10000 INJECTION, SOLUTION INTRAVENOUS at 11:43

## 2024-12-04 RX ADMIN — ROCURONIUM BROMIDE 30 MG: 10 INJECTION, SOLUTION INTRAVENOUS at 07:39

## 2024-12-04 RX ADMIN — MIDAZOLAM 2 MG: 1 INJECTION INTRAMUSCULAR; INTRAVENOUS at 06:52

## 2024-12-04 RX ADMIN — VASOPRESSIN 0.03 UNITS/MIN: 0.2 INJECTION INTRAVENOUS at 02:00

## 2024-12-04 RX ADMIN — SODIUM CHLORIDE, PRESERVATIVE FREE 40 MG: 5 INJECTION INTRAVENOUS at 10:15

## 2024-12-04 RX ADMIN — HEPARIN SODIUM 1000 UNITS: 1000 INJECTION INTRAVENOUS; SUBCUTANEOUS at 12:20

## 2024-12-04 RX ADMIN — DEXMEDETOMIDINE HYDROCHLORIDE 0.2 MCG/KG/HR: 4 INJECTION, SOLUTION INTRAVENOUS at 09:03

## 2024-12-04 RX ADMIN — SODIUM CHLORIDE 1250 MG: 9 INJECTION, SOLUTION INTRAVENOUS at 22:00

## 2024-12-04 RX ADMIN — SODIUM CHLORIDE: 9 INJECTION, SOLUTION INTRAVENOUS at 12:02

## 2024-12-04 RX ADMIN — CALCIUM GLUCONATE 1000 MG: 20 INJECTION, SOLUTION INTRAVENOUS at 23:01

## 2024-12-04 RX ADMIN — SODIUM CHLORIDE 500 ML: 9 INJECTION, SOLUTION INTRAVENOUS at 04:21

## 2024-12-04 RX ADMIN — FUROSEMIDE 40 MG: 10 INJECTION, SOLUTION INTRAMUSCULAR; INTRAVENOUS at 12:05

## 2024-12-04 RX ADMIN — SODIUM CHLORIDE, PRESERVATIVE FREE 40 MG: 5 INJECTION INTRAVENOUS at 21:33

## 2024-12-04 RX ADMIN — Medication 100 MCG: at 07:10

## 2024-12-04 RX ADMIN — CALCIUM GLUCONATE 2000 MG: 20 INJECTION, SOLUTION INTRAVENOUS at 01:15

## 2024-12-04 RX ADMIN — CANGRELOR 2 MCG/KG/MIN: 50 INJECTION, POWDER, LYOPHILIZED, FOR SOLUTION INTRAVENOUS at 03:53

## 2024-12-04 RX ADMIN — CALCIUM CHLORIDE, MAGNESIUM CHLORIDE, DEXTROSE MONOHYDRATE, LACTIC ACID, SODIUM CHLORIDE, SODIUM BICARBONATE AND POTASSIUM CHLORIDE: 5.15; 2.03; 22; 5.4; 6.46; 3.09; .157 INJECTION INTRAVENOUS at 23:21

## 2024-12-04 RX ADMIN — VASOPRESSIN 0.03 UNITS/MIN: 0.2 INJECTION INTRAVENOUS at 11:42

## 2024-12-04 RX ADMIN — Medication 55 MCG/MIN: at 09:52

## 2024-12-04 RX ADMIN — PIPERACILLIN AND TAZOBACTAM 3375 MG: 3; .375 INJECTION, POWDER, LYOPHILIZED, FOR SOLUTION INTRAVENOUS at 12:08

## 2024-12-04 RX ADMIN — Medication 100 MCG: at 07:11

## 2024-12-04 RX ADMIN — ACETAMINOPHEN 650 MG: 325 TABLET ORAL at 13:35

## 2024-12-04 RX ADMIN — SODIUM CHLORIDE: 9 INJECTION, SOLUTION INTRAVENOUS at 23:26

## 2024-12-04 RX ADMIN — CALCIUM GLUCONATE 1000 MG: 20 INJECTION, SOLUTION INTRAVENOUS at 09:36

## 2024-12-04 RX ADMIN — CALCIUM CHLORIDE, MAGNESIUM CHLORIDE, DEXTROSE MONOHYDRATE, LACTIC ACID, SODIUM CHLORIDE, SODIUM BICARBONATE AND POTASSIUM CHLORIDE: 5.15; 2.03; 22; 5.4; 6.46; 3.09; .157 INJECTION INTRAVENOUS at 23:20

## 2024-12-04 RX ADMIN — Medication 50 MCG/MIN: at 11:42

## 2024-12-04 RX ADMIN — HEPARIN SODIUM 7000 UNITS: 1000 INJECTION INTRAVENOUS; SUBCUTANEOUS at 07:09

## 2024-12-04 RX ADMIN — ROCURONIUM BROMIDE 50 MG: 10 INJECTION, SOLUTION INTRAVENOUS at 07:10

## 2024-12-04 RX ADMIN — SODIUM CHLORIDE: 9 INJECTION, SOLUTION INTRAVENOUS at 12:03

## 2024-12-04 RX ADMIN — CANGRELOR 2 MCG/KG/MIN: 50 INJECTION, POWDER, LYOPHILIZED, FOR SOLUTION INTRAVENOUS at 11:57

## 2024-12-04 RX ADMIN — ALBUMIN (HUMAN) 25 G: 12.5 INJECTION, SOLUTION INTRAVENOUS at 19:50

## 2024-12-04 RX ADMIN — Medication 40 MCG/MIN: at 03:57

## 2024-12-04 RX ADMIN — CALCIUM GLUCONATE 2000 MG: 20 INJECTION, SOLUTION INTRAVENOUS at 19:55

## 2024-12-04 RX ADMIN — CALCIUM CHLORIDE, MAGNESIUM CHLORIDE, DEXTROSE MONOHYDRATE, LACTIC ACID, SODIUM CHLORIDE, SODIUM BICARBONATE AND POTASSIUM CHLORIDE: 5.15; 2.03; 22; 5.4; 6.46; 3.09; .157 INJECTION INTRAVENOUS at 23:22

## 2024-12-04 ASSESSMENT — PULMONARY FUNCTION TESTS
PIF_VALUE: 14
PIF_VALUE: 12
PIF_VALUE: 21
PIF_VALUE: 27
PIF_VALUE: 16
PIF_VALUE: 20
PIF_VALUE: 28
PIF_VALUE: 9
PIF_VALUE: 20
PIF_VALUE: 17
PIF_VALUE: 23
PIF_VALUE: 12
PIF_VALUE: 28
PIF_VALUE: 23
PIF_VALUE: 26
PIF_VALUE: 26
PIF_VALUE: 25
PIF_VALUE: 23
PIF_VALUE: 16
PIF_VALUE: 26
PIF_VALUE: 19
PIF_VALUE: 20
PIF_VALUE: 21
PIF_VALUE: 17
PIF_VALUE: 27
PIF_VALUE: 26
PIF_VALUE: 25
PIF_VALUE: 23
PIF_VALUE: 20
PIF_VALUE: 14
PIF_VALUE: 22
PIF_VALUE: 1
PIF_VALUE: 19
PIF_VALUE: 25
PIF_VALUE: 21

## 2024-12-04 ASSESSMENT — ENCOUNTER SYMPTOMS: SHORTNESS OF BREATH: 1

## 2024-12-04 NOTE — CARE COORDINATION
Case Management Assessment  Initial Evaluation    Date/Time of Evaluation: 12/4/2024 3:04 PM  Assessment Completed by: CESAR CEDILLO RN    If patient is discharged prior to next notation, then this note serves as note for discharge by case management.    Patient Name: Lukasz Keller                   YOB: 1950  Diagnosis: STEMI (ST elevation myocardial infarction) (HCC) [I21.3]  Cardiac arrest [I46.9]                   Date / Time: 12/3/2024  1:43 PM    Patient Admission Status: Inpatient   Readmission Risk (Low < 19, Mod (19-27), High > 27): Readmission Risk Score: 14.6    Current PCP: Christal Jang MD  PCP verified by CM? Yes (Christal Jang MD)    Chart Reviewed: Yes      History Provided by: Spouse, Child/Family  Patient Orientation: Sedated    Patient Cognition: Other (see comment) (Intubated/Sedated)    Hospitalization in the last 30 days (Readmission):  No    If yes, Readmission Assessment in  Navigator will be completed.    Advance Directives:      Code Status: Full Code   Patient's Primary Decision Maker is: Legal Next of Kin    Primary Decision Maker: Anaya Keller - Spouse - [phone]    Discharge Planning:    Patient lives with:   Type of Home:    Primary Care Giver: Self  Patient Support Systems include: Spouse/Significant Other, Children   Current Financial resources: Medicare, Other (Comment) ([employer])  Current community resources: None  Current services prior to admission:              Current DME:              Type of Home Care services:  OT, PT, Nursing Services, Skilled Therapy    ADLS  Prior functional level: Independent in ADLs/IADLs  Current functional level: Assistance with the following:, Bathing, Dressing, Toileting, Feeding, Mobility, Other (see comment) (Intubated/ Sedated)    PT AM-PAC:   /24  OT AM-PAC:   /24    Family can provide assistance at DC: Other (comment) (dependent on needs)  Would you like Case Management to discuss the discharge plan with any

## 2024-12-04 NOTE — ANESTHESIA POSTPROCEDURE EVALUATION
Department of Anesthesiology  Postprocedure Note    Patient: Lukasz Keller  MRN: [id number]  YOB: 1950  Date of evaluation: 12/4/2024    Procedure Summary       Date: 12/04/24 Room / Location: [place] [address] [facility]    Anesthesia Start: 0645 Anesthesia Stop: 0752    Procedure: E2 PERCUTANEOUS BILATERAL PULMONARY THROMBECTOMY MECHANICAL / INARI (Groin) Diagnosis:       Saddle embolus of pulmonary artery, unspecified chronicity, unspecified whether acute cor pulmonale present (HCC)      (Saddle embolus of pulmonary artery, unspecified chronicity, unspecified whether acute cor pulmonale present (HCC) [I26.92])    Surgeons: Delos Reyes, Arthur, MD Responsible Provider: Lester Rene MD    Anesthesia Type: general ASA Status: 4 - Emergent            Anesthesia Type: No value filed.    Kaity Phase I:      Kaity Phase II:    POST-OP ANESTHESIA NOTE       BP 92/62   Pulse 99   Temp 100.4 °F (38 °C)   Resp 18   Wt (!) 140.3 kg (309 lb 4.9 oz)   SpO2 90%   BMI 46.35 kg/m²    Pain Assessment: Critical Care Pain Observation Tool (CPOT)            Anesthesia Post Evaluation    Patient location during evaluation: ICU  Patient participation: complete - patient cannot participate  Level of consciousness: sedated and ventilated  Pain score: 0  Airway patency: patent  Nausea & Vomiting: no nausea and no vomiting  Cardiovascular status: hypotensive  Respiratory status: ventilator and intubated  Hydration status: euvolemic  Pain management: adequate      No anesthesia related complications.  Patient remains critically ill.

## 2024-12-05 ENCOUNTER — APPOINTMENT (OUTPATIENT)
Age: 74
DRG: 003 | End: 2024-12-05
Payer: MEDICARE

## 2024-12-05 ENCOUNTER — APPOINTMENT (OUTPATIENT)
Dept: GENERAL RADIOLOGY | Age: 74
DRG: 003 | End: 2024-12-05
Payer: MEDICARE

## 2024-12-05 PROBLEM — N17.9 AKI (ACUTE KIDNEY INJURY) (HCC): Status: ACTIVE | Noted: 2024-12-05

## 2024-12-05 PROBLEM — E87.5 HYPERKALEMIA: Status: ACTIVE | Noted: 2024-12-05

## 2024-12-05 LAB
ACT BLD: 174 SEC (ref 79–149)
ACT BLD: 203 SEC (ref 79–149)
ALBUMIN SERPL-MCNC: 2.4 G/DL (ref 3.5–5.2)
ALBUMIN SERPL-MCNC: 2.8 G/DL (ref 3.5–5.2)
ALBUMIN/GLOB SERPL: 1.6 {RATIO} (ref 1–2.5)
ALBUMIN/GLOB SERPL: 2.5 {RATIO} (ref 1–2.5)
ALP SERPL-CCNC: 46 U/L (ref 40–129)
ALP SERPL-CCNC: 55 U/L (ref 40–129)
ALT SERPL-CCNC: 116 U/L (ref 10–50)
ALT SERPL-CCNC: 75 U/L (ref 10–50)
ANA SER QL IA: NEGATIVE
ANION GAP SERPL CALCULATED.3IONS-SCNC: 10 MMOL/L (ref 9–16)
ANION GAP SERPL CALCULATED.3IONS-SCNC: 11 MMOL/L (ref 9–16)
ANION GAP SERPL CALCULATED.3IONS-SCNC: 11 MMOL/L (ref 9–16)
ANION GAP SERPL CALCULATED.3IONS-SCNC: 9 MMOL/L (ref 9–16)
ANTI-XA UNFRAC HEPARIN: 0.27 IU/L
ANTI-XA UNFRAC HEPARIN: 0.43 IU/L
ANTI-XA UNFRAC HEPARIN: 0.61 IU/L
ANTI-XA UNFRAC HEPARIN: 0.85 IU/L
AST SERPL-CCNC: 128 U/L (ref 10–50)
AST SERPL-CCNC: 84 U/L (ref 10–50)
BILIRUB DIRECT SERPL-MCNC: 0.8 MG/DL (ref 0–0.2)
BILIRUB SERPL-MCNC: 1 MG/DL (ref 0–1.2)
BILIRUB SERPL-MCNC: 2.1 MG/DL (ref 0–1.2)
BUN SERPL-MCNC: 15 MG/DL (ref 8–23)
BUN SERPL-MCNC: 15 MG/DL (ref 8–23)
BUN SERPL-MCNC: 17 MG/DL (ref 8–23)
BUN SERPL-MCNC: 17 MG/DL (ref 8–23)
CA-I BLD-SCNC: 1.02 MMOL/L (ref 1.13–1.33)
CA-I BLD-SCNC: 1.06 MMOL/L (ref 1.13–1.33)
CA-I BLD-SCNC: 1.08 MMOL/L (ref 1.13–1.33)
CA-I BLD-SCNC: 1.12 MMOL/L (ref 1.13–1.33)
CALCIUM SERPL-MCNC: 6.7 MG/DL (ref 8.6–10.4)
CALCIUM SERPL-MCNC: 7.3 MG/DL (ref 8.6–10.4)
CALCIUM SERPL-MCNC: 7.4 MG/DL (ref 8.6–10.4)
CALCIUM SERPL-MCNC: 7.5 MG/DL (ref 8.6–10.4)
CHLORIDE SERPL-SCNC: 108 MMOL/L (ref 98–107)
CHLORIDE SERPL-SCNC: 110 MMOL/L (ref 98–107)
CHLORIDE SERPL-SCNC: 112 MMOL/L (ref 98–107)
CHLORIDE SERPL-SCNC: 112 MMOL/L (ref 98–107)
CHLORIDE UR-SCNC: 44 MMOL/L
CK SERPL-CCNC: 875 U/L (ref 39–308)
CLOT ANGLE.KAOLIN INDUCED BLD RES TEG: 40 DEG (ref 63–78)
CLOT ANGLE.KAOLIN INDUCED BLD RES TEG: 40 DEG (ref 63–78)
CLOT ANGLE.KAOLIN INDUCED BLD RES TEG: ABNORMAL DEG (ref 63–78)
CO2 SERPL-SCNC: 20 MMOL/L (ref 20–31)
CO2 SERPL-SCNC: 21 MMOL/L (ref 20–31)
CO2 SERPL-SCNC: 22 MMOL/L (ref 20–31)
CO2 SERPL-SCNC: 22 MMOL/L (ref 20–31)
CREAT SERPL-MCNC: 1.2 MG/DL (ref 0.7–1.2)
CREAT SERPL-MCNC: 1.2 MG/DL (ref 0.7–1.2)
CREAT SERPL-MCNC: 1.3 MG/DL (ref 0.7–1.2)
CREAT SERPL-MCNC: 1.5 MG/DL (ref 0.7–1.2)
CREAT UR-MCNC: 136 MG/DL (ref 39–259)
DSDNA IGG SER QL IA: 2.1 IU/ML
ECHO BSA: 2.6 M2
ECHO BSA: 2.6 M2
ECHO LV EF PHYSICIAN: 20 %
ERYTHROCYTE [DISTWIDTH] IN BLOOD BY AUTOMATED COUNT: 14 % (ref 11.8–14.4)
ERYTHROCYTE [DISTWIDTH] IN BLOOD BY AUTOMATED COUNT: 14.4 % (ref 11.8–14.4)
ERYTHROCYTE [DISTWIDTH] IN BLOOD BY AUTOMATED COUNT: 14.6 % (ref 11.8–14.4)
ERYTHROCYTE [DISTWIDTH] IN BLOOD BY AUTOMATED COUNT: 15 % (ref 11.8–14.4)
FIBRINOGEN PPP-MCNC: 230 MG/DL (ref 203–521)
FIBRINOGEN PPP-MCNC: 231 MG/DL (ref 203–521)
FIBRINOGEN PPP-MCNC: 266 MG/DL (ref 203–521)
FIBRINOGEN PPP-MCNC: 284 MG/DL (ref 203–521)
FIBRINOGEN, FUNCTIONAL TEG: 12.4 MM (ref 15–32)
FIBRINOGEN, FUNCTIONAL TEG: 16.9 MM (ref 15–32)
FIBRINOGEN, FUNCTIONAL TEG: 19.6 MM (ref 15–32)
FIBRINOGEN, FUNCTIONAL TEG: 8.9 MM (ref 15–32)
FIBRINOGEN, FUNCTIONAL TEG: ABNORMAL MM (ref 15–32)
FIBRINOGEN, FUNCTIONAL TEG: ABNORMAL MM (ref 15–32)
FIO2: 100
GFR, ESTIMATED: 49 ML/MIN/1.73M2
GFR, ESTIMATED: 58 ML/MIN/1.73M2
GFR, ESTIMATED: 64 ML/MIN/1.73M2
GFR, ESTIMATED: 64 ML/MIN/1.73M2
GLUCOSE BLD-MCNC: 120 MG/DL (ref 75–110)
GLUCOSE BLD-MCNC: 129 MG/DL (ref 75–110)
GLUCOSE BLD-MCNC: 137 MG/DL (ref 75–110)
GLUCOSE BLD-MCNC: 142 MG/DL (ref 75–110)
GLUCOSE BLD-MCNC: 144 MG/DL (ref 74–100)
GLUCOSE BLD-MCNC: 144 MG/DL (ref 74–100)
GLUCOSE BLD-MCNC: 144 MG/DL (ref 75–110)
GLUCOSE BLD-MCNC: 146 MG/DL (ref 74–100)
GLUCOSE BLD-MCNC: 152 MG/DL (ref 75–110)
GLUCOSE BLD-MCNC: 154 MG/DL (ref 74–100)
GLUCOSE BLD-MCNC: 156 MG/DL (ref 75–110)
GLUCOSE BLD-MCNC: 157 MG/DL (ref 74–100)
GLUCOSE BLD-MCNC: 158 MG/DL (ref 74–100)
GLUCOSE BLD-MCNC: 165 MG/DL (ref 74–100)
GLUCOSE BLD-MCNC: 169 MG/DL (ref 75–110)
GLUCOSE BLD-MCNC: 170 MG/DL (ref 74–100)
GLUCOSE BLD-MCNC: 182 MG/DL (ref 74–100)
GLUCOSE BLD-MCNC: 193 MG/DL (ref 74–100)
GLUCOSE BLD-MCNC: 217 MG/DL (ref 74–100)
GLUCOSE SERPL-MCNC: 142 MG/DL (ref 74–99)
GLUCOSE SERPL-MCNC: 154 MG/DL (ref 74–99)
GLUCOSE SERPL-MCNC: 165 MG/DL (ref 74–99)
GLUCOSE SERPL-MCNC: 184 MG/DL (ref 74–99)
HAPTOGLOB SERPL-MCNC: 23 MG/DL (ref 30–200)
HBCO, MIXED, EXTENDED: 1.3 % (ref 0–5)
HBCO, MIXED, EXTENDED: 2.2 % (ref 0–5)
HCO3 VENOUS: 23.2 MMOL/L (ref 22–29)
HCT VFR BLD AUTO: 19 % (ref 41–53)
HCT VFR BLD AUTO: 24.3 % (ref 40.7–50.3)
HCT VFR BLD AUTO: 24.9 % (ref 40.7–50.3)
HCT VFR BLD AUTO: 25.4 % (ref 40.7–50.3)
HCT VFR BLD AUTO: 26.1 % (ref 40.7–50.3)
HCT VFR BLD AUTO: 26.4 % (ref 40.7–50.3)
HEMOGLOBIN, MIXED, EXTENDED: 8.1 G/DL (ref 12–18)
HEMOGLOBIN, MIXED, EXTENDED: 8.9 G/DL (ref 12–18)
HGB BLD-MCNC: 7.9 G/DL (ref 13–17)
HGB BLD-MCNC: 8.3 G/DL (ref 13–17)
HGB BLD-MCNC: 8.4 G/DL (ref 13–17)
HGB BLD-MCNC: 8.7 G/DL (ref 13–17)
HGB BLD-MCNC: 8.8 G/DL (ref 13–17)
INR PPP: 2.2
INR PPP: 2.3
INR PPP: 2.5
INR PPP: 2.5
KINETICS TEG: >5 MIN (ref 0.8–2.1)
KINETICS TEG: ABNORMAL MIN (ref 0.8–2.1)
LACTIC ACID, SEPSIS WHOLE BLOOD: 2.1 MMOL/L (ref 0.5–1.9)
LACTIC ACID, WHOLE BLOOD: 1.9 MMOL/L (ref 0.7–2.1)
LACTIC ACID, WHOLE BLOOD: 2.1 MMOL/L (ref 0.7–2.1)
LACTIC ACID, WHOLE BLOOD: 2.1 MMOL/L (ref 0.7–2.1)
LACTIC ACID, WHOLE BLOOD: 3 MMOL/L (ref 0.7–2.1)
LIPASE SERPL-CCNC: 6 U/L (ref 13–60)
MA (MAX CLOT) TEG: <40 MM (ref 52–69)
MA (MAX CLOT) TEG: ABNORMAL MM (ref 52–69)
MA(MAX CLOT) RAPID TEG: 51.4 MM (ref 52–70)
MA(MAX CLOT) RAPID TEG: 55.9 MM (ref 52–70)
MA(MAX CLOT) RAPID TEG: 58 MM (ref 52–70)
MA(MAX CLOT) RAPID TEG: 58.3 MM (ref 52–70)
MA(MAX CLOT) RAPID TEG: 59 MM (ref 52–70)
MA(MAX CLOT) RAPID TEG: 59.8 MM (ref 52–70)
MAGNESIUM SERPL-MCNC: 1.5 MG/DL (ref 1.6–2.4)
MAGNESIUM SERPL-MCNC: 1.6 MG/DL (ref 1.6–2.4)
MAGNESIUM SERPL-MCNC: 1.6 MG/DL (ref 1.6–2.4)
MAGNESIUM SERPL-MCNC: 1.7 MG/DL (ref 1.6–2.4)
MCH RBC QN AUTO: 30.3 PG (ref 25.2–33.5)
MCH RBC QN AUTO: 30.4 PG (ref 25.2–33.5)
MCH RBC QN AUTO: 31.1 PG (ref 25.2–33.5)
MCH RBC QN AUTO: 31.4 PG (ref 25.2–33.5)
MCHC RBC AUTO-ENTMCNC: 32.5 G/DL (ref 28.4–34.8)
MCHC RBC AUTO-ENTMCNC: 33.3 G/DL (ref 28.4–34.8)
MCV RBC AUTO: 90.9 FL (ref 82.6–102.9)
MCV RBC AUTO: 93.3 FL (ref 82.6–102.9)
MCV RBC AUTO: 93.5 FL (ref 82.6–102.9)
MCV RBC AUTO: 94.2 FL (ref 82.6–102.9)
METHB, MIXED, EXTENDED: 1.1 % (ref 0–1.5)
METHB, MIXED, EXTENDED: 1.6 % (ref 0–1.5)
METHEMOGLOBIN: 1.1 % (ref 0–1.5)
NEGATIVE BASE EXCESS, ART: 0.5 MMOL/L (ref 0–2)
NEGATIVE BASE EXCESS, ART: 0.6 MMOL/L (ref 0–2)
NEGATIVE BASE EXCESS, ART: 0.6 MMOL/L (ref 0–2)
NEGATIVE BASE EXCESS, ART: 0.8 MMOL/L (ref 0–2)
NEGATIVE BASE EXCESS, ART: 1.2 MMOL/L (ref 0–2)
NEGATIVE BASE EXCESS, ART: 1.3 MMOL/L (ref 0–2)
NEGATIVE BASE EXCESS, ART: 1.5 MMOL/L (ref 0–2)
NEGATIVE BASE EXCESS, ART: 1.7 MMOL/L (ref 0–2)
NEGATIVE BASE EXCESS, ART: 2 MMOL/L (ref 0–2)
NEGATIVE BASE EXCESS, ART: 2.4 MMOL/L (ref 0–2)
NEGATIVE BASE EXCESS, ART: 2.6 MMOL/L (ref 0–2)
NEGATIVE BASE EXCESS, ART: 4.2 MMOL/L (ref 0–2)
NEGATIVE BASE EXCESS, VEN: 1.5 MMOL/L (ref 0–2)
NRBC BLD-RTO: 0.7 PER 100 WBC
NRBC BLD-RTO: 0.7 PER 100 WBC
NRBC BLD-RTO: 0.8 PER 100 WBC
NRBC BLD-RTO: 0.9 PER 100 WBC
NUCLEAR IGG SER IA-RTO: 0.2 U/ML
O2 CONTENT, MIXED, EXTENDED: 10 VOL % (ref 12–20)
O2 CONTENT, MIXED, EXTENDED: 6 VOL % (ref 12–20)
O2 DELIVERY DEVICE: ABNORMAL
O2 SAT, VEN: 74.4 % (ref 60–85)
OXYGEN STATUS: 100
OXYGEN STATUS: 90
PARTIAL THROMBOPLASTIN TIME: 101.6 SEC (ref 23–36.5)
PARTIAL THROMBOPLASTIN TIME: 132.8 SEC (ref 23–36.5)
PARTIAL THROMBOPLASTIN TIME: >180 SEC (ref 23–36.5)
PARTIAL THROMBOPLASTIN TIME: >180 SEC (ref 23–36.5)
PCO2 VENOUS: 37.3 MM HG (ref 41–51)
PERFORMING LOCATION: ABNORMAL
PH VENOUS: 7.4 (ref 7.32–7.43)
PHOSPHATE SERPL-MCNC: 2.4 MG/DL (ref 2.5–4.5)
PHOSPHATE SERPL-MCNC: 2.9 MG/DL (ref 2.5–4.5)
PHOSPHATE SERPL-MCNC: 2.9 MG/DL (ref 2.5–4.5)
PLATELET # BLD AUTO: ABNORMAL K/UL (ref 138–453)
PLATELET, FLUORESCENCE: 55 K/UL (ref 138–453)
PLATELET, FLUORESCENCE: 62 K/UL (ref 138–453)
PLATELET, FLUORESCENCE: 89 K/UL (ref 138–453)
PLATELET, FLUORESCENCE: 91 K/UL (ref 138–453)
PLATELETS.RETICULATED NFR BLD AUTO: 4 % (ref 1.1–10.3)
PLATELETS.RETICULATED NFR BLD AUTO: 4.4 % (ref 1.1–10.3)
PLATELETS.RETICULATED NFR BLD AUTO: 4.7 % (ref 1.1–10.3)
PLATELETS.RETICULATED NFR BLD AUTO: 5.2 % (ref 1.1–10.3)
PO2 VENOUS: 39.3 MM HG (ref 30–50)
POC HCO3: 19.1 MMOL/L (ref 21–28)
POC HCO3: 21.2 MMOL/L (ref 21–28)
POC HCO3: 21.9 MMOL/L (ref 21–28)
POC HCO3: 22.7 MMOL/L (ref 21–28)
POC HCO3: 22.9 MMOL/L (ref 21–28)
POC HCO3: 23.1 MMOL/L (ref 21–28)
POC HCO3: 23.2 MMOL/L (ref 21–28)
POC HCO3: 23.7 MMOL/L (ref 21–28)
POC HCO3: 23.9 MMOL/L (ref 21–28)
POC HCO3: 24 MMOL/L (ref 21–28)
POC HCO3: 24.1 MMOL/L (ref 21–28)
POC HCO3: 24.1 MMOL/L (ref 21–28)
POC HEMOGLOBIN (CALC): 6.6 G/DL (ref 13.5–17.5)
POC O2 SATURATION: 100 % (ref 94–98)
POC O2 SATURATION: 75.2 % (ref 94–98)
POC O2 SATURATION: 76.4 % (ref 94–98)
POC O2 SATURATION: 77.8 % (ref 94–98)
POC O2 SATURATION: 80.9 % (ref 94–98)
POC O2 SATURATION: 82.3 % (ref 94–98)
POC O2 SATURATION: 83.5 % (ref 94–98)
POC O2 SATURATION: 85.4 % (ref 94–98)
POC O2 SATURATION: 85.5 % (ref 94–98)
POC O2 SATURATION: 86.3 % (ref 94–98)
POC O2 SATURATION: 98.6 % (ref 94–98)
POC O2 SATURATION: 99.9 % (ref 94–98)
POC PCO2: 26.3 MM HG (ref 35–48)
POC PCO2: 31.4 MM HG (ref 35–48)
POC PCO2: 34.3 MM HG (ref 35–48)
POC PCO2: 34.5 MM HG (ref 35–48)
POC PCO2: 37.2 MM HG (ref 35–48)
POC PCO2: 37.2 MM HG (ref 35–48)
POC PCO2: 37.3 MM HG (ref 35–48)
POC PCO2: 38.5 MM HG (ref 35–48)
POC PCO2: 38.8 MM HG (ref 35–48)
POC PCO2: 38.9 MM HG (ref 35–48)
POC PCO2: 39.4 MM HG (ref 35–48)
POC PCO2: 39.8 MM HG (ref 35–48)
POC PH: 7.38 (ref 7.35–7.45)
POC PH: 7.39 (ref 7.35–7.45)
POC PH: 7.4 (ref 7.35–7.45)
POC PH: 7.41 (ref 7.35–7.45)
POC PH: 7.42 (ref 7.35–7.45)
POC PH: 7.43 (ref 7.35–7.45)
POC PH: 7.44 (ref 7.35–7.45)
POC PH: 7.47 (ref 7.35–7.45)
POC PO2: 110.8 MM HG (ref 83–108)
POC PO2: 281.5 MM HG (ref 83–108)
POC PO2: 39.4 MM HG (ref 83–108)
POC PO2: 40.9 MM HG (ref 83–108)
POC PO2: 42.2 MM HG (ref 83–108)
POC PO2: 45.4 MM HG (ref 83–108)
POC PO2: 47 MM HG (ref 83–108)
POC PO2: 47.4 MM HG (ref 83–108)
POC PO2: 48.6 MM HG (ref 83–108)
POC PO2: 50.8 MM HG (ref 83–108)
POC PO2: 51.6 MM HG (ref 83–108)
POC PO2: 614.9 MM HG (ref 83–108)
POTASSIUM SERPL-SCNC: 3.6 MMOL/L (ref 3.7–5.3)
POTASSIUM SERPL-SCNC: 3.7 MMOL/L (ref 3.7–5.3)
POTASSIUM SERPL-SCNC: 3.8 MMOL/L (ref 3.7–5.3)
POTASSIUM SERPL-SCNC: 4 MMOL/L (ref 3.7–5.3)
PROT SERPL-MCNC: 3.9 G/DL (ref 6.6–8.7)
PROT SERPL-MCNC: 3.9 G/DL (ref 6.6–8.7)
PROTHROMBIN TIME: 24.1 SEC (ref 11.7–14.9)
PROTHROMBIN TIME: 24.6 SEC (ref 11.7–14.9)
PROTHROMBIN TIME: 26.3 SEC (ref 11.7–14.9)
PROTHROMBIN TIME: 26.4 SEC (ref 11.7–14.9)
PTH-INTACT SERPL-MCNC: 80 PG/ML (ref 15–65)
RBC # BLD AUTO: 2.6 M/UL (ref 4.21–5.77)
RBC # BLD AUTO: 2.74 M/UL (ref 4.21–5.77)
RBC # BLD AUTO: 2.77 M/UL (ref 4.21–5.77)
RBC # BLD AUTO: 2.83 M/UL (ref 4.21–5.77)
REACTION TIME TEG W HEPARIN: 10.9 MIN (ref 4.3–8.3)
REACTION TIME TEG W HEPARIN: 12.7 MIN (ref 4.3–8.3)
REACTION TIME TEG W HEPARIN: 13.5 MIN (ref 4.3–8.3)
REACTION TIME TEG W HEPARIN: 16.7 MIN (ref 4.3–8.3)
REACTION TIME TEG W HEPARIN: >17 MIN (ref 4.3–8.3)
REACTION TIME TEG W HEPARIN: >17 MIN (ref 4.3–8.3)
REACTION TIME TEG: >17 MIN (ref 4.6–9.1)
REACTION TIME TEG: >17 MIN (ref 4.6–9.1)
REACTION TIME TEG: ABNORMAL MIN (ref 4.6–9.1)
SAMPLE SITE: ABNORMAL
SAO2 % BLDMV: 51.9 % (ref 60–80)
SAO2 % BLDMV: 88.9 % (ref 60–80)
SODIUM SERPL-SCNC: 141 MMOL/L (ref 136–145)
SODIUM SERPL-SCNC: 141 MMOL/L (ref 136–145)
SODIUM SERPL-SCNC: 143 MMOL/L (ref 136–145)
SODIUM SERPL-SCNC: 143 MMOL/L (ref 136–145)
SODIUM UR-SCNC: 54 MMOL/L
TOTAL PROTEIN, URINE: 68 MG/DL
URINE TOTAL PROTEIN CREATININE RATIO: 0.5 (ref 0–0.2)
VANCOMYCIN SERPL-MCNC: 9.1 UG/ML (ref 5–40)
WBC OTHER # BLD: 4.3 K/UL (ref 3.5–11.3)
WBC OTHER # BLD: 5.3 K/UL (ref 3.5–11.3)
WBC OTHER # BLD: 7 K/UL (ref 3.5–11.3)
WBC OTHER # BLD: 7.1 K/UL (ref 3.5–11.3)

## 2024-12-05 PROCEDURE — 36415 COLL VENOUS BLD VENIPUNCTURE: CPT

## 2024-12-05 PROCEDURE — 30233R1 TRANSFUSION OF NONAUTOLOGOUS PLATELETS INTO PERIPHERAL VEIN, PERCUTANEOUS APPROACH: ICD-10-PCS | Performed by: STUDENT IN AN ORGANIZED HEALTH CARE EDUCATION/TRAINING PROGRAM

## 2024-12-05 PROCEDURE — 80202 ASSAY OF VANCOMYCIN: CPT

## 2024-12-05 PROCEDURE — 33967 INSERT I-AORT PERCUT DEVICE: CPT | Performed by: INTERNAL MEDICINE

## 2024-12-05 PROCEDURE — 71045 X-RAY EXAM CHEST 1 VIEW: CPT

## 2024-12-05 PROCEDURE — 83050 HGB METHEMOGLOBIN QUAN: CPT

## 2024-12-05 PROCEDURE — 93325 DOPPLER ECHO COLOR FLOW MAPG: CPT | Performed by: INTERNAL MEDICINE

## 2024-12-05 PROCEDURE — 83735 ASSAY OF MAGNESIUM: CPT

## 2024-12-05 PROCEDURE — 85730 THROMBOPLASTIN TIME PARTIAL: CPT

## 2024-12-05 PROCEDURE — 85300 ANTITHROMBIN III ACTIVITY: CPT

## 2024-12-05 PROCEDURE — 6360000002 HC RX W HCPCS

## 2024-12-05 PROCEDURE — 6370000000 HC RX 637 (ALT 250 FOR IP)

## 2024-12-05 PROCEDURE — 85610 PROTHROMBIN TIME: CPT

## 2024-12-05 PROCEDURE — 2500000003 HC RX 250 WO HCPCS: Performed by: STUDENT IN AN ORGANIZED HEALTH CARE EDUCATION/TRAINING PROGRAM

## 2024-12-05 PROCEDURE — 93308 TTE F-UP OR LMTD: CPT

## 2024-12-05 PROCEDURE — 33970 AORTIC CIRCULATION ASSIST: CPT | Performed by: INTERNAL MEDICINE

## 2024-12-05 PROCEDURE — 82550 ASSAY OF CK (CPK): CPT

## 2024-12-05 PROCEDURE — 2580000003 HC RX 258

## 2024-12-05 PROCEDURE — 99291 CRITICAL CARE FIRST HOUR: CPT | Performed by: INTERNAL MEDICINE

## 2024-12-05 PROCEDURE — P9041 ALBUMIN (HUMAN),5%, 50ML: HCPCS | Performed by: STUDENT IN AN ORGANIZED HEALTH CARE EDUCATION/TRAINING PROGRAM

## 2024-12-05 PROCEDURE — 33949 ECMO/ECLS DAILY MGMT ARTERY: CPT

## 2024-12-05 PROCEDURE — 90945 DIALYSIS ONE EVALUATION: CPT | Performed by: INTERNAL MEDICINE

## 2024-12-05 PROCEDURE — 6360000002 HC RX W HCPCS: Performed by: STUDENT IN AN ORGANIZED HEALTH CARE EDUCATION/TRAINING PROGRAM

## 2024-12-05 PROCEDURE — 83605 ASSAY OF LACTIC ACID: CPT

## 2024-12-05 PROCEDURE — 93308 TTE F-UP OR LMTD: CPT | Performed by: INTERNAL MEDICINE

## 2024-12-05 PROCEDURE — 83010 ASSAY OF HAPTOGLOBIN QUANT: CPT

## 2024-12-05 PROCEDURE — 80048 BASIC METABOLIC PNL TOTAL CA: CPT

## 2024-12-05 PROCEDURE — 85014 HEMATOCRIT: CPT

## 2024-12-05 PROCEDURE — 2100000001 HC CVICU R&B

## 2024-12-05 PROCEDURE — 83051 HEMOGLOBIN PLASMA: CPT

## 2024-12-05 PROCEDURE — 85576 BLOOD PLATELET AGGREGATION: CPT

## 2024-12-05 PROCEDURE — 93005 ELECTROCARDIOGRAM TRACING: CPT | Performed by: STUDENT IN AN ORGANIZED HEALTH CARE EDUCATION/TRAINING PROGRAM

## 2024-12-05 PROCEDURE — 2500000003 HC RX 250 WO HCPCS

## 2024-12-05 PROCEDURE — 82805 BLOOD GASES W/O2 SATURATION: CPT

## 2024-12-05 PROCEDURE — 5A02210 ASSISTANCE WITH CARDIAC OUTPUT USING BALLOON PUMP, CONTINUOUS: ICD-10-PCS | Performed by: INTERNAL MEDICINE

## 2024-12-05 PROCEDURE — 80053 COMPREHEN METABOLIC PANEL: CPT

## 2024-12-05 PROCEDURE — 82947 ASSAY GLUCOSE BLOOD QUANT: CPT

## 2024-12-05 PROCEDURE — 84100 ASSAY OF PHOSPHORUS: CPT

## 2024-12-05 PROCEDURE — 85018 HEMOGLOBIN: CPT

## 2024-12-05 PROCEDURE — 82375 ASSAY CARBOXYHB QUANT: CPT

## 2024-12-05 PROCEDURE — 94761 N-INVAS EAR/PLS OXIMETRY MLT: CPT

## 2024-12-05 PROCEDURE — 6360000002 HC RX W HCPCS: Performed by: THORACIC SURGERY (CARDIOTHORACIC VASCULAR SURGERY)

## 2024-12-05 PROCEDURE — P9016 RBC LEUKOCYTES REDUCED: HCPCS

## 2024-12-05 PROCEDURE — 2709999900 HC NON-CHARGEABLE SUPPLY: Performed by: INTERNAL MEDICINE

## 2024-12-05 PROCEDURE — 36430 TRANSFUSION BLD/BLD COMPNT: CPT

## 2024-12-05 PROCEDURE — C1889 IMPLANT/INSERT DEVICE, NOC: HCPCS | Performed by: INTERNAL MEDICINE

## 2024-12-05 PROCEDURE — 2580000003 HC RX 258: Performed by: STUDENT IN AN ORGANIZED HEALTH CARE EDUCATION/TRAINING PROGRAM

## 2024-12-05 PROCEDURE — 82803 BLOOD GASES ANY COMBINATION: CPT

## 2024-12-05 PROCEDURE — 93321 DOPPLER ECHO F-UP/LMTD STD: CPT | Performed by: INTERNAL MEDICINE

## 2024-12-05 PROCEDURE — 85520 HEPARIN ASSAY: CPT

## 2024-12-05 PROCEDURE — 99152 MOD SED SAME PHYS/QHP 5/>YRS: CPT | Performed by: INTERNAL MEDICINE

## 2024-12-05 PROCEDURE — 82248 BILIRUBIN DIRECT: CPT

## 2024-12-05 PROCEDURE — 94003 VENT MGMT INPAT SUBQ DAY: CPT

## 2024-12-05 PROCEDURE — 82330 ASSAY OF CALCIUM: CPT

## 2024-12-05 PROCEDURE — 85027 COMPLETE CBC AUTOMATED: CPT

## 2024-12-05 PROCEDURE — 85055 RETICULATED PLATELET ASSAY: CPT

## 2024-12-05 PROCEDURE — 85347 COAGULATION TIME ACTIVATED: CPT

## 2024-12-05 PROCEDURE — C1769 GUIDE WIRE: HCPCS | Performed by: INTERNAL MEDICINE

## 2024-12-05 PROCEDURE — 2700000000 HC OXYGEN THERAPY PER DAY

## 2024-12-05 PROCEDURE — 83690 ASSAY OF LIPASE: CPT

## 2024-12-05 PROCEDURE — 30233N1 TRANSFUSION OF NONAUTOLOGOUS RED BLOOD CELLS INTO PERIPHERAL VEIN, PERCUTANEOUS APPROACH: ICD-10-PCS | Performed by: STUDENT IN AN ORGANIZED HEALTH CARE EDUCATION/TRAINING PROGRAM

## 2024-12-05 PROCEDURE — 83970 ASSAY OF PARATHORMONE: CPT

## 2024-12-05 PROCEDURE — C9460 INJECTION, CANGRELOR: HCPCS

## 2024-12-05 PROCEDURE — 85384 FIBRINOGEN ACTIVITY: CPT

## 2024-12-05 PROCEDURE — P9073 PLATELETS PHERESIS PATH REDU: HCPCS

## 2024-12-05 PROCEDURE — 99233 SBSQ HOSP IP/OBS HIGH 50: CPT | Performed by: INTERNAL MEDICINE

## 2024-12-05 RX ORDER — DOBUTAMINE HYDROCHLORIDE 400 MG/100ML
2.5-1 INJECTION INTRAVENOUS CONTINUOUS
Status: DISCONTINUED | OUTPATIENT
Start: 2024-12-05 | End: 2024-12-10

## 2024-12-05 RX ORDER — HEPARIN SODIUM,PORCINE/PF 10 UNIT/ML
3 SYRINGE (ML) INTRAVENOUS DAILY
Status: DISCONTINUED | OUTPATIENT
Start: 2024-12-05 | End: 2024-12-09

## 2024-12-05 RX ORDER — SODIUM CHLORIDE 9 MG/ML
INJECTION, SOLUTION INTRAVENOUS PRN
Status: DISCONTINUED | OUTPATIENT
Start: 2024-12-05 | End: 2024-12-05

## 2024-12-05 RX ORDER — ALBUMIN HUMAN 50 G/1000ML
25 SOLUTION INTRAVENOUS ONCE
Status: COMPLETED | OUTPATIENT
Start: 2024-12-05 | End: 2024-12-05

## 2024-12-05 RX ADMIN — CALCIUM GLUCONATE 1000 MG: 20 INJECTION, SOLUTION INTRAVENOUS at 20:53

## 2024-12-05 RX ADMIN — Medication 300 MCG/MIN: at 01:06

## 2024-12-05 RX ADMIN — CALCIUM GLUCONATE 1000 MG: 20 INJECTION, SOLUTION INTRAVENOUS at 04:44

## 2024-12-05 RX ADMIN — VASOPRESSIN 0.03 UNITS/MIN: 0.2 INJECTION INTRAVENOUS at 05:56

## 2024-12-05 RX ADMIN — Medication 8 MG/HR: at 00:25

## 2024-12-05 RX ADMIN — SODIUM CHLORIDE: 9 INJECTION, SOLUTION INTRAVENOUS at 06:48

## 2024-12-05 RX ADMIN — Medication 175 MCG/HR: at 17:44

## 2024-12-05 RX ADMIN — HEPARIN SODIUM 7 UNITS/KG/HR: 10000 INJECTION, SOLUTION INTRAVENOUS at 06:11

## 2024-12-05 RX ADMIN — SODIUM CHLORIDE, PRESERVATIVE FREE 40 MG: 5 INJECTION INTRAVENOUS at 20:22

## 2024-12-05 RX ADMIN — ALBUMIN (HUMAN) 25 G: 12.5 INJECTION, SOLUTION INTRAVENOUS at 14:11

## 2024-12-05 RX ADMIN — Medication 2000 ML/HR: at 14:02

## 2024-12-05 RX ADMIN — CANGRELOR 0.75 MCG/KG/MIN: 50 INJECTION, POWDER, LYOPHILIZED, FOR SOLUTION INTRAVENOUS at 15:46

## 2024-12-05 RX ADMIN — Medication 1 MG/MIN: at 16:01

## 2024-12-05 RX ADMIN — POTASSIUM PHOSPHATE, MONOBASIC AND POTASSIUM PHOSPHATE, DIBASIC 30 MMOL: 224; 236 INJECTION, SOLUTION, CONCENTRATE INTRAVENOUS at 00:51

## 2024-12-05 RX ADMIN — PIPERACILLIN AND TAZOBACTAM 3375 MG: 3; .375 INJECTION, POWDER, LYOPHILIZED, FOR SOLUTION INTRAVENOUS at 11:37

## 2024-12-05 RX ADMIN — DEXMEDETOMIDINE HYDROCHLORIDE 0.4 MCG/KG/HR: 400 INJECTION, SOLUTION INTRAVENOUS at 11:38

## 2024-12-05 RX ADMIN — DEXMEDETOMIDINE HYDROCHLORIDE 0.4 MCG/KG/HR: 400 INJECTION, SOLUTION INTRAVENOUS at 19:32

## 2024-12-05 RX ADMIN — VASOPRESSIN 0.03 UNITS/MIN: 0.2 INJECTION INTRAVENOUS at 15:44

## 2024-12-05 RX ADMIN — CANGRELOR 0.75 MCG/KG/MIN: 50 INJECTION, POWDER, LYOPHILIZED, FOR SOLUTION INTRAVENOUS at 06:47

## 2024-12-05 RX ADMIN — SODIUM CHLORIDE: 9 INJECTION, SOLUTION INTRAVENOUS at 16:43

## 2024-12-05 RX ADMIN — SODIUM CHLORIDE: 9 INJECTION, SOLUTION INTRAVENOUS at 06:41

## 2024-12-05 RX ADMIN — SODIUM CHLORIDE, PRESERVATIVE FREE 10 ML: 5 INJECTION INTRAVENOUS at 20:24

## 2024-12-05 RX ADMIN — MAGNESIUM SULFATE HEPTAHYDRATE 2000 MG: 40 INJECTION, SOLUTION INTRAVENOUS at 16:54

## 2024-12-05 RX ADMIN — SODIUM CHLORIDE 3.3 UNITS/HR: 9 INJECTION, SOLUTION INTRAVENOUS at 20:16

## 2024-12-05 RX ADMIN — DEXMEDETOMIDINE HYDROCHLORIDE 0.4 MCG/KG/HR: 400 INJECTION, SOLUTION INTRAVENOUS at 03:09

## 2024-12-05 RX ADMIN — PIPERACILLIN AND TAZOBACTAM 3375 MG: 3; .375 INJECTION, POWDER, LYOPHILIZED, FOR SOLUTION INTRAVENOUS at 02:23

## 2024-12-05 RX ADMIN — ATORVASTATIN CALCIUM 40 MG: 40 TABLET, FILM COATED ORAL at 20:22

## 2024-12-05 RX ADMIN — SODIUM CHLORIDE 1500 MG: 9 INJECTION, SOLUTION INTRAVENOUS at 12:32

## 2024-12-05 RX ADMIN — Medication 2000 ML/HR: at 14:04

## 2024-12-05 RX ADMIN — SODIUM CHLORIDE 2.5 UNITS/HR: 9 INJECTION, SOLUTION INTRAVENOUS at 21:14

## 2024-12-05 RX ADMIN — HEPARIN SODIUM 5000 UNITS: 1000 INJECTION INTRAVENOUS; SUBCUTANEOUS at 13:43

## 2024-12-05 RX ADMIN — Medication 30 UNITS: at 09:55

## 2024-12-05 RX ADMIN — Medication 30 UNITS: at 09:50

## 2024-12-05 RX ADMIN — CALCIUM GLUCONATE 1000 MG: 20 INJECTION, SOLUTION INTRAVENOUS at 11:37

## 2024-12-05 RX ADMIN — Medication 200 MCG/HR: at 03:39

## 2024-12-05 RX ADMIN — Medication 7 MG/HR: at 16:29

## 2024-12-05 RX ADMIN — Medication 150 MCG/MIN: at 06:03

## 2024-12-05 RX ADMIN — AMIODARONE HYDROCHLORIDE 150 MG: 50 INJECTION, SOLUTION INTRAVENOUS at 15:49

## 2024-12-05 RX ADMIN — DOBUTAMINE HYDROCHLORIDE 5 MCG/KG/MIN: 400 INJECTION INTRAVENOUS at 12:48

## 2024-12-05 RX ADMIN — SODIUM CHLORIDE: 9 INJECTION, SOLUTION INTRAVENOUS at 18:26

## 2024-12-05 RX ADMIN — SODIUM CHLORIDE: 9 INJECTION, SOLUTION INTRAVENOUS at 16:53

## 2024-12-05 RX ADMIN — Medication 13 MCG/MIN: at 17:59

## 2024-12-05 RX ADMIN — Medication 2000 ML/HR: at 14:00

## 2024-12-05 RX ADMIN — SODIUM CHLORIDE, PRESERVATIVE FREE 40 MG: 5 INJECTION INTRAVENOUS at 08:34

## 2024-12-05 RX ADMIN — PIPERACILLIN AND TAZOBACTAM 3375 MG: 3; .375 INJECTION, POWDER, LYOPHILIZED, FOR SOLUTION INTRAVENOUS at 18:27

## 2024-12-05 RX ADMIN — SODIUM CHLORIDE: 9 INJECTION, SOLUTION INTRAVENOUS at 04:09

## 2024-12-05 RX ADMIN — Medication 30 UNITS: at 11:36

## 2024-12-05 RX ADMIN — CALCIUM GLUCONATE 1000 MG: 20 INJECTION, SOLUTION INTRAVENOUS at 16:44

## 2024-12-05 ASSESSMENT — PULMONARY FUNCTION TESTS
PIF_VALUE: 19
PIF_VALUE: 20
PIF_VALUE: 19
PIF_VALUE: 20
PIF_VALUE: 19
PIF_VALUE: 20
PIF_VALUE: 19
PIF_VALUE: 20
PIF_VALUE: 20
PIF_VALUE: 19
PIF_VALUE: 20
PIF_VALUE: 19
PIF_VALUE: 20
PIF_VALUE: 19

## 2024-12-06 ENCOUNTER — APPOINTMENT (OUTPATIENT)
Dept: GENERAL RADIOLOGY | Age: 74
DRG: 003 | End: 2024-12-06
Payer: MEDICARE

## 2024-12-06 ENCOUNTER — APPOINTMENT (OUTPATIENT)
Age: 74
DRG: 003 | End: 2024-12-06
Payer: MEDICARE

## 2024-12-06 PROBLEM — Z92.81 PATIENT RECEIVING ECMO: Status: ACTIVE | Noted: 2024-12-06

## 2024-12-06 LAB
ACT BLD: 136 SEC (ref 79–149)
ACT BLD: 186 SEC (ref 79–149)
ACT BLD: 207 SEC (ref 79–149)
ALBUMIN PERCENT: 49 % (ref 45–65)
ALBUMIN SERPL-MCNC: 2.3 G/DL (ref 3.2–5.2)
ALBUMIN SERPL-MCNC: 2.3 G/DL (ref 3.5–5.2)
ALBUMIN SERPL-MCNC: 2.3 G/DL (ref 3.5–5.2)
ALBUMIN SERPL-MCNC: 2.4 G/DL (ref 3.5–5.2)
ALBUMIN/GLOB SERPL: 1.5 {RATIO} (ref 1–2.5)
ALBUMIN/GLOB SERPL: 1.6 {RATIO} (ref 1–2.5)
ALBUMIN/GLOB SERPL: 1.8 {RATIO} (ref 1–2.5)
ALP SERPL-CCNC: 49 U/L (ref 40–129)
ALP SERPL-CCNC: 50 U/L (ref 40–129)
ALP SERPL-CCNC: 61 U/L (ref 40–129)
ALPHA 2 PERCENT: 11 % (ref 6–13)
ALPHA1 GLOB SERPL ELPH-MCNC: 0.3 G/DL (ref 0.1–0.4)
ALPHA1 GLOB SERPL ELPH-MCNC: 7 % (ref 3–6)
ALPHA2 GLOB SERPL ELPH-MCNC: 0.5 G/DL (ref 0.5–0.9)
ALT SERPL-CCNC: 55 U/L (ref 10–50)
ALT SERPL-CCNC: 63 U/L (ref 10–50)
ALT SERPL-CCNC: 63 U/L (ref 10–50)
ANION GAP SERPL CALCULATED.3IONS-SCNC: 10 MMOL/L (ref 9–16)
ANION GAP SERPL CALCULATED.3IONS-SCNC: 7 MMOL/L (ref 9–16)
ANION GAP SERPL CALCULATED.3IONS-SCNC: 8 MMOL/L (ref 9–16)
ANION GAP SERPL CALCULATED.3IONS-SCNC: 8 MMOL/L (ref 9–16)
ANTI-XA UNFRAC HEPARIN: 0.25 IU/L
ANTI-XA UNFRAC HEPARIN: 0.27 IU/L
ANTI-XA UNFRAC HEPARIN: 0.3 IU/L
AST SERPL-CCNC: 55 U/L (ref 10–50)
AST SERPL-CCNC: 66 U/L (ref 10–50)
AST SERPL-CCNC: 68 U/L (ref 10–50)
B-GLOBULIN SERPL ELPH-MCNC: 0.9 G/DL (ref 0.5–1.1)
B-GLOBULIN SERPL ELPH-MCNC: 19 % (ref 11–19)
BILIRUB DIRECT SERPL-MCNC: 0.9 MG/DL (ref 0–0.2)
BILIRUB INDIRECT SERPL-MCNC: 0.5 MG/DL (ref 0–1)
BILIRUB SERPL-MCNC: 1.4 MG/DL (ref 0–1.2)
BILIRUB SERPL-MCNC: 1.4 MG/DL (ref 0–1.2)
BILIRUB SERPL-MCNC: 1.5 MG/DL (ref 0–1.2)
BLOOD BANK BLOOD PRODUCT EXPIRATION DATE: NORMAL
BLOOD BANK DISPENSE STATUS: NORMAL
BLOOD BANK ISBT PRODUCT BLOOD TYPE: 7300
BLOOD BANK PRODUCT CODE: NORMAL
BLOOD BANK UNIT TYPE AND RH: NORMAL
BPU ID: NORMAL
BUN SERPL-MCNC: 16 MG/DL (ref 8–23)
BUN SERPL-MCNC: 16 MG/DL (ref 8–23)
BUN SERPL-MCNC: 18 MG/DL (ref 8–23)
BUN SERPL-MCNC: 22 MG/DL (ref 8–23)
CA-I BLD-SCNC: 1.11 MMOL/L (ref 1.13–1.33)
CA-I BLD-SCNC: 1.12 MMOL/L (ref 1.13–1.33)
CA-I BLD-SCNC: 1.13 MMOL/L (ref 1.13–1.33)
CA-I BLD-SCNC: 1.17 MMOL/L (ref 1.13–1.33)
CALCIUM SERPL-MCNC: 7.4 MG/DL (ref 8.6–10.4)
CALCIUM SERPL-MCNC: 7.5 MG/DL (ref 8.6–10.4)
CALCIUM SERPL-MCNC: 7.9 MG/DL (ref 8.6–10.4)
CALCIUM SERPL-MCNC: 8.2 MG/DL (ref 8.6–10.4)
CHLORIDE SERPL-SCNC: 106 MMOL/L (ref 98–107)
CHLORIDE SERPL-SCNC: 107 MMOL/L (ref 98–107)
CHLORIDE SERPL-SCNC: 107 MMOL/L (ref 98–107)
CHLORIDE SERPL-SCNC: 109 MMOL/L (ref 98–107)
CLOT ANGLE.KAOLIN INDUCED BLD RES TEG: 60.4 DEG (ref 63–78)
CO2 SERPL-SCNC: 23 MMOL/L (ref 20–31)
CO2 SERPL-SCNC: 24 MMOL/L (ref 20–31)
CO2 SERPL-SCNC: 25 MMOL/L (ref 20–31)
CO2 SERPL-SCNC: 26 MMOL/L (ref 20–31)
COMPONENT: NORMAL
CREAT SERPL-MCNC: 1 MG/DL (ref 0.7–1.2)
CREAT SERPL-MCNC: 1.2 MG/DL (ref 0.7–1.2)
CREAT SERPL-MCNC: 1.3 MG/DL (ref 0.7–1.2)
CREAT SERPL-MCNC: 1.5 MG/DL (ref 0.7–1.2)
ECHO AV MEAN GRADIENT: 4 MMHG
ECHO AV MEAN GRADIENT: 4 MMHG
ECHO AV MEAN VELOCITY: 0.9 M/S
ECHO AV PEAK GRADIENT: 7 MMHG
ECHO AV PEAK VELOCITY: 1.4 M/S
ECHO AV VELOCITY RATIO: 1
ECHO AV VTI: 17.9 CM
ECHO BSA: 2.6 M2
ECHO LV EJECTION FRACTION BIPLANE: 40 % (ref 55–100)
ECHO LVOT AV VTI INDEX: 0.91
ECHO LVOT MEAN GRADIENT: 4 MMHG
ECHO LVOT PEAK GRADIENT: 8 MMHG
ECHO LVOT PEAK VELOCITY: 1.4 M/S
ECHO LVOT VTI: 16.2 CM
ECHO PV MAX VELOCITY: 0.9 M/S
ECHO PV PEAK GRADIENT: 3 MMHG
ECHO RA AREA 4C: 17.5 CM2
ECHO RA END SYSTOLIC VOLUME APICAL 4 CHAMBER INDEX BSA: 19 ML/M2
ECHO RA VOLUME: 48 ML
ECHO RV INTERNAL DIMENSION: 4.3 CM
EKG ATRIAL RATE: 117 BPM
EKG Q-T INTERVAL: 394 MS
EKG QRS DURATION: 92 MS
EKG QTC CALCULATION (BAZETT): 508 MS
EKG R AXIS: -21 DEGREES
EKG T AXIS: 59 DEGREES
EKG VENTRICULAR RATE: 100 BPM
ERYTHROCYTE [DISTWIDTH] IN BLOOD BY AUTOMATED COUNT: 15.4 % (ref 11.8–14.4)
ERYTHROCYTE [DISTWIDTH] IN BLOOD BY AUTOMATED COUNT: 15.6 % (ref 11.8–14.4)
ERYTHROCYTE [DISTWIDTH] IN BLOOD BY AUTOMATED COUNT: 15.7 % (ref 11.8–14.4)
ERYTHROCYTE [DISTWIDTH] IN BLOOD BY AUTOMATED COUNT: 15.8 % (ref 11.8–14.4)
FIBRINOGEN PPP-MCNC: 289 MG/DL (ref 203–521)
FIBRINOGEN PPP-MCNC: 297 MG/DL (ref 203–521)
FIBRINOGEN PPP-MCNC: 300 MG/DL (ref 203–521)
FIBRINOGEN PPP-MCNC: 336 MG/DL (ref 203–521)
FIBRINOGEN, FUNCTIONAL TEG: 19.6 MM (ref 15–32)
FIO2: 100
GAMMA GLOB SERPL ELPH-MCNC: 0.6 G/DL (ref 0.5–1.5)
GAMMA GLOBULIN %: 13 % (ref 9–20)
GFR, ESTIMATED: 49 ML/MIN/1.73M2
GFR, ESTIMATED: 58 ML/MIN/1.73M2
GFR, ESTIMATED: 64 ML/MIN/1.73M2
GFR, ESTIMATED: 79 ML/MIN/1.73M2
GLUCOSE BLD-MCNC: 135 MG/DL (ref 75–110)
GLUCOSE BLD-MCNC: 138 MG/DL (ref 75–110)
GLUCOSE BLD-MCNC: 141 MG/DL (ref 75–110)
GLUCOSE BLD-MCNC: 145 MG/DL (ref 75–110)
GLUCOSE BLD-MCNC: 149 MG/DL (ref 74–100)
GLUCOSE BLD-MCNC: 149 MG/DL (ref 75–110)
GLUCOSE BLD-MCNC: 152 MG/DL (ref 74–100)
GLUCOSE BLD-MCNC: 156 MG/DL (ref 75–110)
GLUCOSE BLD-MCNC: 158 MG/DL (ref 75–110)
GLUCOSE BLD-MCNC: 160 MG/DL (ref 74–100)
GLUCOSE BLD-MCNC: 161 MG/DL (ref 75–110)
GLUCOSE BLD-MCNC: 165 MG/DL (ref 74–100)
GLUCOSE BLD-MCNC: 168 MG/DL (ref 74–100)
GLUCOSE BLD-MCNC: 168 MG/DL (ref 74–100)
GLUCOSE BLD-MCNC: 170 MG/DL (ref 74–100)
GLUCOSE BLD-MCNC: 173 MG/DL (ref 74–100)
GLUCOSE BLD-MCNC: 180 MG/DL (ref 74–100)
GLUCOSE BLD-MCNC: 181 MG/DL (ref 74–100)
GLUCOSE SERPL-MCNC: 150 MG/DL (ref 74–99)
GLUCOSE SERPL-MCNC: 157 MG/DL (ref 74–99)
GLUCOSE SERPL-MCNC: 167 MG/DL (ref 74–99)
GLUCOSE SERPL-MCNC: 173 MG/DL (ref 74–99)
HBCO, MIXED, EXTENDED: 2 % (ref 0–5)
HCO3 VENOUS: 23.5 MMOL/L (ref 22–29)
HCO3 VENOUS: 27.5 MMOL/L (ref 22–29)
HCT VFR BLD AUTO: 20 % (ref 41–53)
HCT VFR BLD AUTO: 21 % (ref 41–53)
HCT VFR BLD AUTO: 23 % (ref 40.7–50.3)
HCT VFR BLD AUTO: 23.4 % (ref 40.7–50.3)
HCT VFR BLD AUTO: 23.4 % (ref 40.7–50.3)
HCT VFR BLD AUTO: 23.5 % (ref 40.7–50.3)
HCT VFR BLD AUTO: 24 % (ref 41–53)
HCT VFR BLD AUTO: 25 % (ref 40.7–50.3)
HCT VFR BLD AUTO: 25.1 % (ref 40.7–50.3)
HCT VFR BLD AUTO: 26.1 % (ref 40.7–50.3)
HEMOGLOBIN, MIXED, EXTENDED: 7.8 G/DL (ref 12–18)
HGB BLD-MCNC: 7.9 G/DL (ref 13–17)
HGB BLD-MCNC: 8 G/DL (ref 13–17)
HGB BLD-MCNC: 8.5 G/DL (ref 13–17)
HGB BLD-MCNC: 8.6 G/DL (ref 13–17)
HGB BLD-MCNC: 9 G/DL (ref 13–17)
INR PPP: 1.9
INR PPP: 2.1
INR PPP: 3.6
INR PPP: 4.5
ITYP INTERPRETATION: NORMAL
ITYP INTERPRETATION: NORMAL
KINETICS TEG: 2.8 MIN (ref 0.8–2.1)
LACTIC ACID, SEPSIS WHOLE BLOOD: 1.6 MMOL/L (ref 0.5–1.9)
LACTIC ACID, SEPSIS WHOLE BLOOD: 1.7 MMOL/L (ref 0.5–1.9)
LACTIC ACID, WHOLE BLOOD: 1.5 MMOL/L (ref 0.7–2.1)
LACTIC ACID, WHOLE BLOOD: 1.7 MMOL/L (ref 0.7–2.1)
LACTIC ACID, WHOLE BLOOD: 2.1 MMOL/L (ref 0.7–2.1)
MA (MAX CLOT) TEG: 59.7 MM (ref 52–69)
MA(MAX CLOT) RAPID TEG: 58.6 MM (ref 52–70)
MAGNESIUM SERPL-MCNC: 1.7 MG/DL (ref 1.6–2.4)
MCH RBC QN AUTO: 30.7 PG (ref 25.2–33.5)
MCH RBC QN AUTO: 31 PG (ref 25.2–33.5)
MCH RBC QN AUTO: 31 PG (ref 25.2–33.5)
MCH RBC QN AUTO: 31.1 PG (ref 25.2–33.5)
MCHC RBC AUTO-ENTMCNC: 33.6 G/DL (ref 28.4–34.8)
MCHC RBC AUTO-ENTMCNC: 34.2 G/DL (ref 28.4–34.8)
MCHC RBC AUTO-ENTMCNC: 34.3 G/DL (ref 28.4–34.8)
MCHC RBC AUTO-ENTMCNC: 34.3 G/DL (ref 28.4–34.8)
MCV RBC AUTO: 90.2 FL (ref 82.6–102.9)
MCV RBC AUTO: 90.6 FL (ref 82.6–102.9)
MCV RBC AUTO: 91.1 FL (ref 82.6–102.9)
MCV RBC AUTO: 91.4 FL (ref 82.6–102.9)
METHB, MIXED, EXTENDED: 1.3 % (ref 0–1.5)
METHEMOGLOBIN: 1.8 % (ref 0–1.5)
NEGATIVE BASE EXCESS, ART: 0.3 MMOL/L (ref 0–2)
NEGATIVE BASE EXCESS, ART: 0.3 MMOL/L (ref 0–2)
NEGATIVE BASE EXCESS, ART: 1.6 MMOL/L (ref 0–2)
NEGATIVE BASE EXCESS, VEN: 0.5 MMOL/L (ref 0–2)
NRBC BLD-RTO: 1.3 PER 100 WBC
NRBC BLD-RTO: 1.3 PER 100 WBC
NRBC BLD-RTO: 1.4 PER 100 WBC
NRBC BLD-RTO: 1.5 PER 100 WBC
O2 CONTENT, MIXED, EXTENDED: 6 VOL % (ref 12–20)
O2 DELIVERY DEVICE: ABNORMAL
O2 DELIVERY DEVICE: ABNORMAL
O2 SAT, VEN: 100 % (ref 60–85)
O2 SAT, VEN: 79.4 % (ref 60–85)
OXYGEN STATUS: ABNORMAL
P E INTERPRETATION, U: NORMAL
PARTIAL THROMBOPLASTIN TIME: 135.4 SEC (ref 23–36.5)
PARTIAL THROMBOPLASTIN TIME: 76.5 SEC (ref 23–36.5)
PARTIAL THROMBOPLASTIN TIME: 79.4 SEC (ref 23–36.5)
PARTIAL THROMBOPLASTIN TIME: 79.8 SEC (ref 23–36.5)
PARTIAL THROMBOPLASTIN TIME: 86 SEC (ref 23–36.5)
PATH REV: NORMAL
PATHOLOGIST REVIEW: NORMAL
PATHOLOGIST: ABNORMAL
PATHOLOGIST: NORMAL
PCO2 VENOUS: 34.5 MM HG (ref 41–51)
PCO2 VENOUS: 40.1 MM HG (ref 41–51)
PERFORMING LOCATION: ABNORMAL
PH VENOUS: 7.44 (ref 7.32–7.43)
PH VENOUS: 7.44 (ref 7.32–7.43)
PHOSPHATE SERPL-MCNC: 2 MG/DL (ref 2.5–4.5)
PHOSPHATE SERPL-MCNC: 3.6 MG/DL (ref 2.5–4.5)
PLATELET # BLD AUTO: ABNORMAL K/UL (ref 138–453)
PLATELET, FLUORESCENCE: 38 K/UL (ref 138–453)
PLATELET, FLUORESCENCE: 53 K/UL (ref 138–453)
PLATELET, FLUORESCENCE: 60 K/UL (ref 138–453)
PLATELET, FLUORESCENCE: 65 K/UL (ref 138–453)
PLATELETS.RETICULATED NFR BLD AUTO: 4.3 % (ref 1.1–10.3)
PLATELETS.RETICULATED NFR BLD AUTO: 4.4 % (ref 1.1–10.3)
PLATELETS.RETICULATED NFR BLD AUTO: 5.7 % (ref 1.1–10.3)
PLATELETS.RETICULATED NFR BLD AUTO: 6.7 % (ref 1.1–10.3)
PO2 VENOUS: 42 MM HG (ref 30–50)
PO2 VENOUS: 535 MM HG (ref 30–50)
POC HCO3: 22.5 MMOL/L (ref 21–28)
POC HCO3: 24.1 MMOL/L (ref 21–28)
POC HCO3: 24.5 MMOL/L (ref 21–28)
POC HCO3: 25.6 MMOL/L (ref 21–28)
POC HCO3: 26.3 MMOL/L (ref 21–28)
POC HCO3: 26.4 MMOL/L (ref 21–28)
POC HCO3: 27.2 MMOL/L (ref 21–28)
POC HCO3: 27.3 MMOL/L (ref 21–28)
POC HCO3: 27.8 MMOL/L (ref 21–28)
POC HCO3: 28.2 MMOL/L (ref 21–28)
POC HEMOGLOBIN (CALC): 6.9 G/DL (ref 13.5–17.5)
POC HEMOGLOBIN (CALC): 7.3 G/DL (ref 13.5–17.5)
POC HEMOGLOBIN (CALC): 8.1 G/DL (ref 13.5–17.5)
POC O2 SATURATION: 100 % (ref 94–98)
POC O2 SATURATION: 86.2 % (ref 94–98)
POC O2 SATURATION: 87.1 % (ref 94–98)
POC O2 SATURATION: 88.4 % (ref 94–98)
POC O2 SATURATION: 89.8 % (ref 94–98)
POC O2 SATURATION: 90.2 % (ref 94–98)
POC O2 SATURATION: 91.3 % (ref 94–98)
POC O2 SATURATION: 91.3 % (ref 94–98)
POC O2 SATURATION: 92.1 % (ref 94–98)
POC O2 SATURATION: 92.8 % (ref 94–98)
POC PCO2: 34 MM HG (ref 35–48)
POC PCO2: 35.6 MM HG (ref 35–48)
POC PCO2: 36.9 MM HG (ref 35–48)
POC PCO2: 39.7 MM HG (ref 35–48)
POC PCO2: 41.1 MM HG (ref 35–48)
POC PCO2: 41.3 MM HG (ref 35–48)
POC PCO2: 42.1 MM HG (ref 35–48)
POC PCO2: 42.2 MM HG (ref 35–48)
POC PCO2: 43.2 MM HG (ref 35–48)
POC PCO2: 43.4 MM HG (ref 35–48)
POC PH: 7.4 (ref 7.35–7.45)
POC PH: 7.4 (ref 7.35–7.45)
POC PH: 7.41 (ref 7.35–7.45)
POC PH: 7.41 (ref 7.35–7.45)
POC PH: 7.42 (ref 7.35–7.45)
POC PH: 7.43 (ref 7.35–7.45)
POC PH: 7.43 (ref 7.35–7.45)
POC PH: 7.47 (ref 7.35–7.45)
POC PO2: 50.9 MM HG (ref 83–108)
POC PO2: 53.1 MM HG (ref 83–108)
POC PO2: 54.3 MM HG (ref 83–108)
POC PO2: 55.3 MM HG (ref 83–108)
POC PO2: 58.7 MM HG (ref 83–108)
POC PO2: 60.1 MM HG (ref 83–108)
POC PO2: 61.1 MM HG (ref 83–108)
POC PO2: 62.5 MM HG (ref 83–108)
POC PO2: 638.5 MM HG (ref 83–108)
POC PO2: 65.6 MM HG (ref 83–108)
POSITIVE BASE EXCESS, ART: 1.4 MMOL/L (ref 0–3)
POSITIVE BASE EXCESS, ART: 1.6 MMOL/L (ref 0–3)
POSITIVE BASE EXCESS, ART: 1.8 MMOL/L (ref 0–3)
POSITIVE BASE EXCESS, ART: 2.6 MMOL/L (ref 0–3)
POSITIVE BASE EXCESS, ART: 2.6 MMOL/L (ref 0–3)
POSITIVE BASE EXCESS, ART: 2.9 MMOL/L (ref 0–3)
POSITIVE BASE EXCESS, ART: 3.5 MMOL/L (ref 0–3)
POSITIVE BASE EXCESS, VEN: 3.1 MMOL/L (ref 0–3)
POTASSIUM SERPL-SCNC: 3.6 MMOL/L (ref 3.7–5.3)
POTASSIUM SERPL-SCNC: 3.6 MMOL/L (ref 3.7–5.3)
POTASSIUM SERPL-SCNC: 3.7 MMOL/L (ref 3.7–5.3)
POTASSIUM SERPL-SCNC: 3.9 MMOL/L (ref 3.7–5.3)
PROT PATTERN SERPL ELPH-IMP: ABNORMAL
PROT SERPL-MCNC: 3.7 G/DL (ref 6.6–8.7)
PROT SERPL-MCNC: 3.7 G/DL (ref 6.6–8.7)
PROT SERPL-MCNC: 3.8 G/DL (ref 6.6–8.7)
PROT SERPL-MCNC: 4.7 G/DL (ref 6.6–8.7)
PROTHROMBIN TIME: 21.6 SEC (ref 11.7–14.9)
PROTHROMBIN TIME: 22.9 SEC (ref 11.7–14.9)
PROTHROMBIN TIME: 34.9 SEC (ref 11.7–14.9)
PROTHROMBIN TIME: 40.9 SEC (ref 11.7–14.9)
RBC # BLD AUTO: 2.55 M/UL (ref 4.21–5.77)
RBC # BLD AUTO: 2.57 M/UL (ref 4.21–5.77)
RBC # BLD AUTO: 2.57 M/UL (ref 4.21–5.77)
RBC # BLD AUTO: 2.77 M/UL (ref 4.21–5.77)
REACTION TIME TEG W HEPARIN: 15.6 MIN (ref 4.3–8.3)
REACTION TIME TEG: >17 MIN (ref 4.6–9.1)
SAMPLE SITE: ABNORMAL
SAO2 % BLDMV: 56.6 % (ref 60–80)
SODIUM SERPL-SCNC: 139 MMOL/L (ref 136–145)
SODIUM SERPL-SCNC: 140 MMOL/L (ref 136–145)
SODIUM SERPL-SCNC: 140 MMOL/L (ref 136–145)
SODIUM SERPL-SCNC: 141 MMOL/L (ref 136–145)
SPECIMEN TYPE: NORMAL
SPECIMEN TYPE: NORMAL
TOTAL PROT. SUM,%: 99 % (ref 98–102)
TOTAL PROT. SUM: 4.6 G/DL (ref 6.3–8.2)
TOTAL PROTEIN, URINE: 67 MG/DL
TRANSFUSION STATUS: NORMAL
UNIT DIVISION: 0
UNIT ISSUE DATE/TIME: NORMAL
URINE TOTAL PROTEIN: 67 MG/DL
VANCOMYCIN SERPL-MCNC: 10.8 UG/ML (ref 5–40)
VANCOMYCIN SERPL-MCNC: 15.1 UG/ML (ref 5–40)
WBC OTHER # BLD: 4.6 K/UL (ref 3.5–11.3)
WBC OTHER # BLD: 5.6 K/UL (ref 3.5–11.3)
WBC OTHER # BLD: 6.8 K/UL (ref 3.5–11.3)
WBC OTHER # BLD: 6.9 K/UL (ref 3.5–11.3)

## 2024-12-06 PROCEDURE — 2580000003 HC RX 258

## 2024-12-06 PROCEDURE — 83050 HGB METHEMOGLOBIN QUAN: CPT

## 2024-12-06 PROCEDURE — 93308 TTE F-UP OR LMTD: CPT | Performed by: INTERNAL MEDICINE

## 2024-12-06 PROCEDURE — 85027 COMPLETE CBC AUTOMATED: CPT

## 2024-12-06 PROCEDURE — 84100 ASSAY OF PHOSPHORUS: CPT

## 2024-12-06 PROCEDURE — 80048 BASIC METABOLIC PNL TOTAL CA: CPT

## 2024-12-06 PROCEDURE — 82805 BLOOD GASES W/O2 SATURATION: CPT

## 2024-12-06 PROCEDURE — 71045 X-RAY EXAM CHEST 1 VIEW: CPT

## 2024-12-06 PROCEDURE — 80202 ASSAY OF VANCOMYCIN: CPT

## 2024-12-06 PROCEDURE — 83735 ASSAY OF MAGNESIUM: CPT

## 2024-12-06 PROCEDURE — 94761 N-INVAS EAR/PLS OXIMETRY MLT: CPT

## 2024-12-06 PROCEDURE — 85300 ANTITHROMBIN III ACTIVITY: CPT

## 2024-12-06 PROCEDURE — 82803 BLOOD GASES ANY COMBINATION: CPT

## 2024-12-06 PROCEDURE — 99233 SBSQ HOSP IP/OBS HIGH 50: CPT | Performed by: INTERNAL MEDICINE

## 2024-12-06 PROCEDURE — 94003 VENT MGMT INPAT SUBQ DAY: CPT

## 2024-12-06 PROCEDURE — C9460 INJECTION, CANGRELOR: HCPCS

## 2024-12-06 PROCEDURE — 6370000000 HC RX 637 (ALT 250 FOR IP): Performed by: INTERNAL MEDICINE

## 2024-12-06 PROCEDURE — 2500000003 HC RX 250 WO HCPCS

## 2024-12-06 PROCEDURE — 85014 HEMATOCRIT: CPT

## 2024-12-06 PROCEDURE — 6360000002 HC RX W HCPCS

## 2024-12-06 PROCEDURE — 90945 DIALYSIS ONE EVALUATION: CPT | Performed by: INTERNAL MEDICINE

## 2024-12-06 PROCEDURE — 36430 TRANSFUSION BLD/BLD COMPNT: CPT

## 2024-12-06 PROCEDURE — 85520 HEPARIN ASSAY: CPT

## 2024-12-06 PROCEDURE — 93325 DOPPLER ECHO COLOR FLOW MAPG: CPT | Performed by: INTERNAL MEDICINE

## 2024-12-06 PROCEDURE — 85730 THROMBOPLASTIN TIME PARTIAL: CPT

## 2024-12-06 PROCEDURE — 6360000002 HC RX W HCPCS: Performed by: THORACIC SURGERY (CARDIOTHORACIC VASCULAR SURGERY)

## 2024-12-06 PROCEDURE — 85347 COAGULATION TIME ACTIVATED: CPT

## 2024-12-06 PROCEDURE — 6370000000 HC RX 637 (ALT 250 FOR IP)

## 2024-12-06 PROCEDURE — 82330 ASSAY OF CALCIUM: CPT

## 2024-12-06 PROCEDURE — P9073 PLATELETS PHERESIS PATH REDU: HCPCS

## 2024-12-06 PROCEDURE — 2100000001 HC CVICU R&B

## 2024-12-06 PROCEDURE — 83605 ASSAY OF LACTIC ACID: CPT

## 2024-12-06 PROCEDURE — 99291 CRITICAL CARE FIRST HOUR: CPT | Performed by: INTERNAL MEDICINE

## 2024-12-06 PROCEDURE — 85610 PROTHROMBIN TIME: CPT

## 2024-12-06 PROCEDURE — 80076 HEPATIC FUNCTION PANEL: CPT

## 2024-12-06 PROCEDURE — 93321 DOPPLER ECHO F-UP/LMTD STD: CPT

## 2024-12-06 PROCEDURE — 86022 PLATELET ANTIBODIES: CPT

## 2024-12-06 PROCEDURE — 82375 ASSAY CARBOXYHB QUANT: CPT

## 2024-12-06 PROCEDURE — 6360000002 HC RX W HCPCS: Performed by: STUDENT IN AN ORGANIZED HEALTH CARE EDUCATION/TRAINING PROGRAM

## 2024-12-06 PROCEDURE — 2700000000 HC OXYGEN THERAPY PER DAY

## 2024-12-06 PROCEDURE — 82947 ASSAY GLUCOSE BLOOD QUANT: CPT

## 2024-12-06 PROCEDURE — 85018 HEMOGLOBIN: CPT

## 2024-12-06 PROCEDURE — 80053 COMPREHEN METABOLIC PANEL: CPT

## 2024-12-06 PROCEDURE — 83051 HEMOGLOBIN PLASMA: CPT

## 2024-12-06 PROCEDURE — 33949 ECMO/ECLS DAILY MGMT ARTERY: CPT

## 2024-12-06 PROCEDURE — 93321 DOPPLER ECHO F-UP/LMTD STD: CPT | Performed by: INTERNAL MEDICINE

## 2024-12-06 PROCEDURE — 85576 BLOOD PLATELET AGGREGATION: CPT

## 2024-12-06 PROCEDURE — 85055 RETICULATED PLATELET ASSAY: CPT

## 2024-12-06 PROCEDURE — P9016 RBC LEUKOCYTES REDUCED: HCPCS

## 2024-12-06 PROCEDURE — 85384 FIBRINOGEN ACTIVITY: CPT

## 2024-12-06 RX ORDER — ARGATROBAN 1 MG/ML
.0625-1 INJECTION, SOLUTION INTRAVENOUS CONTINUOUS
Status: DISCONTINUED | OUTPATIENT
Start: 2024-12-06 | End: 2024-12-10

## 2024-12-06 RX ORDER — SODIUM CHLORIDE 9 MG/ML
INJECTION, SOLUTION INTRAVENOUS PRN
Status: DISCONTINUED | OUTPATIENT
Start: 2024-12-06 | End: 2024-12-08

## 2024-12-06 RX ORDER — SODIUM CHLORIDE 9 MG/ML
INJECTION, SOLUTION INTRAVENOUS PRN
Status: DISCONTINUED | OUTPATIENT
Start: 2024-12-06 | End: 2024-12-09

## 2024-12-06 RX ADMIN — Medication 2000 ML/HR: at 20:43

## 2024-12-06 RX ADMIN — SODIUM CHLORIDE, PRESERVATIVE FREE 10 ML: 5 INJECTION INTRAVENOUS at 19:59

## 2024-12-06 RX ADMIN — PIPERACILLIN AND TAZOBACTAM 3375 MG: 3; .375 INJECTION, POWDER, LYOPHILIZED, FOR SOLUTION INTRAVENOUS at 02:30

## 2024-12-06 RX ADMIN — SODIUM PHOSPHATE, MONOBASIC, MONOHYDRATE AND SODIUM PHOSPHATE, DIBASIC, ANHYDROUS 23.64 MMOL: 276; 142 INJECTION, SOLUTION INTRAVENOUS at 15:45

## 2024-12-06 RX ADMIN — CALCIUM GLUCONATE 1000 MG: 20 INJECTION, SOLUTION INTRAVENOUS at 17:49

## 2024-12-06 RX ADMIN — SODIUM CHLORIDE, PRESERVATIVE FREE 10 ML: 5 INJECTION INTRAVENOUS at 08:03

## 2024-12-06 RX ADMIN — PIPERACILLIN AND TAZOBACTAM 3375 MG: 3; .375 INJECTION, POWDER, LYOPHILIZED, FOR SOLUTION INTRAVENOUS at 18:53

## 2024-12-06 RX ADMIN — HEPARIN SODIUM 9 UNITS/KG/HR: 10000 INJECTION, SOLUTION INTRAVENOUS at 04:16

## 2024-12-06 RX ADMIN — CALCIUM GLUCONATE 1000 MG: 20 INJECTION, SOLUTION INTRAVENOUS at 03:23

## 2024-12-06 RX ADMIN — Medication 2000 ML/HR: at 01:17

## 2024-12-06 RX ADMIN — SODIUM CHLORIDE 2 UNITS/HR: 9 INJECTION, SOLUTION INTRAVENOUS at 02:12

## 2024-12-06 RX ADMIN — SODIUM CHLORIDE 1.9 UNITS/HR: 9 INJECTION, SOLUTION INTRAVENOUS at 01:17

## 2024-12-06 RX ADMIN — PIPERACILLIN AND TAZOBACTAM 3375 MG: 3; .375 INJECTION, POWDER, LYOPHILIZED, FOR SOLUTION INTRAVENOUS at 11:14

## 2024-12-06 RX ADMIN — Medication 2000 ML/HR: at 10:00

## 2024-12-06 RX ADMIN — Medication 7 MG/HR: at 09:50

## 2024-12-06 RX ADMIN — VASOPRESSIN 0.03 UNITS/MIN: 0.2 INJECTION INTRAVENOUS at 01:50

## 2024-12-06 RX ADMIN — SODIUM CHLORIDE 2.9 UNITS/HR: 9 INJECTION, SOLUTION INTRAVENOUS at 06:22

## 2024-12-06 RX ADMIN — ARGATROBAN 0.5 MCG/KG/MIN: 50 INJECTION INTRAVENOUS at 10:27

## 2024-12-06 RX ADMIN — HEPARIN SODIUM 5000 UNITS: 1000 INJECTION INTRAVENOUS; SUBCUTANEOUS at 05:07

## 2024-12-06 RX ADMIN — CANGRELOR 0.75 MCG/KG/MIN: 50 INJECTION, POWDER, LYOPHILIZED, FOR SOLUTION INTRAVENOUS at 00:13

## 2024-12-06 RX ADMIN — SODIUM CHLORIDE 2.9 UNITS/HR: 9 INJECTION, SOLUTION INTRAVENOUS at 06:59

## 2024-12-06 RX ADMIN — SODIUM CHLORIDE, PRESERVATIVE FREE 40 MG: 5 INJECTION INTRAVENOUS at 07:47

## 2024-12-06 RX ADMIN — ARGATROBAN 0.25 MCG/KG/MIN: 50 INJECTION INTRAVENOUS at 22:53

## 2024-12-06 RX ADMIN — SODIUM CHLORIDE 2.2 UNITS/HR: 9 INJECTION, SOLUTION INTRAVENOUS at 00:22

## 2024-12-06 RX ADMIN — DEXMEDETOMIDINE HYDROCHLORIDE 0.4 MCG/KG/HR: 400 INJECTION, SOLUTION INTRAVENOUS at 19:46

## 2024-12-06 RX ADMIN — SODIUM CHLORIDE 3.6 UNITS/HR: 9 INJECTION, SOLUTION INTRAVENOUS at 05:02

## 2024-12-06 RX ADMIN — DEXMEDETOMIDINE HYDROCHLORIDE 0.4 MCG/KG/HR: 400 INJECTION, SOLUTION INTRAVENOUS at 11:35

## 2024-12-06 RX ADMIN — SODIUM CHLORIDE: 9 INJECTION, SOLUTION INTRAVENOUS at 07:58

## 2024-12-06 RX ADMIN — Medication 2000 ML/HR: at 10:05

## 2024-12-06 RX ADMIN — ASPIRIN 81 MG 81 MG: 81 TABLET ORAL at 09:25

## 2024-12-06 RX ADMIN — Medication 30 UNITS: at 04:34

## 2024-12-06 RX ADMIN — Medication 2000 ML/HR: at 20:42

## 2024-12-06 RX ADMIN — TICAGRELOR 90 MG: 90 TABLET ORAL at 09:25

## 2024-12-06 RX ADMIN — DEXMEDETOMIDINE HYDROCHLORIDE 0.4 MCG/KG/HR: 400 INJECTION, SOLUTION INTRAVENOUS at 03:25

## 2024-12-06 RX ADMIN — TICAGRELOR 90 MG: 90 TABLET ORAL at 19:59

## 2024-12-06 RX ADMIN — SODIUM CHLORIDE 1.1 UNITS/HR: 9 INJECTION, SOLUTION INTRAVENOUS at 20:09

## 2024-12-06 RX ADMIN — DOBUTAMINE HYDROCHLORIDE 2.5 MCG/KG/MIN: 400 INJECTION INTRAVENOUS at 22:57

## 2024-12-06 RX ADMIN — ATORVASTATIN CALCIUM 40 MG: 40 TABLET, FILM COATED ORAL at 19:59

## 2024-12-06 RX ADMIN — Medication 2000 ML/HR: at 20:41

## 2024-12-06 RX ADMIN — SODIUM CHLORIDE, PRESERVATIVE FREE 40 MG: 5 INJECTION INTRAVENOUS at 19:59

## 2024-12-06 RX ADMIN — Medication 150 MCG/HR: at 09:27

## 2024-12-06 RX ADMIN — Medication 2000 ML/HR: at 01:19

## 2024-12-06 RX ADMIN — Medication 0.5 MG/MIN: at 19:38

## 2024-12-06 RX ADMIN — Medication 0.5 MG/MIN: at 02:25

## 2024-12-06 RX ADMIN — Medication 2000 ML/HR: at 10:10

## 2024-12-06 RX ADMIN — SODIUM CHLORIDE 1500 MG: 9 INJECTION, SOLUTION INTRAVENOUS at 07:59

## 2024-12-06 RX ADMIN — SODIUM CHLORIDE 1 UNITS/HR: 9 INJECTION, SOLUTION INTRAVENOUS at 21:18

## 2024-12-06 RX ADMIN — Medication 2000 ML/HR: at 01:18

## 2024-12-06 ASSESSMENT — PULMONARY FUNCTION TESTS
PIF_VALUE: 20
PIF_VALUE: 19
PIF_VALUE: 20
PIF_VALUE: 19
PIF_VALUE: 19
PIF_VALUE: 20
PIF_VALUE: 19
PIF_VALUE: 19
PIF_VALUE: 20
PIF_VALUE: 20
PIF_VALUE: 19
PIF_VALUE: 19
PIF_VALUE: 20
PIF_VALUE: 19
PIF_VALUE: 20
PIF_VALUE: 20
PIF_VALUE: 19
PIF_VALUE: 20
PIF_VALUE: 20
PIF_VALUE: 19
PIF_VALUE: 20
PIF_VALUE: 19
PIF_VALUE: 20
PIF_VALUE: 20

## 2024-12-06 NOTE — CARE COORDINATION
Post Acute Facility/Agency List     Provided spouse with the following list, the list includes the overall star ratings obtained from CMS per the Medicare Web site (www.Medicare.gov):     [x] Long Term Acute Care Facilities  [] Acute Inpatient Rehabilitation Facilities  [x] Skilled Nursing Facilities  [] Home Care  [] Patient's Insurance specific coverage list for SNF    Provided verbal instructions on how to utilize the QR Code to obtain additional detailed star ratings from www.Medicare.gov    [facility] selected [facility] if pt requires LT[facility] at discharge. Referral sent. Call to Anal[facility] from [facility].

## 2024-12-07 ENCOUNTER — APPOINTMENT (OUTPATIENT)
Dept: GENERAL RADIOLOGY | Age: 74
DRG: 003 | End: 2024-12-07
Payer: MEDICARE

## 2024-12-07 ENCOUNTER — APPOINTMENT (OUTPATIENT)
Age: 74
DRG: 003 | End: 2024-12-07
Payer: MEDICARE

## 2024-12-07 LAB
ABO/RH: NORMAL
ACT BLD: 216 SEC (ref 79–149)
ALBUMIN SERPL-MCNC: 2.3 G/DL (ref 3.5–5.2)
ALBUMIN SERPL-MCNC: 2.4 G/DL (ref 3.5–5.2)
ALBUMIN/GLOB SERPL: 1.3 {RATIO} (ref 1–2.5)
ALBUMIN/GLOB SERPL: 1.3 {RATIO} (ref 1–2.5)
ALLEN TEST: ABNORMAL
ALLEN TEST: ABNORMAL
ALP SERPL-CCNC: 102 U/L (ref 40–129)
ALP SERPL-CCNC: 74 U/L (ref 40–129)
ALT SERPL-CCNC: 49 U/L (ref 10–50)
ALT SERPL-CCNC: 53 U/L (ref 10–50)
ANION GAP SERPL CALCULATED.3IONS-SCNC: 10 MMOL/L (ref 9–16)
ANION GAP SERPL CALCULATED.3IONS-SCNC: 8 MMOL/L (ref 9–16)
ANION GAP SERPL CALCULATED.3IONS-SCNC: 9 MMOL/L (ref 9–16)
ANION GAP SERPL CALCULATED.3IONS-SCNC: 9 MMOL/L (ref 9–16)
ANTIBODY SCREEN: NEGATIVE
ARM BAND NUMBER: NORMAL
AST SERPL-CCNC: 58 U/L (ref 10–50)
AST SERPL-CCNC: 58 U/L (ref 10–50)
BILIRUB DIRECT SERPL-MCNC: 0.8 MG/DL (ref 0–0.2)
BILIRUB DIRECT SERPL-MCNC: 0.8 MG/DL (ref 0–0.2)
BILIRUB INDIRECT SERPL-MCNC: 0.5 MG/DL (ref 0–1)
BILIRUB SERPL-MCNC: 1.3 MG/DL (ref 0–1.2)
BILIRUB SERPL-MCNC: 1.3 MG/DL (ref 0–1.2)
BLOOD BANK BLOOD PRODUCT EXPIRATION DATE: NORMAL
BLOOD BANK DISPENSE STATUS: NORMAL
BLOOD BANK ISBT PRODUCT BLOOD TYPE: 600
BLOOD BANK ISBT PRODUCT BLOOD TYPE: 6200
BLOOD BANK PRODUCT CODE: NORMAL
BLOOD BANK SAMPLE EXPIRATION: NORMAL
BLOOD BANK UNIT TYPE AND RH: NORMAL
BPU ID: NORMAL
BUN SERPL-MCNC: 24 MG/DL (ref 8–23)
BUN SERPL-MCNC: 25 MG/DL (ref 8–23)
BUN SERPL-MCNC: 27 MG/DL (ref 8–23)
BUN SERPL-MCNC: 29 MG/DL (ref 8–23)
CA-I BLD-SCNC: 1.17 MMOL/L (ref 1.13–1.33)
CA-I BLD-SCNC: 1.2 MMOL/L (ref 1.13–1.33)
CA-I BLD-SCNC: 1.21 MMOL/L (ref 1.13–1.33)
CA-I BLD-SCNC: 1.23 MMOL/L (ref 1.13–1.33)
CALCIUM SERPL-MCNC: 7.7 MG/DL (ref 8.6–10.4)
CALCIUM SERPL-MCNC: 8 MG/DL (ref 8.6–10.4)
CALCIUM SERPL-MCNC: 8 MG/DL (ref 8.6–10.4)
CALCIUM SERPL-MCNC: 8.2 MG/DL (ref 8.6–10.4)
CHLORIDE SERPL-SCNC: 104 MMOL/L (ref 98–107)
CHLORIDE SERPL-SCNC: 105 MMOL/L (ref 98–107)
CLOT ANGLE.KAOLIN INDUCED BLD RES TEG: 60.5 DEG (ref 63–78)
CLOT ANGLE.KAOLIN INDUCED BLD RES TEG: 63.5 DEG (ref 63–78)
CO2 SERPL-SCNC: 24 MMOL/L (ref 20–31)
CO2 SERPL-SCNC: 25 MMOL/L (ref 20–31)
CO2 SERPL-SCNC: 26 MMOL/L (ref 20–31)
CO2 SERPL-SCNC: 26 MMOL/L (ref 20–31)
COMPONENT: NORMAL
CREAT SERPL-MCNC: 1.6 MG/DL (ref 0.7–1.2)
CREAT SERPL-MCNC: 1.8 MG/DL (ref 0.7–1.2)
CROSSMATCH RESULT: NORMAL
ECHO BSA: 2.7 M2
ECHO LV EF PHYSICIAN: 55 %
ERYTHROCYTE [DISTWIDTH] IN BLOOD BY AUTOMATED COUNT: 15.2 % (ref 11.8–14.4)
ERYTHROCYTE [DISTWIDTH] IN BLOOD BY AUTOMATED COUNT: 15.5 % (ref 11.8–14.4)
ERYTHROCYTE [DISTWIDTH] IN BLOOD BY AUTOMATED COUNT: 15.6 % (ref 11.8–14.4)
ERYTHROCYTE [DISTWIDTH] IN BLOOD BY AUTOMATED COUNT: 15.9 % (ref 11.8–14.4)
FIBRINOGEN PPP-MCNC: 322 MG/DL (ref 203–521)
FIBRINOGEN PPP-MCNC: 373 MG/DL (ref 203–521)
FIBRINOGEN PPP-MCNC: 387 MG/DL (ref 203–521)
FIBRINOGEN PPP-MCNC: 402 MG/DL (ref 203–521)
FIBRINOGEN, FUNCTIONAL TEG: 23.5 MM (ref 15–32)
FIBRINOGEN, FUNCTIONAL TEG: 27.7 MM (ref 15–32)
FIO2: 100
GFR, ESTIMATED: 39 ML/MIN/1.73M2
GFR, ESTIMATED: 45 ML/MIN/1.73M2
GLOBULIN SER CALC-MCNC: 1.9 G/DL
GLUCOSE BLD-MCNC: 149 MG/DL (ref 75–110)
GLUCOSE BLD-MCNC: 161 MG/DL (ref 74–100)
GLUCOSE BLD-MCNC: 165 MG/DL (ref 75–110)
GLUCOSE BLD-MCNC: 167 MG/DL (ref 74–100)
GLUCOSE BLD-MCNC: 167 MG/DL (ref 75–110)
GLUCOSE BLD-MCNC: 170 MG/DL (ref 75–110)
GLUCOSE BLD-MCNC: 170 MG/DL (ref 75–110)
GLUCOSE BLD-MCNC: 172 MG/DL (ref 74–100)
GLUCOSE BLD-MCNC: 180 MG/DL (ref 74–100)
GLUCOSE BLD-MCNC: 187 MG/DL (ref 74–100)
GLUCOSE BLD-MCNC: 188 MG/DL (ref 75–110)
GLUCOSE BLD-MCNC: 190 MG/DL (ref 74–100)
GLUCOSE BLD-MCNC: 196 MG/DL (ref 74–100)
GLUCOSE BLD-MCNC: 208 MG/DL (ref 75–110)
GLUCOSE SERPL-MCNC: 158 MG/DL (ref 74–99)
GLUCOSE SERPL-MCNC: 180 MG/DL (ref 74–99)
GLUCOSE SERPL-MCNC: 191 MG/DL (ref 74–99)
GLUCOSE SERPL-MCNC: 198 MG/DL (ref 74–99)
HBCO, MIXED, EXTENDED: 1.3 % (ref 0–5)
HCO3 VENOUS: 26 MMOL/L (ref 22–29)
HCT VFR BLD AUTO: 22 % (ref 41–53)
HCT VFR BLD AUTO: 22 % (ref 41–53)
HCT VFR BLD AUTO: 24.1 % (ref 40.7–50.3)
HCT VFR BLD AUTO: 24.2 % (ref 40.7–50.3)
HCT VFR BLD AUTO: 24.7 % (ref 40.7–50.3)
HCT VFR BLD AUTO: 24.8 % (ref 40.7–50.3)
HEMOGLOBIN, MIXED, EXTENDED: 7.8 G/DL (ref 12–18)
HGB BLD-MCNC: 8.1 G/DL (ref 13–17)
HGB BLD-MCNC: 8.3 G/DL (ref 13–17)
HGB BLD-MCNC: 8.4 G/DL (ref 13–17)
HGB BLD-MCNC: 8.6 G/DL (ref 13–17)
HGB FREE PLAS-MCNC: 43 MG/DL (ref 0–9.7)
INR PPP: 4.3
INR PPP: 4.3
INR PPP: 4.7
INR PPP: 4.7
KINETICS TEG: 2.3 MIN (ref 0.8–2.1)
KINETICS TEG: 2.8 MIN (ref 0.8–2.1)
LACTIC ACID, WHOLE BLOOD: 1.7 MMOL/L (ref 0.7–2.1)
LACTIC ACID, WHOLE BLOOD: 1.8 MMOL/L (ref 0.7–2.1)
LACTIC ACID, WHOLE BLOOD: 2 MMOL/L (ref 0.7–2.1)
LACTIC ACID, WHOLE BLOOD: 2.4 MMOL/L (ref 0.7–2.1)
MA (MAX CLOT) TEG: 63.1 MM (ref 52–69)
MA (MAX CLOT) TEG: 65.1 MM (ref 52–69)
MA(MAX CLOT) RAPID TEG: 62.3 MM (ref 52–70)
MA(MAX CLOT) RAPID TEG: 65 MM (ref 52–70)
MAGNESIUM SERPL-MCNC: 1.8 MG/DL (ref 1.6–2.4)
MAGNESIUM SERPL-MCNC: 1.9 MG/DL (ref 1.6–2.4)
MAGNESIUM SERPL-MCNC: 1.9 MG/DL (ref 1.6–2.4)
MAGNESIUM SERPL-MCNC: 2 MG/DL (ref 1.6–2.4)
MCH RBC QN AUTO: 30.5 PG (ref 25.2–33.5)
MCH RBC QN AUTO: 30.9 PG (ref 25.2–33.5)
MCH RBC QN AUTO: 31.5 PG (ref 25.2–33.5)
MCH RBC QN AUTO: 31.7 PG (ref 25.2–33.5)
MCHC RBC AUTO-ENTMCNC: 33.5 G/DL (ref 28.4–34.8)
MCHC RBC AUTO-ENTMCNC: 33.6 G/DL (ref 28.4–34.8)
MCHC RBC AUTO-ENTMCNC: 34.7 G/DL (ref 28.4–34.8)
MCHC RBC AUTO-ENTMCNC: 34.8 G/DL (ref 28.4–34.8)
MCV RBC AUTO: 90.5 FL (ref 82.6–102.9)
MCV RBC AUTO: 91.2 FL (ref 82.6–102.9)
MCV RBC AUTO: 91.3 FL (ref 82.6–102.9)
MCV RBC AUTO: 92 FL (ref 82.6–102.9)
METHB, MIXED, EXTENDED: 1.2 % (ref 0–1.5)
METHEMOGLOBIN: 1.3 % (ref 0–1.5)
NRBC BLD-RTO: 1.9 PER 100 WBC
NRBC BLD-RTO: 2.8 PER 100 WBC
NRBC BLD-RTO: 2.9 PER 100 WBC
NRBC BLD-RTO: 3.1 PER 100 WBC
O2 CONTENT, MIXED, EXTENDED: 6 VOL % (ref 12–20)
O2 DELIVERY DEVICE: ABNORMAL
O2 DELIVERY DEVICE: ABNORMAL
O2 SAT, VEN: 92 % (ref 60–85)
OXYGEN STATUS: ABNORMAL
PARTIAL THROMBOPLASTIN TIME: 71.1 SEC (ref 23–36.5)
PARTIAL THROMBOPLASTIN TIME: 72.8 SEC (ref 23–36.5)
PARTIAL THROMBOPLASTIN TIME: 78.2 SEC (ref 23–36.5)
PARTIAL THROMBOPLASTIN TIME: 79.6 SEC (ref 23–36.5)
PCO2 VENOUS: 36.4 MM HG (ref 41–51)
PERFORMING LOCATION: ABNORMAL
PERFORMING LOCATION: ABNORMAL
PF4 HEPARIN CMPLX IGG SERPL IA: 0.13 O.D. (ref 0–0.4)
PH VENOUS: 7.46 (ref 7.32–7.43)
PHOSPHATE SERPL-MCNC: 2.4 MG/DL (ref 2.5–4.5)
PLATELET # BLD AUTO: 65 K/UL (ref 138–453)
PLATELET # BLD AUTO: ABNORMAL K/UL (ref 138–453)
PLATELET, FLUORESCENCE: 65 K/UL (ref 138–453)
PLATELET, FLUORESCENCE: 66 K/UL (ref 138–453)
PLATELET, FLUORESCENCE: 69 K/UL (ref 138–453)
PLATELETS.RETICULATED NFR BLD AUTO: 6.3 % (ref 1.1–10.3)
PLATELETS.RETICULATED NFR BLD AUTO: 6.6 % (ref 1.1–10.3)
PLATELETS.RETICULATED NFR BLD AUTO: 7.8 % (ref 1.1–10.3)
PMV BLD AUTO: 11.7 FL (ref 8.1–13.5)
PO2 VENOUS: 59.7 MM HG (ref 30–50)
POC HCO3: 26 MMOL/L (ref 21–28)
POC HCO3: 26.7 MMOL/L (ref 21–28)
POC HCO3: 26.7 MMOL/L (ref 21–28)
POC HCO3: 27.1 MMOL/L (ref 21–28)
POC HCO3: 28.2 MMOL/L (ref 21–28)
POC HCO3: 28.6 MMOL/L (ref 21–28)
POC HCO3: 28.7 MMOL/L (ref 21–28)
POC HEMOGLOBIN (CALC): 7.5 G/DL (ref 13.5–17.5)
POC HEMOGLOBIN (CALC): 7.5 G/DL (ref 13.5–17.5)
POC O2 SATURATION: 100 % (ref 94–98)
POC O2 SATURATION: 84.2 % (ref 94–98)
POC O2 SATURATION: 86.3 % (ref 94–98)
POC O2 SATURATION: 87 % (ref 94–98)
POC O2 SATURATION: 87.1 % (ref 94–98)
POC O2 SATURATION: 88.1 % (ref 94–98)
POC O2 SATURATION: 88.6 % (ref 94–98)
POC PCO2: 32.2 MM HG (ref 35–48)
POC PCO2: 41.5 MM HG (ref 35–48)
POC PCO2: 42.1 MM HG (ref 35–48)
POC PCO2: 43.3 MM HG (ref 35–48)
POC PCO2: 44.3 MM HG (ref 35–48)
POC PCO2: 45.1 MM HG (ref 35–48)
POC PCO2: 48.6 MM HG (ref 35–48)
POC PH: 7.35 (ref 7.35–7.45)
POC PH: 7.4 (ref 7.35–7.45)
POC PH: 7.41 (ref 7.35–7.45)
POC PH: 7.42 (ref 7.35–7.45)
POC PH: 7.42 (ref 7.35–7.45)
POC PH: 7.43 (ref 7.35–7.45)
POC PH: 7.51 (ref 7.35–7.45)
POC PO2: 51.3 MM HG (ref 83–108)
POC PO2: 51.5 MM HG (ref 83–108)
POC PO2: 51.9 MM HG (ref 83–108)
POC PO2: 53.1 MM HG (ref 83–108)
POC PO2: 54.3 MM HG (ref 83–108)
POC PO2: 56.1 MM HG (ref 83–108)
POC PO2: 624.4 MM HG (ref 83–108)
POSITIVE BASE EXCESS, ART: 1.3 MMOL/L (ref 0–3)
POSITIVE BASE EXCESS, ART: 1.7 MMOL/L (ref 0–3)
POSITIVE BASE EXCESS, ART: 1.9 MMOL/L (ref 0–3)
POSITIVE BASE EXCESS, ART: 2.9 MMOL/L (ref 0–3)
POSITIVE BASE EXCESS, ART: 3.6 MMOL/L (ref 0–3)
POSITIVE BASE EXCESS, ART: 3.7 MMOL/L (ref 0–3)
POSITIVE BASE EXCESS, ART: 3.7 MMOL/L (ref 0–3)
POSITIVE BASE EXCESS, VEN: 2.1 MMOL/L (ref 0–3)
POTASSIUM SERPL-SCNC: 3.5 MMOL/L (ref 3.7–5.3)
POTASSIUM SERPL-SCNC: 3.5 MMOL/L (ref 3.7–5.3)
POTASSIUM SERPL-SCNC: 3.7 MMOL/L (ref 3.7–5.3)
POTASSIUM SERPL-SCNC: 3.7 MMOL/L (ref 3.7–5.3)
PROT SERPL-MCNC: 4.1 G/DL (ref 6.6–8.7)
PROT SERPL-MCNC: 4.3 G/DL (ref 6.6–8.7)
PROTHROMBIN TIME: 39.5 SEC (ref 11.7–14.9)
PROTHROMBIN TIME: 39.8 SEC (ref 11.7–14.9)
PROTHROMBIN TIME: 42.4 SEC (ref 11.7–14.9)
PROTHROMBIN TIME: 42.6 SEC (ref 11.7–14.9)
RBC # BLD AUTO: 2.62 M/UL (ref 4.21–5.77)
RBC # BLD AUTO: 2.65 M/UL (ref 4.21–5.77)
RBC # BLD AUTO: 2.72 M/UL (ref 4.21–5.77)
RBC # BLD AUTO: 2.73 M/UL (ref 4.21–5.77)
REACTION TIME TEG W HEPARIN: 15.2 MIN (ref 4.3–8.3)
REACTION TIME TEG W HEPARIN: >17 MIN (ref 4.3–8.3)
REACTION TIME TEG: 11.2 MIN (ref 4.6–9.1)
REACTION TIME TEG: >17 MIN (ref 4.6–9.1)
SAMPLE SITE: ABNORMAL
SAO2 % BLDMV: 60.2 % (ref 60–80)
SODIUM SERPL-SCNC: 138 MMOL/L (ref 136–145)
SODIUM SERPL-SCNC: 138 MMOL/L (ref 136–145)
SODIUM SERPL-SCNC: 139 MMOL/L (ref 136–145)
SODIUM SERPL-SCNC: 141 MMOL/L (ref 136–145)
TRANSFUSION STATUS: NORMAL
UNIT DIVISION: 0
UNIT ISSUE DATE/TIME: NORMAL
WBC OTHER # BLD: 7.3 K/UL (ref 3.5–11.3)
WBC OTHER # BLD: 7.5 K/UL (ref 3.5–11.3)
WBC OTHER # BLD: 8 K/UL (ref 3.5–11.3)
WBC OTHER # BLD: 8.5 K/UL (ref 3.5–11.3)

## 2024-12-07 PROCEDURE — 85576 BLOOD PLATELET AGGREGATION: CPT

## 2024-12-07 PROCEDURE — 86850 RBC ANTIBODY SCREEN: CPT

## 2024-12-07 PROCEDURE — 80053 COMPREHEN METABOLIC PANEL: CPT

## 2024-12-07 PROCEDURE — 85055 RETICULATED PLATELET ASSAY: CPT

## 2024-12-07 PROCEDURE — 93308 TTE F-UP OR LMTD: CPT | Performed by: INTERNAL MEDICINE

## 2024-12-07 PROCEDURE — 86900 BLOOD TYPING SEROLOGIC ABO: CPT

## 2024-12-07 PROCEDURE — 6370000000 HC RX 637 (ALT 250 FOR IP): Performed by: INTERNAL MEDICINE

## 2024-12-07 PROCEDURE — 2580000003 HC RX 258

## 2024-12-07 PROCEDURE — 83051 HEMOGLOBIN PLASMA: CPT

## 2024-12-07 PROCEDURE — 84100 ASSAY OF PHOSPHORUS: CPT

## 2024-12-07 PROCEDURE — 82248 BILIRUBIN DIRECT: CPT

## 2024-12-07 PROCEDURE — 94761 N-INVAS EAR/PLS OXIMETRY MLT: CPT

## 2024-12-07 PROCEDURE — 93321 DOPPLER ECHO F-UP/LMTD STD: CPT | Performed by: INTERNAL MEDICINE

## 2024-12-07 PROCEDURE — 6360000002 HC RX W HCPCS

## 2024-12-07 PROCEDURE — 99233 SBSQ HOSP IP/OBS HIGH 50: CPT | Performed by: INTERNAL MEDICINE

## 2024-12-07 PROCEDURE — 86901 BLOOD TYPING SEROLOGIC RH(D): CPT

## 2024-12-07 PROCEDURE — 82805 BLOOD GASES W/O2 SATURATION: CPT

## 2024-12-07 PROCEDURE — 94003 VENT MGMT INPAT SUBQ DAY: CPT

## 2024-12-07 PROCEDURE — 82330 ASSAY OF CALCIUM: CPT

## 2024-12-07 PROCEDURE — 85014 HEMATOCRIT: CPT

## 2024-12-07 PROCEDURE — 85730 THROMBOPLASTIN TIME PARTIAL: CPT

## 2024-12-07 PROCEDURE — 86923 COMPATIBILITY TEST ELECTRIC: CPT

## 2024-12-07 PROCEDURE — 83605 ASSAY OF LACTIC ACID: CPT

## 2024-12-07 PROCEDURE — 86920 COMPATIBILITY TEST SPIN: CPT

## 2024-12-07 PROCEDURE — 85300 ANTITHROMBIN III ACTIVITY: CPT

## 2024-12-07 PROCEDURE — 6370000000 HC RX 637 (ALT 250 FOR IP)

## 2024-12-07 PROCEDURE — 82375 ASSAY CARBOXYHB QUANT: CPT

## 2024-12-07 PROCEDURE — 2500000003 HC RX 250 WO HCPCS

## 2024-12-07 PROCEDURE — 85027 COMPLETE CBC AUTOMATED: CPT

## 2024-12-07 PROCEDURE — 85347 COAGULATION TIME ACTIVATED: CPT

## 2024-12-07 PROCEDURE — 80048 BASIC METABOLIC PNL TOTAL CA: CPT

## 2024-12-07 PROCEDURE — 83735 ASSAY OF MAGNESIUM: CPT

## 2024-12-07 PROCEDURE — 2100000001 HC CVICU R&B

## 2024-12-07 PROCEDURE — 6360000002 HC RX W HCPCS: Performed by: THORACIC SURGERY (CARDIOTHORACIC VASCULAR SURGERY)

## 2024-12-07 PROCEDURE — 71045 X-RAY EXAM CHEST 1 VIEW: CPT

## 2024-12-07 PROCEDURE — 2700000000 HC OXYGEN THERAPY PER DAY

## 2024-12-07 PROCEDURE — 83050 HGB METHEMOGLOBIN QUAN: CPT

## 2024-12-07 PROCEDURE — 99291 CRITICAL CARE FIRST HOUR: CPT | Performed by: INTERNAL MEDICINE

## 2024-12-07 PROCEDURE — 93325 DOPPLER ECHO COLOR FLOW MAPG: CPT | Performed by: INTERNAL MEDICINE

## 2024-12-07 PROCEDURE — 90945 DIALYSIS ONE EVALUATION: CPT | Performed by: INTERNAL MEDICINE

## 2024-12-07 PROCEDURE — 80076 HEPATIC FUNCTION PANEL: CPT

## 2024-12-07 PROCEDURE — 82947 ASSAY GLUCOSE BLOOD QUANT: CPT

## 2024-12-07 PROCEDURE — 85384 FIBRINOGEN ACTIVITY: CPT

## 2024-12-07 PROCEDURE — 85610 PROTHROMBIN TIME: CPT

## 2024-12-07 PROCEDURE — 93308 TTE F-UP OR LMTD: CPT

## 2024-12-07 PROCEDURE — P9016 RBC LEUKOCYTES REDUCED: HCPCS

## 2024-12-07 PROCEDURE — 36430 TRANSFUSION BLD/BLD COMPNT: CPT

## 2024-12-07 PROCEDURE — 33949 ECMO/ECLS DAILY MGMT ARTERY: CPT

## 2024-12-07 PROCEDURE — 82803 BLOOD GASES ANY COMBINATION: CPT

## 2024-12-07 RX ORDER — POTASSIUM CHLORIDE 29.8 MG/ML
20 INJECTION INTRAVENOUS ONCE
Status: COMPLETED | OUTPATIENT
Start: 2024-12-07 | End: 2024-12-07

## 2024-12-07 RX ADMIN — Medication 2000 ML/HR: at 15:16

## 2024-12-07 RX ADMIN — DEXMEDETOMIDINE HYDROCHLORIDE 0.4 MCG/KG/HR: 400 INJECTION, SOLUTION INTRAVENOUS at 19:53

## 2024-12-07 RX ADMIN — Medication 150 MCG/HR: at 21:34

## 2024-12-07 RX ADMIN — Medication 150 MCG/HR: at 03:51

## 2024-12-07 RX ADMIN — SODIUM CHLORIDE, PRESERVATIVE FREE 40 MG: 5 INJECTION INTRAVENOUS at 21:42

## 2024-12-07 RX ADMIN — Medication 2000 ML/HR: at 06:22

## 2024-12-07 RX ADMIN — Medication 30 UNITS: at 05:29

## 2024-12-07 RX ADMIN — Medication 2000 ML/HR: at 15:17

## 2024-12-07 RX ADMIN — PIPERACILLIN AND TAZOBACTAM 3375 MG: 3; .375 INJECTION, POWDER, LYOPHILIZED, FOR SOLUTION INTRAVENOUS at 18:33

## 2024-12-07 RX ADMIN — TICAGRELOR 90 MG: 90 TABLET ORAL at 21:42

## 2024-12-07 RX ADMIN — PIPERACILLIN AND TAZOBACTAM 3375 MG: 3; .375 INJECTION, POWDER, LYOPHILIZED, FOR SOLUTION INTRAVENOUS at 02:24

## 2024-12-07 RX ADMIN — SODIUM CHLORIDE, PRESERVATIVE FREE 10 ML: 5 INJECTION INTRAVENOUS at 11:31

## 2024-12-07 RX ADMIN — SODIUM CHLORIDE: 9 INJECTION, SOLUTION INTRAVENOUS at 11:37

## 2024-12-07 RX ADMIN — PIPERACILLIN AND TAZOBACTAM 3375 MG: 3; .375 INJECTION, POWDER, LYOPHILIZED, FOR SOLUTION INTRAVENOUS at 11:38

## 2024-12-07 RX ADMIN — SODIUM CHLORIDE 1500 MG: 9 INJECTION, SOLUTION INTRAVENOUS at 00:52

## 2024-12-07 RX ADMIN — TICAGRELOR 90 MG: 90 TABLET ORAL at 11:30

## 2024-12-07 RX ADMIN — DEXMEDETOMIDINE HYDROCHLORIDE 0.4 MCG/KG/HR: 400 INJECTION, SOLUTION INTRAVENOUS at 03:32

## 2024-12-07 RX ADMIN — Medication 2000 ML/HR: at 06:20

## 2024-12-07 RX ADMIN — Medication 2000 ML/HR: at 15:14

## 2024-12-07 RX ADMIN — POTASSIUM CHLORIDE 20 MEQ: 29.8 INJECTION, SOLUTION INTRAVENOUS at 17:06

## 2024-12-07 RX ADMIN — ASPIRIN 81 MG 81 MG: 81 TABLET ORAL at 11:30

## 2024-12-07 RX ADMIN — SODIUM CHLORIDE, PRESERVATIVE FREE 40 MG: 5 INJECTION INTRAVENOUS at 11:31

## 2024-12-07 RX ADMIN — Medication 2000 ML/HR: at 06:21

## 2024-12-07 RX ADMIN — Medication 0.5 MG/MIN: at 13:14

## 2024-12-07 RX ADMIN — Medication 7 MG/HR: at 01:11

## 2024-12-07 RX ADMIN — ATORVASTATIN CALCIUM 40 MG: 40 TABLET, FILM COATED ORAL at 21:42

## 2024-12-07 RX ADMIN — VANCOMYCIN HYDROCHLORIDE 1250 MG: 1.25 INJECTION, POWDER, LYOPHILIZED, FOR SOLUTION INTRAVENOUS at 18:21

## 2024-12-07 RX ADMIN — DEXMEDETOMIDINE HYDROCHLORIDE 0.4 MCG/KG/HR: 400 INJECTION, SOLUTION INTRAVENOUS at 12:24

## 2024-12-07 ASSESSMENT — PULMONARY FUNCTION TESTS
PIF_VALUE: 19
PIF_VALUE: 20
PIF_VALUE: 20
PIF_VALUE: 19
PIF_VALUE: 20
PIF_VALUE: 20
PIF_VALUE: 19

## 2024-12-08 ENCOUNTER — APPOINTMENT (OUTPATIENT)
Dept: GENERAL RADIOLOGY | Age: 74
DRG: 003 | End: 2024-12-08
Payer: MEDICARE

## 2024-12-08 LAB
ACT BLD: 211 SEC (ref 79–149)
ACT BLD: 211 SEC (ref 79–149)
ACT BLD: 224 SEC (ref 79–149)
ALBUMIN SERPL-MCNC: 2.5 G/DL (ref 3.5–5.2)
ALBUMIN SERPL-MCNC: 2.6 G/DL (ref 3.5–5.2)
ALBUMIN/GLOB SERPL: 1.1 {RATIO} (ref 1–2.5)
ALBUMIN/GLOB SERPL: 1.2 {RATIO} (ref 1–2.5)
ALLEN TEST: ABNORMAL
ALP SERPL-CCNC: 160 U/L (ref 40–129)
ALP SERPL-CCNC: 210 U/L (ref 40–129)
ALT SERPL-CCNC: 45 U/L (ref 10–50)
ALT SERPL-CCNC: 48 U/L (ref 10–50)
ANION GAP SERPL CALCULATED.3IONS-SCNC: 10 MMOL/L (ref 9–16)
ANION GAP SERPL CALCULATED.3IONS-SCNC: 11 MMOL/L (ref 9–16)
ANION GAP SERPL CALCULATED.3IONS-SCNC: 11 MMOL/L (ref 9–16)
ANION GAP SERPL CALCULATED.3IONS-SCNC: 9 MMOL/L (ref 9–16)
AST SERPL-CCNC: 60 U/L (ref 10–50)
AST SERPL-CCNC: 74 U/L (ref 10–50)
BILIRUB DIRECT SERPL-MCNC: 0.8 MG/DL (ref 0–0.2)
BILIRUB DIRECT SERPL-MCNC: 1 MG/DL (ref 0–0.2)
BILIRUB INDIRECT SERPL-MCNC: 0.6 MG/DL (ref 0–1)
BILIRUB INDIRECT SERPL-MCNC: 0.7 MG/DL (ref 0–1)
BILIRUB SERPL-MCNC: 1.5 MG/DL (ref 0–1.2)
BILIRUB SERPL-MCNC: 1.6 MG/DL (ref 0–1.2)
BUN SERPL-MCNC: 31 MG/DL (ref 8–23)
BUN SERPL-MCNC: 31 MG/DL (ref 8–23)
BUN SERPL-MCNC: 33 MG/DL (ref 8–23)
BUN SERPL-MCNC: 33 MG/DL (ref 8–23)
CA-I BLD-SCNC: 1.21 MMOL/L (ref 1.13–1.33)
CA-I BLD-SCNC: 1.21 MMOL/L (ref 1.13–1.33)
CA-I BLD-SCNC: 1.22 MMOL/L (ref 1.13–1.33)
CA-I BLD-SCNC: 1.25 MMOL/L (ref 1.13–1.33)
CALCIUM SERPL-MCNC: 7.9 MG/DL (ref 8.6–10.4)
CALCIUM SERPL-MCNC: 8.5 MG/DL (ref 8.6–10.4)
CALCIUM SERPL-MCNC: 8.6 MG/DL (ref 8.6–10.4)
CALCIUM SERPL-MCNC: 9 MG/DL (ref 8.6–10.4)
CHLORIDE SERPL-SCNC: 103 MMOL/L (ref 98–107)
CHLORIDE SERPL-SCNC: 104 MMOL/L (ref 98–107)
CHLORIDE SERPL-SCNC: 104 MMOL/L (ref 98–107)
CHLORIDE SERPL-SCNC: 105 MMOL/L (ref 98–107)
CLOT ANGLE.KAOLIN INDUCED BLD RES TEG: 64 DEG (ref 63–78)
CLOT ANGLE.KAOLIN INDUCED BLD RES TEG: 65.4 DEG (ref 63–78)
CO2 SERPL-SCNC: 24 MMOL/L (ref 20–31)
CO2 SERPL-SCNC: 24 MMOL/L (ref 20–31)
CO2 SERPL-SCNC: 25 MMOL/L (ref 20–31)
CO2 SERPL-SCNC: 25 MMOL/L (ref 20–31)
CREAT SERPL-MCNC: 2 MG/DL (ref 0.7–1.2)
CREAT SERPL-MCNC: 2.1 MG/DL (ref 0.7–1.2)
ERYTHROCYTE [DISTWIDTH] IN BLOOD BY AUTOMATED COUNT: 15.7 % (ref 11.8–14.4)
ERYTHROCYTE [DISTWIDTH] IN BLOOD BY AUTOMATED COUNT: 16.1 % (ref 11.8–14.4)
ERYTHROCYTE [DISTWIDTH] IN BLOOD BY AUTOMATED COUNT: 16.6 % (ref 11.8–14.4)
ERYTHROCYTE [DISTWIDTH] IN BLOOD BY AUTOMATED COUNT: 17 % (ref 11.8–14.4)
FIBRINOGEN PPP-MCNC: 399 MG/DL (ref 203–521)
FIBRINOGEN PPP-MCNC: 456 MG/DL (ref 203–521)
FIBRINOGEN PPP-MCNC: 518 MG/DL (ref 203–521)
FIBRINOGEN PPP-MCNC: 527 MG/DL (ref 203–521)
FIBRINOGEN, FUNCTIONAL TEG: 35.2 MM (ref 15–32)
FIBRINOGEN, FUNCTIONAL TEG: 42.3 MM (ref 15–32)
FIO2: 100
GFR, ESTIMATED: 33 ML/MIN/1.73M2
GFR, ESTIMATED: 35 ML/MIN/1.73M2
GLOBULIN SER CALC-MCNC: 2.2 G/DL
GLOBULIN SER CALC-MCNC: 2.2 G/DL
GLUCOSE BLD-MCNC: 100 MG/DL (ref 75–110)
GLUCOSE BLD-MCNC: 115 MG/DL (ref 75–110)
GLUCOSE BLD-MCNC: 117 MG/DL (ref 75–110)
GLUCOSE BLD-MCNC: 119 MG/DL (ref 75–110)
GLUCOSE BLD-MCNC: 121 MG/DL (ref 75–110)
GLUCOSE BLD-MCNC: 127 MG/DL (ref 75–110)
GLUCOSE BLD-MCNC: 143 MG/DL (ref 75–110)
GLUCOSE BLD-MCNC: 153 MG/DL (ref 75–110)
GLUCOSE BLD-MCNC: 160 MG/DL (ref 75–110)
GLUCOSE BLD-MCNC: 163 MG/DL (ref 75–110)
GLUCOSE BLD-MCNC: 170 MG/DL (ref 75–110)
GLUCOSE BLD-MCNC: 174 MG/DL (ref 74–100)
GLUCOSE BLD-MCNC: 179 MG/DL (ref 74–100)
GLUCOSE BLD-MCNC: 182 MG/DL (ref 74–100)
GLUCOSE BLD-MCNC: 183 MG/DL (ref 74–100)
GLUCOSE BLD-MCNC: 191 MG/DL (ref 74–100)
GLUCOSE BLD-MCNC: 198 MG/DL (ref 74–100)
GLUCOSE BLD-MCNC: 202 MG/DL (ref 74–100)
GLUCOSE BLD-MCNC: 97 MG/DL (ref 75–110)
GLUCOSE SERPL-MCNC: 163 MG/DL (ref 74–99)
GLUCOSE SERPL-MCNC: 167 MG/DL (ref 74–99)
GLUCOSE SERPL-MCNC: 175 MG/DL (ref 74–99)
GLUCOSE SERPL-MCNC: 181 MG/DL (ref 74–99)
HBCO, MIXED, EXTENDED: 0.8 % (ref 0–5)
HCT VFR BLD AUTO: 22.9 % (ref 40.7–50.3)
HCT VFR BLD AUTO: 25.9 % (ref 40.7–50.3)
HCT VFR BLD AUTO: 25.9 % (ref 40.7–50.3)
HCT VFR BLD AUTO: 26 % (ref 40.7–50.3)
HCT VFR BLD AUTO: 26.2 % (ref 40.7–50.3)
HEMOGLOBIN, MIXED, EXTENDED: 12.6 G/DL (ref 12–18)
HGB BLD-MCNC: 7.7 G/DL (ref 13–17)
HGB BLD-MCNC: 8.5 G/DL (ref 13–17)
HGB BLD-MCNC: 8.6 G/DL (ref 13–17)
HGB FREE PLAS-MCNC: 80.5 MG/DL (ref 0–9.7)
INR PPP: 3.5
INR PPP: 3.7
INR PPP: 3.8
INR PPP: 4
KINETICS TEG: 2 MIN (ref 0.8–2.1)
KINETICS TEG: 2.1 MIN (ref 0.8–2.1)
LACTIC ACID, WHOLE BLOOD: 1.6 MMOL/L (ref 0.7–2.1)
LACTIC ACID, WHOLE BLOOD: 1.7 MMOL/L (ref 0.7–2.1)
LACTIC ACID, WHOLE BLOOD: 2.2 MMOL/L (ref 0.7–2.1)
LACTIC ACID, WHOLE BLOOD: 2.5 MMOL/L (ref 0.7–2.1)
MA (MAX CLOT) TEG: 66.8 MM (ref 52–69)
MA (MAX CLOT) TEG: 68.6 MM (ref 52–69)
MA(MAX CLOT) RAPID TEG: 66.6 MM (ref 52–70)
MA(MAX CLOT) RAPID TEG: 68.4 MM (ref 52–70)
MAGNESIUM SERPL-MCNC: 2 MG/DL (ref 1.6–2.4)
MCH RBC QN AUTO: 29.9 PG (ref 25.2–33.5)
MCH RBC QN AUTO: 30.2 PG (ref 25.2–33.5)
MCH RBC QN AUTO: 30.4 PG (ref 25.2–33.5)
MCH RBC QN AUTO: 31 PG (ref 25.2–33.5)
MCHC RBC AUTO-ENTMCNC: 32.8 G/DL (ref 28.4–34.8)
MCHC RBC AUTO-ENTMCNC: 33.1 G/DL (ref 28.4–34.8)
MCHC RBC AUTO-ENTMCNC: 33.2 G/DL (ref 28.4–34.8)
MCHC RBC AUTO-ENTMCNC: 33.6 G/DL (ref 28.4–34.8)
MCV RBC AUTO: 91 FL (ref 82.6–102.9)
MCV RBC AUTO: 91.2 FL (ref 82.6–102.9)
MCV RBC AUTO: 91.5 FL (ref 82.6–102.9)
MCV RBC AUTO: 92.3 FL (ref 82.6–102.9)
METHB, MIXED, EXTENDED: 1.1 % (ref 0–1.5)
METHEMOGLOBIN: 1.6 % (ref 0–1.5)
MICROORGANISM SPEC CULT: NORMAL
MICROORGANISM SPEC CULT: NORMAL
NRBC BLD-RTO: 2 PER 100 WBC
NRBC BLD-RTO: 2.7 PER 100 WBC
NRBC BLD-RTO: 2.9 PER 100 WBC
NRBC BLD-RTO: 3.4 PER 100 WBC
O2 CONTENT, MIXED, EXTENDED: 10 VOL % (ref 12–20)
OXYGEN STATUS: ABNORMAL
PARTIAL THROMBOPLASTIN TIME: 63 SEC (ref 23–36.5)
PARTIAL THROMBOPLASTIN TIME: 65 SEC (ref 23–36.5)
PARTIAL THROMBOPLASTIN TIME: 66.2 SEC (ref 23–36.5)
PARTIAL THROMBOPLASTIN TIME: 66.2 SEC (ref 23–36.5)
PARTIAL THROMBOPLASTIN TIME: 68 SEC (ref 23–36.5)
PERFORMING LOCATION: ABNORMAL
PERFORMING LOCATION: ABNORMAL
PLATELET # BLD AUTO: 67 K/UL (ref 138–453)
PLATELET # BLD AUTO: ABNORMAL K/UL (ref 138–453)
PLATELET, FLUORESCENCE: 70 K/UL (ref 138–453)
PLATELET, FLUORESCENCE: 78 K/UL (ref 138–453)
PLATELET, FLUORESCENCE: 84 K/UL (ref 138–453)
PLATELETS.RETICULATED NFR BLD AUTO: 10 % (ref 1.1–10.3)
PLATELETS.RETICULATED NFR BLD AUTO: 8 % (ref 1.1–10.3)
PLATELETS.RETICULATED NFR BLD AUTO: 8.4 % (ref 1.1–10.3)
PMV BLD AUTO: 11.7 FL (ref 8.1–13.5)
POC HCO3: 27 MMOL/L (ref 21–28)
POC HCO3: 27.1 MMOL/L (ref 21–28)
POC HCO3: 27.2 MMOL/L (ref 21–28)
POC HCO3: 27.3 MMOL/L (ref 21–28)
POC HCO3: 27.5 MMOL/L (ref 21–28)
POC HCO3: 28.4 MMOL/L (ref 21–28)
POC HCO3: 29.1 MMOL/L (ref 21–28)
POC HCO3: 30.6 MMOL/L (ref 21–28)
POC O2 SATURATION: 84.9 % (ref 94–98)
POC O2 SATURATION: 85 % (ref 94–98)
POC O2 SATURATION: 85.2 % (ref 94–98)
POC O2 SATURATION: 88 % (ref 94–98)
POC O2 SATURATION: 88.3 % (ref 94–98)
POC O2 SATURATION: 88.4 % (ref 94–98)
POC O2 SATURATION: 89.6 % (ref 94–98)
POC O2 SATURATION: 89.9 % (ref 94–98)
POC PCO2: 41.1 MM HG (ref 35–48)
POC PCO2: 41.8 MM HG (ref 35–48)
POC PCO2: 42.3 MM HG (ref 35–48)
POC PCO2: 42.4 MM HG (ref 35–48)
POC PCO2: 42.8 MM HG (ref 35–48)
POC PCO2: 43.2 MM HG (ref 35–48)
POC PCO2: 46.6 MM HG (ref 35–48)
POC PCO2: 49 MM HG (ref 35–48)
POC PH: 7.38 (ref 7.35–7.45)
POC PH: 7.39 (ref 7.35–7.45)
POC PH: 7.41 (ref 7.35–7.45)
POC PH: 7.42 (ref 7.35–7.45)
POC PH: 7.46 (ref 7.35–7.45)
POC PO2: 49.3 MM HG (ref 83–108)
POC PO2: 49.6 MM HG (ref 83–108)
POC PO2: 51.5 MM HG (ref 83–108)
POC PO2: 52.4 MM HG (ref 83–108)
POC PO2: 53.5 MM HG (ref 83–108)
POC PO2: 56.4 MM HG (ref 83–108)
POC PO2: 57.1 MM HG (ref 83–108)
POC PO2: 58.3 MM HG (ref 83–108)
POSITIVE BASE EXCESS, ART: 2.3 MMOL/L (ref 0–3)
POSITIVE BASE EXCESS, ART: 2.3 MMOL/L (ref 0–3)
POSITIVE BASE EXCESS, ART: 2.4 MMOL/L (ref 0–3)
POSITIVE BASE EXCESS, ART: 2.5 MMOL/L (ref 0–3)
POSITIVE BASE EXCESS, ART: 2.7 MMOL/L (ref 0–3)
POSITIVE BASE EXCESS, ART: 3.1 MMOL/L (ref 0–3)
POSITIVE BASE EXCESS, ART: 3.6 MMOL/L (ref 0–3)
POSITIVE BASE EXCESS, ART: 6.2 MMOL/L (ref 0–3)
POTASSIUM SERPL-SCNC: 3.5 MMOL/L (ref 3.7–5.3)
POTASSIUM SERPL-SCNC: 3.7 MMOL/L (ref 3.7–5.3)
POTASSIUM SERPL-SCNC: 3.8 MMOL/L (ref 3.7–5.3)
POTASSIUM SERPL-SCNC: 3.8 MMOL/L (ref 3.7–5.3)
PROCALCITONIN SERPL-MCNC: 6.37 NG/ML (ref 0–0.09)
PROT SERPL-MCNC: 4.7 G/DL (ref 6.6–8.7)
PROT SERPL-MCNC: 4.8 G/DL (ref 6.6–8.7)
PROTHROMBIN TIME: 34.2 SEC (ref 11.7–14.9)
PROTHROMBIN TIME: 35.6 SEC (ref 11.7–14.9)
PROTHROMBIN TIME: 36.2 SEC (ref 11.7–14.9)
PROTHROMBIN TIME: 37.9 SEC (ref 11.7–14.9)
RBC # BLD AUTO: 2.48 M/UL (ref 4.21–5.77)
RBC # BLD AUTO: 2.83 M/UL (ref 4.21–5.77)
RBC # BLD AUTO: 2.85 M/UL (ref 4.21–5.77)
RBC # BLD AUTO: 2.88 M/UL (ref 4.21–5.77)
REACTION TIME TEG W HEPARIN: 15.7 MIN (ref 4.3–8.3)
REACTION TIME TEG W HEPARIN: 15.7 MIN (ref 4.3–8.3)
REACTION TIME TEG: >17 MIN (ref 4.6–9.1)
REACTION TIME TEG: >17 MIN (ref 4.6–9.1)
SAMPLE SITE: ABNORMAL
SAO2 % BLDMV: 56 % (ref 60–80)
SERVICE CMNT-IMP: NORMAL
SERVICE CMNT-IMP: NORMAL
SODIUM SERPL-SCNC: 138 MMOL/L (ref 136–145)
SODIUM SERPL-SCNC: 139 MMOL/L (ref 136–145)
SPECIMEN DESCRIPTION: NORMAL
SPECIMEN DESCRIPTION: NORMAL
VANCOMYCIN SERPL-MCNC: 15.2 UG/ML (ref 5–40)
VANCOMYCIN SERPL-MCNC: 20.1 UG/ML (ref 5–40)
WBC OTHER # BLD: 11.4 K/UL (ref 3.5–11.3)
WBC OTHER # BLD: 11.9 K/UL (ref 3.5–11.3)
WBC OTHER # BLD: 8.8 K/UL (ref 3.5–11.3)
WBC OTHER # BLD: 9.7 K/UL (ref 3.5–11.3)

## 2024-12-08 PROCEDURE — 6360000002 HC RX W HCPCS: Performed by: INTERNAL MEDICINE

## 2024-12-08 PROCEDURE — 6360000002 HC RX W HCPCS

## 2024-12-08 PROCEDURE — 85610 PROTHROMBIN TIME: CPT

## 2024-12-08 PROCEDURE — 33949 ECMO/ECLS DAILY MGMT ARTERY: CPT

## 2024-12-08 PROCEDURE — P9016 RBC LEUKOCYTES REDUCED: HCPCS

## 2024-12-08 PROCEDURE — 6370000000 HC RX 637 (ALT 250 FOR IP)

## 2024-12-08 PROCEDURE — 83051 HEMOGLOBIN PLASMA: CPT

## 2024-12-08 PROCEDURE — 87186 SC STD MICRODIL/AGAR DIL: CPT

## 2024-12-08 PROCEDURE — 85300 ANTITHROMBIN III ACTIVITY: CPT

## 2024-12-08 PROCEDURE — 83605 ASSAY OF LACTIC ACID: CPT

## 2024-12-08 PROCEDURE — 82805 BLOOD GASES W/O2 SATURATION: CPT

## 2024-12-08 PROCEDURE — 87184 SC STD DISK METHOD PER PLATE: CPT

## 2024-12-08 PROCEDURE — 90945 DIALYSIS ONE EVALUATION: CPT | Performed by: INTERNAL MEDICINE

## 2024-12-08 PROCEDURE — 87077 CULTURE AEROBIC IDENTIFY: CPT

## 2024-12-08 PROCEDURE — 87070 CULTURE OTHR SPECIMN AEROBIC: CPT

## 2024-12-08 PROCEDURE — 85055 RETICULATED PLATELET ASSAY: CPT

## 2024-12-08 PROCEDURE — 36430 TRANSFUSION BLD/BLD COMPNT: CPT

## 2024-12-08 PROCEDURE — 6360000002 HC RX W HCPCS: Performed by: THORACIC SURGERY (CARDIOTHORACIC VASCULAR SURGERY)

## 2024-12-08 PROCEDURE — 6360000002 HC RX W HCPCS: Performed by: STUDENT IN AN ORGANIZED HEALTH CARE EDUCATION/TRAINING PROGRAM

## 2024-12-08 PROCEDURE — 2580000003 HC RX 258

## 2024-12-08 PROCEDURE — 80048 BASIC METABOLIC PNL TOTAL CA: CPT

## 2024-12-08 PROCEDURE — 2500000003 HC RX 250 WO HCPCS

## 2024-12-08 PROCEDURE — 89220 SPUTUM SPECIMEN COLLECTION: CPT

## 2024-12-08 PROCEDURE — 6370000000 HC RX 637 (ALT 250 FOR IP): Performed by: INTERNAL MEDICINE

## 2024-12-08 PROCEDURE — 94003 VENT MGMT INPAT SUBQ DAY: CPT

## 2024-12-08 PROCEDURE — 85730 THROMBOPLASTIN TIME PARTIAL: CPT

## 2024-12-08 PROCEDURE — 84145 PROCALCITONIN (PCT): CPT

## 2024-12-08 PROCEDURE — 82330 ASSAY OF CALCIUM: CPT

## 2024-12-08 PROCEDURE — 87641 MR-STAPH DNA AMP PROBE: CPT

## 2024-12-08 PROCEDURE — 85027 COMPLETE CBC AUTOMATED: CPT

## 2024-12-08 PROCEDURE — 83735 ASSAY OF MAGNESIUM: CPT

## 2024-12-08 PROCEDURE — 80202 ASSAY OF VANCOMYCIN: CPT

## 2024-12-08 PROCEDURE — 83050 HGB METHEMOGLOBIN QUAN: CPT

## 2024-12-08 PROCEDURE — 99291 CRITICAL CARE FIRST HOUR: CPT | Performed by: INTERNAL MEDICINE

## 2024-12-08 PROCEDURE — 71045 X-RAY EXAM CHEST 1 VIEW: CPT

## 2024-12-08 PROCEDURE — 80076 HEPATIC FUNCTION PANEL: CPT

## 2024-12-08 PROCEDURE — 85576 BLOOD PLATELET AGGREGATION: CPT

## 2024-12-08 PROCEDURE — 99233 SBSQ HOSP IP/OBS HIGH 50: CPT | Performed by: INTERNAL MEDICINE

## 2024-12-08 PROCEDURE — 94761 N-INVAS EAR/PLS OXIMETRY MLT: CPT

## 2024-12-08 PROCEDURE — 85384 FIBRINOGEN ACTIVITY: CPT

## 2024-12-08 PROCEDURE — 93005 ELECTROCARDIOGRAM TRACING: CPT | Performed by: STUDENT IN AN ORGANIZED HEALTH CARE EDUCATION/TRAINING PROGRAM

## 2024-12-08 PROCEDURE — 85347 COAGULATION TIME ACTIVATED: CPT

## 2024-12-08 PROCEDURE — 85018 HEMOGLOBIN: CPT

## 2024-12-08 PROCEDURE — 82375 ASSAY CARBOXYHB QUANT: CPT

## 2024-12-08 PROCEDURE — 2700000000 HC OXYGEN THERAPY PER DAY

## 2024-12-08 PROCEDURE — 87205 SMEAR GRAM STAIN: CPT

## 2024-12-08 PROCEDURE — 85014 HEMATOCRIT: CPT

## 2024-12-08 PROCEDURE — 82803 BLOOD GASES ANY COMBINATION: CPT

## 2024-12-08 PROCEDURE — 2100000001 HC CVICU R&B

## 2024-12-08 RX ORDER — INSULIN LISPRO 100 [IU]/ML
0-8 INJECTION, SOLUTION INTRAVENOUS; SUBCUTANEOUS EVERY 6 HOURS
Status: DISCONTINUED | OUTPATIENT
Start: 2024-12-08 | End: 2024-12-17

## 2024-12-08 RX ORDER — SODIUM CHLORIDE 9 MG/ML
INJECTION, SOLUTION INTRAVENOUS PRN
Status: DISCONTINUED | OUTPATIENT
Start: 2024-12-08 | End: 2024-12-09

## 2024-12-08 RX ORDER — FENTANYL CITRATE 50 UG/ML
100 INJECTION, SOLUTION INTRAMUSCULAR; INTRAVENOUS
Status: DISCONTINUED | OUTPATIENT
Start: 2024-12-08 | End: 2024-12-17

## 2024-12-08 RX ORDER — FENTANYL CITRATE 50 UG/ML
50 INJECTION, SOLUTION INTRAMUSCULAR; INTRAVENOUS
Status: DISCONTINUED | OUTPATIENT
Start: 2024-12-08 | End: 2024-12-17

## 2024-12-08 RX ADMIN — Medication 2000 ML/HR: at 11:30

## 2024-12-08 RX ADMIN — Medication 2000 ML/HR: at 23:41

## 2024-12-08 RX ADMIN — Medication 30 UNITS: at 04:34

## 2024-12-08 RX ADMIN — TICAGRELOR 90 MG: 90 TABLET ORAL at 22:30

## 2024-12-08 RX ADMIN — SODIUM CHLORIDE, PRESERVATIVE FREE 10 ML: 5 INJECTION INTRAVENOUS at 09:53

## 2024-12-08 RX ADMIN — Medication 8 MCG/MIN: at 02:26

## 2024-12-08 RX ADMIN — Medication 2000 ML/HR: at 16:11

## 2024-12-08 RX ADMIN — ARGATROBAN 0.35 MCG/KG/MIN: 50 INJECTION INTRAVENOUS at 20:27

## 2024-12-08 RX ADMIN — ASPIRIN 81 MG 81 MG: 81 TABLET ORAL at 09:52

## 2024-12-08 RX ADMIN — Medication 0.5 MG/MIN: at 20:27

## 2024-12-08 RX ADMIN — Medication 30 UNITS: at 04:30

## 2024-12-08 RX ADMIN — Medication 2000 ML/HR: at 23:40

## 2024-12-08 RX ADMIN — Medication 2000 ML/HR: at 01:00

## 2024-12-08 RX ADMIN — Medication 0.5 MG/MIN: at 03:20

## 2024-12-08 RX ADMIN — AMIODARONE HYDROCHLORIDE 150 MG: 50 INJECTION, SOLUTION INTRAVENOUS at 03:06

## 2024-12-08 RX ADMIN — Medication 2000 ML/HR: at 09:30

## 2024-12-08 RX ADMIN — ARGATROBAN 0.25 MCG/KG/MIN: 50 INJECTION INTRAVENOUS at 01:07

## 2024-12-08 RX ADMIN — PIPERACILLIN AND TAZOBACTAM 3375 MG: 3; .375 INJECTION, POWDER, LYOPHILIZED, FOR SOLUTION INTRAVENOUS at 18:26

## 2024-12-08 RX ADMIN — Medication 4 MG/HR: at 06:53

## 2024-12-08 RX ADMIN — TICAGRELOR 90 MG: 90 TABLET ORAL at 09:53

## 2024-12-08 RX ADMIN — Medication 2000 ML/HR: at 12:29

## 2024-12-08 RX ADMIN — ATORVASTATIN CALCIUM 40 MG: 40 TABLET, FILM COATED ORAL at 22:30

## 2024-12-08 RX ADMIN — VANCOMYCIN HYDROCHLORIDE 1250 MG: 1.25 INJECTION, POWDER, LYOPHILIZED, FOR SOLUTION INTRAVENOUS at 23:26

## 2024-12-08 RX ADMIN — Medication 100 MCG/HR: at 16:56

## 2024-12-08 RX ADMIN — Medication 2000 ML/HR: at 01:02

## 2024-12-08 RX ADMIN — FENTANYL CITRATE 100 MCG: 50 INJECTION, SOLUTION INTRAMUSCULAR; INTRAVENOUS at 22:16

## 2024-12-08 RX ADMIN — SODIUM CHLORIDE, PRESERVATIVE FREE 40 MG: 5 INJECTION INTRAVENOUS at 22:30

## 2024-12-08 RX ADMIN — Medication 2000 ML/HR: at 01:01

## 2024-12-08 RX ADMIN — PIPERACILLIN AND TAZOBACTAM 3375 MG: 3; .375 INJECTION, POWDER, LYOPHILIZED, FOR SOLUTION INTRAVENOUS at 12:50

## 2024-12-08 RX ADMIN — PIPERACILLIN AND TAZOBACTAM 3375 MG: 3; .375 INJECTION, POWDER, LYOPHILIZED, FOR SOLUTION INTRAVENOUS at 02:07

## 2024-12-08 RX ADMIN — Medication 2000 ML/HR: at 16:14

## 2024-12-08 RX ADMIN — Medication 2000 ML/HR: at 23:42

## 2024-12-08 RX ADMIN — ARGATROBAN 0.25 MCG/KG/MIN: 50 INJECTION INTRAVENOUS at 09:09

## 2024-12-08 RX ADMIN — Medication 30 UNITS: at 04:35

## 2024-12-08 RX ADMIN — SODIUM CHLORIDE, PRESERVATIVE FREE 40 MG: 5 INJECTION INTRAVENOUS at 09:52

## 2024-12-08 RX ADMIN — SODIUM CHLORIDE 4.9 UNITS/HR: 9 INJECTION, SOLUTION INTRAVENOUS at 03:08

## 2024-12-08 RX ADMIN — Medication 2000 ML/HR: at 16:15

## 2024-12-08 ASSESSMENT — PULMONARY FUNCTION TESTS
PIF_VALUE: 20
PIF_VALUE: 19
PIF_VALUE: 22
PIF_VALUE: 19
PIF_VALUE: 19
PIF_VALUE: 21
PIF_VALUE: 21
PIF_VALUE: 24

## 2024-12-09 ENCOUNTER — APPOINTMENT (OUTPATIENT)
Dept: GENERAL RADIOLOGY | Age: 74
DRG: 003 | End: 2024-12-09
Payer: MEDICARE

## 2024-12-09 ENCOUNTER — APPOINTMENT (OUTPATIENT)
Age: 74
DRG: 003 | End: 2024-12-09
Payer: MEDICARE

## 2024-12-09 LAB
ALBUMIN SERPL-MCNC: 2.6 G/DL (ref 3.5–5.2)
ALBUMIN SERPL-MCNC: 2.6 G/DL (ref 3.5–5.2)
ALBUMIN/GLOB SERPL: 1.1 {RATIO} (ref 1–2.5)
ALBUMIN/GLOB SERPL: 1.2 {RATIO} (ref 1–2.5)
ALP SERPL-CCNC: 219 U/L (ref 40–129)
ALP SERPL-CCNC: 230 U/L (ref 40–129)
ALT SERPL-CCNC: 43 U/L (ref 10–50)
ALT SERPL-CCNC: 44 U/L (ref 10–50)
ANION GAP SERPL CALCULATED.3IONS-SCNC: 12 MMOL/L (ref 9–16)
ANION GAP SERPL CALCULATED.3IONS-SCNC: 13 MMOL/L (ref 9–16)
ANION GAP SERPL CALCULATED.3IONS-SCNC: 14 MMOL/L (ref 9–16)
AST SERPL-CCNC: 70 U/L (ref 10–50)
AST SERPL-CCNC: 72 U/L (ref 10–50)
AT III ACT/NOR PPP CHRO: 27 % (ref 83–122)
AT III ACT/NOR PPP CHRO: 29 % (ref 83–122)
AT III ACT/NOR PPP CHRO: 40 % (ref 83–122)
AT III ACT/NOR PPP CHRO: 60 % (ref 83–122)
BILIRUB DIRECT SERPL-MCNC: 0.9 MG/DL (ref 0–0.2)
BILIRUB DIRECT SERPL-MCNC: 0.9 MG/DL (ref 0–0.2)
BILIRUB INDIRECT SERPL-MCNC: 0.4 MG/DL (ref 0–1)
BILIRUB INDIRECT SERPL-MCNC: 0.6 MG/DL (ref 0–1)
BILIRUB SERPL-MCNC: 1.3 MG/DL (ref 0–1.2)
BILIRUB SERPL-MCNC: 1.5 MG/DL (ref 0–1.2)
BUN SERPL-MCNC: 32 MG/DL (ref 8–23)
BUN SERPL-MCNC: 33 MG/DL (ref 8–23)
BUN SERPL-MCNC: 39 MG/DL (ref 8–23)
CA-I BLD-SCNC: 1.24 MMOL/L (ref 1.13–1.33)
CA-I BLD-SCNC: 1.24 MMOL/L (ref 1.13–1.33)
CA-I BLD-SCNC: 1.25 MMOL/L (ref 1.13–1.33)
CALCIUM SERPL-MCNC: 8.7 MG/DL (ref 8.6–10.4)
CALCIUM SERPL-MCNC: 9 MG/DL (ref 8.6–10.4)
CALCIUM SERPL-MCNC: 9.2 MG/DL (ref 8.6–10.4)
CHLORIDE SERPL-SCNC: 101 MMOL/L (ref 98–107)
CHLORIDE SERPL-SCNC: 102 MMOL/L (ref 98–107)
CHLORIDE SERPL-SCNC: 103 MMOL/L (ref 98–107)
CLOT ANGLE.KAOLIN INDUCED BLD RES TEG: 58.5 DEG (ref 63–78)
CO2 SERPL-SCNC: 22 MMOL/L (ref 20–31)
CO2 SERPL-SCNC: 23 MMOL/L (ref 20–31)
CO2 SERPL-SCNC: 24 MMOL/L (ref 20–31)
CREAT SERPL-MCNC: 1.9 MG/DL (ref 0.7–1.2)
CREAT SERPL-MCNC: 2 MG/DL (ref 0.7–1.2)
CREAT SERPL-MCNC: 2.2 MG/DL (ref 0.7–1.2)
ECHO AV MEAN GRADIENT: 7 MMHG
ECHO AV MEAN VELOCITY: 1.2 M/S
ECHO AV PEAK GRADIENT: 14 MMHG
ECHO AV PEAK VELOCITY: 1.9 M/S
ECHO AV VTI: 25.8 CM
ECHO BSA: 2.7 M2
ECHO BSA: 2.7 M2
ECHO LV EF PHYSICIAN: 55 %
ECHO RV INTERNAL DIMENSION: 4.6 CM
EKG ATRIAL RATE: 91 BPM
EKG Q-T INTERVAL: 430 MS
EKG QRS DURATION: 98 MS
EKG QTC CALCULATION (BAZETT): 490 MS
EKG R AXIS: -18 DEGREES
EKG T AXIS: 34 DEGREES
EKG VENTRICULAR RATE: 78 BPM
ERYTHROCYTE [DISTWIDTH] IN BLOOD BY AUTOMATED COUNT: 17 % (ref 11.8–14.4)
ERYTHROCYTE [DISTWIDTH] IN BLOOD BY AUTOMATED COUNT: 17.2 % (ref 11.8–14.4)
ERYTHROCYTE [DISTWIDTH] IN BLOOD BY AUTOMATED COUNT: 17.2 % (ref 11.8–14.4)
FIBRINOGEN PPP-MCNC: 515 MG/DL (ref 203–521)
FIBRINOGEN PPP-MCNC: 546 MG/DL (ref 203–521)
FIBRINOGEN PPP-MCNC: 552 MG/DL (ref 203–521)
FIBRINOGEN, FUNCTIONAL TEG: 44.2 MM (ref 15–32)
FIO2: 100
GFR, ESTIMATED: 31 ML/MIN/1.73M2
GFR, ESTIMATED: 35 ML/MIN/1.73M2
GFR, ESTIMATED: 37 ML/MIN/1.73M2
GLOBULIN SER CALC-MCNC: 2.2 G/DL
GLOBULIN SER CALC-MCNC: 2.4 G/DL
GLUCOSE BLD-MCNC: 143 MG/DL (ref 74–100)
GLUCOSE BLD-MCNC: 154 MG/DL (ref 74–100)
GLUCOSE BLD-MCNC: 155 MG/DL (ref 74–100)
GLUCOSE BLD-MCNC: 155 MG/DL (ref 74–100)
GLUCOSE BLD-MCNC: 169 MG/DL (ref 75–110)
GLUCOSE SERPL-MCNC: 146 MG/DL (ref 74–99)
GLUCOSE SERPL-MCNC: 149 MG/DL (ref 74–99)
GLUCOSE SERPL-MCNC: 186 MG/DL (ref 74–99)
HCO3 VENOUS: 28.5 MMOL/L (ref 22–29)
HCT VFR BLD AUTO: 24.9 % (ref 40.7–50.3)
HCT VFR BLD AUTO: 25.1 % (ref 40.7–50.3)
HCT VFR BLD AUTO: 26.2 % (ref 40.7–50.3)
HGB BLD-MCNC: 8.2 G/DL (ref 13–17)
HGB BLD-MCNC: 8.3 G/DL (ref 13–17)
HGB BLD-MCNC: 8.6 G/DL (ref 13–17)
HGB FREE PLAS-MCNC: 2.8 MG/DL (ref 0–9.7)
INR PPP: 2.2
INR PPP: 4.1
INR PPP: 4.4
KINETICS TEG: 2.6 MIN (ref 0.8–2.1)
LACTIC ACID, WHOLE BLOOD: 1.4 MMOL/L (ref 0.7–2.1)
LACTIC ACID, WHOLE BLOOD: 1.9 MMOL/L (ref 0.7–2.1)
LACTIC ACID, WHOLE BLOOD: 2 MMOL/L (ref 0.7–2.1)
MA (MAX CLOT) TEG: 67.6 MM (ref 52–69)
MA(MAX CLOT) RAPID TEG: 69.2 MM (ref 52–70)
MAGNESIUM SERPL-MCNC: 2 MG/DL (ref 1.6–2.4)
MAGNESIUM SERPL-MCNC: 2 MG/DL (ref 1.6–2.4)
MAGNESIUM SERPL-MCNC: 2.2 MG/DL (ref 1.6–2.4)
MCH RBC QN AUTO: 30.3 PG (ref 25.2–33.5)
MCH RBC QN AUTO: 30.5 PG (ref 25.2–33.5)
MCH RBC QN AUTO: 30.5 PG (ref 25.2–33.5)
MCHC RBC AUTO-ENTMCNC: 32.8 G/DL (ref 28.4–34.8)
MCHC RBC AUTO-ENTMCNC: 32.9 G/DL (ref 28.4–34.8)
MCHC RBC AUTO-ENTMCNC: 33.1 G/DL (ref 28.4–34.8)
MCV RBC AUTO: 91.9 FL (ref 82.6–102.9)
MCV RBC AUTO: 92.3 FL (ref 82.6–102.9)
MCV RBC AUTO: 92.9 FL (ref 82.6–102.9)
METHEMOGLOBIN: 1.2 % (ref 0–1.5)
MRSA, DNA, NASAL: NEGATIVE
NEGATIVE BASE EXCESS, ART: 1 MMOL/L (ref 0–2)
NRBC BLD-RTO: 2 PER 100 WBC
NRBC BLD-RTO: 2.3 PER 100 WBC
NRBC BLD-RTO: 3.2 PER 100 WBC
O2 SAT, VEN: 91.1 % (ref 60–85)
PARTIAL THROMBOPLASTIN TIME: 47.6 SEC (ref 23–36.5)
PARTIAL THROMBOPLASTIN TIME: 69.1 SEC (ref 23–36.5)
PARTIAL THROMBOPLASTIN TIME: 70.9 SEC (ref 23–36.5)
PARTIAL THROMBOPLASTIN TIME: 71.3 SEC (ref 23–36.5)
PCO2 VENOUS: 39.5 MM HG (ref 41–51)
PERFORMING LOCATION: ABNORMAL
PH VENOUS: 7.47 (ref 7.32–7.43)
PLATELET # BLD AUTO: 73 K/UL (ref 138–453)
PLATELET # BLD AUTO: ABNORMAL K/UL (ref 138–453)
PLATELET # BLD AUTO: ABNORMAL K/UL (ref 138–453)
PLATELET, FLUORESCENCE: 82 K/UL (ref 138–453)
PLATELET, FLUORESCENCE: 84 K/UL (ref 138–453)
PLATELETS.RETICULATED NFR BLD AUTO: 8.5 % (ref 1.1–10.3)
PLATELETS.RETICULATED NFR BLD AUTO: 9.6 % (ref 1.1–10.3)
PMV BLD AUTO: 11.7 FL (ref 8.1–13.5)
PO2 VENOUS: 57.7 MM HG (ref 30–50)
POC HCO3: 22.9 MMOL/L (ref 21–28)
POC HCO3: 24.6 MMOL/L (ref 21–28)
POC HCO3: 25.8 MMOL/L (ref 21–28)
POC HCO3: 26.5 MMOL/L (ref 21–28)
POC HCO3: 26.6 MMOL/L (ref 21–28)
POC HCO3: 27.5 MMOL/L (ref 21–28)
POC HCO3: 28.1 MMOL/L (ref 21–28)
POC HCO3: 28.2 MMOL/L (ref 21–28)
POC O2 SATURATION: 100 % (ref 94–98)
POC O2 SATURATION: 90.1 % (ref 94–98)
POC O2 SATURATION: 90.2 % (ref 94–98)
POC O2 SATURATION: 90.4 % (ref 94–98)
POC O2 SATURATION: 92.8 % (ref 94–98)
POC O2 SATURATION: 94.8 % (ref 94–98)
POC O2 SATURATION: 96 % (ref 94–98)
POC O2 SATURATION: ABNORMAL % (ref 94–98)
POC PCO2: 31 MM HG (ref 35–48)
POC PCO2: 33.7 MM HG (ref 35–48)
POC PCO2: 37.4 MM HG (ref 35–48)
POC PCO2: 37.5 MM HG (ref 35–48)
POC PCO2: 39.2 MM HG (ref 35–48)
POC PCO2: 39.4 MM HG (ref 35–48)
POC PCO2: 40.6 MM HG (ref 35–48)
POC PCO2: 41.6 MM HG (ref 35–48)
POC PH: 7.44 (ref 7.35–7.45)
POC PH: 7.45 (ref 7.35–7.45)
POC PH: 7.46 (ref 7.35–7.45)
POC PH: 7.51 (ref 7.35–7.45)
POC PO2: 56.4 MM HG (ref 83–108)
POC PO2: 56.7 MM HG (ref 83–108)
POC PO2: 57.1 MM HG (ref 83–108)
POC PO2: 624.5 MM HG (ref 83–108)
POC PO2: 63.2 MM HG (ref 83–108)
POC PO2: 72 MM HG (ref 83–108)
POC PO2: 77.3 MM HG (ref 83–108)
POC PO2: ABNORMAL MM HG (ref 83–108)
POSITIVE BASE EXCESS, ART: 1.6 MMOL/L (ref 0–3)
POSITIVE BASE EXCESS, ART: 1.9 MMOL/L (ref 0–3)
POSITIVE BASE EXCESS, ART: 2.2 MMOL/L (ref 0–3)
POSITIVE BASE EXCESS, ART: 2.6 MMOL/L (ref 0–3)
POSITIVE BASE EXCESS, ART: 3.2 MMOL/L (ref 0–3)
POSITIVE BASE EXCESS, ART: 3.7 MMOL/L (ref 0–3)
POSITIVE BASE EXCESS, ART: 3.8 MMOL/L (ref 0–3)
POSITIVE BASE EXCESS, VEN: 4.4 MMOL/L (ref 0–3)
POTASSIUM SERPL-SCNC: 3.6 MMOL/L (ref 3.7–5.3)
POTASSIUM SERPL-SCNC: 3.7 MMOL/L (ref 3.7–5.3)
POTASSIUM SERPL-SCNC: 3.8 MMOL/L (ref 3.7–5.3)
PROT SERPL-MCNC: 4.8 G/DL (ref 6.6–8.7)
PROT SERPL-MCNC: 5 G/DL (ref 6.6–8.7)
PROTHROMBIN TIME: 24.2 SEC (ref 11.7–14.9)
PROTHROMBIN TIME: 38.6 SEC (ref 11.7–14.9)
PROTHROMBIN TIME: 40.4 SEC (ref 11.7–14.9)
RBC # BLD AUTO: 2.71 M/UL (ref 4.21–5.77)
RBC # BLD AUTO: 2.72 M/UL (ref 4.21–5.77)
RBC # BLD AUTO: 2.82 M/UL (ref 4.21–5.77)
REACTION TIME TEG W HEPARIN: >17 MIN (ref 4.3–8.3)
REACTION TIME TEG: >17 MIN (ref 4.6–9.1)
SAMPLE SITE: ABNORMAL
SODIUM SERPL-SCNC: 137 MMOL/L (ref 136–145)
SODIUM SERPL-SCNC: 138 MMOL/L (ref 136–145)
SODIUM SERPL-SCNC: 139 MMOL/L (ref 136–145)
SPECIMEN DESCRIPTION: NORMAL
WBC OTHER # BLD: 12.7 K/UL (ref 3.5–11.3)
WBC OTHER # BLD: 12.7 K/UL (ref 3.5–11.3)
WBC OTHER # BLD: 13.3 K/UL (ref 3.5–11.3)

## 2024-12-09 PROCEDURE — 75630 X-RAY AORTA LEG ARTERIES: CPT | Performed by: STUDENT IN AN ORGANIZED HEALTH CARE EDUCATION/TRAINING PROGRAM

## 2024-12-09 PROCEDURE — 6370000000 HC RX 637 (ALT 250 FOR IP)

## 2024-12-09 PROCEDURE — 85730 THROMBOPLASTIN TIME PARTIAL: CPT

## 2024-12-09 PROCEDURE — 2700000000 HC OXYGEN THERAPY PER DAY

## 2024-12-09 PROCEDURE — 6360000002 HC RX W HCPCS

## 2024-12-09 PROCEDURE — 99291 CRITICAL CARE FIRST HOUR: CPT | Performed by: STUDENT IN AN ORGANIZED HEALTH CARE EDUCATION/TRAINING PROGRAM

## 2024-12-09 PROCEDURE — 85576 BLOOD PLATELET AGGREGATION: CPT

## 2024-12-09 PROCEDURE — 33968 REMOVE AORTIC ASSIST DEVICE: CPT | Performed by: STUDENT IN AN ORGANIZED HEALTH CARE EDUCATION/TRAINING PROGRAM

## 2024-12-09 PROCEDURE — 82947 ASSAY GLUCOSE BLOOD QUANT: CPT

## 2024-12-09 PROCEDURE — 85027 COMPLETE CBC AUTOMATED: CPT

## 2024-12-09 PROCEDURE — 33966 ECMO/ECLS RMVL PRPH CANNULA: CPT | Performed by: STUDENT IN AN ORGANIZED HEALTH CARE EDUCATION/TRAINING PROGRAM

## 2024-12-09 PROCEDURE — 93321 DOPPLER ECHO F-UP/LMTD STD: CPT | Performed by: INTERNAL MEDICINE

## 2024-12-09 PROCEDURE — 36245 INS CATH ABD/L-EXT ART 1ST: CPT | Performed by: STUDENT IN AN ORGANIZED HEALTH CARE EDUCATION/TRAINING PROGRAM

## 2024-12-09 PROCEDURE — 93005 ELECTROCARDIOGRAM TRACING: CPT | Performed by: STUDENT IN AN ORGANIZED HEALTH CARE EDUCATION/TRAINING PROGRAM

## 2024-12-09 PROCEDURE — 6360000002 HC RX W HCPCS: Performed by: THORACIC SURGERY (CARDIOTHORACIC VASCULAR SURGERY)

## 2024-12-09 PROCEDURE — 83051 HEMOGLOBIN PLASMA: CPT

## 2024-12-09 PROCEDURE — 2580000003 HC RX 258

## 2024-12-09 PROCEDURE — 93308 TTE F-UP OR LMTD: CPT | Performed by: INTERNAL MEDICINE

## 2024-12-09 PROCEDURE — 99233 SBSQ HOSP IP/OBS HIGH 50: CPT | Performed by: INTERNAL MEDICINE

## 2024-12-09 PROCEDURE — 33966 ECMO/ECLS RMVL PRPH CANNULA: CPT

## 2024-12-09 PROCEDURE — C1894 INTRO/SHEATH, NON-LASER: HCPCS | Performed by: STUDENT IN AN ORGANIZED HEALTH CARE EDUCATION/TRAINING PROGRAM

## 2024-12-09 PROCEDURE — 2709999900 HC NON-CHARGEABLE SUPPLY: Performed by: STUDENT IN AN ORGANIZED HEALTH CARE EDUCATION/TRAINING PROGRAM

## 2024-12-09 PROCEDURE — 71045 X-RAY EXAM CHEST 1 VIEW: CPT

## 2024-12-09 PROCEDURE — 6360000002 HC RX W HCPCS: Performed by: STUDENT IN AN ORGANIZED HEALTH CARE EDUCATION/TRAINING PROGRAM

## 2024-12-09 PROCEDURE — 82803 BLOOD GASES ANY COMBINATION: CPT

## 2024-12-09 PROCEDURE — 6370000000 HC RX 637 (ALT 250 FOR IP): Performed by: INTERNAL MEDICINE

## 2024-12-09 PROCEDURE — 82330 ASSAY OF CALCIUM: CPT

## 2024-12-09 PROCEDURE — 94003 VENT MGMT INPAT SUBQ DAY: CPT

## 2024-12-09 PROCEDURE — 2100000001 HC CVICU R&B

## 2024-12-09 PROCEDURE — 33948 ECMO/ECLS DAILY MGMT-VENOUS: CPT

## 2024-12-09 PROCEDURE — 85384 FIBRINOGEN ACTIVITY: CPT

## 2024-12-09 PROCEDURE — 94761 N-INVAS EAR/PLS OXIMETRY MLT: CPT

## 2024-12-09 PROCEDURE — 2500000003 HC RX 250 WO HCPCS

## 2024-12-09 PROCEDURE — 85610 PROTHROMBIN TIME: CPT

## 2024-12-09 PROCEDURE — 85055 RETICULATED PLATELET ASSAY: CPT

## 2024-12-09 PROCEDURE — 83605 ASSAY OF LACTIC ACID: CPT

## 2024-12-09 PROCEDURE — 83735 ASSAY OF MAGNESIUM: CPT

## 2024-12-09 PROCEDURE — C8924 2D TTE W OR W/O FOL W/CON,FU: HCPCS

## 2024-12-09 PROCEDURE — 83050 HGB METHEMOGLOBIN QUAN: CPT

## 2024-12-09 PROCEDURE — C1760 CLOSURE DEV, VASC: HCPCS | Performed by: STUDENT IN AN ORGANIZED HEALTH CARE EDUCATION/TRAINING PROGRAM

## 2024-12-09 PROCEDURE — C1769 GUIDE WIRE: HCPCS | Performed by: STUDENT IN AN ORGANIZED HEALTH CARE EDUCATION/TRAINING PROGRAM

## 2024-12-09 PROCEDURE — 85347 COAGULATION TIME ACTIVATED: CPT

## 2024-12-09 PROCEDURE — 6360000002 HC RX W HCPCS: Performed by: INTERNAL MEDICINE

## 2024-12-09 PROCEDURE — 93325 DOPPLER ECHO COLOR FLOW MAPG: CPT | Performed by: INTERNAL MEDICINE

## 2024-12-09 PROCEDURE — C1752 CATH,HEMODIALYSIS,SHORT-TERM: HCPCS | Performed by: STUDENT IN AN ORGANIZED HEALTH CARE EDUCATION/TRAINING PROGRAM

## 2024-12-09 PROCEDURE — C1725 CATH, TRANSLUMIN NON-LASER: HCPCS | Performed by: STUDENT IN AN ORGANIZED HEALTH CARE EDUCATION/TRAINING PROGRAM

## 2024-12-09 PROCEDURE — 80076 HEPATIC FUNCTION PANEL: CPT

## 2024-12-09 PROCEDURE — 2580000003 HC RX 258: Performed by: STUDENT IN AN ORGANIZED HEALTH CARE EDUCATION/TRAINING PROGRAM

## 2024-12-09 PROCEDURE — 80048 BASIC METABOLIC PNL TOTAL CA: CPT

## 2024-12-09 PROCEDURE — 90945 DIALYSIS ONE EVALUATION: CPT | Performed by: INTERNAL MEDICINE

## 2024-12-09 DEVICE — 14F X 24CM 400XL STRAIGHT CATHETER MAX BARRIER
Type: IMPLANTABLE DEVICE | Status: FUNCTIONAL
Brand: 400XL

## 2024-12-09 RX ORDER — SODIUM CHLORIDE 0.9 % (FLUSH) 0.9 %
5-40 SYRINGE (ML) INJECTION EVERY 12 HOURS SCHEDULED
Status: DISCONTINUED | OUTPATIENT
Start: 2024-12-09 | End: 2024-12-23 | Stop reason: HOSPADM

## 2024-12-09 RX ORDER — SODIUM CHLORIDE 9 MG/ML
INJECTION, SOLUTION INTRAVENOUS PRN
Status: DISCONTINUED | OUTPATIENT
Start: 2024-12-09 | End: 2024-12-18

## 2024-12-09 RX ORDER — METOCLOPRAMIDE HYDROCHLORIDE 5 MG/ML
10 INJECTION INTRAMUSCULAR; INTRAVENOUS EVERY 6 HOURS
Status: DISCONTINUED | OUTPATIENT
Start: 2024-12-09 | End: 2024-12-12

## 2024-12-09 RX ORDER — SODIUM CHLORIDE 9 MG/ML
INJECTION, SOLUTION INTRAVENOUS PRN
Status: DISCONTINUED | OUTPATIENT
Start: 2024-12-09 | End: 2024-12-14

## 2024-12-09 RX ORDER — MIDAZOLAM 1 MG/ML
INJECTION INTRAMUSCULAR; INTRAVENOUS PRN
Status: DISCONTINUED | OUTPATIENT
Start: 2024-12-09 | End: 2024-12-09 | Stop reason: HOSPADM

## 2024-12-09 RX ORDER — SODIUM CHLORIDE 0.9 % (FLUSH) 0.9 %
5-40 SYRINGE (ML) INJECTION PRN
Status: DISCONTINUED | OUTPATIENT
Start: 2024-12-09 | End: 2024-12-18

## 2024-12-09 RX ADMIN — METOCLOPRAMIDE HYDROCHLORIDE 10 MG: 5 INJECTION INTRAMUSCULAR; INTRAVENOUS at 19:45

## 2024-12-09 RX ADMIN — SODIUM CHLORIDE, PRESERVATIVE FREE 40 MG: 5 INJECTION INTRAVENOUS at 19:45

## 2024-12-09 RX ADMIN — Medication 2000 ML/HR: at 13:08

## 2024-12-09 RX ADMIN — TICAGRELOR 90 MG: 90 TABLET ORAL at 08:39

## 2024-12-09 RX ADMIN — Medication 0.5 MG/MIN: at 10:42

## 2024-12-09 RX ADMIN — ATORVASTATIN CALCIUM 40 MG: 40 TABLET, FILM COATED ORAL at 19:45

## 2024-12-09 RX ADMIN — Medication 2000 ML/HR: at 06:56

## 2024-12-09 RX ADMIN — SODIUM CHLORIDE, PRESERVATIVE FREE 40 MG: 5 INJECTION INTRAVENOUS at 08:36

## 2024-12-09 RX ADMIN — SODIUM CHLORIDE, PRESERVATIVE FREE 10 ML: 5 INJECTION INTRAVENOUS at 19:46

## 2024-12-09 RX ADMIN — ASPIRIN 81 MG 81 MG: 81 TABLET ORAL at 08:36

## 2024-12-09 RX ADMIN — INSULIN LISPRO 2 UNITS: 100 INJECTION, SOLUTION INTRAVENOUS; SUBCUTANEOUS at 20:45

## 2024-12-09 RX ADMIN — DOBUTAMINE HYDROCHLORIDE 2.5 MCG/KG/MIN: 400 INJECTION INTRAVENOUS at 05:23

## 2024-12-09 RX ADMIN — PIPERACILLIN AND TAZOBACTAM 3375 MG: 3; .375 INJECTION, POWDER, LYOPHILIZED, FOR SOLUTION INTRAVENOUS at 18:33

## 2024-12-09 RX ADMIN — Medication 2000 ML/HR: at 13:07

## 2024-12-09 RX ADMIN — PIPERACILLIN AND TAZOBACTAM 3375 MG: 3; .375 INJECTION, POWDER, LYOPHILIZED, FOR SOLUTION INTRAVENOUS at 09:38

## 2024-12-09 RX ADMIN — SODIUM CHLORIDE, PRESERVATIVE FREE 10 ML: 5 INJECTION INTRAVENOUS at 19:45

## 2024-12-09 RX ADMIN — POTASSIUM BICARBONATE 40 MEQ: 782 TABLET, EFFERVESCENT ORAL at 09:05

## 2024-12-09 RX ADMIN — PIPERACILLIN AND TAZOBACTAM 3375 MG: 3; .375 INJECTION, POWDER, LYOPHILIZED, FOR SOLUTION INTRAVENOUS at 02:47

## 2024-12-09 RX ADMIN — Medication 2000 ML/HR: at 13:09

## 2024-12-09 RX ADMIN — Medication 4 MG/HR: at 06:32

## 2024-12-09 RX ADMIN — TICAGRELOR 90 MG: 90 TABLET ORAL at 19:45

## 2024-12-09 RX ADMIN — Medication 100 MCG/HR: at 11:21

## 2024-12-09 RX ADMIN — Medication 2000 ML/HR: at 06:55

## 2024-12-09 RX ADMIN — Medication 30 UNITS: at 04:29

## 2024-12-09 RX ADMIN — Medication 2000 ML/HR: at 06:57

## 2024-12-09 RX ADMIN — PERFLUTREN 1.5 ML: 6.52 INJECTION, SUSPENSION INTRAVENOUS at 18:12

## 2024-12-09 RX ADMIN — FENTANYL CITRATE 100 MCG: 50 INJECTION, SOLUTION INTRAMUSCULAR; INTRAVENOUS at 00:28

## 2024-12-09 ASSESSMENT — PULMONARY FUNCTION TESTS
PIF_VALUE: 19
PIF_VALUE: 20

## 2024-12-09 NOTE — CONSENT
Verbal consent obtained over the phone from the wife (Anaya) for VA ECMO decannulation with possible transition to VV ECMO. All questions answered. Anaya gave verbal consent to proceed.     This was witnessed by the cath lab RNs, [facility] and [facility].       Nilson Grijalva MD  Fellow, cardiovascular diseases  [facility]  12/9/2024, 2:43 PM

## 2024-12-10 ENCOUNTER — APPOINTMENT (OUTPATIENT)
Dept: GENERAL RADIOLOGY | Age: 74
DRG: 003 | End: 2024-12-10
Payer: MEDICARE

## 2024-12-10 PROBLEM — R79.89 ELEVATED PROCALCITONIN: Status: ACTIVE | Noted: 2024-12-10

## 2024-12-10 PROBLEM — D72.825 BANDEMIA: Status: ACTIVE | Noted: 2024-12-10

## 2024-12-10 PROBLEM — I26.02 ACUTE SADDLE PULMONARY EMBOLISM WITH ACUTE COR PULMONALE (HCC): Status: ACTIVE | Noted: 2021-06-28

## 2024-12-10 PROBLEM — J69.0 ASPIRATION PNEUMONIA OF BOTH LOWER LOBES (HCC): Status: ACTIVE | Noted: 2024-12-10

## 2024-12-10 PROBLEM — E11.9 DIABETES MELLITUS (HCC): Status: ACTIVE | Noted: 2024-12-10

## 2024-12-10 PROBLEM — J96.01 ACUTE HYPOXEMIC RESPIRATORY FAILURE: Status: ACTIVE | Noted: 2020-03-17

## 2024-12-10 LAB
ALBUMIN SERPL-MCNC: 2.5 G/DL (ref 3.5–5.2)
ALBUMIN/GLOB SERPL: 1 {RATIO} (ref 1–2.5)
ALP SERPL-CCNC: 237 U/L (ref 40–129)
ALT SERPL-CCNC: 40 U/L (ref 10–50)
ANION GAP SERPL CALCULATED.3IONS-SCNC: 11 MMOL/L (ref 9–16)
ANTI-XA UNFRAC HEPARIN: 0.26 IU/L
AST SERPL-CCNC: 66 U/L (ref 10–50)
BILIRUB DIRECT SERPL-MCNC: 0.9 MG/DL (ref 0–0.2)
BILIRUB INDIRECT SERPL-MCNC: 0.4 MG/DL (ref 0–1)
BILIRUB SERPL-MCNC: 1.3 MG/DL (ref 0–1.2)
BUN SERPL-MCNC: 47 MG/DL (ref 8–23)
CA-I BLD-SCNC: 1.25 MMOL/L (ref 1.13–1.33)
CALCIUM SERPL-MCNC: 8.4 MG/DL (ref 8.6–10.4)
CHLORIDE SERPL-SCNC: 102 MMOL/L (ref 98–107)
CO2 SERPL-SCNC: 24 MMOL/L (ref 20–31)
CREAT SERPL-MCNC: 2.7 MG/DL (ref 0.7–1.2)
EKG ATRIAL RATE: 71 BPM
EKG P AXIS: 74 DEGREES
EKG P-R INTERVAL: 200 MS
EKG Q-T INTERVAL: 370 MS
EKG QRS DURATION: 116 MS
EKG QTC CALCULATION (BAZETT): 402 MS
EKG R AXIS: -13 DEGREES
EKG T AXIS: 16 DEGREES
EKG VENTRICULAR RATE: 71 BPM
ERYTHROCYTE [DISTWIDTH] IN BLOOD BY AUTOMATED COUNT: 17.1 % (ref 11.8–14.4)
FIBRINOGEN PPP-MCNC: 554 MG/DL (ref 203–521)
FIO2: 70
GFR, ESTIMATED: 24 ML/MIN/1.73M2
GLOBULIN SER CALC-MCNC: 2.5 G/DL
GLUCOSE BLD-MCNC: 116 MG/DL (ref 75–110)
GLUCOSE BLD-MCNC: 168 MG/DL (ref 75–110)
GLUCOSE BLD-MCNC: 183 MG/DL (ref 74–100)
GLUCOSE SERPL-MCNC: 177 MG/DL (ref 74–99)
HCT VFR BLD AUTO: 25.2 % (ref 40.7–50.3)
HGB BLD-MCNC: 8.1 G/DL (ref 13–17)
INR PPP: 1.7
LACTIC ACID, WHOLE BLOOD: 1.2 MMOL/L (ref 0.7–2.1)
MAGNESIUM SERPL-MCNC: 2.3 MG/DL (ref 1.6–2.4)
MCH RBC QN AUTO: 30.2 PG (ref 25.2–33.5)
MCHC RBC AUTO-ENTMCNC: 32.1 G/DL (ref 28.4–34.8)
MCV RBC AUTO: 94 FL (ref 82.6–102.9)
MODE: ABNORMAL
NRBC BLD-RTO: 2.3 PER 100 WBC
O2 DELIVERY DEVICE: ABNORMAL
PARTIAL THROMBOPLASTIN TIME: 40.1 SEC (ref 23–36.5)
PLATELET # BLD AUTO: 80 K/UL (ref 138–453)
PMV BLD AUTO: 12.1 FL (ref 8.1–13.5)
POC HCO3: 28.6 MMOL/L (ref 21–28)
POC LACTIC ACID: 1.2 MMOL/L (ref 0.56–1.39)
POC O2 SATURATION: 90.8 % (ref 94–98)
POC PCO2: 52.9 MM HG (ref 35–48)
POC PH: 7.34 (ref 7.35–7.45)
POC PO2: 65.2 MM HG (ref 83–108)
POSITIVE BASE EXCESS, ART: 2.4 MMOL/L (ref 0–3)
POTASSIUM SERPL-SCNC: 3.7 MMOL/L (ref 3.7–5.3)
PROT SERPL-MCNC: 5 G/DL (ref 6.6–8.7)
PROTHROMBIN TIME: 20 SEC (ref 11.7–14.9)
RBC # BLD AUTO: 2.68 M/UL (ref 4.21–5.77)
SAMPLE SITE: ABNORMAL
SODIUM SERPL-SCNC: 137 MMOL/L (ref 136–145)
WBC OTHER # BLD: 12.4 K/UL (ref 3.5–11.3)

## 2024-12-10 PROCEDURE — 2700000000 HC OXYGEN THERAPY PER DAY

## 2024-12-10 PROCEDURE — 6360000002 HC RX W HCPCS

## 2024-12-10 PROCEDURE — 6370000000 HC RX 637 (ALT 250 FOR IP)

## 2024-12-10 PROCEDURE — 93010 ELECTROCARDIOGRAM REPORT: CPT | Performed by: INTERNAL MEDICINE

## 2024-12-10 PROCEDURE — 6360000002 HC RX W HCPCS: Performed by: STUDENT IN AN ORGANIZED HEALTH CARE EDUCATION/TRAINING PROGRAM

## 2024-12-10 PROCEDURE — 82947 ASSAY GLUCOSE BLOOD QUANT: CPT

## 2024-12-10 PROCEDURE — 6360000002 HC RX W HCPCS: Performed by: INTERNAL MEDICINE

## 2024-12-10 PROCEDURE — 83605 ASSAY OF LACTIC ACID: CPT

## 2024-12-10 PROCEDURE — 85384 FIBRINOGEN ACTIVITY: CPT

## 2024-12-10 PROCEDURE — 82803 BLOOD GASES ANY COMBINATION: CPT

## 2024-12-10 PROCEDURE — 83735 ASSAY OF MAGNESIUM: CPT

## 2024-12-10 PROCEDURE — 85520 HEPARIN ASSAY: CPT

## 2024-12-10 PROCEDURE — 99232 SBSQ HOSP IP/OBS MODERATE 35: CPT | Performed by: INTERNAL MEDICINE

## 2024-12-10 PROCEDURE — 2100000001 HC CVICU R&B

## 2024-12-10 PROCEDURE — 80048 BASIC METABOLIC PNL TOTAL CA: CPT

## 2024-12-10 PROCEDURE — 90935 HEMODIALYSIS ONE EVALUATION: CPT | Performed by: INTERNAL MEDICINE

## 2024-12-10 PROCEDURE — 85027 COMPLETE CBC AUTOMATED: CPT

## 2024-12-10 PROCEDURE — 71045 X-RAY EXAM CHEST 1 VIEW: CPT

## 2024-12-10 PROCEDURE — 2500000003 HC RX 250 WO HCPCS: Performed by: INTERNAL MEDICINE

## 2024-12-10 PROCEDURE — 85610 PROTHROMBIN TIME: CPT

## 2024-12-10 PROCEDURE — 37799 UNLISTED PX VASCULAR SURGERY: CPT

## 2024-12-10 PROCEDURE — 99291 CRITICAL CARE FIRST HOUR: CPT | Performed by: STUDENT IN AN ORGANIZED HEALTH CARE EDUCATION/TRAINING PROGRAM

## 2024-12-10 PROCEDURE — 6370000000 HC RX 637 (ALT 250 FOR IP): Performed by: INTERNAL MEDICINE

## 2024-12-10 PROCEDURE — 82330 ASSAY OF CALCIUM: CPT

## 2024-12-10 PROCEDURE — 2580000003 HC RX 258

## 2024-12-10 PROCEDURE — 85730 THROMBOPLASTIN TIME PARTIAL: CPT

## 2024-12-10 PROCEDURE — 99291 CRITICAL CARE FIRST HOUR: CPT | Performed by: INTERNAL MEDICINE

## 2024-12-10 PROCEDURE — 5A1D70Z PERFORMANCE OF URINARY FILTRATION, INTERMITTENT, LESS THAN 6 HOURS PER DAY: ICD-10-PCS

## 2024-12-10 PROCEDURE — 80076 HEPATIC FUNCTION PANEL: CPT

## 2024-12-10 PROCEDURE — 2500000003 HC RX 250 WO HCPCS

## 2024-12-10 PROCEDURE — 99223 1ST HOSP IP/OBS HIGH 75: CPT | Performed by: INTERNAL MEDICINE

## 2024-12-10 PROCEDURE — 94003 VENT MGMT INPAT SUBQ DAY: CPT

## 2024-12-10 PROCEDURE — 90935 HEMODIALYSIS ONE EVALUATION: CPT

## 2024-12-10 PROCEDURE — 2580000003 HC RX 258: Performed by: STUDENT IN AN ORGANIZED HEALTH CARE EDUCATION/TRAINING PROGRAM

## 2024-12-10 PROCEDURE — 99231 SBSQ HOSP IP/OBS SF/LOW 25: CPT

## 2024-12-10 PROCEDURE — 36415 COLL VENOUS BLD VENIPUNCTURE: CPT

## 2024-12-10 PROCEDURE — 94761 N-INVAS EAR/PLS OXIMETRY MLT: CPT

## 2024-12-10 RX ORDER — FUROSEMIDE 10 MG/ML
40 INJECTION INTRAMUSCULAR; INTRAVENOUS DAILY
Status: DISCONTINUED | OUTPATIENT
Start: 2024-12-10 | End: 2024-12-10

## 2024-12-10 RX ORDER — HEPARIN SODIUM 1000 [USP'U]/ML
2000 INJECTION, SOLUTION INTRAVENOUS; SUBCUTANEOUS PRN
Status: DISCONTINUED | OUTPATIENT
Start: 2024-12-10 | End: 2024-12-20

## 2024-12-10 RX ORDER — MIDAZOLAM HYDROCHLORIDE 1 MG/ML
1-10 INJECTION, SOLUTION INTRAVENOUS CONTINUOUS
Status: DISCONTINUED | OUTPATIENT
Start: 2024-12-10 | End: 2024-12-12

## 2024-12-10 RX ORDER — DEXMEDETOMIDINE HYDROCHLORIDE 4 UG/ML
.1-1.5 INJECTION, SOLUTION INTRAVENOUS CONTINUOUS
Status: DISCONTINUED | OUTPATIENT
Start: 2024-12-10 | End: 2024-12-20

## 2024-12-10 RX ORDER — HEPARIN SODIUM 1000 [USP'U]/ML
1900 INJECTION, SOLUTION INTRAVENOUS; SUBCUTANEOUS ONCE
Status: COMPLETED | OUTPATIENT
Start: 2024-12-10 | End: 2024-12-10

## 2024-12-10 RX ORDER — NOREPINEPHRINE BITARTRATE 0.06 MG/ML
1-100 INJECTION, SOLUTION INTRAVENOUS CONTINUOUS
Status: DISCONTINUED | OUTPATIENT
Start: 2024-12-10 | End: 2024-12-17

## 2024-12-10 RX ORDER — HEPARIN SODIUM 10000 [USP'U]/100ML
5-30 INJECTION, SOLUTION INTRAVENOUS CONTINUOUS
Status: DISCONTINUED | OUTPATIENT
Start: 2024-12-10 | End: 2024-12-19

## 2024-12-10 RX ORDER — HEPARIN SODIUM 1000 [USP'U]/ML
4000 INJECTION, SOLUTION INTRAVENOUS; SUBCUTANEOUS PRN
Status: DISCONTINUED | OUTPATIENT
Start: 2024-12-10 | End: 2024-12-20

## 2024-12-10 RX ORDER — HEPARIN SODIUM 1000 [USP'U]/ML
4000 INJECTION, SOLUTION INTRAVENOUS; SUBCUTANEOUS ONCE
Status: COMPLETED | OUTPATIENT
Start: 2024-12-10 | End: 2024-12-10

## 2024-12-10 RX ORDER — HEPARIN SODIUM 1000 [USP'U]/ML
1600 INJECTION, SOLUTION INTRAVENOUS; SUBCUTANEOUS ONCE
Status: COMPLETED | OUTPATIENT
Start: 2024-12-10 | End: 2024-12-10

## 2024-12-10 RX ORDER — FUROSEMIDE 10 MG/ML
80 INJECTION INTRAMUSCULAR; INTRAVENOUS 2 TIMES DAILY
Status: DISCONTINUED | OUTPATIENT
Start: 2024-12-10 | End: 2024-12-23 | Stop reason: HOSPADM

## 2024-12-10 RX ADMIN — METOCLOPRAMIDE HYDROCHLORIDE 10 MG: 5 INJECTION INTRAMUSCULAR; INTRAVENOUS at 01:10

## 2024-12-10 RX ADMIN — HEPARIN SODIUM 6 UNITS/KG/HR: 10000 INJECTION, SOLUTION INTRAVENOUS at 14:57

## 2024-12-10 RX ADMIN — TICAGRELOR 90 MG: 90 TABLET ORAL at 20:07

## 2024-12-10 RX ADMIN — SODIUM CHLORIDE, PRESERVATIVE FREE 10 ML: 5 INJECTION INTRAVENOUS at 08:02

## 2024-12-10 RX ADMIN — SODIUM CHLORIDE, PRESERVATIVE FREE 10 ML: 5 INJECTION INTRAVENOUS at 20:08

## 2024-12-10 RX ADMIN — METOCLOPRAMIDE HYDROCHLORIDE 10 MG: 5 INJECTION INTRAMUSCULAR; INTRAVENOUS at 13:30

## 2024-12-10 RX ADMIN — SODIUM CHLORIDE, PRESERVATIVE FREE 40 MG: 5 INJECTION INTRAVENOUS at 08:01

## 2024-12-10 RX ADMIN — FUROSEMIDE 40 MG: 10 INJECTION, SOLUTION INTRAMUSCULAR; INTRAVENOUS at 09:42

## 2024-12-10 RX ADMIN — HEPARIN SODIUM 4000 UNITS: 1000 INJECTION INTRAVENOUS; SUBCUTANEOUS at 14:50

## 2024-12-10 RX ADMIN — METOCLOPRAMIDE HYDROCHLORIDE 10 MG: 5 INJECTION INTRAMUSCULAR; INTRAVENOUS at 18:15

## 2024-12-10 RX ADMIN — METOCLOPRAMIDE HYDROCHLORIDE 10 MG: 5 INJECTION INTRAMUSCULAR; INTRAVENOUS at 08:01

## 2024-12-10 RX ADMIN — PIPERACILLIN AND TAZOBACTAM 3375 MG: 3; .375 INJECTION, POWDER, LYOPHILIZED, FOR SOLUTION INTRAVENOUS at 02:02

## 2024-12-10 RX ADMIN — Medication 7 MG/HR: at 02:31

## 2024-12-10 RX ADMIN — Medication 0.5 MG/MIN: at 01:57

## 2024-12-10 RX ADMIN — ASPIRIN 81 MG 81 MG: 81 TABLET ORAL at 08:01

## 2024-12-10 RX ADMIN — ACETAMINOPHEN 650 MG: 325 TABLET ORAL at 23:12

## 2024-12-10 RX ADMIN — DEXMEDETOMIDINE HYDROCHLORIDE 0.2 MCG/KG/HR: 400 INJECTION, SOLUTION INTRAVENOUS at 19:24

## 2024-12-10 RX ADMIN — DEXMEDETOMIDINE HYDROCHLORIDE 0.2 MCG/KG/HR: 400 INJECTION, SOLUTION INTRAVENOUS at 09:46

## 2024-12-10 RX ADMIN — SODIUM CHLORIDE, PRESERVATIVE FREE 40 MG: 5 INJECTION INTRAVENOUS at 20:07

## 2024-12-10 RX ADMIN — PIPERACILLIN AND TAZOBACTAM 3375 MG: 3; .375 INJECTION, POWDER, LYOPHILIZED, FOR SOLUTION INTRAVENOUS at 22:56

## 2024-12-10 RX ADMIN — Medication 125 MCG/HR: at 02:32

## 2024-12-10 RX ADMIN — FUROSEMIDE 80 MG: 10 INJECTION, SOLUTION INTRAMUSCULAR; INTRAVENOUS at 18:15

## 2024-12-10 RX ADMIN — Medication 5 MCG/MIN: at 23:42

## 2024-12-10 RX ADMIN — HEPARIN SODIUM 1900 UNITS: 1000 INJECTION INTRAVENOUS; SUBCUTANEOUS at 14:24

## 2024-12-10 RX ADMIN — HEPARIN SODIUM 1600 UNITS: 1000 INJECTION INTRAVENOUS; SUBCUTANEOUS at 14:22

## 2024-12-10 RX ADMIN — INSULIN LISPRO 2 UNITS: 100 INJECTION, SOLUTION INTRAVENOUS; SUBCUTANEOUS at 05:36

## 2024-12-10 RX ADMIN — PIPERACILLIN AND TAZOBACTAM 3375 MG: 3; .375 INJECTION, POWDER, LYOPHILIZED, FOR SOLUTION INTRAVENOUS at 09:43

## 2024-12-10 RX ADMIN — Medication 7 MG/HR: at 00:13

## 2024-12-10 RX ADMIN — ATORVASTATIN CALCIUM 40 MG: 40 TABLET, FILM COATED ORAL at 20:07

## 2024-12-10 RX ADMIN — Medication 0.5 MG/MIN: at 18:19

## 2024-12-10 RX ADMIN — TICAGRELOR 90 MG: 90 TABLET ORAL at 08:01

## 2024-12-10 ASSESSMENT — PULMONARY FUNCTION TESTS
PIF_VALUE: 20
PIF_VALUE: 19
PIF_VALUE: 20
PIF_VALUE: 20
PIF_VALUE: 19
PIF_VALUE: 21
PIF_VALUE: 20

## 2024-12-10 NOTE — CARE COORDINATION
Transition Planning    Spouse SNF choices in order of preference: 1. [facility], and 2. [facility]. Referrals sent. Call to Yady from [facility]- able to accept when pt is medically stable for discharge.     Patient has a qualifying diagnosis for cardiac rehabilitation.     Education provided to patient regarding the health benefits of participating in a cardiac rehabilitation program. Handout provided with an overview of cardiac rehabilitation from the [employer].     Patient agreeable: Yes    Freedom of choice provided.     Referral made to :   [x] [facility]  [] [facility]  [] [facility]  [] [facility]  [] [facility]  [] Other

## 2024-12-11 ENCOUNTER — APPOINTMENT (OUTPATIENT)
Dept: GENERAL RADIOLOGY | Age: 74
DRG: 003 | End: 2024-12-11
Payer: MEDICARE

## 2024-12-11 LAB
ABO/RH: NORMAL
ALBUMIN SERPL-MCNC: 2.7 G/DL (ref 3.5–5.2)
ALBUMIN/GLOB SERPL: 1.1 {RATIO} (ref 1–2.5)
ALP SERPL-CCNC: 249 U/L (ref 40–129)
ALT SERPL-CCNC: 37 U/L (ref 10–50)
ANION GAP SERPL CALCULATED.3IONS-SCNC: 11 MMOL/L (ref 9–16)
ANTI-XA UNFRAC HEPARIN: 0.35 IU/L
ANTI-XA UNFRAC HEPARIN: 0.4 IU/L
ANTIBODY SCREEN: NEGATIVE
ARM BAND NUMBER: NORMAL
AST SERPL-CCNC: 63 U/L (ref 10–50)
BILIRUB DIRECT SERPL-MCNC: 1 MG/DL (ref 0–0.2)
BILIRUB INDIRECT SERPL-MCNC: 0.4 MG/DL (ref 0–1)
BILIRUB SERPL-MCNC: 1.4 MG/DL (ref 0–1.2)
BLOOD BANK BLOOD PRODUCT EXPIRATION DATE: NORMAL
BLOOD BANK BLOOD PRODUCT EXPIRATION DATE: NORMAL
BLOOD BANK DISPENSE STATUS: NORMAL
BLOOD BANK ISBT PRODUCT BLOOD TYPE: 6200
BLOOD BANK ISBT PRODUCT BLOOD TYPE: 6200
BLOOD BANK PRODUCT CODE: NORMAL
BLOOD BANK PRODUCT CODE: NORMAL
BLOOD BANK SAMPLE EXPIRATION: NORMAL
BLOOD BANK UNIT TYPE AND RH: NORMAL
BLOOD BANK UNIT TYPE AND RH: NORMAL
BPU ID: NORMAL
BUN SERPL-MCNC: 56 MG/DL (ref 8–23)
CA-I BLD-SCNC: 1.1 MMOL/L (ref 1.13–1.33)
CALCIUM SERPL-MCNC: 7.9 MG/DL (ref 8.6–10.4)
CHLORIDE SERPL-SCNC: 101 MMOL/L (ref 98–107)
CO2 SERPL-SCNC: 25 MMOL/L (ref 20–31)
COMPONENT: NORMAL
CREAT SERPL-MCNC: 3.6 MG/DL (ref 0.7–1.2)
CROSSMATCH RESULT: NORMAL
EKG ATRIAL RATE: 63 BPM
EKG P AXIS: 40 DEGREES
EKG P-R INTERVAL: 188 MS
EKG Q-T INTERVAL: 398 MS
EKG QRS DURATION: 114 MS
EKG QTC CALCULATION (BAZETT): 407 MS
EKG R AXIS: -13 DEGREES
EKG T AXIS: -29 DEGREES
EKG VENTRICULAR RATE: 63 BPM
ERYTHROCYTE [DISTWIDTH] IN BLOOD BY AUTOMATED COUNT: 17.4 % (ref 11.8–14.4)
FIBRINOGEN PPP-MCNC: 606 MG/DL (ref 203–521)
FIO2: 70
GFR, ESTIMATED: 17 ML/MIN/1.73M2
GLOBULIN SER CALC-MCNC: 2.5 G/DL
GLUCOSE BLD-MCNC: 196 MG/DL (ref 75–110)
GLUCOSE BLD-MCNC: 202 MG/DL (ref 75–110)
GLUCOSE BLD-MCNC: 207 MG/DL (ref 75–110)
GLUCOSE BLD-MCNC: 225 MG/DL (ref 75–110)
GLUCOSE BLD-MCNC: 237 MG/DL (ref 74–100)
GLUCOSE BLD-MCNC: 243 MG/DL (ref 74–100)
GLUCOSE SERPL-MCNC: 234 MG/DL (ref 74–99)
HCT VFR BLD AUTO: 24 % (ref 40.7–50.3)
HGB BLD-MCNC: 7.7 G/DL (ref 13–17)
HGB FREE PLAS-MCNC: 2.6 MG/DL (ref 0–9.7)
INR PPP: 1.5
LACTIC ACID, WHOLE BLOOD: 1.3 MMOL/L (ref 0.7–2.1)
MAGNESIUM SERPL-MCNC: 2.2 MG/DL (ref 1.6–2.4)
MCH RBC QN AUTO: 30.7 PG (ref 25.2–33.5)
MCHC RBC AUTO-ENTMCNC: 32.1 G/DL (ref 28.4–34.8)
MCV RBC AUTO: 95.6 FL (ref 82.6–102.9)
MODE: ABNORMAL
MODE: ABNORMAL
NRBC BLD-RTO: 1.6 PER 100 WBC
O2 DELIVERY DEVICE: ABNORMAL
PARTIAL THROMBOPLASTIN TIME: 68.8 SEC (ref 23–36.5)
PATIENT TEMP: 38.3
PLATELET # BLD AUTO: 99 K/UL (ref 138–453)
PMV BLD AUTO: 11.3 FL (ref 8.1–13.5)
POC HCO3: 28 MMOL/L (ref 21–28)
POC HCO3: 30.2 MMOL/L (ref 21–28)
POC HCO3: 30.8 MMOL/L (ref 21–28)
POC LACTIC ACID: 1.3 MMOL/L (ref 0.56–1.39)
POC LACTIC ACID: 1.3 MMOL/L (ref 0.56–1.39)
POC O2 SATURATION: 92.7 % (ref 94–98)
POC O2 SATURATION: 93.7 % (ref 94–98)
POC O2 SATURATION: 97.5 % (ref 94–98)
POC PCO2 TEMP: 60.7 MM HG
POC PCO2: 43.3 MM HG (ref 35–48)
POC PCO2: 52.7 MM HG (ref 35–48)
POC PCO2: 57.4 MM HG (ref 35–48)
POC PH TEMP: 7.32
POC PH: 7.34 (ref 7.35–7.45)
POC PH: 7.37 (ref 7.35–7.45)
POC PH: 7.42 (ref 7.35–7.45)
POC PO2 TEMP: 78.4 MM HG
POC PO2: 71.8 MM HG (ref 83–108)
POC PO2: 73.5 MM HG (ref 83–108)
POC PO2: 94.8 MM HG (ref 83–108)
POSITIVE BASE EXCESS, ART: 3.2 MMOL/L (ref 0–3)
POSITIVE BASE EXCESS, ART: 4.2 MMOL/L (ref 0–3)
POSITIVE BASE EXCESS, ART: 4.3 MMOL/L (ref 0–3)
POTASSIUM SERPL-SCNC: 3.6 MMOL/L (ref 3.7–5.3)
PROT SERPL-MCNC: 5.2 G/DL (ref 6.6–8.7)
PROTHROMBIN TIME: 17.9 SEC (ref 11.7–14.9)
RBC # BLD AUTO: 2.51 M/UL (ref 4.21–5.77)
SAMPLE SITE: ABNORMAL
SAMPLE SITE: ABNORMAL
SODIUM SERPL-SCNC: 137 MMOL/L (ref 136–145)
TRANSFUSION STATUS: NORMAL
UNIT DIVISION: 0
UNIT ISSUE DATE/TIME: NORMAL
UNIT ISSUE DATE/TIME: NORMAL
WBC OTHER # BLD: 13.4 K/UL (ref 3.5–11.3)

## 2024-12-11 PROCEDURE — 6360000002 HC RX W HCPCS: Performed by: INTERNAL MEDICINE

## 2024-12-11 PROCEDURE — 82947 ASSAY GLUCOSE BLOOD QUANT: CPT

## 2024-12-11 PROCEDURE — 89220 SPUTUM SPECIMEN COLLECTION: CPT

## 2024-12-11 PROCEDURE — 2100000001 HC CVICU R&B

## 2024-12-11 PROCEDURE — 85027 COMPLETE CBC AUTOMATED: CPT

## 2024-12-11 PROCEDURE — 6360000002 HC RX W HCPCS

## 2024-12-11 PROCEDURE — 2700000000 HC OXYGEN THERAPY PER DAY

## 2024-12-11 PROCEDURE — 2580000003 HC RX 258: Performed by: INTERNAL MEDICINE

## 2024-12-11 PROCEDURE — 85520 HEPARIN ASSAY: CPT

## 2024-12-11 PROCEDURE — 87205 SMEAR GRAM STAIN: CPT

## 2024-12-11 PROCEDURE — 71045 X-RAY EXAM CHEST 1 VIEW: CPT

## 2024-12-11 PROCEDURE — 99232 SBSQ HOSP IP/OBS MODERATE 35: CPT | Performed by: INTERNAL MEDICINE

## 2024-12-11 PROCEDURE — 2500000003 HC RX 250 WO HCPCS: Performed by: INTERNAL MEDICINE

## 2024-12-11 PROCEDURE — 6370000000 HC RX 637 (ALT 250 FOR IP): Performed by: INTERNAL MEDICINE

## 2024-12-11 PROCEDURE — 2500000003 HC RX 250 WO HCPCS

## 2024-12-11 PROCEDURE — 2580000003 HC RX 258: Performed by: STUDENT IN AN ORGANIZED HEALTH CARE EDUCATION/TRAINING PROGRAM

## 2024-12-11 PROCEDURE — 82803 BLOOD GASES ANY COMBINATION: CPT

## 2024-12-11 PROCEDURE — 6370000000 HC RX 637 (ALT 250 FOR IP)

## 2024-12-11 PROCEDURE — 99233 SBSQ HOSP IP/OBS HIGH 50: CPT | Performed by: INTERNAL MEDICINE

## 2024-12-11 PROCEDURE — 85384 FIBRINOGEN ACTIVITY: CPT

## 2024-12-11 PROCEDURE — 94761 N-INVAS EAR/PLS OXIMETRY MLT: CPT

## 2024-12-11 PROCEDURE — 37799 UNLISTED PX VASCULAR SURGERY: CPT

## 2024-12-11 PROCEDURE — 36415 COLL VENOUS BLD VENIPUNCTURE: CPT

## 2024-12-11 PROCEDURE — 94003 VENT MGMT INPAT SUBQ DAY: CPT

## 2024-12-11 PROCEDURE — 83735 ASSAY OF MAGNESIUM: CPT

## 2024-12-11 PROCEDURE — 85730 THROMBOPLASTIN TIME PARTIAL: CPT

## 2024-12-11 PROCEDURE — 99291 CRITICAL CARE FIRST HOUR: CPT | Performed by: INTERNAL MEDICINE

## 2024-12-11 PROCEDURE — 82330 ASSAY OF CALCIUM: CPT

## 2024-12-11 PROCEDURE — 93005 ELECTROCARDIOGRAM TRACING: CPT

## 2024-12-11 PROCEDURE — 87070 CULTURE OTHR SPECIMN AEROBIC: CPT

## 2024-12-11 PROCEDURE — 99232 SBSQ HOSP IP/OBS MODERATE 35: CPT | Performed by: STUDENT IN AN ORGANIZED HEALTH CARE EDUCATION/TRAINING PROGRAM

## 2024-12-11 PROCEDURE — 6360000002 HC RX W HCPCS: Performed by: STUDENT IN AN ORGANIZED HEALTH CARE EDUCATION/TRAINING PROGRAM

## 2024-12-11 PROCEDURE — 87040 BLOOD CULTURE FOR BACTERIA: CPT

## 2024-12-11 PROCEDURE — 80048 BASIC METABOLIC PNL TOTAL CA: CPT

## 2024-12-11 PROCEDURE — 2580000003 HC RX 258

## 2024-12-11 PROCEDURE — 85610 PROTHROMBIN TIME: CPT

## 2024-12-11 PROCEDURE — 93010 ELECTROCARDIOGRAM REPORT: CPT | Performed by: INTERNAL MEDICINE

## 2024-12-11 PROCEDURE — 80076 HEPATIC FUNCTION PANEL: CPT

## 2024-12-11 PROCEDURE — 83605 ASSAY OF LACTIC ACID: CPT

## 2024-12-11 RX ORDER — MIDODRINE HYDROCHLORIDE 5 MG/1
10 TABLET ORAL
Status: DISCONTINUED | OUTPATIENT
Start: 2024-12-11 | End: 2024-12-23 | Stop reason: HOSPADM

## 2024-12-11 RX ADMIN — INSULIN LISPRO 2 UNITS: 100 INJECTION, SOLUTION INTRAVENOUS; SUBCUTANEOUS at 05:28

## 2024-12-11 RX ADMIN — INSULIN LISPRO 2 UNITS: 100 INJECTION, SOLUTION INTRAVENOUS; SUBCUTANEOUS at 23:28

## 2024-12-11 RX ADMIN — MEROPENEM 1000 MG: 1 INJECTION INTRAVENOUS at 15:21

## 2024-12-11 RX ADMIN — ATORVASTATIN CALCIUM 40 MG: 40 TABLET, FILM COATED ORAL at 20:25

## 2024-12-11 RX ADMIN — SODIUM CHLORIDE, PRESERVATIVE FREE 40 MG: 5 INJECTION INTRAVENOUS at 20:25

## 2024-12-11 RX ADMIN — ASPIRIN 81 MG 81 MG: 81 TABLET ORAL at 08:47

## 2024-12-11 RX ADMIN — ALTEPLASE 2 MG: 2.2 INJECTION, POWDER, LYOPHILIZED, FOR SOLUTION INTRAVENOUS at 10:13

## 2024-12-11 RX ADMIN — ACETAMINOPHEN 650 MG: 325 TABLET ORAL at 20:30

## 2024-12-11 RX ADMIN — Medication 50 MCG/HR: at 00:25

## 2024-12-11 RX ADMIN — SODIUM CHLORIDE: 9 INJECTION, SOLUTION INTRAVENOUS at 20:40

## 2024-12-11 RX ADMIN — SODIUM CHLORIDE, PRESERVATIVE FREE 10 ML: 5 INJECTION INTRAVENOUS at 20:26

## 2024-12-11 RX ADMIN — DEXMEDETOMIDINE HYDROCHLORIDE 0.4 MCG/KG/HR: 400 INJECTION, SOLUTION INTRAVENOUS at 03:53

## 2024-12-11 RX ADMIN — INSULIN LISPRO 2 UNITS: 100 INJECTION, SOLUTION INTRAVENOUS; SUBCUTANEOUS at 12:37

## 2024-12-11 RX ADMIN — SODIUM CHLORIDE, PRESERVATIVE FREE 10 ML: 5 INJECTION INTRAVENOUS at 09:04

## 2024-12-11 RX ADMIN — METOCLOPRAMIDE HYDROCHLORIDE 10 MG: 5 INJECTION INTRAMUSCULAR; INTRAVENOUS at 20:26

## 2024-12-11 RX ADMIN — Medication 0.5 MG/MIN: at 11:11

## 2024-12-11 RX ADMIN — SODIUM CHLORIDE, PRESERVATIVE FREE 10 ML: 5 INJECTION INTRAVENOUS at 20:27

## 2024-12-11 RX ADMIN — METOCLOPRAMIDE HYDROCHLORIDE 10 MG: 5 INJECTION INTRAMUSCULAR; INTRAVENOUS at 12:12

## 2024-12-11 RX ADMIN — ALTEPLASE 2 MG: 2.2 INJECTION, POWDER, LYOPHILIZED, FOR SOLUTION INTRAVENOUS at 10:14

## 2024-12-11 RX ADMIN — METOCLOPRAMIDE HYDROCHLORIDE 10 MG: 5 INJECTION INTRAMUSCULAR; INTRAVENOUS at 08:47

## 2024-12-11 RX ADMIN — SODIUM CHLORIDE, PRESERVATIVE FREE 40 MG: 5 INJECTION INTRAVENOUS at 08:47

## 2024-12-11 RX ADMIN — TICAGRELOR 90 MG: 90 TABLET ORAL at 20:25

## 2024-12-11 RX ADMIN — INSULIN LISPRO 2 UNITS: 100 INJECTION, SOLUTION INTRAVENOUS; SUBCUTANEOUS at 01:29

## 2024-12-11 RX ADMIN — CALCIUM GLUCONATE 1000 MG: 20 INJECTION, SOLUTION INTRAVENOUS at 04:13

## 2024-12-11 RX ADMIN — FUROSEMIDE 80 MG: 10 INJECTION, SOLUTION INTRAMUSCULAR; INTRAVENOUS at 17:24

## 2024-12-11 RX ADMIN — TICAGRELOR 90 MG: 90 TABLET ORAL at 08:47

## 2024-12-11 RX ADMIN — ACETAMINOPHEN 650 MG: 325 TABLET ORAL at 02:49

## 2024-12-11 RX ADMIN — INSULIN LISPRO 2 UNITS: 100 INJECTION, SOLUTION INTRAVENOUS; SUBCUTANEOUS at 17:52

## 2024-12-11 RX ADMIN — DEXMEDETOMIDINE HYDROCHLORIDE 0.2 MCG/KG/HR: 400 INJECTION, SOLUTION INTRAVENOUS at 19:04

## 2024-12-11 RX ADMIN — MIDODRINE HYDROCHLORIDE 10 MG: 5 TABLET ORAL at 12:13

## 2024-12-11 RX ADMIN — POTASSIUM BICARBONATE 40 MEQ: 782 TABLET, EFFERVESCENT ORAL at 05:57

## 2024-12-11 RX ADMIN — METOCLOPRAMIDE HYDROCHLORIDE 10 MG: 5 INJECTION INTRAMUSCULAR; INTRAVENOUS at 01:29

## 2024-12-11 RX ADMIN — PIPERACILLIN AND TAZOBACTAM 3375 MG: 3; .375 INJECTION, POWDER, LYOPHILIZED, FOR SOLUTION INTRAVENOUS at 10:37

## 2024-12-11 RX ADMIN — HEPARIN SODIUM 8 UNITS/KG/HR: 10000 INJECTION, SOLUTION INTRAVENOUS at 13:55

## 2024-12-11 RX ADMIN — MIDODRINE HYDROCHLORIDE 10 MG: 5 TABLET ORAL at 17:24

## 2024-12-11 RX ADMIN — FUROSEMIDE 80 MG: 10 INJECTION, SOLUTION INTRAMUSCULAR; INTRAVENOUS at 08:47

## 2024-12-11 RX ADMIN — Medication 0.5 MG/MIN: at 20:50

## 2024-12-11 RX ADMIN — SODIUM CHLORIDE: 9 INJECTION, SOLUTION INTRAVENOUS at 20:44

## 2024-12-11 ASSESSMENT — PULMONARY FUNCTION TESTS
PIF_VALUE: 20
PIF_VALUE: 20
PIF_VALUE: 24
PIF_VALUE: 24
PIF_VALUE: 25
PIF_VALUE: 23
PIF_VALUE: 20
PIF_VALUE: 23
PIF_VALUE: 25
PIF_VALUE: 23
PIF_VALUE: 20
PIF_VALUE: 20
PIF_VALUE: 24
PIF_VALUE: 23
PIF_VALUE: 24
PIF_VALUE: 24
PIF_VALUE: 20
PIF_VALUE: 20
PIF_VALUE: 24
PIF_VALUE: 28
PIF_VALUE: 24
PIF_VALUE: 24
PIF_VALUE: 23
PIF_VALUE: 24
PIF_VALUE: 24
PIF_VALUE: 20

## 2024-12-11 ASSESSMENT — PAIN SCALES - GENERAL: PAINLEVEL_OUTOF10: 0

## 2024-12-12 LAB
ALBUMIN SERPL-MCNC: 2.5 G/DL (ref 3.5–5.2)
ALBUMIN/GLOB SERPL: 0.9 {RATIO} (ref 1–2.5)
ALP SERPL-CCNC: 215 U/L (ref 40–129)
ALT SERPL-CCNC: 34 U/L (ref 10–50)
ANION GAP SERPL CALCULATED.3IONS-SCNC: 13 MMOL/L (ref 9–16)
ANTI-XA UNFRAC HEPARIN: 0.32 IU/L
AST SERPL-CCNC: 57 U/L (ref 10–50)
BILIRUB DIRECT SERPL-MCNC: 0.7 MG/DL (ref 0–0.2)
BILIRUB INDIRECT SERPL-MCNC: 0.3 MG/DL (ref 0–1)
BILIRUB SERPL-MCNC: 1 MG/DL (ref 0–1.2)
BUN SERPL-MCNC: 81 MG/DL (ref 8–23)
CA-I BLD-SCNC: 0.98 MMOL/L (ref 1.13–1.33)
CA-I BLD-SCNC: 1.04 MMOL/L (ref 1.13–1.33)
CALCIUM SERPL-MCNC: 7.2 MG/DL (ref 8.6–10.4)
CHLORIDE SERPL-SCNC: 101 MMOL/L (ref 98–107)
CO2 SERPL-SCNC: 25 MMOL/L (ref 20–31)
CREAT SERPL-MCNC: 4.7 MG/DL (ref 0.7–1.2)
EKG ATRIAL RATE: 55 BPM
EKG P AXIS: 36 DEGREES
EKG P-R INTERVAL: 172 MS
EKG Q-T INTERVAL: 454 MS
EKG QRS DURATION: 102 MS
EKG QTC CALCULATION (BAZETT): 434 MS
EKG R AXIS: -2 DEGREES
EKG T AXIS: -21 DEGREES
EKG VENTRICULAR RATE: 55 BPM
ERYTHROCYTE [DISTWIDTH] IN BLOOD BY AUTOMATED COUNT: 17.2 % (ref 11.8–14.4)
FIBRINOGEN PPP-MCNC: 726 MG/DL (ref 203–521)
FIO2: 65
GFR, ESTIMATED: 12 ML/MIN/1.73M2
GLOBULIN SER CALC-MCNC: 2.9 G/DL
GLUCOSE BLD-MCNC: 186 MG/DL (ref 75–110)
GLUCOSE BLD-MCNC: 238 MG/DL (ref 75–110)
GLUCOSE BLD-MCNC: 251 MG/DL (ref 75–110)
GLUCOSE BLD-MCNC: 254 MG/DL (ref 75–110)
GLUCOSE BLD-MCNC: 274 MG/DL (ref 74–100)
GLUCOSE SERPL-MCNC: 272 MG/DL (ref 74–99)
HCT VFR BLD AUTO: 22.4 % (ref 40.7–50.3)
HGB BLD-MCNC: 7.3 G/DL (ref 13–17)
HGB FREE PLAS-MCNC: 4.9 MG/DL (ref 0–9.7)
INR PPP: 1.4
LACTIC ACID, WHOLE BLOOD: 1.3 MMOL/L (ref 0.7–2.1)
MAGNESIUM SERPL-MCNC: 2.2 MG/DL (ref 1.6–2.4)
MCH RBC QN AUTO: 31.2 PG (ref 25.2–33.5)
MCHC RBC AUTO-ENTMCNC: 32.6 G/DL (ref 28.4–34.8)
MCV RBC AUTO: 95.7 FL (ref 82.6–102.9)
NRBC BLD-RTO: 0.5 PER 100 WBC
O2 DELIVERY DEVICE: ABNORMAL
PARTIAL THROMBOPLASTIN TIME: 55.3 SEC (ref 23–36.5)
PATIENT TEMP: 37.4
PLATELET # BLD AUTO: 120 K/UL (ref 138–453)
PMV BLD AUTO: 11.7 FL (ref 8.1–13.5)
POC HCO3: 28.6 MMOL/L (ref 21–28)
POC LACTIC ACID: 1.3 MMOL/L (ref 0.56–1.39)
POC O2 SATURATION: 91.3 % (ref 94–98)
POC PCO2 TEMP: 44.6 MM HG
POC PCO2: 43.8 MM HG (ref 35–48)
POC PH TEMP: 7.42
POC PH: 7.42 (ref 7.35–7.45)
POC PO2 TEMP: 62.7 MM HG
POC PO2: 61 MM HG (ref 83–108)
POSITIVE BASE EXCESS, ART: 3.8 MMOL/L (ref 0–3)
POTASSIUM SERPL-SCNC: 3.1 MMOL/L (ref 3.7–5.3)
PROT SERPL-MCNC: 5.4 G/DL (ref 6.6–8.7)
PROTHROMBIN TIME: 16.6 SEC (ref 11.7–14.9)
RBC # BLD AUTO: 2.34 M/UL (ref 4.21–5.77)
SAMPLE SITE: ABNORMAL
SODIUM SERPL-SCNC: 139 MMOL/L (ref 136–145)
WBC OTHER # BLD: 9.7 K/UL (ref 3.5–11.3)

## 2024-12-12 PROCEDURE — 2580000003 HC RX 258

## 2024-12-12 PROCEDURE — 6360000002 HC RX W HCPCS: Performed by: INTERNAL MEDICINE

## 2024-12-12 PROCEDURE — 99231 SBSQ HOSP IP/OBS SF/LOW 25: CPT

## 2024-12-12 PROCEDURE — 6360000002 HC RX W HCPCS

## 2024-12-12 PROCEDURE — 82803 BLOOD GASES ANY COMBINATION: CPT

## 2024-12-12 PROCEDURE — P9047 ALBUMIN (HUMAN), 25%, 50ML: HCPCS | Performed by: INTERNAL MEDICINE

## 2024-12-12 PROCEDURE — 90935 HEMODIALYSIS ONE EVALUATION: CPT | Performed by: INTERNAL MEDICINE

## 2024-12-12 PROCEDURE — 93005 ELECTROCARDIOGRAM TRACING: CPT | Performed by: STUDENT IN AN ORGANIZED HEALTH CARE EDUCATION/TRAINING PROGRAM

## 2024-12-12 PROCEDURE — 82330 ASSAY OF CALCIUM: CPT

## 2024-12-12 PROCEDURE — 6370000000 HC RX 637 (ALT 250 FOR IP)

## 2024-12-12 PROCEDURE — 2700000000 HC OXYGEN THERAPY PER DAY

## 2024-12-12 PROCEDURE — 90935 HEMODIALYSIS ONE EVALUATION: CPT

## 2024-12-12 PROCEDURE — 85730 THROMBOPLASTIN TIME PARTIAL: CPT

## 2024-12-12 PROCEDURE — 82947 ASSAY GLUCOSE BLOOD QUANT: CPT

## 2024-12-12 PROCEDURE — 85027 COMPLETE CBC AUTOMATED: CPT

## 2024-12-12 PROCEDURE — 85520 HEPARIN ASSAY: CPT

## 2024-12-12 PROCEDURE — 80048 BASIC METABOLIC PNL TOTAL CA: CPT

## 2024-12-12 PROCEDURE — 99291 CRITICAL CARE FIRST HOUR: CPT | Performed by: INTERNAL MEDICINE

## 2024-12-12 PROCEDURE — 2580000003 HC RX 258: Performed by: STUDENT IN AN ORGANIZED HEALTH CARE EDUCATION/TRAINING PROGRAM

## 2024-12-12 PROCEDURE — 6360000002 HC RX W HCPCS: Performed by: STUDENT IN AN ORGANIZED HEALTH CARE EDUCATION/TRAINING PROGRAM

## 2024-12-12 PROCEDURE — 2500000003 HC RX 250 WO HCPCS: Performed by: INTERNAL MEDICINE

## 2024-12-12 PROCEDURE — 6370000000 HC RX 637 (ALT 250 FOR IP): Performed by: INTERNAL MEDICINE

## 2024-12-12 PROCEDURE — 85610 PROTHROMBIN TIME: CPT

## 2024-12-12 PROCEDURE — 93010 ELECTROCARDIOGRAM REPORT: CPT | Performed by: INTERNAL MEDICINE

## 2024-12-12 PROCEDURE — 94003 VENT MGMT INPAT SUBQ DAY: CPT

## 2024-12-12 PROCEDURE — 83735 ASSAY OF MAGNESIUM: CPT

## 2024-12-12 PROCEDURE — 99233 SBSQ HOSP IP/OBS HIGH 50: CPT | Performed by: INTERNAL MEDICINE

## 2024-12-12 PROCEDURE — 83605 ASSAY OF LACTIC ACID: CPT

## 2024-12-12 PROCEDURE — 2580000003 HC RX 258: Performed by: INTERNAL MEDICINE

## 2024-12-12 PROCEDURE — 80076 HEPATIC FUNCTION PANEL: CPT

## 2024-12-12 PROCEDURE — 94761 N-INVAS EAR/PLS OXIMETRY MLT: CPT

## 2024-12-12 PROCEDURE — 99232 SBSQ HOSP IP/OBS MODERATE 35: CPT | Performed by: INTERNAL MEDICINE

## 2024-12-12 PROCEDURE — 85384 FIBRINOGEN ACTIVITY: CPT

## 2024-12-12 PROCEDURE — 37799 UNLISTED PX VASCULAR SURGERY: CPT

## 2024-12-12 PROCEDURE — 2100000001 HC CVICU R&B

## 2024-12-12 RX ORDER — INSULIN GLARGINE 100 [IU]/ML
5 INJECTION, SOLUTION SUBCUTANEOUS DAILY
Status: DISCONTINUED | OUTPATIENT
Start: 2024-12-12 | End: 2024-12-13

## 2024-12-12 RX ORDER — ALBUMIN (HUMAN) 12.5 G/50ML
25 SOLUTION INTRAVENOUS
Status: COMPLETED | OUTPATIENT
Start: 2024-12-12 | End: 2024-12-12

## 2024-12-12 RX ORDER — HEPARIN SODIUM 1000 [USP'U]/ML
1900 INJECTION, SOLUTION INTRAVENOUS; SUBCUTANEOUS PRN
Status: DISCONTINUED | OUTPATIENT
Start: 2024-12-12 | End: 2024-12-19

## 2024-12-12 RX ORDER — LEVOFLOXACIN 5 MG/ML
750 INJECTION, SOLUTION INTRAVENOUS EVERY 24 HOURS
Status: DISCONTINUED | OUTPATIENT
Start: 2024-12-12 | End: 2024-12-12

## 2024-12-12 RX ORDER — ALBUMIN (HUMAN) 12.5 G/50ML
25 SOLUTION INTRAVENOUS
Status: ACTIVE | OUTPATIENT
Start: 2024-12-12 | End: 2024-12-13

## 2024-12-12 RX ORDER — LEVOFLOXACIN 5 MG/ML
750 INJECTION, SOLUTION INTRAVENOUS ONCE
Status: COMPLETED | OUTPATIENT
Start: 2024-12-13 | End: 2024-12-13

## 2024-12-12 RX ORDER — METOCLOPRAMIDE HYDROCHLORIDE 5 MG/ML
5 INJECTION INTRAMUSCULAR; INTRAVENOUS EVERY 6 HOURS
Status: DISCONTINUED | OUTPATIENT
Start: 2024-12-12 | End: 2024-12-17

## 2024-12-12 RX ORDER — AMIODARONE HYDROCHLORIDE 200 MG/1
200 TABLET ORAL 2 TIMES DAILY
Status: DISCONTINUED | OUTPATIENT
Start: 2024-12-12 | End: 2024-12-23 | Stop reason: HOSPADM

## 2024-12-12 RX ORDER — LEVOFLOXACIN 5 MG/ML
500 INJECTION, SOLUTION INTRAVENOUS
Status: COMPLETED | OUTPATIENT
Start: 2024-12-15 | End: 2024-12-20

## 2024-12-12 RX ORDER — HEPARIN SODIUM 1000 [USP'U]/ML
1600 INJECTION, SOLUTION INTRAVENOUS; SUBCUTANEOUS PRN
Status: DISCONTINUED | OUTPATIENT
Start: 2024-12-12 | End: 2024-12-19

## 2024-12-12 RX ADMIN — METOCLOPRAMIDE HYDROCHLORIDE 10 MG: 5 INJECTION INTRAMUSCULAR; INTRAVENOUS at 08:32

## 2024-12-12 RX ADMIN — AMIODARONE HYDROCHLORIDE 200 MG: 200 TABLET ORAL at 21:18

## 2024-12-12 RX ADMIN — SODIUM CHLORIDE, PRESERVATIVE FREE 40 MG: 5 INJECTION INTRAVENOUS at 08:31

## 2024-12-12 RX ADMIN — FUROSEMIDE 80 MG: 10 INJECTION, SOLUTION INTRAMUSCULAR; INTRAVENOUS at 16:58

## 2024-12-12 RX ADMIN — SODIUM CHLORIDE, PRESERVATIVE FREE 40 MG: 5 INJECTION INTRAVENOUS at 21:18

## 2024-12-12 RX ADMIN — HEPARIN SODIUM 1900 UNITS: 1000 INJECTION INTRAVENOUS; SUBCUTANEOUS at 14:56

## 2024-12-12 RX ADMIN — INSULIN LISPRO 2 UNITS: 100 INJECTION, SOLUTION INTRAVENOUS; SUBCUTANEOUS at 17:39

## 2024-12-12 RX ADMIN — MIDODRINE HYDROCHLORIDE 10 MG: 5 TABLET ORAL at 12:16

## 2024-12-12 RX ADMIN — TICAGRELOR 90 MG: 90 TABLET ORAL at 21:17

## 2024-12-12 RX ADMIN — SODIUM CHLORIDE, PRESERVATIVE FREE 10 ML: 5 INJECTION INTRAVENOUS at 21:18

## 2024-12-12 RX ADMIN — INSULIN LISPRO 2 UNITS: 100 INJECTION, SOLUTION INTRAVENOUS; SUBCUTANEOUS at 12:26

## 2024-12-12 RX ADMIN — INSULIN LISPRO 4 UNITS: 100 INJECTION, SOLUTION INTRAVENOUS; SUBCUTANEOUS at 05:45

## 2024-12-12 RX ADMIN — AMIODARONE HYDROCHLORIDE 200 MG: 200 TABLET ORAL at 08:30

## 2024-12-12 RX ADMIN — MEROPENEM 500 MG: 500 INJECTION, POWDER, FOR SOLUTION INTRAVENOUS at 15:04

## 2024-12-12 RX ADMIN — METOCLOPRAMIDE HYDROCHLORIDE 5 MG: 5 INJECTION INTRAMUSCULAR; INTRAVENOUS at 12:16

## 2024-12-12 RX ADMIN — Medication 75 MCG/HR: at 10:35

## 2024-12-12 RX ADMIN — MIDODRINE HYDROCHLORIDE 10 MG: 5 TABLET ORAL at 08:30

## 2024-12-12 RX ADMIN — METOCLOPRAMIDE HYDROCHLORIDE 5 MG: 5 INJECTION INTRAMUSCULAR; INTRAVENOUS at 21:17

## 2024-12-12 RX ADMIN — HEPARIN SODIUM 1600 UNITS: 1000 INJECTION INTRAVENOUS; SUBCUTANEOUS at 14:56

## 2024-12-12 RX ADMIN — CALCIUM GLUCONATE 1000 MG: 20 INJECTION, SOLUTION INTRAVENOUS at 06:31

## 2024-12-12 RX ADMIN — SODIUM CHLORIDE, PRESERVATIVE FREE 10 ML: 5 INJECTION INTRAVENOUS at 09:27

## 2024-12-12 RX ADMIN — FUROSEMIDE 80 MG: 10 INJECTION, SOLUTION INTRAMUSCULAR; INTRAVENOUS at 08:31

## 2024-12-12 RX ADMIN — MIDODRINE HYDROCHLORIDE 10 MG: 5 TABLET ORAL at 16:58

## 2024-12-12 RX ADMIN — HEPARIN SODIUM 8 UNITS/KG/HR: 10000 INJECTION, SOLUTION INTRAVENOUS at 12:04

## 2024-12-12 RX ADMIN — INSULIN GLARGINE 5 UNITS: 100 INJECTION, SOLUTION SUBCUTANEOUS at 09:27

## 2024-12-12 RX ADMIN — SODIUM CHLORIDE, PRESERVATIVE FREE 10 ML: 5 INJECTION INTRAVENOUS at 09:26

## 2024-12-12 RX ADMIN — CALCIUM GLUCONATE 1000 MG: 20 INJECTION, SOLUTION INTRAVENOUS at 10:27

## 2024-12-12 RX ADMIN — ASPIRIN 81 MG 81 MG: 81 TABLET ORAL at 08:30

## 2024-12-12 RX ADMIN — ATORVASTATIN CALCIUM 40 MG: 40 TABLET, FILM COATED ORAL at 21:17

## 2024-12-12 RX ADMIN — DEXMEDETOMIDINE HYDROCHLORIDE 0.2 MCG/KG/HR: 400 INJECTION, SOLUTION INTRAVENOUS at 10:37

## 2024-12-12 RX ADMIN — TICAGRELOR 90 MG: 90 TABLET ORAL at 08:30

## 2024-12-12 RX ADMIN — POTASSIUM BICARBONATE 40 MEQ: 782 TABLET, EFFERVESCENT ORAL at 06:34

## 2024-12-12 RX ADMIN — ALBUMIN (HUMAN) 25 G: 0.25 INJECTION, SOLUTION INTRAVENOUS at 11:50

## 2024-12-12 RX ADMIN — METOCLOPRAMIDE HYDROCHLORIDE 10 MG: 5 INJECTION INTRAMUSCULAR; INTRAVENOUS at 01:28

## 2024-12-12 ASSESSMENT — PULMONARY FUNCTION TESTS
PIF_VALUE: 24
PIF_VALUE: 25
PIF_VALUE: 24
PIF_VALUE: 24
PIF_VALUE: 23
PIF_VALUE: 23
PIF_VALUE: 24
PIF_VALUE: 25
PIF_VALUE: 23
PIF_VALUE: 24
PIF_VALUE: 25
PIF_VALUE: 24
PIF_VALUE: 25
PIF_VALUE: 24
PIF_VALUE: 24
PIF_VALUE: 23
PIF_VALUE: 23
PIF_VALUE: 24
PIF_VALUE: 25

## 2024-12-13 ENCOUNTER — APPOINTMENT (OUTPATIENT)
Dept: DIALYSIS | Age: 74
DRG: 003 | End: 2024-12-13
Payer: MEDICARE

## 2024-12-13 ENCOUNTER — APPOINTMENT (OUTPATIENT)
Dept: GENERAL RADIOLOGY | Age: 74
DRG: 003 | End: 2024-12-13
Payer: MEDICARE

## 2024-12-13 LAB
ALBUMIN SERPL-MCNC: 3 G/DL (ref 3.5–5.2)
ALBUMIN/GLOB SERPL: 1.1 {RATIO} (ref 1–2.5)
ALLEN TEST: ABNORMAL
ALP SERPL-CCNC: 174 U/L (ref 40–129)
ALT SERPL-CCNC: 28 U/L (ref 10–50)
ANION GAP SERPL CALCULATED.3IONS-SCNC: 12 MMOL/L (ref 9–16)
ANTI-XA UNFRAC HEPARIN: 0.18 IU/L
ANTI-XA UNFRAC HEPARIN: 0.48 IU/L
AST SERPL-CCNC: 45 U/L (ref 10–50)
BILIRUB DIRECT SERPL-MCNC: 0.6 MG/DL (ref 0–0.2)
BILIRUB INDIRECT SERPL-MCNC: 0.4 MG/DL (ref 0–1)
BILIRUB SERPL-MCNC: 1 MG/DL (ref 0–1.2)
BUN BLD-MCNC: 69 MG/DL (ref 8–26)
BUN SERPL-MCNC: 73 MG/DL (ref 8–23)
CA-I BLD-SCNC: 1.02 MMOL/L (ref 1.13–1.33)
CA-I BLD-SCNC: 1.09 MMOL/L (ref 1.15–1.33)
CALCIUM SERPL-MCNC: 8 MG/DL (ref 8.6–10.4)
CHLORIDE BLD-SCNC: 99 MMOL/L (ref 98–107)
CHLORIDE SERPL-SCNC: 101 MMOL/L (ref 98–107)
CO2 BLD CALC-SCNC: 27 MMOL/L (ref 22–30)
CO2 SERPL-SCNC: 26 MMOL/L (ref 20–31)
CREAT SERPL-MCNC: 4.1 MG/DL (ref 0.7–1.2)
CRITICAL ACTION: NORMAL
CRITICAL NOTIFICATION DATE/TIME: NORMAL
CRITICAL NOTIFICATION: NORMAL
CRITICAL VALUE READ BACK: YES
EGFR, POC: 16 ML/MIN/1.73M2
ERYTHROCYTE [DISTWIDTH] IN BLOOD BY AUTOMATED COUNT: 17.7 % (ref 11.8–14.4)
FIBRINOGEN PPP-MCNC: 641 MG/DL (ref 203–521)
FIO2: 70
GFR, ESTIMATED: 15 ML/MIN/1.73M2
GLOBULIN SER CALC-MCNC: 2.7 G/DL
GLUCOSE BLD-MCNC: 196 MG/DL (ref 75–110)
GLUCOSE BLD-MCNC: 202 MG/DL (ref 75–110)
GLUCOSE BLD-MCNC: 207 MG/DL (ref 75–110)
GLUCOSE BLD-MCNC: 207 MG/DL (ref 75–110)
GLUCOSE BLD-MCNC: 240 MG/DL (ref 75–110)
GLUCOSE BLD-MCNC: 252 MG/DL (ref 74–100)
GLUCOSE BLD-MCNC: 266 MG/DL (ref 75–110)
GLUCOSE SERPL-MCNC: 236 MG/DL (ref 74–99)
HCT VFR BLD AUTO: 21 % (ref 41–53)
HCT VFR BLD AUTO: 23.2 % (ref 40.7–50.3)
HGB BLD-MCNC: 7.4 G/DL (ref 13–17)
INR PPP: 1.2
LACTIC ACID, WHOLE BLOOD: 1.8 MMOL/L (ref 0.7–2.1)
MAGNESIUM SERPL-MCNC: 1.8 MG/DL (ref 1.6–2.4)
MCH RBC QN AUTO: 30.8 PG (ref 25.2–33.5)
MCHC RBC AUTO-ENTMCNC: 31.9 G/DL (ref 28.4–34.8)
MCV RBC AUTO: 96.7 FL (ref 82.6–102.9)
MICROORGANISM SPEC CULT: ABNORMAL
MICROORGANISM SPEC CULT: NORMAL
MICROORGANISM/AGENT SPEC: ABNORMAL
MICROORGANISM/AGENT SPEC: NORMAL
MODE: ABNORMAL
NRBC BLD-RTO: 0.3 PER 100 WBC
O2 DELIVERY DEVICE: ABNORMAL
PARTIAL THROMBOPLASTIN TIME: 37 SEC (ref 23–36.5)
PLATELET # BLD AUTO: 171 K/UL (ref 138–453)
PMV BLD AUTO: 11.7 FL (ref 8.1–13.5)
POC ANION GAP: 14 MMOL/L (ref 7–16)
POC CREATININE: 3.8 MG/DL (ref 0.51–1.19)
POC HCO3: 28.4 MMOL/L (ref 21–28)
POC HEMOGLOBIN (CALC): 7.3 G/DL (ref 13.5–17.5)
POC LACTIC ACID: 1.7 MMOL/L (ref 0.56–1.39)
POC O2 SATURATION: 95.2 % (ref 94–98)
POC PCO2: 40.8 MM HG (ref 35–48)
POC PH: 7.45 (ref 7.35–7.45)
POC PO2: 73.3 MM HG (ref 83–108)
POSITIVE BASE EXCESS, ART: 4 MMOL/L (ref 0–3)
POTASSIUM BLD-SCNC: 2.9 MMOL/L (ref 3.5–4.5)
POTASSIUM SERPL-SCNC: 3.1 MMOL/L (ref 3.7–5.3)
PROT SERPL-MCNC: 5.7 G/DL (ref 6.6–8.7)
PROTHROMBIN TIME: 15.4 SEC (ref 11.7–14.9)
RBC # BLD AUTO: 2.4 M/UL (ref 4.21–5.77)
SAMPLE SITE: ABNORMAL
SERVICE CMNT-IMP: ABNORMAL
SODIUM BLD-SCNC: 139 MMOL/L (ref 138–146)
SODIUM SERPL-SCNC: 139 MMOL/L (ref 136–145)
SPECIMEN DESCRIPTION: ABNORMAL
SPECIMEN DESCRIPTION: NORMAL
WBC OTHER # BLD: 11.7 K/UL (ref 3.5–11.3)

## 2024-12-13 PROCEDURE — 90935 HEMODIALYSIS ONE EVALUATION: CPT

## 2024-12-13 PROCEDURE — 90935 HEMODIALYSIS ONE EVALUATION: CPT | Performed by: INTERNAL MEDICINE

## 2024-12-13 PROCEDURE — 85520 HEPARIN ASSAY: CPT

## 2024-12-13 PROCEDURE — 83605 ASSAY OF LACTIC ACID: CPT

## 2024-12-13 PROCEDURE — 6360000002 HC RX W HCPCS

## 2024-12-13 PROCEDURE — 2580000003 HC RX 258

## 2024-12-13 PROCEDURE — 80048 BASIC METABOLIC PNL TOTAL CA: CPT

## 2024-12-13 PROCEDURE — 85014 HEMATOCRIT: CPT

## 2024-12-13 PROCEDURE — 6370000000 HC RX 637 (ALT 250 FOR IP)

## 2024-12-13 PROCEDURE — 99233 SBSQ HOSP IP/OBS HIGH 50: CPT | Performed by: INTERNAL MEDICINE

## 2024-12-13 PROCEDURE — 94761 N-INVAS EAR/PLS OXIMETRY MLT: CPT

## 2024-12-13 PROCEDURE — 85027 COMPLETE CBC AUTOMATED: CPT

## 2024-12-13 PROCEDURE — 2500000003 HC RX 250 WO HCPCS: Performed by: INTERNAL MEDICINE

## 2024-12-13 PROCEDURE — 80076 HEPATIC FUNCTION PANEL: CPT

## 2024-12-13 PROCEDURE — 2700000000 HC OXYGEN THERAPY PER DAY

## 2024-12-13 PROCEDURE — 85384 FIBRINOGEN ACTIVITY: CPT

## 2024-12-13 PROCEDURE — 71045 X-RAY EXAM CHEST 1 VIEW: CPT

## 2024-12-13 PROCEDURE — 6360000002 HC RX W HCPCS: Performed by: STUDENT IN AN ORGANIZED HEALTH CARE EDUCATION/TRAINING PROGRAM

## 2024-12-13 PROCEDURE — 6370000000 HC RX 637 (ALT 250 FOR IP): Performed by: INTERNAL MEDICINE

## 2024-12-13 PROCEDURE — 2580000003 HC RX 258: Performed by: STUDENT IN AN ORGANIZED HEALTH CARE EDUCATION/TRAINING PROGRAM

## 2024-12-13 PROCEDURE — 99232 SBSQ HOSP IP/OBS MODERATE 35: CPT | Performed by: INTERNAL MEDICINE

## 2024-12-13 PROCEDURE — 82330 ASSAY OF CALCIUM: CPT

## 2024-12-13 PROCEDURE — 2580000003 HC RX 258: Performed by: INTERNAL MEDICINE

## 2024-12-13 PROCEDURE — 82565 ASSAY OF CREATININE: CPT

## 2024-12-13 PROCEDURE — 6360000002 HC RX W HCPCS: Performed by: INTERNAL MEDICINE

## 2024-12-13 PROCEDURE — 80051 ELECTROLYTE PANEL: CPT

## 2024-12-13 PROCEDURE — 2000000000 HC ICU R&B

## 2024-12-13 PROCEDURE — 84520 ASSAY OF UREA NITROGEN: CPT

## 2024-12-13 PROCEDURE — 99291 CRITICAL CARE FIRST HOUR: CPT | Performed by: STUDENT IN AN ORGANIZED HEALTH CARE EDUCATION/TRAINING PROGRAM

## 2024-12-13 PROCEDURE — 94003 VENT MGMT INPAT SUBQ DAY: CPT

## 2024-12-13 PROCEDURE — 82947 ASSAY GLUCOSE BLOOD QUANT: CPT

## 2024-12-13 PROCEDURE — 82803 BLOOD GASES ANY COMBINATION: CPT

## 2024-12-13 PROCEDURE — 83735 ASSAY OF MAGNESIUM: CPT

## 2024-12-13 PROCEDURE — 37799 UNLISTED PX VASCULAR SURGERY: CPT

## 2024-12-13 PROCEDURE — 85610 PROTHROMBIN TIME: CPT

## 2024-12-13 PROCEDURE — 85730 THROMBOPLASTIN TIME PARTIAL: CPT

## 2024-12-13 RX ORDER — INSULIN GLARGINE 100 [IU]/ML
10 INJECTION, SOLUTION SUBCUTANEOUS DAILY
Status: DISCONTINUED | OUTPATIENT
Start: 2024-12-13 | End: 2024-12-16

## 2024-12-13 RX ORDER — POTASSIUM CHLORIDE 29.8 MG/ML
20 INJECTION INTRAVENOUS
Status: COMPLETED | OUTPATIENT
Start: 2024-12-13 | End: 2024-12-13

## 2024-12-13 RX ORDER — CALCIUM GLUCONATE 20 MG/ML
2000 INJECTION, SOLUTION INTRAVENOUS ONCE
Status: COMPLETED | OUTPATIENT
Start: 2024-12-13 | End: 2024-12-13

## 2024-12-13 RX ADMIN — AMIODARONE HYDROCHLORIDE 200 MG: 200 TABLET ORAL at 21:38

## 2024-12-13 RX ADMIN — INSULIN GLARGINE 10 UNITS: 100 INJECTION, SOLUTION SUBCUTANEOUS at 08:54

## 2024-12-13 RX ADMIN — METOCLOPRAMIDE HYDROCHLORIDE 5 MG: 5 INJECTION INTRAMUSCULAR; INTRAVENOUS at 14:21

## 2024-12-13 RX ADMIN — SODIUM CHLORIDE, PRESERVATIVE FREE 10 ML: 5 INJECTION INTRAVENOUS at 19:45

## 2024-12-13 RX ADMIN — MIDODRINE HYDROCHLORIDE 10 MG: 5 TABLET ORAL at 12:03

## 2024-12-13 RX ADMIN — INSULIN LISPRO 4 UNITS: 100 INJECTION, SOLUTION INTRAVENOUS; SUBCUTANEOUS at 02:23

## 2024-12-13 RX ADMIN — LEVOFLOXACIN 750 MG: 750 INJECTION, SOLUTION INTRAVENOUS at 01:58

## 2024-12-13 RX ADMIN — HEPARIN SODIUM 1900 UNITS: 1000 INJECTION INTRAVENOUS; SUBCUTANEOUS at 14:14

## 2024-12-13 RX ADMIN — METOCLOPRAMIDE HYDROCHLORIDE 5 MG: 5 INJECTION INTRAMUSCULAR; INTRAVENOUS at 02:11

## 2024-12-13 RX ADMIN — HEPARIN SODIUM 1600 UNITS: 1000 INJECTION INTRAVENOUS; SUBCUTANEOUS at 14:14

## 2024-12-13 RX ADMIN — TICAGRELOR 90 MG: 90 TABLET ORAL at 08:54

## 2024-12-13 RX ADMIN — AMPICILLIN AND SULBACTAM 1500 MG: 1; .5 INJECTION, POWDER, FOR SOLUTION INTRAMUSCULAR; INTRAVENOUS at 12:07

## 2024-12-13 RX ADMIN — SODIUM CHLORIDE, PRESERVATIVE FREE 40 MG: 5 INJECTION INTRAVENOUS at 19:41

## 2024-12-13 RX ADMIN — AMPICILLIN AND SULBACTAM 1500 MG: 1; .5 INJECTION, POWDER, FOR SOLUTION INTRAMUSCULAR; INTRAVENOUS at 00:08

## 2024-12-13 RX ADMIN — ASPIRIN 81 MG 81 MG: 81 TABLET ORAL at 08:54

## 2024-12-13 RX ADMIN — HEPARIN SODIUM 2000 UNITS: 1000 INJECTION INTRAVENOUS; SUBCUTANEOUS at 07:36

## 2024-12-13 RX ADMIN — DEXMEDETOMIDINE HYDROCHLORIDE 0.2 MCG/KG/HR: 400 INJECTION, SOLUTION INTRAVENOUS at 05:41

## 2024-12-13 RX ADMIN — CALCIUM GLUCONATE 2000 MG: 20 INJECTION, SOLUTION INTRAVENOUS at 09:37

## 2024-12-13 RX ADMIN — FUROSEMIDE 80 MG: 10 INJECTION, SOLUTION INTRAMUSCULAR; INTRAVENOUS at 16:36

## 2024-12-13 RX ADMIN — METOCLOPRAMIDE HYDROCHLORIDE 5 MG: 5 INJECTION INTRAMUSCULAR; INTRAVENOUS at 08:54

## 2024-12-13 RX ADMIN — TICAGRELOR 90 MG: 90 TABLET ORAL at 21:38

## 2024-12-13 RX ADMIN — POTASSIUM CHLORIDE 20 MEQ: 29.8 INJECTION, SOLUTION INTRAVENOUS at 08:01

## 2024-12-13 RX ADMIN — INSULIN LISPRO 2 UNITS: 100 INJECTION, SOLUTION INTRAVENOUS; SUBCUTANEOUS at 16:36

## 2024-12-13 RX ADMIN — SODIUM CHLORIDE, PRESERVATIVE FREE 10 ML: 5 INJECTION INTRAVENOUS at 19:46

## 2024-12-13 RX ADMIN — INSULIN LISPRO 2 UNITS: 100 INJECTION, SOLUTION INTRAVENOUS; SUBCUTANEOUS at 06:09

## 2024-12-13 RX ADMIN — MIDODRINE HYDROCHLORIDE 10 MG: 5 TABLET ORAL at 08:54

## 2024-12-13 RX ADMIN — MIDODRINE HYDROCHLORIDE 10 MG: 5 TABLET ORAL at 16:36

## 2024-12-13 RX ADMIN — AMIODARONE HYDROCHLORIDE 200 MG: 200 TABLET ORAL at 08:54

## 2024-12-13 RX ADMIN — INSULIN LISPRO 2 UNITS: 100 INJECTION, SOLUTION INTRAVENOUS; SUBCUTANEOUS at 12:02

## 2024-12-13 RX ADMIN — SODIUM CHLORIDE, PRESERVATIVE FREE 40 MG: 5 INJECTION INTRAVENOUS at 08:54

## 2024-12-13 RX ADMIN — METOCLOPRAMIDE HYDROCHLORIDE 5 MG: 5 INJECTION INTRAMUSCULAR; INTRAVENOUS at 19:36

## 2024-12-13 RX ADMIN — POTASSIUM CHLORIDE 20 MEQ: 29.8 INJECTION, SOLUTION INTRAVENOUS at 06:38

## 2024-12-13 RX ADMIN — FUROSEMIDE 80 MG: 10 INJECTION, SOLUTION INTRAMUSCULAR; INTRAVENOUS at 08:54

## 2024-12-13 RX ADMIN — ATORVASTATIN CALCIUM 40 MG: 40 TABLET, FILM COATED ORAL at 21:38

## 2024-12-13 RX ADMIN — Medication 100 MCG/HR: at 19:45

## 2024-12-13 ASSESSMENT — PULMONARY FUNCTION TESTS
PIF_VALUE: 24
PIF_VALUE: 24
PIF_VALUE: 25
PIF_VALUE: 26
PIF_VALUE: 25
PIF_VALUE: 27
PIF_VALUE: 29
PIF_VALUE: 23
PIF_VALUE: 26
PIF_VALUE: 24
PIF_VALUE: 23
PIF_VALUE: 23
PIF_VALUE: 34
PIF_VALUE: 26
PIF_VALUE: 26
PIF_VALUE: 34
PIF_VALUE: 24
PIF_VALUE: 24
PIF_VALUE: 25
PIF_VALUE: 24

## 2024-12-13 ASSESSMENT — PAIN SCALES - GENERAL
PAINLEVEL_OUTOF10: 0

## 2024-12-13 NOTE — CARE COORDINATION
TRANSITIONAL CARE/DISCHARGE PLANNING ONGOING EVALUATION      Reason for Admission: STEMI (ST elevation myocardial infarction) ([facility]) [I21.3]  Cardiac arrest [I46.9]     Hospital day:10    Patient goals/Transitional Plan:    Patient remains Vented, FiO2 70%, PEEP 10, following commands, possible Trach an PEG this admission.  Accepted to [facility].  [facility] following as secondary.         [facility] RISK OF UNPLANNED READMISSION 2.0             20.1 Total Score

## 2024-12-13 NOTE — H&P
[facility]    H&P               Today's Date: 12/3/2024  Patient Name: Lukasz Keller  Date of admission: 12/3/2024  1:43 PM  Patient's age: 74 y.o., 1950  Admission Dx: STEMI (ST elevation myocardial infarction) (HCC) [I21.3]  Cardiac arrest [I46.9]    History Obtained From: wife/chart review     History of Present Illness:    This patient 74 y.o. years old male with past medical history as below.     Patient presented to ER via EMS with cardiac arrest after a witnessed collapse at home. EKG with inferior STEs. Required CPR a total of 20-25 minutes (including with EMS, ER and en-route to cath lab). Required defibrillation x1 with EMS. Patient was referred for emergent coronary angiography and possible PCI.    Per wife patient was having chest pain and not feeling well since last Tuesday. He is a former smoker, smoked almost 40-50 years.     Past Medical History:   has a past medical history of Abnormal EKG, Acute respiratory failure, Anesthesia complication, Anxiety, Cancer (HCC), Colon polyps, Hx of blood clots, Hyperlipidemia, Prostate CA (HCC), Pulmonary emboli (HCC), SOB (shortness of breath) on exertion, Wears partial dentures, and Wellness examination.    Past Surgical History:   has a past surgical history that includes knee surgery (Right); Foot Tendon Surgery (Right); Wrist surgery (Left); Prostate Biopsy; back surgery; Colonoscopy; Prostatectomy (03/13/2020); Prostatectomy (N/A, 03/13/2020); Tonsillectomy; hemicolectomy (05/13/2021); lipoma resection; Cervical spine surgery; Colonoscopy (N/A, 04/25/2022); joint replacement (Right, 05/25/2022); joint replacement (Left, 02/22/2023); other surgical history (06/2021); Breast surgery (Left, 10/16/2023); Arm Surgery (Right, 10/16/2023); and IR BIOPSY THYROID PERC CORE NEEDLE (8/26/2024).     Home Medications:    Prior to Admission medications    Medication Sig Start Date End Date Taking? Authorizing Provider   phentermine (ADIPEX-P) 
[id number]  Ventilator Safety Check Performed Pre-Use: Yes  Equipment Changed: Expiratory Filter, Humidification  Ventilator Initiate: Yes  Vent Mode: AC/PRVC    CV:  Inferior STEMI with VT arrest s/p emergent cardiac cath with PCI/ERNST to RCA 12/3/2024 , S/p VA ECMO 12/4 and IABP placement on 12/5 and removal on 12/9.   Afib with RVR -to NSR with IV amiodarone   Cardiology on board  Continue amiodarone 200 Mg twice daily and heparin infusion for A-fib  Continue Lipitor 40 Mg  Continue Brilinta 90 Mg twice daily  Continue midodrine 10 Mg 3 times daily  GI/Nutrition:  Diet: Diet NPO  ADULT TUBE FEEDING; Orogastric; Renal Formula; Cyclic; 35; 9:00 AM; 1:00 PM; 30; Q 6 hours; Protein; 1 Dose; TID  Daily documentation of bowel movement  Pantoprazole 40 Mg IV twice daily  Continue Reglan 5 Mg as needed every 6    /Fluids/Electrolytes:  RUBEN secondary to ischemic ATN due to reduced perfusion and possible contrast nephropathy  CVVH D from 12/4/2024 to 12/9/2024  Transition to hemodialysis.  Continue hemodialysis schedule per nephrology.  Continue Lasix 80 Mg IV twice daily  Monitor input output  Avoid nephrotoxic drugs and hypotension  Recent Labs     12/11/24  0320 12/12/24  0546 12/13/24  0521 12/13/24  0545   BUN 56* 81*  --  73*   CREATININE 3.6* 4.7* 3.8* 4.1*       Heme:  Hemoglobin 7.7  Acute blood loss anemia  Monitor H&H and transfuse if hemoglobin less than 7  Patient received multiple PRBC in this admission  There was a concern for hip admission as platelets were low, but stopped with heparin and started on argatroban but later HIT panel was negative.  Patient was restarted on heparin 12/11  Recent Labs     12/11/24  0320 12/12/24  0546 12/13/24  0545   HGB 7.7* 7.3* 7.4*   HCT 24.0* 22.4* 23.2*       ID:  Concern for possible aspiration pneumonia/ventilator associated pneumonia  Patient was on Zosyn, vancomycin and meropenem in this admission  Elevated procalcitonin 6  Continue patient on Unasyn and

## 2024-12-14 ENCOUNTER — APPOINTMENT (OUTPATIENT)
Dept: GENERAL RADIOLOGY | Age: 74
DRG: 003 | End: 2024-12-14
Payer: MEDICARE

## 2024-12-14 ENCOUNTER — APPOINTMENT (OUTPATIENT)
Dept: DIALYSIS | Age: 74
DRG: 003 | End: 2024-12-14
Payer: MEDICARE

## 2024-12-14 PROBLEM — Z95.5 STENTED CORONARY ARTERY: Status: ACTIVE | Noted: 2024-12-14

## 2024-12-14 LAB
ALBUMIN SERPL-MCNC: 2.9 G/DL (ref 3.5–5.2)
ALBUMIN/GLOB SERPL: 1 {RATIO} (ref 1–2.5)
ALP SERPL-CCNC: 161 U/L (ref 40–129)
ALT SERPL-CCNC: 25 U/L (ref 10–50)
ANION GAP SERPL CALCULATED.3IONS-SCNC: 13 MMOL/L (ref 9–16)
ANTI-XA UNFRAC HEPARIN: 0.27 IU/L
ANTI-XA UNFRAC HEPARIN: 0.39 IU/L
ANTI-XA UNFRAC HEPARIN: 0.45 IU/L
AST SERPL-CCNC: 43 U/L (ref 10–50)
BILIRUB DIRECT SERPL-MCNC: 0.5 MG/DL (ref 0–0.2)
BILIRUB INDIRECT SERPL-MCNC: 0.5 MG/DL (ref 0–1)
BILIRUB SERPL-MCNC: 1 MG/DL (ref 0–1.2)
BUN SERPL-MCNC: 69 MG/DL (ref 8–23)
CA-I BLD-SCNC: 1.11 MMOL/L (ref 1.13–1.33)
CALCIUM SERPL-MCNC: 8.5 MG/DL (ref 8.6–10.4)
CHLORIDE SERPL-SCNC: 102 MMOL/L (ref 98–107)
CO2 SERPL-SCNC: 24 MMOL/L (ref 20–31)
CREAT SERPL-MCNC: 4.2 MG/DL (ref 0.7–1.2)
ERYTHROCYTE [DISTWIDTH] IN BLOOD BY AUTOMATED COUNT: 17.8 % (ref 11.8–14.4)
FIBRINOGEN PPP-MCNC: 700 MG/DL (ref 203–521)
FIO2: 70
GFR, ESTIMATED: 14 ML/MIN/1.73M2
GLOBULIN SER CALC-MCNC: 3 G/DL
GLUCOSE BLD-MCNC: 185 MG/DL (ref 75–110)
GLUCOSE BLD-MCNC: 202 MG/DL (ref 75–110)
GLUCOSE BLD-MCNC: 211 MG/DL (ref 75–110)
GLUCOSE BLD-MCNC: 213 MG/DL (ref 75–110)
GLUCOSE BLD-MCNC: 223 MG/DL (ref 74–100)
GLUCOSE BLD-MCNC: 243 MG/DL (ref 75–110)
GLUCOSE BLD-MCNC: 244 MG/DL (ref 75–110)
GLUCOSE BLD-MCNC: 258 MG/DL (ref 75–110)
GLUCOSE SERPL-MCNC: 211 MG/DL (ref 74–99)
HCT VFR BLD AUTO: 24 % (ref 40.7–50.3)
HGB BLD-MCNC: 7.5 G/DL (ref 13–17)
INR PPP: 1.3
LACTIC ACID, WHOLE BLOOD: 1.8 MMOL/L (ref 0.7–2.1)
MAGNESIUM SERPL-MCNC: 1.8 MG/DL (ref 1.6–2.4)
MCH RBC QN AUTO: 30.6 PG (ref 25.2–33.5)
MCHC RBC AUTO-ENTMCNC: 31.3 G/DL (ref 28.4–34.8)
MCV RBC AUTO: 98 FL (ref 82.6–102.9)
NRBC BLD-RTO: 0.5 PER 100 WBC
PARTIAL THROMBOPLASTIN TIME: 53.4 SEC (ref 23–36.5)
PLATELET # BLD AUTO: 228 K/UL (ref 138–453)
PMV BLD AUTO: 11.6 FL (ref 8.1–13.5)
POC HCO3: 28.1 MMOL/L (ref 21–28)
POC O2 SATURATION: 97.4 % (ref 94–98)
POC PCO2: 41.5 MM HG (ref 35–48)
POC PH: 7.44 (ref 7.35–7.45)
POC PO2: 92.4 MM HG (ref 83–108)
POSITIVE BASE EXCESS, ART: 3.6 MMOL/L (ref 0–3)
POTASSIUM SERPL-SCNC: 3.9 MMOL/L (ref 3.7–5.3)
PROT SERPL-MCNC: 5.9 G/DL (ref 6.6–8.7)
PROTHROMBIN TIME: 15.7 SEC (ref 11.7–14.9)
RBC # BLD AUTO: 2.45 M/UL (ref 4.21–5.77)
SAMPLE SITE: ABNORMAL
SODIUM SERPL-SCNC: 139 MMOL/L (ref 136–145)
WBC OTHER # BLD: 15 K/UL (ref 3.5–11.3)

## 2024-12-14 PROCEDURE — 99291 CRITICAL CARE FIRST HOUR: CPT | Performed by: INTERNAL MEDICINE

## 2024-12-14 PROCEDURE — 71045 X-RAY EXAM CHEST 1 VIEW: CPT

## 2024-12-14 PROCEDURE — 90935 HEMODIALYSIS ONE EVALUATION: CPT

## 2024-12-14 PROCEDURE — 85027 COMPLETE CBC AUTOMATED: CPT

## 2024-12-14 PROCEDURE — 80076 HEPATIC FUNCTION PANEL: CPT

## 2024-12-14 PROCEDURE — 83605 ASSAY OF LACTIC ACID: CPT

## 2024-12-14 PROCEDURE — 99233 SBSQ HOSP IP/OBS HIGH 50: CPT | Performed by: INTERNAL MEDICINE

## 2024-12-14 PROCEDURE — 6370000000 HC RX 637 (ALT 250 FOR IP)

## 2024-12-14 PROCEDURE — 80048 BASIC METABOLIC PNL TOTAL CA: CPT

## 2024-12-14 PROCEDURE — 6360000002 HC RX W HCPCS

## 2024-12-14 PROCEDURE — 94761 N-INVAS EAR/PLS OXIMETRY MLT: CPT

## 2024-12-14 PROCEDURE — 94003 VENT MGMT INPAT SUBQ DAY: CPT

## 2024-12-14 PROCEDURE — 2700000000 HC OXYGEN THERAPY PER DAY

## 2024-12-14 PROCEDURE — 85384 FIBRINOGEN ACTIVITY: CPT

## 2024-12-14 PROCEDURE — 2580000003 HC RX 258: Performed by: STUDENT IN AN ORGANIZED HEALTH CARE EDUCATION/TRAINING PROGRAM

## 2024-12-14 PROCEDURE — 2500000003 HC RX 250 WO HCPCS: Performed by: INTERNAL MEDICINE

## 2024-12-14 PROCEDURE — 82803 BLOOD GASES ANY COMBINATION: CPT

## 2024-12-14 PROCEDURE — 2500000003 HC RX 250 WO HCPCS

## 2024-12-14 PROCEDURE — 6370000000 HC RX 637 (ALT 250 FOR IP): Performed by: INTERNAL MEDICINE

## 2024-12-14 PROCEDURE — 2000000000 HC ICU R&B

## 2024-12-14 PROCEDURE — 37799 UNLISTED PX VASCULAR SURGERY: CPT

## 2024-12-14 PROCEDURE — 6360000002 HC RX W HCPCS: Performed by: STUDENT IN AN ORGANIZED HEALTH CARE EDUCATION/TRAINING PROGRAM

## 2024-12-14 PROCEDURE — 99232 SBSQ HOSP IP/OBS MODERATE 35: CPT | Performed by: INTERNAL MEDICINE

## 2024-12-14 PROCEDURE — 82330 ASSAY OF CALCIUM: CPT

## 2024-12-14 PROCEDURE — 2580000003 HC RX 258: Performed by: INTERNAL MEDICINE

## 2024-12-14 PROCEDURE — 85610 PROTHROMBIN TIME: CPT

## 2024-12-14 PROCEDURE — 6360000002 HC RX W HCPCS: Performed by: INTERNAL MEDICINE

## 2024-12-14 PROCEDURE — 85730 THROMBOPLASTIN TIME PARTIAL: CPT

## 2024-12-14 PROCEDURE — 85520 HEPARIN ASSAY: CPT

## 2024-12-14 PROCEDURE — 82947 ASSAY GLUCOSE BLOOD QUANT: CPT

## 2024-12-14 PROCEDURE — 83735 ASSAY OF MAGNESIUM: CPT

## 2024-12-14 PROCEDURE — 2580000003 HC RX 258

## 2024-12-14 RX ORDER — FLUTICASONE PROPIONATE 50 MCG
2 SPRAY, SUSPENSION (ML) NASAL DAILY PRN
Status: DISCONTINUED | OUTPATIENT
Start: 2024-12-14 | End: 2024-12-23 | Stop reason: HOSPADM

## 2024-12-14 RX ADMIN — SODIUM CHLORIDE, PRESERVATIVE FREE 10 ML: 5 INJECTION INTRAVENOUS at 08:46

## 2024-12-14 RX ADMIN — AMPICILLIN AND SULBACTAM 1500 MG: 1; .5 INJECTION, POWDER, FOR SOLUTION INTRAMUSCULAR; INTRAVENOUS at 00:13

## 2024-12-14 RX ADMIN — METOCLOPRAMIDE HYDROCHLORIDE 5 MG: 5 INJECTION INTRAMUSCULAR; INTRAVENOUS at 08:40

## 2024-12-14 RX ADMIN — HEPARIN SODIUM 1600 UNITS: 1000 INJECTION INTRAVENOUS; SUBCUTANEOUS at 19:16

## 2024-12-14 RX ADMIN — FUROSEMIDE 80 MG: 10 INJECTION, SOLUTION INTRAMUSCULAR; INTRAVENOUS at 08:50

## 2024-12-14 RX ADMIN — DEXMEDETOMIDINE HYDROCHLORIDE 0.4 MCG/KG/HR: 400 INJECTION, SOLUTION INTRAVENOUS at 01:59

## 2024-12-14 RX ADMIN — INSULIN LISPRO 4 UNITS: 100 INJECTION, SOLUTION INTRAVENOUS; SUBCUTANEOUS at 23:37

## 2024-12-14 RX ADMIN — INSULIN LISPRO 2 UNITS: 100 INJECTION, SOLUTION INTRAVENOUS; SUBCUTANEOUS at 06:11

## 2024-12-14 RX ADMIN — AMIODARONE HYDROCHLORIDE 200 MG: 200 TABLET ORAL at 08:41

## 2024-12-14 RX ADMIN — ASPIRIN 81 MG 81 MG: 81 TABLET ORAL at 08:42

## 2024-12-14 RX ADMIN — FENTANYL CITRATE 50 MCG: 50 INJECTION, SOLUTION INTRAMUSCULAR; INTRAVENOUS at 04:46

## 2024-12-14 RX ADMIN — DEXMEDETOMIDINE HYDROCHLORIDE 0.6 MCG/KG/HR: 400 INJECTION, SOLUTION INTRAVENOUS at 15:47

## 2024-12-14 RX ADMIN — DEXMEDETOMIDINE HYDROCHLORIDE 0.4 MCG/KG/HR: 400 INJECTION, SOLUTION INTRAVENOUS at 22:14

## 2024-12-14 RX ADMIN — METOCLOPRAMIDE HYDROCHLORIDE 5 MG: 5 INJECTION INTRAMUSCULAR; INTRAVENOUS at 01:22

## 2024-12-14 RX ADMIN — METOCLOPRAMIDE HYDROCHLORIDE 5 MG: 5 INJECTION INTRAMUSCULAR; INTRAVENOUS at 21:09

## 2024-12-14 RX ADMIN — HEPARIN SODIUM 12 UNITS/KG/HR: 10000 INJECTION, SOLUTION INTRAVENOUS at 17:53

## 2024-12-14 RX ADMIN — ACETAMINOPHEN 650 MG: 325 TABLET ORAL at 08:59

## 2024-12-14 RX ADMIN — INSULIN LISPRO 2 UNITS: 100 INJECTION, SOLUTION INTRAVENOUS; SUBCUTANEOUS at 00:15

## 2024-12-14 RX ADMIN — TICAGRELOR 90 MG: 90 TABLET ORAL at 21:08

## 2024-12-14 RX ADMIN — FUROSEMIDE 80 MG: 10 INJECTION, SOLUTION INTRAMUSCULAR; INTRAVENOUS at 18:34

## 2024-12-14 RX ADMIN — Medication 5 MCG/MIN: at 02:57

## 2024-12-14 RX ADMIN — FENTANYL CITRATE 50 MCG: 50 INJECTION, SOLUTION INTRAMUSCULAR; INTRAVENOUS at 00:02

## 2024-12-14 RX ADMIN — ACETAMINOPHEN 650 MG: 325 TABLET ORAL at 01:55

## 2024-12-14 RX ADMIN — AMIODARONE HYDROCHLORIDE 200 MG: 200 TABLET ORAL at 21:05

## 2024-12-14 RX ADMIN — ATORVASTATIN CALCIUM 40 MG: 40 TABLET, FILM COATED ORAL at 21:08

## 2024-12-14 RX ADMIN — SODIUM CHLORIDE, PRESERVATIVE FREE 40 MG: 5 INJECTION INTRAVENOUS at 21:09

## 2024-12-14 RX ADMIN — SODIUM CHLORIDE, PRESERVATIVE FREE 10 ML: 5 INJECTION INTRAVENOUS at 21:09

## 2024-12-14 RX ADMIN — ACETAMINOPHEN 650 MG: 325 TABLET ORAL at 16:31

## 2024-12-14 RX ADMIN — HEPARIN SODIUM 1900 UNITS: 1000 INJECTION INTRAVENOUS; SUBCUTANEOUS at 19:16

## 2024-12-14 RX ADMIN — DEXMEDETOMIDINE HYDROCHLORIDE 0.6 MCG/KG/HR: 400 INJECTION, SOLUTION INTRAVENOUS at 10:36

## 2024-12-14 RX ADMIN — AMPICILLIN AND SULBACTAM 1500 MG: 1; .5 INJECTION, POWDER, FOR SOLUTION INTRAMUSCULAR; INTRAVENOUS at 11:53

## 2024-12-14 RX ADMIN — INSULIN GLARGINE 10 UNITS: 100 INJECTION, SOLUTION SUBCUTANEOUS at 09:14

## 2024-12-14 RX ADMIN — METOCLOPRAMIDE HYDROCHLORIDE 5 MG: 5 INJECTION INTRAMUSCULAR; INTRAVENOUS at 13:57

## 2024-12-14 RX ADMIN — INSULIN LISPRO 2 UNITS: 100 INJECTION, SOLUTION INTRAVENOUS; SUBCUTANEOUS at 18:33

## 2024-12-14 RX ADMIN — INSULIN LISPRO 2 UNITS: 100 INJECTION, SOLUTION INTRAVENOUS; SUBCUTANEOUS at 11:50

## 2024-12-14 RX ADMIN — FLUTICASONE PROPIONATE 2 SPRAY: 50 SPRAY, METERED NASAL at 23:33

## 2024-12-14 RX ADMIN — HEPARIN SODIUM 2000 UNITS: 1000 INJECTION INTRAVENOUS; SUBCUTANEOUS at 05:22

## 2024-12-14 RX ADMIN — AMPICILLIN AND SULBACTAM 1500 MG: 1; .5 INJECTION, POWDER, FOR SOLUTION INTRAMUSCULAR; INTRAVENOUS at 23:33

## 2024-12-14 RX ADMIN — MIDODRINE HYDROCHLORIDE 10 MG: 5 TABLET ORAL at 16:55

## 2024-12-14 RX ADMIN — HEPARIN SODIUM 10 UNITS/KG/HR: 10000 INJECTION, SOLUTION INTRAVENOUS at 02:36

## 2024-12-14 RX ADMIN — TICAGRELOR 90 MG: 90 TABLET ORAL at 08:42

## 2024-12-14 RX ADMIN — Medication 100 MCG/HR: at 22:16

## 2024-12-14 RX ADMIN — SODIUM CHLORIDE, PRESERVATIVE FREE 40 MG: 5 INJECTION INTRAVENOUS at 08:44

## 2024-12-14 ASSESSMENT — PULMONARY FUNCTION TESTS
PIF_VALUE: 25
PIF_VALUE: 24
PIF_VALUE: 22
PIF_VALUE: 29
PIF_VALUE: 24
PIF_VALUE: 26
PIF_VALUE: 16
PIF_VALUE: 22
PIF_VALUE: 22
PIF_VALUE: 18
PIF_VALUE: 24

## 2024-12-14 ASSESSMENT — PAIN SCALES - GENERAL
PAINLEVEL_OUTOF10: 0
PAINLEVEL_OUTOF10: 0

## 2024-12-15 ENCOUNTER — APPOINTMENT (OUTPATIENT)
Dept: GENERAL RADIOLOGY | Age: 74
DRG: 003 | End: 2024-12-15
Payer: MEDICARE

## 2024-12-15 LAB
ALBUMIN SERPL-MCNC: 2.8 G/DL (ref 3.5–5.2)
ALBUMIN/GLOB SERPL: 0.8 {RATIO} (ref 1–2.5)
ALP SERPL-CCNC: 144 U/L (ref 40–129)
ALT SERPL-CCNC: 22 U/L (ref 10–50)
ANION GAP SERPL CALCULATED.3IONS-SCNC: 15 MMOL/L (ref 9–16)
ANTI-XA UNFRAC HEPARIN: 0.45 IU/L
AST SERPL-CCNC: 34 U/L (ref 10–50)
BILIRUB DIRECT SERPL-MCNC: 0.5 MG/DL (ref 0–0.2)
BILIRUB INDIRECT SERPL-MCNC: 0.4 MG/DL (ref 0–1)
BILIRUB SERPL-MCNC: 0.9 MG/DL (ref 0–1.2)
BUN SERPL-MCNC: 89 MG/DL (ref 8–23)
CA-I BLD-SCNC: 1.05 MMOL/L (ref 1.13–1.33)
CALCIUM SERPL-MCNC: 8 MG/DL (ref 8.6–10.4)
CHLORIDE SERPL-SCNC: 100 MMOL/L (ref 98–107)
CO2 SERPL-SCNC: 22 MMOL/L (ref 20–31)
CREAT SERPL-MCNC: 4.6 MG/DL (ref 0.7–1.2)
ERYTHROCYTE [DISTWIDTH] IN BLOOD BY AUTOMATED COUNT: 17.6 % (ref 11.8–14.4)
FIBRINOGEN PPP-MCNC: 704 MG/DL (ref 203–521)
FIO2: 50
GFR, ESTIMATED: 13 ML/MIN/1.73M2
GLOBULIN SER CALC-MCNC: 3.3 G/DL
GLUCOSE BLD-MCNC: 207 MG/DL (ref 75–110)
GLUCOSE BLD-MCNC: 216 MG/DL (ref 75–110)
GLUCOSE BLD-MCNC: 223 MG/DL (ref 75–110)
GLUCOSE BLD-MCNC: 232 MG/DL (ref 75–110)
GLUCOSE BLD-MCNC: 252 MG/DL (ref 75–110)
GLUCOSE BLD-MCNC: 261 MG/DL (ref 75–110)
GLUCOSE BLD-MCNC: 284 MG/DL (ref 74–100)
GLUCOSE SERPL-MCNC: 269 MG/DL (ref 74–99)
HCT VFR BLD AUTO: 23.7 % (ref 40.7–50.3)
HGB BLD-MCNC: 7.4 G/DL (ref 13–17)
INR PPP: 1.3
LACTIC ACID, WHOLE BLOOD: 1.6 MMOL/L (ref 0.7–2.1)
MAGNESIUM SERPL-MCNC: 2 MG/DL (ref 1.6–2.4)
MCH RBC QN AUTO: 30.7 PG (ref 25.2–33.5)
MCHC RBC AUTO-ENTMCNC: 31.2 G/DL (ref 28.4–34.8)
MCV RBC AUTO: 98.3 FL (ref 82.6–102.9)
MODE: ABNORMAL
NRBC BLD-RTO: 0.3 PER 100 WBC
O2 DELIVERY DEVICE: ABNORMAL
PARTIAL THROMBOPLASTIN TIME: 78.1 SEC (ref 23–36.5)
PLATELET # BLD AUTO: 270 K/UL (ref 138–453)
PMV BLD AUTO: 11.4 FL (ref 8.1–13.5)
POC HCO3: 26.9 MMOL/L (ref 21–28)
POC LACTIC ACID: 1.4 MMOL/L (ref 0.56–1.39)
POC O2 SATURATION: 93.7 % (ref 94–98)
POC PCO2: 39.3 MM HG (ref 35–48)
POC PH: 7.44 (ref 7.35–7.45)
POC PO2: 66.8 MM HG (ref 83–108)
POSITIVE BASE EXCESS, ART: 2.6 MMOL/L (ref 0–3)
POTASSIUM SERPL-SCNC: 3.1 MMOL/L (ref 3.7–5.3)
POTASSIUM SERPL-SCNC: 3.2 MMOL/L (ref 3.7–5.3)
POTASSIUM SERPL-SCNC: 3.8 MMOL/L (ref 3.7–5.3)
PROCALCITONIN SERPL-MCNC: 1.1 NG/ML (ref 0–0.09)
PROT SERPL-MCNC: 6.1 G/DL (ref 6.6–8.7)
PROTHROMBIN TIME: 15.9 SEC (ref 11.7–14.9)
RBC # BLD AUTO: 2.41 M/UL (ref 4.21–5.77)
SODIUM SERPL-SCNC: 137 MMOL/L (ref 136–145)
WBC OTHER # BLD: 15.5 K/UL (ref 3.5–11.3)

## 2024-12-15 PROCEDURE — 83735 ASSAY OF MAGNESIUM: CPT

## 2024-12-15 PROCEDURE — 6370000000 HC RX 637 (ALT 250 FOR IP)

## 2024-12-15 PROCEDURE — 94003 VENT MGMT INPAT SUBQ DAY: CPT

## 2024-12-15 PROCEDURE — 87040 BLOOD CULTURE FOR BACTERIA: CPT

## 2024-12-15 PROCEDURE — 99233 SBSQ HOSP IP/OBS HIGH 50: CPT | Performed by: INTERNAL MEDICINE

## 2024-12-15 PROCEDURE — 85730 THROMBOPLASTIN TIME PARTIAL: CPT

## 2024-12-15 PROCEDURE — 85027 COMPLETE CBC AUTOMATED: CPT

## 2024-12-15 PROCEDURE — 83605 ASSAY OF LACTIC ACID: CPT

## 2024-12-15 PROCEDURE — 6370000000 HC RX 637 (ALT 250 FOR IP): Performed by: INTERNAL MEDICINE

## 2024-12-15 PROCEDURE — 6360000002 HC RX W HCPCS: Performed by: INTERNAL MEDICINE

## 2024-12-15 PROCEDURE — 85384 FIBRINOGEN ACTIVITY: CPT

## 2024-12-15 PROCEDURE — 2580000003 HC RX 258: Performed by: STUDENT IN AN ORGANIZED HEALTH CARE EDUCATION/TRAINING PROGRAM

## 2024-12-15 PROCEDURE — 84145 PROCALCITONIN (PCT): CPT

## 2024-12-15 PROCEDURE — 84132 ASSAY OF SERUM POTASSIUM: CPT

## 2024-12-15 PROCEDURE — 99291 CRITICAL CARE FIRST HOUR: CPT | Performed by: INTERNAL MEDICINE

## 2024-12-15 PROCEDURE — 94761 N-INVAS EAR/PLS OXIMETRY MLT: CPT

## 2024-12-15 PROCEDURE — 80076 HEPATIC FUNCTION PANEL: CPT

## 2024-12-15 PROCEDURE — 37799 UNLISTED PX VASCULAR SURGERY: CPT

## 2024-12-15 PROCEDURE — 6360000002 HC RX W HCPCS

## 2024-12-15 PROCEDURE — 71045 X-RAY EXAM CHEST 1 VIEW: CPT

## 2024-12-15 PROCEDURE — 2580000003 HC RX 258: Performed by: INTERNAL MEDICINE

## 2024-12-15 PROCEDURE — 6360000002 HC RX W HCPCS: Performed by: STUDENT IN AN ORGANIZED HEALTH CARE EDUCATION/TRAINING PROGRAM

## 2024-12-15 PROCEDURE — 36415 COLL VENOUS BLD VENIPUNCTURE: CPT

## 2024-12-15 PROCEDURE — 80048 BASIC METABOLIC PNL TOTAL CA: CPT

## 2024-12-15 PROCEDURE — 82803 BLOOD GASES ANY COMBINATION: CPT

## 2024-12-15 PROCEDURE — 2700000000 HC OXYGEN THERAPY PER DAY

## 2024-12-15 PROCEDURE — 2580000003 HC RX 258

## 2024-12-15 PROCEDURE — 99232 SBSQ HOSP IP/OBS MODERATE 35: CPT | Performed by: INTERNAL MEDICINE

## 2024-12-15 PROCEDURE — 2000000000 HC ICU R&B

## 2024-12-15 PROCEDURE — 82330 ASSAY OF CALCIUM: CPT

## 2024-12-15 PROCEDURE — 82947 ASSAY GLUCOSE BLOOD QUANT: CPT

## 2024-12-15 PROCEDURE — 2500000003 HC RX 250 WO HCPCS: Performed by: INTERNAL MEDICINE

## 2024-12-15 PROCEDURE — 93005 ELECTROCARDIOGRAM TRACING: CPT

## 2024-12-15 PROCEDURE — 85520 HEPARIN ASSAY: CPT

## 2024-12-15 PROCEDURE — 85610 PROTHROMBIN TIME: CPT

## 2024-12-15 RX ORDER — CALCIUM GLUCONATE 20 MG/ML
1000 INJECTION, SOLUTION INTRAVENOUS ONCE
Status: COMPLETED | OUTPATIENT
Start: 2024-12-15 | End: 2024-12-15

## 2024-12-15 RX ORDER — QUETIAPINE FUMARATE 25 MG/1
25 TABLET, FILM COATED ORAL 2 TIMES DAILY
Status: DISCONTINUED | OUTPATIENT
Start: 2024-12-15 | End: 2024-12-23 | Stop reason: HOSPADM

## 2024-12-15 RX ORDER — POTASSIUM CHLORIDE 29.8 MG/ML
20 INJECTION INTRAVENOUS PRN
Status: DISCONTINUED | OUTPATIENT
Start: 2024-12-15 | End: 2024-12-16

## 2024-12-15 RX ORDER — POTASSIUM CHLORIDE 7.45 MG/ML
10 INJECTION INTRAVENOUS PRN
Status: DISCONTINUED | OUTPATIENT
Start: 2024-12-15 | End: 2024-12-16

## 2024-12-15 RX ADMIN — AMIODARONE HYDROCHLORIDE 200 MG: 200 TABLET ORAL at 08:18

## 2024-12-15 RX ADMIN — DEXMEDETOMIDINE HYDROCHLORIDE 0.2 MCG/KG/HR: 400 INJECTION, SOLUTION INTRAVENOUS at 11:14

## 2024-12-15 RX ADMIN — SODIUM CHLORIDE, PRESERVATIVE FREE 10 ML: 5 INJECTION INTRAVENOUS at 08:27

## 2024-12-15 RX ADMIN — DEXMEDETOMIDINE HYDROCHLORIDE 0.6 MCG/KG/HR: 400 INJECTION, SOLUTION INTRAVENOUS at 03:58

## 2024-12-15 RX ADMIN — AMPICILLIN AND SULBACTAM 1500 MG: 1; .5 INJECTION, POWDER, FOR SOLUTION INTRAMUSCULAR; INTRAVENOUS at 12:00

## 2024-12-15 RX ADMIN — TICAGRELOR 90 MG: 90 TABLET ORAL at 20:38

## 2024-12-15 RX ADMIN — SODIUM CHLORIDE, PRESERVATIVE FREE 40 MG: 5 INJECTION INTRAVENOUS at 20:37

## 2024-12-15 RX ADMIN — INSULIN LISPRO 2 UNITS: 100 INJECTION, SOLUTION INTRAVENOUS; SUBCUTANEOUS at 23:52

## 2024-12-15 RX ADMIN — MIDODRINE HYDROCHLORIDE 10 MG: 5 TABLET ORAL at 11:14

## 2024-12-15 RX ADMIN — FUROSEMIDE 80 MG: 10 INJECTION, SOLUTION INTRAMUSCULAR; INTRAVENOUS at 08:29

## 2024-12-15 RX ADMIN — SODIUM CHLORIDE, PRESERVATIVE FREE 10 ML: 5 INJECTION INTRAVENOUS at 20:52

## 2024-12-15 RX ADMIN — METOCLOPRAMIDE HYDROCHLORIDE 5 MG: 5 INJECTION INTRAMUSCULAR; INTRAVENOUS at 01:22

## 2024-12-15 RX ADMIN — SODIUM CHLORIDE, PRESERVATIVE FREE 10 ML: 5 INJECTION INTRAVENOUS at 20:51

## 2024-12-15 RX ADMIN — LEVOFLOXACIN 500 MG: 5 INJECTION, SOLUTION INTRAVENOUS at 00:22

## 2024-12-15 RX ADMIN — POTASSIUM CHLORIDE 20 MEQ: 29.8 INJECTION, SOLUTION INTRAVENOUS at 16:37

## 2024-12-15 RX ADMIN — HEPARIN SODIUM 12 UNITS/KG/HR: 10000 INJECTION, SOLUTION INTRAVENOUS at 06:54

## 2024-12-15 RX ADMIN — AMPICILLIN AND SULBACTAM 1500 MG: 1; .5 INJECTION, POWDER, FOR SOLUTION INTRAMUSCULAR; INTRAVENOUS at 23:47

## 2024-12-15 RX ADMIN — SODIUM CHLORIDE, PRESERVATIVE FREE 40 MG: 5 INJECTION INTRAVENOUS at 08:19

## 2024-12-15 RX ADMIN — CALCIUM GLUCONATE 1000 MG: 20 INJECTION, SOLUTION INTRAVENOUS at 09:21

## 2024-12-15 RX ADMIN — METOCLOPRAMIDE HYDROCHLORIDE 5 MG: 5 INJECTION INTRAMUSCULAR; INTRAVENOUS at 06:30

## 2024-12-15 RX ADMIN — QUETIAPINE FUMARATE 25 MG: 25 TABLET ORAL at 20:38

## 2024-12-15 RX ADMIN — QUETIAPINE FUMARATE 25 MG: 25 TABLET ORAL at 09:21

## 2024-12-15 RX ADMIN — ATORVASTATIN CALCIUM 40 MG: 40 TABLET, FILM COATED ORAL at 20:38

## 2024-12-15 RX ADMIN — METOCLOPRAMIDE HYDROCHLORIDE 5 MG: 5 INJECTION INTRAMUSCULAR; INTRAVENOUS at 14:21

## 2024-12-15 RX ADMIN — POTASSIUM BICARBONATE 20 MEQ: 782 TABLET, EFFERVESCENT ORAL at 05:38

## 2024-12-15 RX ADMIN — INSULIN LISPRO 2 UNITS: 100 INJECTION, SOLUTION INTRAVENOUS; SUBCUTANEOUS at 17:54

## 2024-12-15 RX ADMIN — INSULIN LISPRO 2 UNITS: 100 INJECTION, SOLUTION INTRAVENOUS; SUBCUTANEOUS at 12:32

## 2024-12-15 RX ADMIN — ASPIRIN 81 MG 81 MG: 81 TABLET ORAL at 08:18

## 2024-12-15 RX ADMIN — FUROSEMIDE 80 MG: 10 INJECTION, SOLUTION INTRAMUSCULAR; INTRAVENOUS at 17:55

## 2024-12-15 RX ADMIN — AMIODARONE HYDROCHLORIDE 200 MG: 200 TABLET ORAL at 20:38

## 2024-12-15 RX ADMIN — INSULIN LISPRO 4 UNITS: 100 INJECTION, SOLUTION INTRAVENOUS; SUBCUTANEOUS at 06:24

## 2024-12-15 RX ADMIN — POTASSIUM BICARBONATE 40 MEQ: 782 TABLET, EFFERVESCENT ORAL at 09:21

## 2024-12-15 RX ADMIN — INSULIN GLARGINE 10 UNITS: 100 INJECTION, SOLUTION SUBCUTANEOUS at 09:27

## 2024-12-15 RX ADMIN — POTASSIUM CHLORIDE 20 MEQ: 29.8 INJECTION, SOLUTION INTRAVENOUS at 17:39

## 2024-12-15 RX ADMIN — METOCLOPRAMIDE HYDROCHLORIDE 5 MG: 5 INJECTION INTRAMUSCULAR; INTRAVENOUS at 20:35

## 2024-12-15 RX ADMIN — TICAGRELOR 90 MG: 90 TABLET ORAL at 08:18

## 2024-12-15 RX ADMIN — DEXMEDETOMIDINE HYDROCHLORIDE 0.2 MCG/KG/HR: 400 INJECTION, SOLUTION INTRAVENOUS at 12:41

## 2024-12-15 ASSESSMENT — PULMONARY FUNCTION TESTS
PIF_VALUE: 22
PIF_VALUE: 23
PIF_VALUE: 24
PIF_VALUE: 25
PIF_VALUE: 19
PIF_VALUE: 26
PIF_VALUE: 23
PIF_VALUE: 22
PIF_VALUE: 25
PIF_VALUE: 19
PIF_VALUE: 17
PIF_VALUE: 23
PIF_VALUE: 24
PIF_VALUE: 23

## 2024-12-16 LAB
ANION GAP SERPL CALCULATED.3IONS-SCNC: 16 MMOL/L (ref 9–16)
ANTI-XA UNFRAC HEPARIN: 0.41 IU/L
BASOPHILS # BLD: 0.06 K/UL (ref 0–0.2)
BASOPHILS NFR BLD: 0 % (ref 0–2)
BUN SERPL-MCNC: 108 MG/DL (ref 8–23)
CA-I BLD-SCNC: 1.08 MMOL/L (ref 1.13–1.33)
CALCIUM SERPL-MCNC: 8.5 MG/DL (ref 8.6–10.4)
CHLORIDE SERPL-SCNC: 99 MMOL/L (ref 98–107)
CO2 SERPL-SCNC: 23 MMOL/L (ref 20–31)
CREAT SERPL-MCNC: 5.7 MG/DL (ref 0.7–1.2)
EKG ATRIAL RATE: 48 BPM
EKG P AXIS: 23 DEGREES
EKG P-R INTERVAL: 184 MS
EKG Q-T INTERVAL: 470 MS
EKG QRS DURATION: 98 MS
EKG QTC CALCULATION (BAZETT): 419 MS
EKG R AXIS: 4 DEGREES
EKG T AXIS: -11 DEGREES
EKG VENTRICULAR RATE: 48 BPM
EOSINOPHIL # BLD: 0.31 K/UL (ref 0–0.44)
EOSINOPHILS RELATIVE PERCENT: 2 % (ref 1–4)
ERYTHROCYTE [DISTWIDTH] IN BLOOD BY AUTOMATED COUNT: 18.2 % (ref 11.8–14.4)
FIO2: 70
GFR, ESTIMATED: 10 ML/MIN/1.73M2
GLUCOSE BLD-MCNC: 157 MG/DL (ref 75–110)
GLUCOSE BLD-MCNC: 220 MG/DL (ref 75–110)
GLUCOSE BLD-MCNC: 233 MG/DL (ref 75–110)
GLUCOSE BLD-MCNC: 243 MG/DL (ref 74–100)
GLUCOSE BLD-MCNC: 243 MG/DL (ref 75–110)
GLUCOSE SERPL-MCNC: 233 MG/DL (ref 74–99)
HCT VFR BLD AUTO: 23.8 % (ref 40.7–50.3)
HGB BLD-MCNC: 7.6 G/DL (ref 13–17)
IMM GRANULOCYTES # BLD AUTO: 0.3 K/UL (ref 0–0.3)
IMM GRANULOCYTES NFR BLD: 2 %
LYMPHOCYTES NFR BLD: 1.12 K/UL (ref 1.1–3.7)
LYMPHOCYTES RELATIVE PERCENT: 8 % (ref 24–43)
MAGNESIUM SERPL-MCNC: 2 MG/DL (ref 1.6–2.4)
MCH RBC QN AUTO: 31.1 PG (ref 25.2–33.5)
MCHC RBC AUTO-ENTMCNC: 31.9 G/DL (ref 28.4–34.8)
MCV RBC AUTO: 97.5 FL (ref 82.6–102.9)
MICROORGANISM SPEC CULT: NORMAL
MICROORGANISM SPEC CULT: NORMAL
MONOCYTES NFR BLD: 0.97 K/UL (ref 0.1–1.2)
MONOCYTES NFR BLD: 7 % (ref 3–12)
NEUTROPHILS NFR BLD: 81 % (ref 36–65)
NEUTS SEG NFR BLD: 11.52 K/UL (ref 1.5–8.1)
NRBC BLD-RTO: 0.1 PER 100 WBC
O2 DELIVERY DEVICE: ABNORMAL
PLATELET # BLD AUTO: 325 K/UL (ref 138–453)
PMV BLD AUTO: 11.4 FL (ref 8.1–13.5)
POC HCO3: 29.5 MMOL/L (ref 21–28)
POC O2 SATURATION: 95.2 % (ref 94–98)
POC PCO2: 42.8 MM HG (ref 35–48)
POC PH: 7.45 (ref 7.35–7.45)
POC PO2: 73.7 MM HG (ref 83–108)
POSITIVE BASE EXCESS, ART: 4.9 MMOL/L (ref 0–3)
POTASSIUM SERPL-SCNC: 3.5 MMOL/L (ref 3.7–5.3)
RBC # BLD AUTO: 2.44 M/UL (ref 4.21–5.77)
RBC # BLD: ABNORMAL 10*6/UL
SAMPLE SITE: ABNORMAL
SERVICE CMNT-IMP: NORMAL
SERVICE CMNT-IMP: NORMAL
SODIUM SERPL-SCNC: 138 MMOL/L (ref 136–145)
SPECIMEN DESCRIPTION: NORMAL
SPECIMEN DESCRIPTION: NORMAL
WBC OTHER # BLD: 14.3 K/UL (ref 3.5–11.3)

## 2024-12-16 PROCEDURE — 94761 N-INVAS EAR/PLS OXIMETRY MLT: CPT

## 2024-12-16 PROCEDURE — 82947 ASSAY GLUCOSE BLOOD QUANT: CPT

## 2024-12-16 PROCEDURE — 2000000000 HC ICU R&B

## 2024-12-16 PROCEDURE — 99233 SBSQ HOSP IP/OBS HIGH 50: CPT | Performed by: INTERNAL MEDICINE

## 2024-12-16 PROCEDURE — 2580000003 HC RX 258

## 2024-12-16 PROCEDURE — 6360000002 HC RX W HCPCS

## 2024-12-16 PROCEDURE — 82803 BLOOD GASES ANY COMBINATION: CPT

## 2024-12-16 PROCEDURE — 2580000003 HC RX 258: Performed by: STUDENT IN AN ORGANIZED HEALTH CARE EDUCATION/TRAINING PROGRAM

## 2024-12-16 PROCEDURE — 94003 VENT MGMT INPAT SUBQ DAY: CPT

## 2024-12-16 PROCEDURE — 6370000000 HC RX 637 (ALT 250 FOR IP)

## 2024-12-16 PROCEDURE — 37799 UNLISTED PX VASCULAR SURGERY: CPT

## 2024-12-16 PROCEDURE — 90935 HEMODIALYSIS ONE EVALUATION: CPT

## 2024-12-16 PROCEDURE — 6360000002 HC RX W HCPCS: Performed by: STUDENT IN AN ORGANIZED HEALTH CARE EDUCATION/TRAINING PROGRAM

## 2024-12-16 PROCEDURE — 93010 ELECTROCARDIOGRAM REPORT: CPT | Performed by: INTERNAL MEDICINE

## 2024-12-16 PROCEDURE — 99291 CRITICAL CARE FIRST HOUR: CPT | Performed by: INTERNAL MEDICINE

## 2024-12-16 PROCEDURE — 6360000002 HC RX W HCPCS: Performed by: INTERNAL MEDICINE

## 2024-12-16 PROCEDURE — 80048 BASIC METABOLIC PNL TOTAL CA: CPT

## 2024-12-16 PROCEDURE — 85025 COMPLETE CBC W/AUTO DIFF WBC: CPT

## 2024-12-16 PROCEDURE — 2580000003 HC RX 258: Performed by: INTERNAL MEDICINE

## 2024-12-16 PROCEDURE — 6370000000 HC RX 637 (ALT 250 FOR IP): Performed by: INTERNAL MEDICINE

## 2024-12-16 PROCEDURE — 6370000000 HC RX 637 (ALT 250 FOR IP): Performed by: STUDENT IN AN ORGANIZED HEALTH CARE EDUCATION/TRAINING PROGRAM

## 2024-12-16 PROCEDURE — 2500000003 HC RX 250 WO HCPCS: Performed by: INTERNAL MEDICINE

## 2024-12-16 PROCEDURE — 85520 HEPARIN ASSAY: CPT

## 2024-12-16 PROCEDURE — 99213 OFFICE O/P EST LOW 20 MIN: CPT

## 2024-12-16 PROCEDURE — 90935 HEMODIALYSIS ONE EVALUATION: CPT | Performed by: INTERNAL MEDICINE

## 2024-12-16 PROCEDURE — 2700000000 HC OXYGEN THERAPY PER DAY

## 2024-12-16 PROCEDURE — 82330 ASSAY OF CALCIUM: CPT

## 2024-12-16 PROCEDURE — 83735 ASSAY OF MAGNESIUM: CPT

## 2024-12-16 RX ORDER — INSULIN GLARGINE 100 [IU]/ML
10 INJECTION, SOLUTION SUBCUTANEOUS 2 TIMES DAILY
Status: DISCONTINUED | OUTPATIENT
Start: 2024-12-16 | End: 2024-12-18

## 2024-12-16 RX ORDER — CALCIUM GLUCONATE 20 MG/ML
1000 INJECTION, SOLUTION INTRAVENOUS ONCE
Status: COMPLETED | OUTPATIENT
Start: 2024-12-16 | End: 2024-12-16

## 2024-12-16 RX ADMIN — FUROSEMIDE 80 MG: 10 INJECTION, SOLUTION INTRAMUSCULAR; INTRAVENOUS at 09:27

## 2024-12-16 RX ADMIN — METOCLOPRAMIDE HYDROCHLORIDE 5 MG: 5 INJECTION INTRAMUSCULAR; INTRAVENOUS at 02:47

## 2024-12-16 RX ADMIN — HEPARIN SODIUM 12 UNITS/KG/HR: 10000 INJECTION, SOLUTION INTRAVENOUS at 01:34

## 2024-12-16 RX ADMIN — FENTANYL CITRATE 50 MCG: 50 INJECTION, SOLUTION INTRAMUSCULAR; INTRAVENOUS at 01:12

## 2024-12-16 RX ADMIN — DEXMEDETOMIDINE HYDROCHLORIDE 0.6 MCG/KG/HR: 400 INJECTION, SOLUTION INTRAVENOUS at 23:27

## 2024-12-16 RX ADMIN — AMIODARONE HYDROCHLORIDE 200 MG: 200 TABLET ORAL at 09:27

## 2024-12-16 RX ADMIN — SODIUM CHLORIDE, PRESERVATIVE FREE 40 MG: 5 INJECTION INTRAVENOUS at 20:46

## 2024-12-16 RX ADMIN — AMPICILLIN AND SULBACTAM 1500 MG: 1; .5 INJECTION, POWDER, FOR SOLUTION INTRAMUSCULAR; INTRAVENOUS at 14:34

## 2024-12-16 RX ADMIN — AMPICILLIN AND SULBACTAM 1500 MG: 1; .5 INJECTION, POWDER, FOR SOLUTION INTRAMUSCULAR; INTRAVENOUS at 23:31

## 2024-12-16 RX ADMIN — SODIUM CHLORIDE, PRESERVATIVE FREE 10 ML: 5 INJECTION INTRAVENOUS at 09:28

## 2024-12-16 RX ADMIN — QUETIAPINE FUMARATE 25 MG: 25 TABLET ORAL at 20:48

## 2024-12-16 RX ADMIN — DEXMEDETOMIDINE HYDROCHLORIDE 0.2 MCG/KG/HR: 400 INJECTION, SOLUTION INTRAVENOUS at 01:46

## 2024-12-16 RX ADMIN — DEXMEDETOMIDINE HYDROCHLORIDE 0.4 MCG/KG/HR: 400 INJECTION, SOLUTION INTRAVENOUS at 11:27

## 2024-12-16 RX ADMIN — MIDODRINE HYDROCHLORIDE 10 MG: 5 TABLET ORAL at 15:29

## 2024-12-16 RX ADMIN — INSULIN GLARGINE 10 UNITS: 100 INJECTION, SOLUTION SUBCUTANEOUS at 09:26

## 2024-12-16 RX ADMIN — INSULIN LISPRO 2 UNITS: 100 INJECTION, SOLUTION INTRAVENOUS; SUBCUTANEOUS at 18:38

## 2024-12-16 RX ADMIN — HEPARIN SODIUM 1600 UNITS: 1000 INJECTION INTRAVENOUS; SUBCUTANEOUS at 13:43

## 2024-12-16 RX ADMIN — SODIUM CHLORIDE, PRESERVATIVE FREE 40 MG: 5 INJECTION INTRAVENOUS at 07:54

## 2024-12-16 RX ADMIN — METOCLOPRAMIDE HYDROCHLORIDE 5 MG: 5 INJECTION INTRAMUSCULAR; INTRAVENOUS at 14:40

## 2024-12-16 RX ADMIN — ATORVASTATIN CALCIUM 40 MG: 40 TABLET, FILM COATED ORAL at 20:48

## 2024-12-16 RX ADMIN — FLUTICASONE PROPIONATE 2 SPRAY: 50 SPRAY, METERED NASAL at 04:05

## 2024-12-16 RX ADMIN — TICAGRELOR 90 MG: 90 TABLET ORAL at 09:27

## 2024-12-16 RX ADMIN — INSULIN LISPRO 2 UNITS: 100 INJECTION, SOLUTION INTRAVENOUS; SUBCUTANEOUS at 05:58

## 2024-12-16 RX ADMIN — METOCLOPRAMIDE HYDROCHLORIDE 5 MG: 5 INJECTION INTRAMUSCULAR; INTRAVENOUS at 07:08

## 2024-12-16 RX ADMIN — CALCIUM GLUCONATE 1000 MG: 20 INJECTION, SOLUTION INTRAVENOUS at 06:01

## 2024-12-16 RX ADMIN — AMIODARONE HYDROCHLORIDE 200 MG: 200 TABLET ORAL at 20:48

## 2024-12-16 RX ADMIN — INSULIN GLARGINE 10 UNITS: 100 INJECTION, SOLUTION SUBCUTANEOUS at 20:44

## 2024-12-16 RX ADMIN — FUROSEMIDE 80 MG: 10 INJECTION, SOLUTION INTRAMUSCULAR; INTRAVENOUS at 18:39

## 2024-12-16 RX ADMIN — QUETIAPINE FUMARATE 25 MG: 25 TABLET ORAL at 09:27

## 2024-12-16 RX ADMIN — Medication 100 MCG/HR: at 15:28

## 2024-12-16 RX ADMIN — SODIUM CHLORIDE: 9 INJECTION, SOLUTION INTRAVENOUS at 01:43

## 2024-12-16 RX ADMIN — Medication 100 MCG/HR: at 01:35

## 2024-12-16 RX ADMIN — TICAGRELOR 90 MG: 90 TABLET ORAL at 20:48

## 2024-12-16 RX ADMIN — MIDODRINE HYDROCHLORIDE 10 MG: 5 TABLET ORAL at 11:22

## 2024-12-16 RX ADMIN — METOCLOPRAMIDE HYDROCHLORIDE 5 MG: 5 INJECTION INTRAMUSCULAR; INTRAVENOUS at 20:43

## 2024-12-16 RX ADMIN — SODIUM CHLORIDE, PRESERVATIVE FREE 10 ML: 5 INJECTION INTRAVENOUS at 20:44

## 2024-12-16 RX ADMIN — DEXMEDETOMIDINE HYDROCHLORIDE 0.6 MCG/KG/HR: 400 INJECTION, SOLUTION INTRAVENOUS at 18:31

## 2024-12-16 RX ADMIN — HEPARIN SODIUM 1900 UNITS: 1000 INJECTION INTRAVENOUS; SUBCUTANEOUS at 13:45

## 2024-12-16 RX ADMIN — ASPIRIN 81 MG 81 MG: 81 TABLET ORAL at 09:27

## 2024-12-16 RX ADMIN — HEPARIN SODIUM 12 UNITS/KG/HR: 10000 INJECTION, SOLUTION INTRAVENOUS at 15:27

## 2024-12-16 ASSESSMENT — PULMONARY FUNCTION TESTS
PIF_VALUE: 22
PIF_VALUE: 21
PIF_VALUE: 22
PIF_VALUE: 13
PIF_VALUE: 23
PIF_VALUE: 19
PIF_VALUE: 22

## 2024-12-16 ASSESSMENT — PAIN SCALES - GENERAL
PAINLEVEL_OUTOF10: 0

## 2024-12-17 ENCOUNTER — APPOINTMENT (OUTPATIENT)
Dept: GENERAL RADIOLOGY | Age: 74
DRG: 003 | End: 2024-12-17
Payer: MEDICARE

## 2024-12-17 LAB
ALLEN TEST: ABNORMAL
ANION GAP SERPL CALCULATED.3IONS-SCNC: 13 MMOL/L (ref 9–16)
ANTI-XA UNFRAC HEPARIN: 0.42 IU/L
BASOPHILS # BLD: 0.08 K/UL (ref 0–0.2)
BASOPHILS NFR BLD: 1 % (ref 0–2)
BUN SERPL-MCNC: 73 MG/DL (ref 8–23)
CALCIUM SERPL-MCNC: 8.3 MG/DL (ref 8.6–10.4)
CHLORIDE SERPL-SCNC: 97 MMOL/L (ref 98–107)
CO2 SERPL-SCNC: 26 MMOL/L (ref 20–31)
CREAT SERPL-MCNC: 4.1 MG/DL (ref 0.7–1.2)
EOSINOPHIL # BLD: 0.27 K/UL (ref 0–0.44)
EOSINOPHILS RELATIVE PERCENT: 2 % (ref 1–4)
ERYTHROCYTE [DISTWIDTH] IN BLOOD BY AUTOMATED COUNT: 18.3 % (ref 11.8–14.4)
FIO2: 60
GFR, ESTIMATED: 15 ML/MIN/1.73M2
GLUCOSE BLD-MCNC: 226 MG/DL (ref 75–110)
GLUCOSE BLD-MCNC: 231 MG/DL (ref 75–110)
GLUCOSE BLD-MCNC: 238 MG/DL (ref 75–110)
GLUCOSE BLD-MCNC: 244 MG/DL (ref 75–110)
GLUCOSE BLD-MCNC: 251 MG/DL (ref 75–110)
GLUCOSE BLD-MCNC: 261 MG/DL (ref 74–100)
GLUCOSE SERPL-MCNC: 246 MG/DL (ref 74–99)
HCT VFR BLD AUTO: 25 % (ref 40.7–50.3)
HGB BLD-MCNC: 7.9 G/DL (ref 13–17)
IMM GRANULOCYTES # BLD AUTO: 0.2 K/UL (ref 0–0.3)
IMM GRANULOCYTES NFR BLD: 2 %
LYMPHOCYTES NFR BLD: 1.15 K/UL (ref 1.1–3.7)
LYMPHOCYTES RELATIVE PERCENT: 10 % (ref 24–43)
MCH RBC QN AUTO: 30.5 PG (ref 25.2–33.5)
MCHC RBC AUTO-ENTMCNC: 31.6 G/DL (ref 28.4–34.8)
MCV RBC AUTO: 96.5 FL (ref 82.6–102.9)
MODE: ABNORMAL
MONOCYTES NFR BLD: 1.34 K/UL (ref 0.1–1.2)
MONOCYTES NFR BLD: 11 % (ref 3–12)
NEUTROPHILS NFR BLD: 74 % (ref 36–65)
NEUTS SEG NFR BLD: 8.83 K/UL (ref 1.5–8.1)
NRBC BLD-RTO: 0 PER 100 WBC
O2 DELIVERY DEVICE: ABNORMAL
PLATELET # BLD AUTO: 346 K/UL (ref 138–453)
PMV BLD AUTO: 11 FL (ref 8.1–13.5)
POC HCO3: 26.9 MMOL/L (ref 21–28)
POC LACTIC ACID: 1.3 MMOL/L (ref 0.56–1.39)
POC O2 SATURATION: 94.7 % (ref 94–98)
POC PCO2: 38.8 MM HG (ref 35–48)
POC PH: 7.45 (ref 7.35–7.45)
POC PO2: 70.6 MM HG (ref 83–108)
POSITIVE BASE EXCESS, ART: 2.8 MMOL/L (ref 0–3)
POTASSIUM SERPL-SCNC: 3.8 MMOL/L (ref 3.7–5.3)
RBC # BLD AUTO: 2.59 M/UL (ref 4.21–5.77)
RBC # BLD: ABNORMAL 10*6/UL
SAMPLE SITE: ABNORMAL
SODIUM SERPL-SCNC: 136 MMOL/L (ref 136–145)
WBC OTHER # BLD: 11.9 K/UL (ref 3.5–11.3)

## 2024-12-17 PROCEDURE — 6360000002 HC RX W HCPCS: Performed by: INTERNAL MEDICINE

## 2024-12-17 PROCEDURE — 51702 INSERT TEMP BLADDER CATH: CPT

## 2024-12-17 PROCEDURE — 6370000000 HC RX 637 (ALT 250 FOR IP): Performed by: INTERNAL MEDICINE

## 2024-12-17 PROCEDURE — 6360000002 HC RX W HCPCS

## 2024-12-17 PROCEDURE — 90935 HEMODIALYSIS ONE EVALUATION: CPT

## 2024-12-17 PROCEDURE — 2500000003 HC RX 250 WO HCPCS: Performed by: INTERNAL MEDICINE

## 2024-12-17 PROCEDURE — 2700000000 HC OXYGEN THERAPY PER DAY

## 2024-12-17 PROCEDURE — 99233 SBSQ HOSP IP/OBS HIGH 50: CPT | Performed by: INTERNAL MEDICINE

## 2024-12-17 PROCEDURE — 80048 BASIC METABOLIC PNL TOTAL CA: CPT

## 2024-12-17 PROCEDURE — 2580000003 HC RX 258

## 2024-12-17 PROCEDURE — 6370000000 HC RX 637 (ALT 250 FOR IP)

## 2024-12-17 PROCEDURE — 2580000003 HC RX 258: Performed by: STUDENT IN AN ORGANIZED HEALTH CARE EDUCATION/TRAINING PROGRAM

## 2024-12-17 PROCEDURE — 2000000000 HC ICU R&B

## 2024-12-17 PROCEDURE — 37799 UNLISTED PX VASCULAR SURGERY: CPT

## 2024-12-17 PROCEDURE — 82803 BLOOD GASES ANY COMBINATION: CPT

## 2024-12-17 PROCEDURE — 94003 VENT MGMT INPAT SUBQ DAY: CPT

## 2024-12-17 PROCEDURE — 99291 CRITICAL CARE FIRST HOUR: CPT | Performed by: INTERNAL MEDICINE

## 2024-12-17 PROCEDURE — 71045 X-RAY EXAM CHEST 1 VIEW: CPT

## 2024-12-17 PROCEDURE — 87086 URINE CULTURE/COLONY COUNT: CPT

## 2024-12-17 PROCEDURE — 6370000000 HC RX 637 (ALT 250 FOR IP): Performed by: STUDENT IN AN ORGANIZED HEALTH CARE EDUCATION/TRAINING PROGRAM

## 2024-12-17 PROCEDURE — 2500000003 HC RX 250 WO HCPCS: Performed by: STUDENT IN AN ORGANIZED HEALTH CARE EDUCATION/TRAINING PROGRAM

## 2024-12-17 PROCEDURE — 87071 CULTURE AEROBIC QUANT OTHER: CPT

## 2024-12-17 PROCEDURE — 6360000002 HC RX W HCPCS: Performed by: STUDENT IN AN ORGANIZED HEALTH CARE EDUCATION/TRAINING PROGRAM

## 2024-12-17 PROCEDURE — 82947 ASSAY GLUCOSE BLOOD QUANT: CPT

## 2024-12-17 PROCEDURE — 36415 COLL VENOUS BLD VENIPUNCTURE: CPT

## 2024-12-17 PROCEDURE — 99231 SBSQ HOSP IP/OBS SF/LOW 25: CPT

## 2024-12-17 PROCEDURE — 85520 HEPARIN ASSAY: CPT

## 2024-12-17 PROCEDURE — 85025 COMPLETE CBC W/AUTO DIFF WBC: CPT

## 2024-12-17 PROCEDURE — 83605 ASSAY OF LACTIC ACID: CPT

## 2024-12-17 PROCEDURE — 94761 N-INVAS EAR/PLS OXIMETRY MLT: CPT

## 2024-12-17 PROCEDURE — 87040 BLOOD CULTURE FOR BACTERIA: CPT

## 2024-12-17 PROCEDURE — 2580000003 HC RX 258: Performed by: INTERNAL MEDICINE

## 2024-12-17 PROCEDURE — 99232 SBSQ HOSP IP/OBS MODERATE 35: CPT | Performed by: INTERNAL MEDICINE

## 2024-12-17 RX ORDER — FENTANYL CITRATE 50 UG/ML
100 INJECTION, SOLUTION INTRAMUSCULAR; INTRAVENOUS
Status: DISCONTINUED | OUTPATIENT
Start: 2024-12-17 | End: 2024-12-22

## 2024-12-17 RX ORDER — FENTANYL CITRATE 50 UG/ML
50 INJECTION, SOLUTION INTRAMUSCULAR; INTRAVENOUS
Status: DISCONTINUED | OUTPATIENT
Start: 2024-12-17 | End: 2024-12-22

## 2024-12-17 RX ORDER — NOREPINEPHRINE BITARTRATE 0.06 MG/ML
1-100 INJECTION, SOLUTION INTRAVENOUS CONTINUOUS
Status: DISCONTINUED | OUTPATIENT
Start: 2024-12-17 | End: 2024-12-18

## 2024-12-17 RX ORDER — INSULIN LISPRO 100 [IU]/ML
0-16 INJECTION, SOLUTION INTRAVENOUS; SUBCUTANEOUS
Status: DISCONTINUED | OUTPATIENT
Start: 2024-12-17 | End: 2024-12-23 | Stop reason: HOSPADM

## 2024-12-17 RX ADMIN — SODIUM CHLORIDE, PRESERVATIVE FREE 10 ML: 5 INJECTION INTRAVENOUS at 07:56

## 2024-12-17 RX ADMIN — DEXMEDETOMIDINE HYDROCHLORIDE 0.5 MCG/KG/HR: 400 INJECTION, SOLUTION INTRAVENOUS at 10:23

## 2024-12-17 RX ADMIN — DEXMEDETOMIDINE HYDROCHLORIDE 0.5 MCG/KG/HR: 400 INJECTION, SOLUTION INTRAVENOUS at 18:23

## 2024-12-17 RX ADMIN — FENTANYL CITRATE 100 MCG: 50 INJECTION, SOLUTION INTRAMUSCULAR; INTRAVENOUS at 00:34

## 2024-12-17 RX ADMIN — INSULIN LISPRO 2 UNITS: 100 INJECTION, SOLUTION INTRAVENOUS; SUBCUTANEOUS at 00:03

## 2024-12-17 RX ADMIN — FUROSEMIDE 80 MG: 10 INJECTION, SOLUTION INTRAMUSCULAR; INTRAVENOUS at 18:27

## 2024-12-17 RX ADMIN — INSULIN GLARGINE 10 UNITS: 100 INJECTION, SOLUTION SUBCUTANEOUS at 20:47

## 2024-12-17 RX ADMIN — AMPICILLIN AND SULBACTAM 1500 MG: 1; .5 INJECTION, POWDER, FOR SOLUTION INTRAMUSCULAR; INTRAVENOUS at 13:15

## 2024-12-17 RX ADMIN — HEPARIN SODIUM 12 UNITS/KG/HR: 10000 INJECTION, SOLUTION INTRAVENOUS at 05:48

## 2024-12-17 RX ADMIN — FUROSEMIDE 80 MG: 10 INJECTION, SOLUTION INTRAMUSCULAR; INTRAVENOUS at 07:56

## 2024-12-17 RX ADMIN — ACETAMINOPHEN 650 MG: 325 TABLET ORAL at 07:54

## 2024-12-17 RX ADMIN — ACETAMINOPHEN 650 MG: 325 TABLET ORAL at 12:09

## 2024-12-17 RX ADMIN — INSULIN LISPRO 4 UNITS: 100 INJECTION, SOLUTION INTRAVENOUS; SUBCUTANEOUS at 20:47

## 2024-12-17 RX ADMIN — QUETIAPINE FUMARATE 25 MG: 25 TABLET ORAL at 07:55

## 2024-12-17 RX ADMIN — INSULIN LISPRO 4 UNITS: 100 INJECTION, SOLUTION INTRAVENOUS; SUBCUTANEOUS at 12:08

## 2024-12-17 RX ADMIN — ACETAMINOPHEN 650 MG: 325 TABLET ORAL at 23:56

## 2024-12-17 RX ADMIN — AMIODARONE HYDROCHLORIDE 200 MG: 200 TABLET ORAL at 07:55

## 2024-12-17 RX ADMIN — TICAGRELOR 90 MG: 90 TABLET ORAL at 20:46

## 2024-12-17 RX ADMIN — SODIUM CHLORIDE, PRESERVATIVE FREE 40 MG: 5 INJECTION INTRAVENOUS at 20:47

## 2024-12-17 RX ADMIN — SODIUM CHLORIDE, PRESERVATIVE FREE 40 MG: 5 INJECTION INTRAVENOUS at 07:55

## 2024-12-17 RX ADMIN — LEVOFLOXACIN 500 MG: 5 INJECTION, SOLUTION INTRAVENOUS at 00:06

## 2024-12-17 RX ADMIN — METOCLOPRAMIDE HYDROCHLORIDE 5 MG: 5 INJECTION INTRAMUSCULAR; INTRAVENOUS at 02:22

## 2024-12-17 RX ADMIN — INSULIN LISPRO 4 UNITS: 100 INJECTION, SOLUTION INTRAVENOUS; SUBCUTANEOUS at 18:27

## 2024-12-17 RX ADMIN — METOCLOPRAMIDE HYDROCHLORIDE 5 MG: 5 INJECTION INTRAMUSCULAR; INTRAVENOUS at 07:56

## 2024-12-17 RX ADMIN — MIDODRINE HYDROCHLORIDE 10 MG: 5 TABLET ORAL at 12:08

## 2024-12-17 RX ADMIN — Medication 100 MCG/HR: at 18:26

## 2024-12-17 RX ADMIN — INSULIN LISPRO 2 UNITS: 100 INJECTION, SOLUTION INTRAVENOUS; SUBCUTANEOUS at 05:12

## 2024-12-17 RX ADMIN — AMPICILLIN AND SULBACTAM 1500 MG: 1; .5 INJECTION, POWDER, FOR SOLUTION INTRAMUSCULAR; INTRAVENOUS at 23:56

## 2024-12-17 RX ADMIN — QUETIAPINE FUMARATE 25 MG: 25 TABLET ORAL at 20:46

## 2024-12-17 RX ADMIN — ATORVASTATIN CALCIUM 40 MG: 40 TABLET, FILM COATED ORAL at 20:46

## 2024-12-17 RX ADMIN — AMIODARONE HYDROCHLORIDE 200 MG: 200 TABLET ORAL at 20:47

## 2024-12-17 RX ADMIN — DEXMEDETOMIDINE HYDROCHLORIDE 0.6 MCG/KG/HR: 400 INJECTION, SOLUTION INTRAVENOUS at 04:06

## 2024-12-17 RX ADMIN — SODIUM CHLORIDE, PRESERVATIVE FREE 10 ML: 5 INJECTION INTRAVENOUS at 20:48

## 2024-12-17 RX ADMIN — HEPARIN SODIUM 12 UNITS/KG/HR: 10000 INJECTION, SOLUTION INTRAVENOUS at 20:58

## 2024-12-17 RX ADMIN — MIDODRINE HYDROCHLORIDE 10 MG: 5 TABLET ORAL at 07:55

## 2024-12-17 RX ADMIN — INSULIN GLARGINE 10 UNITS: 100 INJECTION, SOLUTION SUBCUTANEOUS at 07:55

## 2024-12-17 ASSESSMENT — PULMONARY FUNCTION TESTS
PIF_VALUE: 17
PIF_VALUE: 25
PIF_VALUE: 21
PIF_VALUE: 23
PIF_VALUE: 20
PIF_VALUE: 23

## 2024-12-17 ASSESSMENT — PAIN SCALES - GENERAL
PAINLEVEL_OUTOF10: 0

## 2024-12-17 NOTE — CARE COORDINATION
Transitional planning. I/S FiO2 FiO2 60%, PEEP of 8, Follows commands. HD today, Will need IR for tunneled cath, febrile today, cultures taken.   [facility], [facility] & [facility] are SNF choices.

## 2024-12-17 NOTE — ACP (ADVANCE CARE PLANNING)
Advance Care Planning     Advance Care Planning Activator (Inpatient)  Conversation Note      Date of ACP Conversation: 12/17/2024     Conversation Conducted with: Healthcare Decision Maker, pt's wife    ACP Activator: CESAR CEDILLO RN        Health Care Decision Maker:     Current Designated Health Care Decision Maker:     Primary Decision Maker: Anaya Keller - Spouse - [phone]  Click here to complete Healthcare Decision Makers including section of the Healthcare Decision Maker Relationship (ie \"Primary\")      Care Preferences    Ventilation:  \"If you were in your present state of health and suddenly became very ill and were unable to breathe on your own, what would your preference be about the use of a ventilator (breathing machine) if it were available to you?\"      Would the patient desire the use of ventilator (breathing machine)?: yes    \"If your health worsens and it becomes clear that your chance of recovery is unlikely, what would your preference be about the use of a ventilator (breathing machine) if it were available to you?\"     Would the patient desire the use of ventilator (breathing machine)?: Yes      Resuscitation  \"CPR works best to restart the heart when there is a sudden event, like a heart attack, in someone who is otherwise healthy. Unfortunately, CPR does not typically restart the heart for people who have serious health conditions or who are very sick.\"    \"In the event your heart stopped as a result of an underlying serious health condition, would you want attempts to be made to restart your heart (answer \"yes\" for attempt to resuscitate) or would you prefer a natural death (answer \"no\" for do not attempt to resuscitate)?\" yes       [] Yes   [] No   Educated Patient / Decision Maker regarding differences between Advance Directives and portable DNR orders.    Length of ACP Conversation in minutes:      Conversation Outcomes:  ACP discussion completed    Follow-up plan:    []

## 2024-12-18 PROBLEM — R13.10 DYSPHAGIA: Status: ACTIVE | Noted: 2024-12-18

## 2024-12-18 LAB
ANION GAP SERPL CALCULATED.3IONS-SCNC: 15 MMOL/L (ref 9–16)
ANION GAP SERPL CALCULATED.3IONS-SCNC: 16 MMOL/L (ref 9–16)
ANTI-XA UNFRAC HEPARIN: 0.52 IU/L
BASOPHILS # BLD: 0.08 K/UL (ref 0–0.2)
BASOPHILS NFR BLD: 1 % (ref 0–2)
BUN SERPL-MCNC: 74 MG/DL (ref 8–23)
BUN SERPL-MCNC: 82 MG/DL (ref 8–23)
CALCIUM SERPL-MCNC: 8.2 MG/DL (ref 8.6–10.4)
CALCIUM SERPL-MCNC: 8.3 MG/DL (ref 8.6–10.4)
CHLORIDE SERPL-SCNC: 96 MMOL/L (ref 98–107)
CHLORIDE SERPL-SCNC: 96 MMOL/L (ref 98–107)
CO2 SERPL-SCNC: 24 MMOL/L (ref 20–31)
CO2 SERPL-SCNC: 24 MMOL/L (ref 20–31)
CREAT SERPL-MCNC: 4.1 MG/DL (ref 0.7–1.2)
CREAT SERPL-MCNC: 4.4 MG/DL (ref 0.7–1.2)
EOSINOPHIL # BLD: 0.17 K/UL (ref 0–0.44)
EOSINOPHILS RELATIVE PERCENT: 1 % (ref 1–4)
ERYTHROCYTE [DISTWIDTH] IN BLOOD BY AUTOMATED COUNT: 17.8 % (ref 11.8–14.4)
FIO2: 60
GFR, ESTIMATED: 13 ML/MIN/1.73M2
GFR, ESTIMATED: 15 ML/MIN/1.73M2
GLUCOSE BLD-MCNC: 200 MG/DL (ref 75–110)
GLUCOSE BLD-MCNC: 235 MG/DL (ref 75–110)
GLUCOSE BLD-MCNC: 239 MG/DL (ref 75–110)
GLUCOSE BLD-MCNC: 254 MG/DL (ref 74–100)
GLUCOSE SERPL-MCNC: 239 MG/DL (ref 74–99)
GLUCOSE SERPL-MCNC: 251 MG/DL (ref 74–99)
HCT VFR BLD AUTO: 24.5 % (ref 40.7–50.3)
HGB BLD-MCNC: 7.8 G/DL (ref 13–17)
IMM GRANULOCYTES # BLD AUTO: 0.2 K/UL (ref 0–0.3)
IMM GRANULOCYTES NFR BLD: 2 %
LYMPHOCYTES NFR BLD: 1.32 K/UL (ref 1.1–3.7)
LYMPHOCYTES RELATIVE PERCENT: 10 % (ref 24–43)
MAGNESIUM SERPL-MCNC: 1.9 MG/DL (ref 1.6–2.4)
MCH RBC QN AUTO: 30.8 PG (ref 25.2–33.5)
MCHC RBC AUTO-ENTMCNC: 31.8 G/DL (ref 28.4–34.8)
MCV RBC AUTO: 96.8 FL (ref 82.6–102.9)
MICROORGANISM SPEC CULT: NO GROWTH
MONOCYTES NFR BLD: 1.41 K/UL (ref 0.1–1.2)
MONOCYTES NFR BLD: 11 % (ref 3–12)
NEUTROPHILS NFR BLD: 76 % (ref 36–65)
NEUTS SEG NFR BLD: 9.83 K/UL (ref 1.5–8.1)
NRBC BLD-RTO: 0 PER 100 WBC
PATIENT TEMP: 37.2
PLATELET # BLD AUTO: 335 K/UL (ref 138–453)
PMV BLD AUTO: 10.8 FL (ref 8.1–13.5)
POC HCO3: 27.5 MMOL/L (ref 21–28)
POC O2 SATURATION: 98.4 % (ref 94–98)
POC PCO2: 41.7 MM HG (ref 35–48)
POC PH: 7.43 (ref 7.35–7.45)
POC PO2: 109.4 MM HG (ref 83–108)
POSITIVE BASE EXCESS, ART: 2.8 MMOL/L (ref 0–3)
POTASSIUM SERPL-SCNC: 3.5 MMOL/L (ref 3.7–5.3)
POTASSIUM SERPL-SCNC: 3.6 MMOL/L (ref 3.7–5.3)
RBC # BLD AUTO: 2.53 M/UL (ref 4.21–5.77)
RBC # BLD: ABNORMAL 10*6/UL
SAMPLE SITE: ABNORMAL
SERVICE CMNT-IMP: NORMAL
SODIUM SERPL-SCNC: 135 MMOL/L (ref 136–145)
SODIUM SERPL-SCNC: 136 MMOL/L (ref 136–145)
SPECIMEN DESCRIPTION: NORMAL
WBC OTHER # BLD: 13 K/UL (ref 3.5–11.3)

## 2024-12-18 PROCEDURE — 6370000000 HC RX 637 (ALT 250 FOR IP): Performed by: INTERNAL MEDICINE

## 2024-12-18 PROCEDURE — 2000000000 HC ICU R&B

## 2024-12-18 PROCEDURE — 6370000000 HC RX 637 (ALT 250 FOR IP)

## 2024-12-18 PROCEDURE — 99222 1ST HOSP IP/OBS MODERATE 55: CPT | Performed by: SURGERY

## 2024-12-18 PROCEDURE — 36415 COLL VENOUS BLD VENIPUNCTURE: CPT

## 2024-12-18 PROCEDURE — 6370000000 HC RX 637 (ALT 250 FOR IP): Performed by: STUDENT IN AN ORGANIZED HEALTH CARE EDUCATION/TRAINING PROGRAM

## 2024-12-18 PROCEDURE — 94003 VENT MGMT INPAT SUBQ DAY: CPT

## 2024-12-18 PROCEDURE — 2580000003 HC RX 258

## 2024-12-18 PROCEDURE — 99213 OFFICE O/P EST LOW 20 MIN: CPT

## 2024-12-18 PROCEDURE — 2580000003 HC RX 258: Performed by: INTERNAL MEDICINE

## 2024-12-18 PROCEDURE — 2700000000 HC OXYGEN THERAPY PER DAY

## 2024-12-18 PROCEDURE — 82947 ASSAY GLUCOSE BLOOD QUANT: CPT

## 2024-12-18 PROCEDURE — 99232 SBSQ HOSP IP/OBS MODERATE 35: CPT | Performed by: INTERNAL MEDICINE

## 2024-12-18 PROCEDURE — 99291 CRITICAL CARE FIRST HOUR: CPT | Performed by: INTERNAL MEDICINE

## 2024-12-18 PROCEDURE — 37799 UNLISTED PX VASCULAR SURGERY: CPT

## 2024-12-18 PROCEDURE — 6360000002 HC RX W HCPCS

## 2024-12-18 PROCEDURE — 85025 COMPLETE CBC W/AUTO DIFF WBC: CPT

## 2024-12-18 PROCEDURE — 82803 BLOOD GASES ANY COMBINATION: CPT

## 2024-12-18 PROCEDURE — 2500000003 HC RX 250 WO HCPCS: Performed by: INTERNAL MEDICINE

## 2024-12-18 PROCEDURE — 80048 BASIC METABOLIC PNL TOTAL CA: CPT

## 2024-12-18 PROCEDURE — 85520 HEPARIN ASSAY: CPT

## 2024-12-18 PROCEDURE — 83735 ASSAY OF MAGNESIUM: CPT

## 2024-12-18 PROCEDURE — 6360000002 HC RX W HCPCS: Performed by: INTERNAL MEDICINE

## 2024-12-18 PROCEDURE — 2500000003 HC RX 250 WO HCPCS: Performed by: STUDENT IN AN ORGANIZED HEALTH CARE EDUCATION/TRAINING PROGRAM

## 2024-12-18 PROCEDURE — 94761 N-INVAS EAR/PLS OXIMETRY MLT: CPT

## 2024-12-18 RX ORDER — LANSOPRAZOLE 30 MG/1
30 TABLET, ORALLY DISINTEGRATING, DELAYED RELEASE ORAL
Status: DISCONTINUED | OUTPATIENT
Start: 2024-12-19 | End: 2024-12-23 | Stop reason: HOSPADM

## 2024-12-18 RX ORDER — INSULIN GLARGINE 100 [IU]/ML
15 INJECTION, SOLUTION SUBCUTANEOUS 2 TIMES DAILY
Status: DISCONTINUED | OUTPATIENT
Start: 2024-12-18 | End: 2024-12-19

## 2024-12-18 RX ADMIN — DEXMEDETOMIDINE HYDROCHLORIDE 0.1 MCG/KG/HR: 400 INJECTION, SOLUTION INTRAVENOUS at 23:10

## 2024-12-18 RX ADMIN — FUROSEMIDE 80 MG: 10 INJECTION, SOLUTION INTRAMUSCULAR; INTRAVENOUS at 18:14

## 2024-12-18 RX ADMIN — FENTANYL CITRATE 100 MCG: 50 INJECTION, SOLUTION INTRAMUSCULAR; INTRAVENOUS at 23:10

## 2024-12-18 RX ADMIN — INSULIN LISPRO 4 UNITS: 100 INJECTION, SOLUTION INTRAVENOUS; SUBCUTANEOUS at 20:46

## 2024-12-18 RX ADMIN — INSULIN LISPRO 4 UNITS: 100 INJECTION, SOLUTION INTRAVENOUS; SUBCUTANEOUS at 11:27

## 2024-12-18 RX ADMIN — INSULIN LISPRO 4 UNITS: 100 INJECTION, SOLUTION INTRAVENOUS; SUBCUTANEOUS at 08:27

## 2024-12-18 RX ADMIN — FENTANYL CITRATE 100 MCG: 50 INJECTION, SOLUTION INTRAMUSCULAR; INTRAVENOUS at 20:22

## 2024-12-18 RX ADMIN — INSULIN GLARGINE 15 UNITS: 100 INJECTION, SOLUTION SUBCUTANEOUS at 08:30

## 2024-12-18 RX ADMIN — TICAGRELOR 90 MG: 90 TABLET ORAL at 08:27

## 2024-12-18 RX ADMIN — DEXMEDETOMIDINE HYDROCHLORIDE 0.5 MCG/KG/HR: 400 INJECTION, SOLUTION INTRAVENOUS at 00:02

## 2024-12-18 RX ADMIN — FUROSEMIDE 80 MG: 10 INJECTION, SOLUTION INTRAMUSCULAR; INTRAVENOUS at 08:27

## 2024-12-18 RX ADMIN — AMPICILLIN AND SULBACTAM 1500 MG: 1; .5 INJECTION, POWDER, FOR SOLUTION INTRAMUSCULAR; INTRAVENOUS at 11:31

## 2024-12-18 RX ADMIN — AMIODARONE HYDROCHLORIDE 200 MG: 200 TABLET ORAL at 08:27

## 2024-12-18 RX ADMIN — ACETAMINOPHEN 650 MG: 325 TABLET ORAL at 18:18

## 2024-12-18 RX ADMIN — TICAGRELOR 90 MG: 90 TABLET ORAL at 21:19

## 2024-12-18 RX ADMIN — FENTANYL CITRATE 50 MCG: 50 INJECTION, SOLUTION INTRAMUSCULAR; INTRAVENOUS at 06:33

## 2024-12-18 RX ADMIN — HEPARIN SODIUM 12 UNITS/KG/HR: 10000 INJECTION, SOLUTION INTRAVENOUS at 13:07

## 2024-12-18 RX ADMIN — Medication 150 MCG/HR: at 13:07

## 2024-12-18 RX ADMIN — SODIUM CHLORIDE, PRESERVATIVE FREE 10 ML: 5 INJECTION INTRAVENOUS at 08:28

## 2024-12-18 RX ADMIN — FENTANYL CITRATE 50 MCG: 50 INJECTION, SOLUTION INTRAMUSCULAR; INTRAVENOUS at 04:05

## 2024-12-18 RX ADMIN — QUETIAPINE FUMARATE 25 MG: 25 TABLET ORAL at 20:46

## 2024-12-18 RX ADMIN — QUETIAPINE FUMARATE 25 MG: 25 TABLET ORAL at 08:27

## 2024-12-18 RX ADMIN — ASPIRIN 81 MG 81 MG: 81 TABLET ORAL at 08:27

## 2024-12-18 RX ADMIN — INSULIN LISPRO 4 UNITS: 100 INJECTION, SOLUTION INTRAVENOUS; SUBCUTANEOUS at 16:38

## 2024-12-18 RX ADMIN — ACETAMINOPHEN 650 MG: 325 TABLET ORAL at 11:31

## 2024-12-18 RX ADMIN — SODIUM CHLORIDE, PRESERVATIVE FREE 40 MG: 5 INJECTION INTRAVENOUS at 08:27

## 2024-12-18 RX ADMIN — ATORVASTATIN CALCIUM 40 MG: 40 TABLET, FILM COATED ORAL at 20:46

## 2024-12-18 RX ADMIN — AMIODARONE HYDROCHLORIDE 200 MG: 200 TABLET ORAL at 20:47

## 2024-12-18 RX ADMIN — SODIUM CHLORIDE, PRESERVATIVE FREE 10 ML: 5 INJECTION INTRAVENOUS at 20:47

## 2024-12-18 RX ADMIN — INSULIN GLARGINE 15 UNITS: 100 INJECTION, SOLUTION SUBCUTANEOUS at 20:46

## 2024-12-18 ASSESSMENT — PULMONARY FUNCTION TESTS
PIF_VALUE: 20
PIF_VALUE: 22
PIF_VALUE: 19
PIF_VALUE: 17
PIF_VALUE: 19
PIF_VALUE: 20
PIF_VALUE: 17
PIF_VALUE: 19

## 2024-12-19 ENCOUNTER — APPOINTMENT (OUTPATIENT)
Dept: DIALYSIS | Age: 74
DRG: 003 | End: 2024-12-19
Payer: MEDICARE

## 2024-12-19 ENCOUNTER — APPOINTMENT (OUTPATIENT)
Dept: INTERVENTIONAL RADIOLOGY/VASCULAR | Age: 74
DRG: 003 | End: 2024-12-19
Payer: MEDICARE

## 2024-12-19 ENCOUNTER — APPOINTMENT (OUTPATIENT)
Dept: CT IMAGING | Age: 74
DRG: 003 | End: 2024-12-19
Payer: MEDICARE

## 2024-12-19 PROBLEM — R56.9 SEIZURE-LIKE ACTIVITY (HCC): Status: ACTIVE | Noted: 2024-12-19

## 2024-12-19 LAB
ANION GAP SERPL CALCULATED.3IONS-SCNC: 17 MMOL/L (ref 9–16)
ANTI-XA UNFRAC HEPARIN: <0.1 IU/L
BASOPHILS # BLD: 0.05 K/UL (ref 0–0.2)
BASOPHILS NFR BLD: 0 % (ref 0–2)
BUN SERPL-MCNC: 91 MG/DL (ref 8–23)
CA-I BLD-SCNC: 1.01 MMOL/L (ref 1.13–1.33)
CALCIUM SERPL-MCNC: 8 MG/DL (ref 8.6–10.4)
CHLORIDE SERPL-SCNC: 94 MMOL/L (ref 98–107)
CO2 SERPL-SCNC: 24 MMOL/L (ref 20–31)
CREAT SERPL-MCNC: 4.6 MG/DL (ref 0.7–1.2)
EOSINOPHIL # BLD: 0.07 K/UL (ref 0–0.44)
EOSINOPHILS RELATIVE PERCENT: 1 % (ref 1–4)
ERYTHROCYTE [DISTWIDTH] IN BLOOD BY AUTOMATED COUNT: 17.8 % (ref 11.8–14.4)
FIO2: 50
GFR, ESTIMATED: 13 ML/MIN/1.73M2
GLUCOSE BLD-MCNC: 136 MG/DL (ref 75–110)
GLUCOSE BLD-MCNC: 162 MG/DL (ref 75–110)
GLUCOSE BLD-MCNC: 203 MG/DL (ref 75–110)
GLUCOSE BLD-MCNC: 223 MG/DL (ref 74–100)
GLUCOSE BLD-MCNC: 225 MG/DL (ref 75–110)
GLUCOSE BLD-MCNC: 256 MG/DL (ref 75–110)
GLUCOSE SERPL-MCNC: 230 MG/DL (ref 74–99)
HCT VFR BLD AUTO: 23.9 % (ref 40.7–50.3)
HGB BLD-MCNC: 7.8 G/DL (ref 13–17)
IMM GRANULOCYTES # BLD AUTO: 0.11 K/UL (ref 0–0.3)
IMM GRANULOCYTES NFR BLD: 1 %
LYMPHOCYTES NFR BLD: 0.93 K/UL (ref 1.1–3.7)
LYMPHOCYTES RELATIVE PERCENT: 7 % (ref 24–43)
MAGNESIUM SERPL-MCNC: 1.9 MG/DL (ref 1.6–2.4)
MCH RBC QN AUTO: 30.7 PG (ref 25.2–33.5)
MCHC RBC AUTO-ENTMCNC: 32.6 G/DL (ref 28.4–34.8)
MCV RBC AUTO: 94.1 FL (ref 82.6–102.9)
MONOCYTES NFR BLD: 1.44 K/UL (ref 0.1–1.2)
MONOCYTES NFR BLD: 11 % (ref 3–12)
NEUTROPHILS NFR BLD: 80 % (ref 36–65)
NEUTS SEG NFR BLD: 10.1 K/UL (ref 1.5–8.1)
NRBC BLD-RTO: 0 PER 100 WBC
PHOSPHATE SERPL-MCNC: 3.6 MG/DL (ref 2.5–4.5)
PLATELET # BLD AUTO: 390 K/UL (ref 138–453)
PMV BLD AUTO: 10.4 FL (ref 8.1–13.5)
POC HCO3: 27.8 MMOL/L (ref 21–28)
POC O2 SATURATION: 97.5 % (ref 94–98)
POC PCO2: 36.9 MM HG (ref 35–48)
POC PH: 7.49 (ref 7.35–7.45)
POC PO2: 88.3 MM HG (ref 83–108)
POSITIVE BASE EXCESS, ART: 4.1 MMOL/L (ref 0–3)
POTASSIUM SERPL-SCNC: 3.7 MMOL/L (ref 3.7–5.3)
RBC # BLD AUTO: 2.54 M/UL (ref 4.21–5.77)
RBC # BLD: ABNORMAL 10*6/UL
SAMPLE SITE: ABNORMAL
SODIUM SERPL-SCNC: 135 MMOL/L (ref 136–145)
WBC OTHER # BLD: 12.7 K/UL (ref 3.5–11.3)

## 2024-12-19 PROCEDURE — C9460 INJECTION, CANGRELOR: HCPCS

## 2024-12-19 PROCEDURE — 85520 HEPARIN ASSAY: CPT

## 2024-12-19 PROCEDURE — 76937 US GUIDE VASCULAR ACCESS: CPT

## 2024-12-19 PROCEDURE — 2500000003 HC RX 250 WO HCPCS: Performed by: STUDENT IN AN ORGANIZED HEALTH CARE EDUCATION/TRAINING PROGRAM

## 2024-12-19 PROCEDURE — 70450 CT HEAD/BRAIN W/O DYE: CPT

## 2024-12-19 PROCEDURE — 99223 1ST HOSP IP/OBS HIGH 75: CPT | Performed by: PSYCHIATRY & NEUROLOGY

## 2024-12-19 PROCEDURE — 77001 FLUOROGUIDE FOR VEIN DEVICE: CPT

## 2024-12-19 PROCEDURE — 82803 BLOOD GASES ANY COMBINATION: CPT

## 2024-12-19 PROCEDURE — 6360000002 HC RX W HCPCS

## 2024-12-19 PROCEDURE — 2580000003 HC RX 258

## 2024-12-19 PROCEDURE — 6360000002 HC RX W HCPCS: Performed by: INTERNAL MEDICINE

## 2024-12-19 PROCEDURE — 6360000002 HC RX W HCPCS: Performed by: PHYSICIAN ASSISTANT

## 2024-12-19 PROCEDURE — 2000000000 HC ICU R&B

## 2024-12-19 PROCEDURE — 6370000000 HC RX 637 (ALT 250 FOR IP)

## 2024-12-19 PROCEDURE — 82947 ASSAY GLUCOSE BLOOD QUANT: CPT

## 2024-12-19 PROCEDURE — 94003 VENT MGMT INPAT SUBQ DAY: CPT

## 2024-12-19 PROCEDURE — 37799 UNLISTED PX VASCULAR SURGERY: CPT

## 2024-12-19 PROCEDURE — C1769 GUIDE WIRE: HCPCS

## 2024-12-19 PROCEDURE — 80048 BASIC METABOLIC PNL TOTAL CA: CPT

## 2024-12-19 PROCEDURE — 99233 SBSQ HOSP IP/OBS HIGH 50: CPT | Performed by: INTERNAL MEDICINE

## 2024-12-19 PROCEDURE — 2700000000 HC OXYGEN THERAPY PER DAY

## 2024-12-19 PROCEDURE — 82330 ASSAY OF CALCIUM: CPT

## 2024-12-19 PROCEDURE — 90935 HEMODIALYSIS ONE EVALUATION: CPT

## 2024-12-19 PROCEDURE — 99232 SBSQ HOSP IP/OBS MODERATE 35: CPT | Performed by: SURGERY

## 2024-12-19 PROCEDURE — 6370000000 HC RX 637 (ALT 250 FOR IP): Performed by: STUDENT IN AN ORGANIZED HEALTH CARE EDUCATION/TRAINING PROGRAM

## 2024-12-19 PROCEDURE — 94761 N-INVAS EAR/PLS OXIMETRY MLT: CPT

## 2024-12-19 PROCEDURE — 85025 COMPLETE CBC W/AUTO DIFF WBC: CPT

## 2024-12-19 PROCEDURE — 84100 ASSAY OF PHOSPHORUS: CPT

## 2024-12-19 PROCEDURE — 2500000003 HC RX 250 WO HCPCS: Performed by: INTERNAL MEDICINE

## 2024-12-19 PROCEDURE — 36558 INSERT TUNNELED CV CATH: CPT

## 2024-12-19 PROCEDURE — 02H633Z INSERTION OF INFUSION DEVICE INTO RIGHT ATRIUM, PERCUTANEOUS APPROACH: ICD-10-PCS | Performed by: PHYSICIAN ASSISTANT

## 2024-12-19 PROCEDURE — 99232 SBSQ HOSP IP/OBS MODERATE 35: CPT | Performed by: INTERNAL MEDICINE

## 2024-12-19 PROCEDURE — 83735 ASSAY OF MAGNESIUM: CPT

## 2024-12-19 PROCEDURE — 2580000003 HC RX 258: Performed by: INTERNAL MEDICINE

## 2024-12-19 PROCEDURE — 99291 CRITICAL CARE FIRST HOUR: CPT | Performed by: INTERNAL MEDICINE

## 2024-12-19 RX ORDER — HEPARIN SODIUM 1000 [USP'U]/ML
INJECTION, SOLUTION INTRAVENOUS; SUBCUTANEOUS PRN
Status: COMPLETED | OUTPATIENT
Start: 2024-12-19 | End: 2024-12-19

## 2024-12-19 RX ORDER — MIDAZOLAM HYDROCHLORIDE 2 MG/2ML
2 INJECTION, SOLUTION INTRAMUSCULAR; INTRAVENOUS ONCE
Status: COMPLETED | OUTPATIENT
Start: 2024-12-19 | End: 2024-12-19

## 2024-12-19 RX ORDER — 0.9 % SODIUM CHLORIDE 0.9 %
250 INTRAVENOUS SOLUTION INTRAVENOUS PRN
Status: DISCONTINUED | OUTPATIENT
Start: 2024-12-19 | End: 2024-12-23 | Stop reason: HOSPADM

## 2024-12-19 RX ORDER — HEPARIN SODIUM 1000 [USP'U]/ML
1900 INJECTION, SOLUTION INTRAVENOUS; SUBCUTANEOUS PRN
Status: DISCONTINUED | OUTPATIENT
Start: 2024-12-19 | End: 2024-12-23 | Stop reason: HOSPADM

## 2024-12-19 RX ORDER — LEVETIRACETAM 5 MG/ML
500 INJECTION INTRAVASCULAR EVERY 12 HOURS
Status: DISCONTINUED | OUTPATIENT
Start: 2024-12-19 | End: 2024-12-20

## 2024-12-19 RX ORDER — LEVETIRACETAM 500 MG/5ML
2000 INJECTION, SOLUTION, CONCENTRATE INTRAVENOUS ONCE
Status: COMPLETED | OUTPATIENT
Start: 2024-12-19 | End: 2024-12-19

## 2024-12-19 RX ORDER — INSULIN GLARGINE 100 [IU]/ML
20 INJECTION, SOLUTION SUBCUTANEOUS 2 TIMES DAILY
Status: DISCONTINUED | OUTPATIENT
Start: 2024-12-19 | End: 2024-12-23 | Stop reason: HOSPADM

## 2024-12-19 RX ORDER — HEPARIN SODIUM 10000 [USP'U]/100ML
5-30 INJECTION, SOLUTION INTRAVENOUS CONTINUOUS
Status: ACTIVE | OUTPATIENT
Start: 2024-12-19 | End: 2024-12-20

## 2024-12-19 RX ORDER — MIDAZOLAM HYDROCHLORIDE 1 MG/ML
INJECTION, SOLUTION INTRAMUSCULAR; INTRAVENOUS
Status: COMPLETED
Start: 2024-12-19 | End: 2024-12-19

## 2024-12-19 RX ADMIN — SODIUM CHLORIDE, PRESERVATIVE FREE 10 ML: 5 INJECTION INTRAVENOUS at 08:29

## 2024-12-19 RX ADMIN — Medication 100 MCG/HR: at 20:43

## 2024-12-19 RX ADMIN — AMIODARONE HYDROCHLORIDE 200 MG: 200 TABLET ORAL at 08:29

## 2024-12-19 RX ADMIN — LEVOFLOXACIN 500 MG: 5 INJECTION, SOLUTION INTRAVENOUS at 02:17

## 2024-12-19 RX ADMIN — INSULIN GLARGINE 20 UNITS: 100 INJECTION, SOLUTION SUBCUTANEOUS at 08:29

## 2024-12-19 RX ADMIN — AMPICILLIN AND SULBACTAM 1500 MG: 1; .5 INJECTION, POWDER, FOR SOLUTION INTRAMUSCULAR; INTRAVENOUS at 00:34

## 2024-12-19 RX ADMIN — INSULIN LISPRO 4 UNITS: 100 INJECTION, SOLUTION INTRAVENOUS; SUBCUTANEOUS at 20:30

## 2024-12-19 RX ADMIN — FUROSEMIDE 80 MG: 10 INJECTION, SOLUTION INTRAMUSCULAR; INTRAVENOUS at 17:35

## 2024-12-19 RX ADMIN — MIDAZOLAM HYDROCHLORIDE 2 MG: 1 INJECTION, SOLUTION INTRAMUSCULAR; INTRAVENOUS at 03:17

## 2024-12-19 RX ADMIN — HEPARIN SODIUM 12 UNITS/KG/HR: 10000 INJECTION, SOLUTION INTRAVENOUS at 20:39

## 2024-12-19 RX ADMIN — LANSOPRAZOLE 30 MG: 30 TABLET, ORALLY DISINTEGRATING, DELAYED RELEASE ORAL at 06:20

## 2024-12-19 RX ADMIN — FENTANYL CITRATE 50 MCG: 50 INJECTION, SOLUTION INTRAMUSCULAR; INTRAVENOUS at 22:27

## 2024-12-19 RX ADMIN — MIDAZOLAM HYDROCHLORIDE 2 MG: 1 INJECTION, SOLUTION INTRAMUSCULAR; INTRAVENOUS at 03:15

## 2024-12-19 RX ADMIN — CANGRELOR 0.75 MCG/KG/MIN: 50 INJECTION, POWDER, LYOPHILIZED, FOR SOLUTION INTRAVENOUS at 18:06

## 2024-12-19 RX ADMIN — HEPARIN SODIUM 12 UNITS/KG/HR: 10000 INJECTION, SOLUTION INTRAVENOUS at 16:55

## 2024-12-19 RX ADMIN — LEVETIRACETAM 2000 MG: 100 INJECTION INTRAVENOUS at 04:47

## 2024-12-19 RX ADMIN — LEVETIRACETAM 500 MG: 5 INJECTION, SOLUTION INTRAVENOUS at 09:49

## 2024-12-19 RX ADMIN — HEPARIN SODIUM 1900 UNITS: 1000 INJECTION, SOLUTION INTRAVENOUS; SUBCUTANEOUS at 14:06

## 2024-12-19 RX ADMIN — AMPICILLIN AND SULBACTAM 1500 MG: 1; .5 INJECTION, POWDER, FOR SOLUTION INTRAMUSCULAR; INTRAVENOUS at 11:32

## 2024-12-19 RX ADMIN — QUETIAPINE FUMARATE 25 MG: 25 TABLET ORAL at 20:31

## 2024-12-19 RX ADMIN — LEVETIRACETAM 500 MG: 5 INJECTION, SOLUTION INTRAVENOUS at 22:31

## 2024-12-19 RX ADMIN — HEPARIN SODIUM 1900 UNITS: 1000 INJECTION, SOLUTION INTRAVENOUS; SUBCUTANEOUS at 14:07

## 2024-12-19 RX ADMIN — FENTANYL CITRATE 50 MCG: 50 INJECTION, SOLUTION INTRAMUSCULAR; INTRAVENOUS at 02:49

## 2024-12-19 RX ADMIN — AMIODARONE HYDROCHLORIDE 200 MG: 200 TABLET ORAL at 20:31

## 2024-12-19 RX ADMIN — INSULIN GLARGINE 20 UNITS: 100 INJECTION, SOLUTION SUBCUTANEOUS at 20:30

## 2024-12-19 RX ADMIN — QUETIAPINE FUMARATE 25 MG: 25 TABLET ORAL at 08:29

## 2024-12-19 RX ADMIN — ATORVASTATIN CALCIUM 40 MG: 40 TABLET, FILM COATED ORAL at 20:31

## 2024-12-19 RX ADMIN — SODIUM CHLORIDE, PRESERVATIVE FREE 10 ML: 5 INJECTION INTRAVENOUS at 20:31

## 2024-12-19 RX ADMIN — INSULIN LISPRO 4 UNITS: 100 INJECTION, SOLUTION INTRAVENOUS; SUBCUTANEOUS at 06:20

## 2024-12-19 RX ADMIN — HEPARIN SODIUM 12 UNITS/KG/HR: 10000 INJECTION, SOLUTION INTRAVENOUS at 16:57

## 2024-12-19 RX ADMIN — HEPARIN SODIUM 1900 UNITS: 1000 INJECTION INTRAVENOUS; SUBCUTANEOUS at 19:16

## 2024-12-19 RX ADMIN — CANGRELOR 0.75 MCG/KG/MIN: 50 INJECTION, POWDER, LYOPHILIZED, FOR SOLUTION INTRAVENOUS at 09:00

## 2024-12-19 RX ADMIN — MIDAZOLAM HYDROCHLORIDE 2 MG: 2 INJECTION, SOLUTION INTRAMUSCULAR; INTRAVENOUS at 03:15

## 2024-12-19 RX ADMIN — FUROSEMIDE 80 MG: 10 INJECTION, SOLUTION INTRAMUSCULAR; INTRAVENOUS at 08:29

## 2024-12-19 RX ADMIN — HEPARIN SODIUM 1900 UNITS: 1000 INJECTION INTRAVENOUS; SUBCUTANEOUS at 19:15

## 2024-12-19 ASSESSMENT — PULMONARY FUNCTION TESTS
PIF_VALUE: 18
PIF_VALUE: 18
PIF_VALUE: 21
PIF_VALUE: 21
PIF_VALUE: 0
PIF_VALUE: 27
PIF_VALUE: 17
PIF_VALUE: 21
PIF_VALUE: 15

## 2024-12-19 ASSESSMENT — PAIN SCALES - GENERAL
PAINLEVEL_OUTOF10: 0
PAINLEVEL_OUTOF10: 0

## 2024-12-19 NOTE — BRIEF OP NOTE
Brief Postoperative Note    Lukasz Keller  YOB: 1950  [id number]    Pre-operative Diagnosis: Acute Renal Failure      Post-operative Diagnosis: Same    Procedure: Tunneled Dialysis Catheter    Medication Given: none    Anesthesia: 1%Lidocaine     Surgeons/Assistants: YEYO Bernardo    Estimated Blood Loss: Minimal    Complications: none    14 Fr x 23 cm tip to cuff palindrome tunneled HD Catheter placed successfully via the Site:  Right Internal Jugular Vein.  Catheter secured to skin, dressing applied.  Catheter locked with Heparin.  May use catheter.    Electronically signed by YEYO Bernardo on 12/19/2024 at 2:13 PM

## 2024-12-19 NOTE — CARE COORDINATION
Transitional planning. I/S FiO2 60%, PEEP of 8, follows commands, HD today, IR today for tunnel cath, Plans for Trach/PEG 12/20, [facility] is LT[facility] choice, [facility] and [facility] are SNF choices

## 2024-12-19 NOTE — CASE COMMUNICATION
Transported patient to CT on transport vent, RT and RN present. Emergency equipment present. No issues

## 2024-12-19 NOTE — CONSULTS
Pulmonary/Critical Care consultation    Patient's name:  Lukasz Keller  Medical Record Number: [id number]  Patient's account/billing number: [id number]  Patient's YOB: 1950  Age: 74 y.o.  Date of Admission: 12/3/2024  1:43 PM  Date of Consult: 12/4/2024      Primary Care Physician: Christal Jang MD      Code Status: Full Code    Reason for consult: Acute hypoxemic respiratory failure secondary to STEMI, pulmonary embolism with shock due to cardiogenic and obstructive shock      HISTORY OF PRESENT ILLNESS:   History was obtained from chart review.  History was unable to be obtained from the patient secondary to his mentation.  Lukasz Keller is a 74 y.o. white gentleman with known history of pulmonary embolism in the past was admitted with cardiogenic shock and cardiac arrest secondary to V. tach/PEA arrest.  Patient was diagnosed with STEMI and had 2 stents placed.  Subsequently a CT scan of the chest revealed bilateral pulmonary emboli with right heart strain.  Patient continued to be on anticoagulation and antiplatelet agents and this morning had thrombectomy done by vascular surgery for the saddle pulmonary embolism  Patient remains on the ventilator on 100% oxygen  Patient is sedated  ABG showed a combined respiratory and metabolic acidosis  Rate on the ventilator has been increased to 30 and patient receiving bicarbonate  Having small tracheal secretions  Needs pressor support with norepinephrine and vasopressin  Sedation and analgesia with Precedex, midazolam and fentanyl  Other than history of pulmonary embolism, no known history of COPD or asthma  Was not on any anticoagulants at home            Past Medical History:        Diagnosis Date    Abnormal EKG     Acute respiratory failure 03/17/2020    Anesthesia complication 2005    woke up during neck surgery    Anxiety     Dr. Marlow    Cancer (HCC)     Prostate    Colon polyps     Hx of 
          [facility] -   Infectious diseases evaluation  admission date 12/3/2024    reason for consultation:   Concern for sepsis    Impression :   Current:  Cardiac arrest Vfib and 20 min downtime  Card cath RCP PCI drug eluting  Cardiogenic shock  ECMO, intra aortic balloon pump,pressors  Thrombocytopenia  PONCE  CHF with preserved EF  PE by history  Saddle PE found 12/3 - thrombectomy 12/3/24 pm  Was on ECMO - removed 12/9  Balloon pump removed 12/9   Bilat LL  infil 12/9 R> L - possible pneumonia   Sp cx 12/8 pend normal cayetano  Bandemia  RUBEN   Elevated procal 6 - suspect non specific and inflammatory   Other:    Discussion / summary of stay / plan of care/ Recommendations:     HENCE:   Keep zosyn for now -   sp cx w many GNR - asking lab to run those GNR ( in case of ESBL)  Defer vanco   12/9 - off the ECMO  and off the aortic balloon       Infection Control Recommendations   [place] Precautions    Antimicrobial Stewardship Recommendations   Simplification of therapy  Targeted therapy    History of Present Illness:   Initial history:  Lukasz Keller is a 74 y.o.-year-old male with last labs/cardiac arrest, EMS called and downtime was 20 minutes, defibrillation x 1 for pulseless V. tach.  Picture of NSTEMI, received epinephrine-defibrillation X1 went into junctional rhythm 70 heart rate, achieved on the ROSC  Intubated,  Transferred to the cardiac lab where he became bradycardic lost pulse, received atropine and epi then again lost pulse.  Had an emergent cardiac cath with rescue PCI with thrombectomy of the proximal to mid right CAD using 2 drug-eluting stents    After the cardiac cath CT revealed bilateral pulmonary emboli with right heart strain    EF 65 to 70% on echo, sclerosis of the aortic valve cusp and hyperdynamic left ventricle    Requiring very high oxygen, saturation 82%, started on ECMO 12/4  Levophed pulmonary ongoing  Insulin drip  Argatroban infusion with 
      Division of Vascular Surgery        New Consult      Physician Requesting Consult:  Dr. Bee    Reason for Consult:   \"Bilateral PE with right heart strain, admitted with STEMI. Hypotensive\"    Chief Complaint:      Intubated    History of Present Illness:      Lukasz Keller is a 74 y.o. gentleman who presents with inferior STEMI s/p emergent PCI with stenting and V. tach/PEA arrest.  Patient was intubated and sedated from this and has been requiring increasing amounts of vasopressors to maintain blood pressure.  Trops have been bit elevated and uptrending.  A CT was performed showing a saddle PE with large clot burden, this resulted at 8:48 PM on 12/3/2024 and the patient was manage by the primary team with anticoagulation consisting of heparin drip until vascular surgery was consulted at 3:30 AM on 12/4.  Patient is also on cangrelor with increasing amounts of Levophed and also on vasopressin.  Patient is completely sedated and nonresponsive not following commands.  Has Doppler signals in bilateral lower extremities, and palpable radial pulse.  Patient has venous sheath converted to a central line by the primary team in the right groin, that has been oozing since placement, ABG comparison to an art line shows this is likely in the venous system.  During chest compressions, he was noted to have multiple broken ribs.  During PCI cardiology states they had stenotic coronaries and performed thrombectomy with drug-eluting stent placement x 2.     Medical History:     Past Medical History:   Diagnosis Date    Abnormal EKG     Acute respiratory failure 03/17/2020    Anesthesia complication 2005    woke up during neck surgery    Anxiety     Dr. Marlow    Cancer ([facility])     Prostate    Colon polyps     Hx of blood clots 06/28/2021    Saddle PE- [facility]    Hyperlipidemia     Dr. Hoffman    Prostate CA ([facility])     Pulmonary emboli ([facility]) 06/28/2021    Per CTA chest, 6-28-21-\" Large saddle pulmonary embolism 
  [facility]     Department of Internal Medicine - Staff Internal Medicine Teaching Service          ADMISSION NOTE/HISTORY AND PHYSICAL EXAMINATION   Date: 12/4/2024  Patient Name: Lukasz Keller  Date of admission: 12/3/2024  1:43 PM  YOB: 1950  PCP: Christal Jang MD  History Obtained From:   electronic medical record    Consult Reason:     Consult Reason: Management of diabetes, hyperkalemia, acidosis    HISTORY OF PRESENTING ILLNESS     The patient is a  74 y.o. male with past medical history significant for  PE not noted to be on anticoagulation per chart review  Hyperlipidemia  Acute respiratory failure secondary to PE  Colon polyp s/p right hemicolectomy with ileocolic anastomosis  History of prostate cancer s/p robotic prostatectomy 4 years ago.  PSA currently undetectable  PONCE  CHF with preserved ejection fraction  Asymptomatic ventral hernia  Primary hypertension    That presented via EMS after witnessed collapse cardiac arrest.  EMS was initially called.  Patient had downtime of about 20 minutes.  Interventional cardiologist was paged with the picture of STEMI faxed by EMS.  Initial rhythm noted by EMS was pulseless V. tach and he received defibrillation x 1.  He also received epi x 1 and was started on amnio bolus by EMS.  ROSC was obtained en route.  On arrival to the ED shortly patient became pulseless and CPR and ACLS was started.  Patient received epi x 2 and defibrillation x 1.  Junctional rhythm with a rate of 60-70 was achieved on the ROSC.  Patient was intubated.  Patient was transferred to Cath Lab.  En route to the Cath Lab patient became bradycardic and lost pulse and received 1 cc push of epi along with 1 mg of atropine.  He then again briefly lost pulses in Cath Lab and required epi x 1.  Per chart review patient apparently is a former smoker and smoked for almost 40-50 years.Apparently patient wife stated that he was having chest pain and 
  Palliative Care Inpatient Consult    NAME:  Lukasz Keller  MEDICAL RECORD NUMBER:  [id number]  AGE: 74 y.o.   GENDER: male  : 1950  TODAY'S DATE:  2024    Reasons for Consultation:    Symptom and/or pain management  Provision of information regarding PC and/or hospice philosophies  Complex, time-intensive communication and interdisciplinary psychosocial support  Clarification of goals of care and/or assistance with difficult decision-making  Guidance in regards to resources and transition(s)    Members of PC team contributing to this consultation are : Analisa Nicholson, Palliative Care APRN-CNP    Plan      Palliative Interaction: Patient seen on rounds. He remains intubated and sedated. Requiring high doses of pressor support including Vaso, Levo, and Rahul. Per nursing patient able to follow commands on sedation holiday.     I was able to meet with patients wife Anaya and daughter Rochelle. Anaya updates surrounding the events leading up to admission. She notes that patient had not been feeling well for about a week. She states he was doing his normal daily things like showering, he then sat down, once he stood up he fell back into his chair and was unresponsive.     She notes prior to this patient was completely independent.     We discuss his goals moving forward. It was thought that patient had a previous DNR based on wifes comments. However it seems that maybe she is mistaken his living will for a DNR. She states that he would never want to be maintained on machines. Both Anaya and Rochelle note feeling like he would want help if it was felt he could get better.     Throughout conversation reiterate how critical patient remains. They have consented to dialysis as patient will likely require CVVHD. They wish for patient to remain a full code at this time with continuation of interventions.     Explained that we will continue goals of care conversations moving forward based on patient recovery. They seem 
Comprehensive Nutrition Assessment    Type and Reason for Visit:  Reassess, Consult    Nutrition Recommendations/Plan:   Recommend start Immune Enhancing TF (Pivot 1.5) at 15 mL trickle rate + protein modular TID to provide 852 kcal, 111 gm protein, 261 mL free water. With flushes 30 mL q 6 hours = 381 mL water/day.  Consider bowel regimen.  If IABP dc, consider Pivot 1.5 at goal rate 35 mL/hr + protein modular BID.  Monitor TF tolerance, adequacy, weight, labs, GI status, fluid status, progression of nutrition.     Malnutrition Assessment:  Malnutrition Status:  Insufficient data (12/04/24 1442)    Context:  Acute Illness       Nutrition Assessment:    Pt continues intubated and sedated, on VA ECMO and CVVHD. Pressor support x2 per RN. +IABP. Consult for TF order and management. D/w RN, plan to start trickle TF. Will also provide recommendation if IABP dc. Labs reviewed: Glu 167-173 mg/dL. Meds reviewed: n/o insulin. Per chart, abdomen is soft with hypoactive bowel sounds. No BM yet.     Nutrition Related Findings:    +2 generalized and extremity edema Wound Type: Multiple, Surgical Incision (sternum and brachial incisions, femoral puncture)       Current Nutrition Intake & Therapies:    Average Meal Intake: NPO  Average Supplements Intake: NPO  Diet NPO Exceptions are: Sips of Water with Meds  Additional Calorie Sources:  None    Anthropometric Measures:  Height: 174 cm (5' 8.5\")  Ideal Body Weight (IBW): 157 lbs (71 kg)    Admission Body Weight: 135.6 kg (298 lb 15.1 oz)  Current Body Weight: 147.7 kg (325 lb 9.9 oz), 197 % IBW. Weight Source: Bed scale  Current BMI (kg/m2): 48.8  Usual Body Weight: 124.3 kg (274 lb 0.5 oz) (10/16/2023)  % Weight Change (Calculated): 12.9  BMI Categories: Obese Class 3 (BMI 40.0 or greater)    Estimated Daily Nutrient Needs:  Energy Requirements Based On: Kcal/kg  Weight Used for Energy Requirements: Current  Energy (kcal/day): 8648-2666 kcal/d  Weight Used for Protein 
Renal Consult Note    Patient :  Lukasz Keller; 74 y.o. MRN# [id number]  Location:  1014/1014-01  Attending:  Christiano Smith MD  Admit Date:  12/3/2024   Hospital Day: 1    Reason for Consult:     Asked by Christiano Valverde MD to see for RUBEN/Elevated Creatinine and hyperkalemia.     History Obtained From:     family member, electronic medical record.    History of Present Illness:     Lukasz Keller; 74 y.o. male with past medical history of prostate cancer status post TURP, DVT/PE, lung nodule, tobacco use, hyperlipidemia, anxiety, and morbid obesity who presented to the hospital after experiencing a V. tach arrest at home.    According to documentation, patient's wife called 911 stating he was not feeling well and had collapsed, on EMS arrival EKG was obtained which demonstrated inferior STEMI and patient then progressed into V. tach arrest with 1 round of CPR and 1 of epi before obtaining ROSC.  Patient rearrested upon arrival to ED, first rhythm check demonstrated V. tach, patient was shocked once, and next rhythm check demonstrated PEA.  ROSC was obtained after 4 rounds of CPR.  Interventional cardiology was reportedly at bedside during rearrest and patient was taken directly to Cath Lab where he underwent coronary artery thrombectomy with drug-eluting stent placement x 2.  Post cath, patient was then taken for CT chest abdomen pelvis with contrast which demonstrated large volume bilateral pulmonary embolus with evidence of right heart strain, vascular surgery was consulted and patient returned to the OR early this morning for mechanical bilateral thrombectomy.    Over the course of hospitalization thus far, patient had demonstrated hyperkalemia and more recently oliguria.  Nephrology is consulted for oliguric RUBEN and hyperkalemia.    Most recent labs reviewed, sodium 140, potassium 4.8, chloride 110, bicarb 18, BUN 20, creatinine 1.6, anion gap 12, estimated GFR 45, lactic acid 2.2, glucose 199, calcium 
[facility]   IN-PATIENT SERVICE   [facility]    Neurology Consult Note            Date:   12/19/2024  Patient name:  Lukasz Keller  Date of admission:  12/3/2024  1:43 PM  MRN:   [id number]  Account:  [id number]  YOB: 1950  PCP:    Christal Jang MD  Room:   [address] [place] [address]  Code Status:    Full Code    Chief Complaint:     No chief complaint on file.  Post cardiac arrest    History Obtained From:     patient    History of Present Illness:     74-year-old male currently admitted to medical ICU who initially presented as a post pulseless V. tach cardiac arrest, was found to have inferior STEMI, underwent cardiac cath with placement of 2 drug-eluting stents to RCA.  He also was found to have saddle pulmonary embolism later on and underwent thrombectomy.  He was   Patient was placed on VA ECMO. Patient was started on CV VHD on 12/4 and ballon pump 12/5. Off ECMO, balloon pump and CRRt on 12/9. Transitioned to HD.  Patient also developed ventilator associated pneumonia with respiratory culture growing multiple gram-negative bacteria and currently on levofloxacin and Unasyn f as per the ID recommendations.    Neurology is consulted as patient had 2 episodes of seizure-like activity with generalized shaking of all 4 extremities with up rolling eye movements twice.  As per the nurse, he was shaking all his extremities and was confused post episodes.  Before the episodes, he was following commands as per the nurse.  He received 4 Mg of Versed during the episode.    On my evaluation, he was drowsy and somnolent and did not follow commands, could be postictal versus benzodiazepine effect.         Past Medical History:     Past Medical History:   Diagnosis Date    Abnormal EKG     Acute respiratory failure 03/17/2020    Anesthesia complication 2005    woke up during neck surgery    Anxiety     Dr. Marlow    Cancer (HCC)     Prostate    Colon polyps     Hx of blood clots 
Vascular team called back for Charan placement.  Patient is to be decannulated today, requested decannulation team to place Charan while in Cath Lab.  If unable to vascular team would be happy to place Charan.  Vascular team to sign off, if require line placement please page vascular surgery resident pager.  Thank you for allowing us to be part of this patient's care.    Janel Fierro, DO  12/9/24  
(N/A, 12/3/2024); vascular surgery (N/A, 12/4/2024); Cardiac procedure (Bilateral, 12/4/2024); Cardiac procedure (N/A, 12/4/2024); Cardiac procedure (N/A, 12/5/2024); Cardiac procedure (N/A, 12/9/2024); invasive vascular (N/A, 12/9/2024); Cardiac procedure (N/A, 12/9/2024); and Cardiac procedure (N/A, 12/9/2024).  Medications  Prior to Admission medications    Medication Sig Start Date End Date Taking? Authorizing Provider   simvastatin (ZOCOR) 40 MG tablet Take 1 tablet by mouth nightly 5/16/24   Christal Jang MD   aspirin 81 MG EC tablet Take 1 tablet by mouth 2 times daily    Ioana Lopez MD   meloxicam (MOBIC) 15 MG tablet Take 1 tablet by mouth every morning 9/2/23   Ioana Lopez MD   NONFORMULARY Take 1 caplet by mouth daily Takes over the counter medication daily for weight loss.    Ioana Lopez MD   Incontinence Supply Disposable (DISPOSABLE BRIEF LARGE) MISC 10 briefs per day 4/8/20   Kimmie Fernandez, APRN - CNP   calcium carbonate 600 MG TABS tablet Take 1 tablet by mouth daily as needed    Ioana Lopez MD   traZODone (DESYREL) 150 MG tablet 1 tablet nightly 12/14/19   Ioana Lopez MD   clonazePAM (KLONOPIN) 2 MG tablet TAKE 1 TABLET BY MOUTH TWICE DAILY 12/14/19   Ioana Lopez MD   buPROPion (WELLBUTRIN SR) 200 MG extended release tablet Take 1 tablet by mouth 2 times daily    Ioana Lopez MD   Multiple Vitamins-Minerals (MULTIVITAMIN ADULT PO) Take 1 capsule by mouth    Ioana Lopez MD    Scheduled Meds:   insulin glargine  15 Units SubCUTAneous BID    [START ON 12/19/2024] lansoprazole  30 mg Per NG tube QAM AC    insulin lispro  0-16 Units SubCUTAneous 4x Daily AC & HS    QUEtiapine  25 mg Oral BID    amiodarone  200 mg Oral BID    ampicillin-sulbactam  1,500 mg IntraVENous Q12H    levofloxacin  500 mg IntraVENous Q48H    midodrine  10 mg Oral TID WC    furosemide  80 mg IntraVENous BID    sodium chloride flush  5-40 mL 
edema  On CVVHD  Thank you for having us involved in the care of your patient. Please call us if you have any questions or concerns.  Total critical care time caring for this patient with life threatening, unstable organ failure, including direct patient contact, management of life support systems, review of data including imaging and labs, discussions with other team members and physicians at least 30  Min so far today, excluding procedures.      Electronically signed by-  David Bowman MD  12/6/2024 5:42 AM         
members and physicians at least 30  Min so far today, excluding procedures.      Electronically signed by-  David Bowman MD  12/5/2024 7:09 AM

## 2024-12-20 ENCOUNTER — APPOINTMENT (OUTPATIENT)
Dept: MRI IMAGING | Age: 74
DRG: 003 | End: 2024-12-20
Payer: MEDICARE

## 2024-12-20 ENCOUNTER — ANESTHESIA EVENT (OUTPATIENT)
Dept: OPERATING ROOM | Age: 74
End: 2024-12-20
Payer: MEDICARE

## 2024-12-20 ENCOUNTER — ANESTHESIA (OUTPATIENT)
Dept: OPERATING ROOM | Age: 74
End: 2024-12-20
Payer: MEDICARE

## 2024-12-20 ENCOUNTER — APPOINTMENT (OUTPATIENT)
Dept: GENERAL RADIOLOGY | Age: 74
DRG: 003 | End: 2024-12-20
Payer: MEDICARE

## 2024-12-20 PROBLEM — G93.41 METABOLIC ENCEPHALOPATHY: Status: ACTIVE | Noted: 2024-12-20

## 2024-12-20 LAB
ABO + RH BLD: NORMAL
ALLEN TEST: POSITIVE
ANION GAP SERPL CALCULATED.3IONS-SCNC: 14 MMOL/L (ref 9–16)
ANION GAP SERPL CALCULATED.3IONS-SCNC: 15 MMOL/L (ref 9–16)
ANTI-XA UNFRAC HEPARIN: <0.1 IU/L
ARM BAND NUMBER: NORMAL
BASOPHILS # BLD: 0.13 K/UL (ref 0–0.2)
BASOPHILS NFR BLD: 1 % (ref 0–2)
BLOOD BANK SAMPLE EXPIRATION: NORMAL
BLOOD GROUP ANTIBODIES SERPL: NEGATIVE
BUN SERPL-MCNC: 52 MG/DL (ref 8–23)
BUN SERPL-MCNC: 58 MG/DL (ref 8–23)
CALCIUM SERPL-MCNC: 8.5 MG/DL (ref 8.6–10.4)
CALCIUM SERPL-MCNC: 8.5 MG/DL (ref 8.6–10.4)
CHLORIDE SERPL-SCNC: 94 MMOL/L (ref 98–107)
CHLORIDE SERPL-SCNC: 96 MMOL/L (ref 98–107)
CO2 SERPL-SCNC: 25 MMOL/L (ref 20–31)
CO2 SERPL-SCNC: 25 MMOL/L (ref 20–31)
CREAT SERPL-MCNC: 3.3 MG/DL (ref 0.7–1.2)
CREAT SERPL-MCNC: 3.3 MG/DL (ref 0.7–1.2)
EOSINOPHIL # BLD: 0.13 K/UL (ref 0–0.44)
EOSINOPHILS RELATIVE PERCENT: 1 % (ref 1–4)
ERYTHROCYTE [DISTWIDTH] IN BLOOD BY AUTOMATED COUNT: 17.8 % (ref 11.8–14.4)
ERYTHROCYTE [DISTWIDTH] IN BLOOD BY AUTOMATED COUNT: 18 % (ref 11.8–14.4)
FIO2: 40
GFR, ESTIMATED: 19 ML/MIN/1.73M2
GFR, ESTIMATED: 19 ML/MIN/1.73M2
GLUCOSE BLD-MCNC: 175 MG/DL (ref 75–110)
GLUCOSE BLD-MCNC: 188 MG/DL (ref 75–110)
GLUCOSE BLD-MCNC: 197 MG/DL (ref 74–100)
GLUCOSE BLD-MCNC: 205 MG/DL (ref 75–110)
GLUCOSE SERPL-MCNC: 165 MG/DL (ref 74–99)
GLUCOSE SERPL-MCNC: 180 MG/DL (ref 74–99)
HCT VFR BLD AUTO: 26.6 % (ref 40.7–50.3)
HCT VFR BLD AUTO: 28.7 % (ref 40.7–50.3)
HGB BLD-MCNC: 8.2 G/DL (ref 13–17)
HGB BLD-MCNC: 8.8 G/DL (ref 13–17)
IMM GRANULOCYTES # BLD AUTO: 0.13 K/UL (ref 0–0.3)
IMM GRANULOCYTES NFR BLD: 1 %
LEVETIRACETAM SERPL-MCNC: 21 UG/ML
LYMPHOCYTES NFR BLD: 1.66 K/UL (ref 1.1–3.7)
LYMPHOCYTES RELATIVE PERCENT: 13 % (ref 24–43)
MCH RBC QN AUTO: 30.3 PG (ref 25.2–33.5)
MCH RBC QN AUTO: 30.9 PG (ref 25.2–33.5)
MCHC RBC AUTO-ENTMCNC: 30.7 G/DL (ref 28.4–34.8)
MCHC RBC AUTO-ENTMCNC: 30.8 G/DL (ref 28.4–34.8)
MCV RBC AUTO: 100.7 FL (ref 82.6–102.9)
MCV RBC AUTO: 98.2 FL (ref 82.6–102.9)
MICROORGANISM SPEC CULT: NORMAL
MICROORGANISM SPEC CULT: NORMAL
MODE: ABNORMAL
MONOCYTES NFR BLD: 16 % (ref 3–12)
MONOCYTES NFR BLD: 2.05 K/UL (ref 0.1–1.2)
MORPHOLOGY: ABNORMAL
NEUTROPHILS NFR BLD: 68 % (ref 36–65)
NEUTS SEG NFR BLD: 8.7 K/UL (ref 1.5–8.1)
NRBC BLD-RTO: 0 PER 100 WBC
NRBC BLD-RTO: 0 PER 100 WBC
O2 DELIVERY DEVICE: ABNORMAL
PLATELET # BLD AUTO: 344 K/UL (ref 138–453)
PLATELET # BLD AUTO: 378 K/UL (ref 138–453)
PMV BLD AUTO: 10.3 FL (ref 8.1–13.5)
PMV BLD AUTO: 10.4 FL (ref 8.1–13.5)
POC HCO3: 29.6 MMOL/L (ref 21–28)
POC O2 SATURATION: 96.3 % (ref 94–98)
POC PCO2: 42.1 MM HG (ref 35–48)
POC PH: 7.46 (ref 7.35–7.45)
POC PO2: 80 MM HG (ref 83–108)
POSITIVE BASE EXCESS, ART: 5.2 MMOL/L (ref 0–3)
POTASSIUM SERPL-SCNC: 3.7 MMOL/L (ref 3.7–5.3)
POTASSIUM SERPL-SCNC: 3.8 MMOL/L (ref 3.7–5.3)
RBC # BLD AUTO: 2.71 M/UL (ref 4.21–5.77)
RBC # BLD AUTO: 2.85 M/UL (ref 4.21–5.77)
SAMPLE SITE: ABNORMAL
SERVICE CMNT-IMP: NORMAL
SERVICE CMNT-IMP: NORMAL
SODIUM SERPL-SCNC: 134 MMOL/L (ref 136–145)
SODIUM SERPL-SCNC: 135 MMOL/L (ref 136–145)
SPECIMEN DESCRIPTION: NORMAL
SPECIMEN DESCRIPTION: NORMAL
WBC OTHER # BLD: 10.6 K/UL (ref 3.5–11.3)
WBC OTHER # BLD: 12.8 K/UL (ref 3.5–11.3)

## 2024-12-20 PROCEDURE — 6360000002 HC RX W HCPCS

## 2024-12-20 PROCEDURE — 6360000002 HC RX W HCPCS: Performed by: NURSE ANESTHETIST, CERTIFIED REGISTERED

## 2024-12-20 PROCEDURE — 94761 N-INVAS EAR/PLS OXIMETRY MLT: CPT

## 2024-12-20 PROCEDURE — 99232 SBSQ HOSP IP/OBS MODERATE 35: CPT | Performed by: SURGERY

## 2024-12-20 PROCEDURE — 6370000000 HC RX 637 (ALT 250 FOR IP): Performed by: INTERNAL MEDICINE

## 2024-12-20 PROCEDURE — 85027 COMPLETE CBC AUTOMATED: CPT

## 2024-12-20 PROCEDURE — 86850 RBC ANTIBODY SCREEN: CPT

## 2024-12-20 PROCEDURE — 6360000002 HC RX W HCPCS: Performed by: STUDENT IN AN ORGANIZED HEALTH CARE EDUCATION/TRAINING PROGRAM

## 2024-12-20 PROCEDURE — 3E0G76Z INTRODUCTION OF NUTRITIONAL SUBSTANCE INTO UPPER GI, VIA NATURAL OR ARTIFICIAL OPENING: ICD-10-PCS

## 2024-12-20 PROCEDURE — 2709999900 HC NON-CHARGEABLE SUPPLY: Performed by: SURGERY

## 2024-12-20 PROCEDURE — 82947 ASSAY GLUCOSE BLOOD QUANT: CPT

## 2024-12-20 PROCEDURE — 6360000002 HC RX W HCPCS: Performed by: SURGERY

## 2024-12-20 PROCEDURE — 6370000000 HC RX 637 (ALT 250 FOR IP)

## 2024-12-20 PROCEDURE — C1769 GUIDE WIRE: HCPCS | Performed by: SURGERY

## 2024-12-20 PROCEDURE — 3700000001 HC ADD 15 MINUTES (ANESTHESIA): Performed by: SURGERY

## 2024-12-20 PROCEDURE — 99232 SBSQ HOSP IP/OBS MODERATE 35: CPT | Performed by: PSYCHIATRY & NEUROLOGY

## 2024-12-20 PROCEDURE — 6370000000 HC RX 637 (ALT 250 FOR IP): Performed by: STUDENT IN AN ORGANIZED HEALTH CARE EDUCATION/TRAINING PROGRAM

## 2024-12-20 PROCEDURE — 85520 HEPARIN ASSAY: CPT

## 2024-12-20 PROCEDURE — 82803 BLOOD GASES ANY COMBINATION: CPT

## 2024-12-20 PROCEDURE — 99232 SBSQ HOSP IP/OBS MODERATE 35: CPT | Performed by: INTERNAL MEDICINE

## 2024-12-20 PROCEDURE — 6370000000 HC RX 637 (ALT 250 FOR IP): Performed by: SURGERY

## 2024-12-20 PROCEDURE — 0B110F4 BYPASS TRACHEA TO CUTANEOUS WITH TRACHEOSTOMY DEVICE, OPEN APPROACH: ICD-10-PCS

## 2024-12-20 PROCEDURE — C9460 INJECTION, CANGRELOR: HCPCS

## 2024-12-20 PROCEDURE — 3600000014 HC SURGERY LEVEL 4 ADDTL 15MIN: Performed by: SURGERY

## 2024-12-20 PROCEDURE — 3700000000 HC ANESTHESIA ATTENDED CARE: Performed by: SURGERY

## 2024-12-20 PROCEDURE — 43246 EGD PLACE GASTROSTOMY TUBE: CPT | Performed by: SURGERY

## 2024-12-20 PROCEDURE — 71045 X-RAY EXAM CHEST 1 VIEW: CPT

## 2024-12-20 PROCEDURE — 95700 EEG CONT REC W/VID EEG TECH: CPT

## 2024-12-20 PROCEDURE — 95712 VEEG 2-12 HR INTMT MNTR: CPT

## 2024-12-20 PROCEDURE — 2500000003 HC RX 250 WO HCPCS: Performed by: NURSE ANESTHETIST, CERTIFIED REGISTERED

## 2024-12-20 PROCEDURE — 31600 PLANNED TRACHEOSTOMY: CPT | Performed by: SURGERY

## 2024-12-20 PROCEDURE — 3600000004 HC SURGERY LEVEL 4 BASE: Performed by: SURGERY

## 2024-12-20 PROCEDURE — 86901 BLOOD TYPING SEROLOGIC RH(D): CPT

## 2024-12-20 PROCEDURE — 36415 COLL VENOUS BLD VENIPUNCTURE: CPT

## 2024-12-20 PROCEDURE — 70551 MRI BRAIN STEM W/O DYE: CPT

## 2024-12-20 PROCEDURE — 2500000003 HC RX 250 WO HCPCS

## 2024-12-20 PROCEDURE — 2500000003 HC RX 250 WO HCPCS: Performed by: SURGERY

## 2024-12-20 PROCEDURE — 2500000003 HC RX 250 WO HCPCS: Performed by: STUDENT IN AN ORGANIZED HEALTH CARE EDUCATION/TRAINING PROGRAM

## 2024-12-20 PROCEDURE — 36600 WITHDRAWAL OF ARTERIAL BLOOD: CPT

## 2024-12-20 PROCEDURE — 86900 BLOOD TYPING SEROLOGIC ABO: CPT

## 2024-12-20 PROCEDURE — 2000000000 HC ICU R&B

## 2024-12-20 PROCEDURE — 2580000003 HC RX 258

## 2024-12-20 PROCEDURE — 85025 COMPLETE CBC W/AUTO DIFF WBC: CPT

## 2024-12-20 PROCEDURE — 2700000000 HC OXYGEN THERAPY PER DAY

## 2024-12-20 PROCEDURE — 99291 CRITICAL CARE FIRST HOUR: CPT | Performed by: INTERNAL MEDICINE

## 2024-12-20 PROCEDURE — 80177 DRUG SCRN QUAN LEVETIRACETAM: CPT

## 2024-12-20 PROCEDURE — 0DH63UZ INSERTION OF FEEDING DEVICE INTO STOMACH, PERCUTANEOUS APPROACH: ICD-10-PCS

## 2024-12-20 PROCEDURE — 2720000010 HC SURG SUPPLY STERILE: Performed by: SURGERY

## 2024-12-20 PROCEDURE — 99233 SBSQ HOSP IP/OBS HIGH 50: CPT | Performed by: INTERNAL MEDICINE

## 2024-12-20 PROCEDURE — 95720 EEG PHY/QHP EA INCR W/VEEG: CPT | Performed by: PSYCHIATRY & NEUROLOGY

## 2024-12-20 PROCEDURE — 6360000002 HC RX W HCPCS: Performed by: INTERNAL MEDICINE

## 2024-12-20 PROCEDURE — 80048 BASIC METABOLIC PNL TOTAL CA: CPT

## 2024-12-20 PROCEDURE — APPSS30 APP SPLIT SHARED TIME 16-30 MINUTES: Performed by: NURSE PRACTITIONER

## 2024-12-20 PROCEDURE — 94003 VENT MGMT INPAT SUBQ DAY: CPT

## 2024-12-20 RX ORDER — SODIUM CHLORIDE 9 MG/ML
INJECTION, SOLUTION INTRAVENOUS PRN
Status: CANCELLED | OUTPATIENT
Start: 2024-12-20

## 2024-12-20 RX ORDER — DIPHENHYDRAMINE HYDROCHLORIDE 50 MG/ML
12.5 INJECTION INTRAMUSCULAR; INTRAVENOUS
Status: CANCELLED | OUTPATIENT
Start: 2024-12-20 | End: 2024-12-21

## 2024-12-20 RX ORDER — MIDAZOLAM HYDROCHLORIDE 2 MG/2ML
2 INJECTION, SOLUTION INTRAMUSCULAR; INTRAVENOUS
Status: DISPENSED | OUTPATIENT
Start: 2024-12-20 | End: 2024-12-21

## 2024-12-20 RX ORDER — DROPERIDOL 2.5 MG/ML
0.62 INJECTION, SOLUTION INTRAMUSCULAR; INTRAVENOUS
Status: CANCELLED | OUTPATIENT
Start: 2024-12-20 | End: 2024-12-21

## 2024-12-20 RX ORDER — HYDRALAZINE HYDROCHLORIDE 20 MG/ML
10 INJECTION INTRAMUSCULAR; INTRAVENOUS
Status: CANCELLED | OUTPATIENT
Start: 2024-12-20

## 2024-12-20 RX ORDER — MAGNESIUM HYDROXIDE 1200 MG/15ML
LIQUID ORAL CONTINUOUS PRN
Status: COMPLETED | OUTPATIENT
Start: 2024-12-20 | End: 2024-12-20

## 2024-12-20 RX ORDER — LIDOCAINE HYDROCHLORIDE AND EPINEPHRINE 10; 10 MG/ML; UG/ML
INJECTION, SOLUTION INFILTRATION; PERINEURAL PRN
Status: DISCONTINUED | OUTPATIENT
Start: 2024-12-20 | End: 2024-12-20 | Stop reason: ALTCHOICE

## 2024-12-20 RX ORDER — ULTRASOUND COUPLING MEDIUM
GEL (GRAM) TOPICAL PRN
Status: DISCONTINUED | OUTPATIENT
Start: 2024-12-20 | End: 2024-12-20 | Stop reason: ALTCHOICE

## 2024-12-20 RX ORDER — MIDAZOLAM HYDROCHLORIDE 2 MG/2ML
5 INJECTION, SOLUTION INTRAMUSCULAR; INTRAVENOUS ONCE
Status: COMPLETED | OUTPATIENT
Start: 2024-12-20 | End: 2024-12-20

## 2024-12-20 RX ORDER — ROCURONIUM BROMIDE 10 MG/ML
INJECTION, SOLUTION INTRAVENOUS
Status: DISCONTINUED | OUTPATIENT
Start: 2024-12-20 | End: 2024-12-20 | Stop reason: SDUPTHER

## 2024-12-20 RX ORDER — SODIUM CHLORIDE 0.9 % (FLUSH) 0.9 %
5-40 SYRINGE (ML) INJECTION EVERY 12 HOURS SCHEDULED
Status: CANCELLED | OUTPATIENT
Start: 2024-12-20

## 2024-12-20 RX ORDER — HYDRALAZINE HYDROCHLORIDE 20 MG/ML
INJECTION INTRAMUSCULAR; INTRAVENOUS
Status: DISCONTINUED | OUTPATIENT
Start: 2024-12-20 | End: 2024-12-20 | Stop reason: SDUPTHER

## 2024-12-20 RX ORDER — NALOXONE HYDROCHLORIDE 0.4 MG/ML
INJECTION, SOLUTION INTRAMUSCULAR; INTRAVENOUS; SUBCUTANEOUS PRN
Status: CANCELLED | OUTPATIENT
Start: 2024-12-20

## 2024-12-20 RX ORDER — SODIUM CHLORIDE 0.9 % (FLUSH) 0.9 %
5-40 SYRINGE (ML) INJECTION PRN
Status: CANCELLED | OUTPATIENT
Start: 2024-12-20

## 2024-12-20 RX ORDER — LEVETIRACETAM 500 MG/5ML
500 INJECTION, SOLUTION, CONCENTRATE INTRAVENOUS EVERY 12 HOURS
Status: DISCONTINUED | OUTPATIENT
Start: 2024-12-20 | End: 2024-12-21

## 2024-12-20 RX ORDER — FENTANYL CITRATE 50 UG/ML
INJECTION, SOLUTION INTRAMUSCULAR; INTRAVENOUS
Status: DISCONTINUED | OUTPATIENT
Start: 2024-12-20 | End: 2024-12-20 | Stop reason: SDUPTHER

## 2024-12-20 RX ORDER — METOCLOPRAMIDE HYDROCHLORIDE 5 MG/ML
10 INJECTION INTRAMUSCULAR; INTRAVENOUS
Status: CANCELLED | OUTPATIENT
Start: 2024-12-20 | End: 2024-12-21

## 2024-12-20 RX ADMIN — INSULIN LISPRO 4 UNITS: 100 INJECTION, SOLUTION INTRAVENOUS; SUBCUTANEOUS at 16:59

## 2024-12-20 RX ADMIN — FENTANYL CITRATE 50 MCG: 50 INJECTION, SOLUTION INTRAMUSCULAR; INTRAVENOUS at 04:52

## 2024-12-20 RX ADMIN — LEVETIRACETAM 500 MG: 100 INJECTION INTRAVENOUS at 12:16

## 2024-12-20 RX ADMIN — QUETIAPINE FUMARATE 25 MG: 25 TABLET ORAL at 07:51

## 2024-12-20 RX ADMIN — SODIUM CHLORIDE: 9 INJECTION, SOLUTION INTRAVENOUS at 02:07

## 2024-12-20 RX ADMIN — INSULIN GLARGINE 20 UNITS: 100 INJECTION, SOLUTION SUBCUTANEOUS at 21:22

## 2024-12-20 RX ADMIN — SODIUM CHLORIDE, PRESERVATIVE FREE 10 ML: 5 INJECTION INTRAVENOUS at 21:22

## 2024-12-20 RX ADMIN — FENTANYL CITRATE 100 MCG: 50 INJECTION, SOLUTION INTRAMUSCULAR; INTRAVENOUS at 11:05

## 2024-12-20 RX ADMIN — QUETIAPINE FUMARATE 25 MG: 25 TABLET ORAL at 21:22

## 2024-12-20 RX ADMIN — AMIODARONE HYDROCHLORIDE 200 MG: 200 TABLET ORAL at 21:22

## 2024-12-20 RX ADMIN — TICAGRELOR 180 MG: 90 TABLET ORAL at 13:14

## 2024-12-20 RX ADMIN — AMIODARONE HYDROCHLORIDE 200 MG: 200 TABLET ORAL at 07:51

## 2024-12-20 RX ADMIN — FUROSEMIDE 80 MG: 10 INJECTION, SOLUTION INTRAMUSCULAR; INTRAVENOUS at 07:51

## 2024-12-20 RX ADMIN — LEVOFLOXACIN 500 MG: 5 INJECTION, SOLUTION INTRAVENOUS at 02:11

## 2024-12-20 RX ADMIN — ATORVASTATIN CALCIUM 40 MG: 40 TABLET, FILM COATED ORAL at 21:22

## 2024-12-20 RX ADMIN — FENTANYL CITRATE 100 MCG: 50 INJECTION, SOLUTION INTRAMUSCULAR; INTRAVENOUS at 10:50

## 2024-12-20 RX ADMIN — ROCURONIUM BROMIDE 50 MG: 10 INJECTION, SOLUTION INTRAVENOUS at 10:53

## 2024-12-20 RX ADMIN — TICAGRELOR 90 MG: 90 TABLET ORAL at 21:22

## 2024-12-20 RX ADMIN — MIDAZOLAM HYDROCHLORIDE 2 MG: 1 INJECTION, SOLUTION INTRAMUSCULAR; INTRAVENOUS at 12:36

## 2024-12-20 RX ADMIN — SODIUM CHLORIDE, PRESERVATIVE FREE 10 ML: 5 INJECTION INTRAVENOUS at 07:58

## 2024-12-20 RX ADMIN — INSULIN GLARGINE 20 UNITS: 100 INJECTION, SOLUTION SUBCUTANEOUS at 07:50

## 2024-12-20 RX ADMIN — MIDAZOLAM HYDROCHLORIDE 5 MG: 1 INJECTION, SOLUTION INTRAMUSCULAR; INTRAVENOUS at 13:17

## 2024-12-20 RX ADMIN — LANSOPRAZOLE 30 MG: 30 TABLET, ORALLY DISINTEGRATING, DELAYED RELEASE ORAL at 06:33

## 2024-12-20 RX ADMIN — HYDRALAZINE HYDROCHLORIDE 10 MG: 20 INJECTION INTRAMUSCULAR; INTRAVENOUS at 11:31

## 2024-12-20 RX ADMIN — FUROSEMIDE 80 MG: 10 INJECTION, SOLUTION INTRAMUSCULAR; INTRAVENOUS at 16:59

## 2024-12-20 RX ADMIN — ROCURONIUM BROMIDE 50 MG: 10 INJECTION, SOLUTION INTRAVENOUS at 10:26

## 2024-12-20 RX ADMIN — ONDANSETRON 4 MG: 2 INJECTION INTRAMUSCULAR; INTRAVENOUS at 17:37

## 2024-12-20 RX ADMIN — ASPIRIN 81 MG 81 MG: 81 TABLET ORAL at 07:51

## 2024-12-20 RX ADMIN — CANGRELOR 0.75 MCG/KG/MIN: 50 INJECTION, POWDER, LYOPHILIZED, FOR SOLUTION INTRAVENOUS at 03:53

## 2024-12-20 RX ADMIN — Medication 175 MCG/HR: at 12:22

## 2024-12-20 RX ADMIN — MIDODRINE HYDROCHLORIDE 10 MG: 5 TABLET ORAL at 07:51

## 2024-12-20 RX ADMIN — INSULIN LISPRO 4 UNITS: 100 INJECTION, SOLUTION INTRAVENOUS; SUBCUTANEOUS at 06:33

## 2024-12-20 ASSESSMENT — PAIN SCALES - GENERAL
PAINLEVEL_OUTOF10: 0
PAINLEVEL_OUTOF10: 0

## 2024-12-20 ASSESSMENT — PULMONARY FUNCTION TESTS
PIF_VALUE: 21
PIF_VALUE: 20
PIF_VALUE: 18
PIF_VALUE: 19
PIF_VALUE: 21
PIF_VALUE: 0

## 2024-12-20 ASSESSMENT — ENCOUNTER SYMPTOMS: SHORTNESS OF BREATH: 1

## 2024-12-20 NOTE — ANESTHESIA PRE PROCEDURE
injection 4,000 Units  4,000 Units IntraVENous PRN Sejal Paz MD       • heparin (porcine) injection 2,000 Units  2,000 Units IntraVENous PRN Sejal Paz MD   2,000 Units at 12/14/24 0522   • sodium chloride flush 0.9 % injection 5-40 mL  5-40 mL IntraVENous 2 times per day Nilson Grijalva MD   10 mL at 12/19/24 2031   • acetaminophen (TYLENOL) tablet 650 mg  650 mg Oral Q4H PRN Miriam Jones, DO   650 mg at 12/18/24 1818   • dextrose bolus 10% 125 mL  125 mL IntraVENous PRN Audrey Peters MD        Or   • dextrose bolus 10% 250 mL  250 mL IntraVENous PRN Audrey Peters MD       • glucagon injection 1 mg  1 mg SubCUTAneous PRN Audrey Peters MD       • dextrose 10 % infusion   IntraVENous Continuous PRN Audrey Peters MD       • 0.9 % sodium chloride infusion   IntraVENous PRN Miriam Jones DO 10 mL/hr at 12/20/24 0207 New Bag at 12/20/24 0207   • promethazine (PHENERGAN) tablet 12.5 mg  12.5 mg Oral Q6H PRN Miriam Jones, DO        Or   • ondansetron (ZOFRAN) injection 4 mg  4 mg IntraVENous Q6H PRN Miriam Jones, DO       • polyethylene glycol (GLYCOLAX) packet 17 g  17 g Oral Daily PRN Miriam Jones, DO       • aspirin chewable tablet 81 mg  81 mg Oral Daily Brennan Lay DO   81 mg at 12/18/24 0827   • atorvastatin (LIPITOR) tablet 40 mg  40 mg Oral Nightly Miriam Jones, DO   40 mg at 12/19/24 2031   • sodium chloride flush 0.9 % injection 5-40 mL  5-40 mL IntraVENous PRN Miriam Jones DO       • dextrose 50 % IV solution  25 g IntraVENous PRN Miriam Jones, DO   25 g at 12/03/24 1948       Allergies:    Allergies   Allergen Reactions   • Hydrocodone-Acetaminophen Hives     Vicodin/ Other reaction(s): Hives       Problem List:    Patient Active Problem List   Diagnosis Code   • Hypoxia R09.02   • Acute hypoxemic respiratory failure J96.01   • Atelectasis J98.11   • Anxiety disorder, unspecified F41.9   • Atopic rhinitis J30.9   • Benign prostatic hyperplasia N40.0   • Depression F32.A   •

## 2024-12-20 NOTE — OP NOTE
Operative Note      Patient: Lukasz Keller  YOB: 1950  MRN: [id number]    Date of Procedure: 12/4/2024    Pre-Op Diagnosis Codes:      * Saddle embolus of pulmonary artery, unspecified chronicity, unspecified whether acute cor pulmonale present (HCC) [I26.92]    Post-Op Diagnosis: Same       Procedure(s):  E2 PERCUTANEOUS BILATERAL PULMONARY THROMBECTOMY MECHANICAL / INARI    Surgeon(s):  Delos Reyes, Arthur, MD    Assistant:   * No surgical staff found *    Anesthesia: General    Estimated Blood Loss (mL): 65    Complications: None    Specimens:   * No specimens in log *    Implants:  * No implants in log *      Drains:   NG/OG/NJ/NE Tube Orogastric Center mouth (Active)   Surrounding Skin Clean, dry & intact 12/04/24 0400   Securement device Tape 12/04/24 0400   Status Suction-low intermittent 12/04/24 0400   Placement Verified X-Ray (Initial);External Catheter Length 12/04/24 0400   NG/OG/NJ/NE External Measurement (cm) 70 cm 12/04/24 0400   Drainage Appearance Bloody 12/04/24 0400       Urinary Catheter 12/03/24 (Active)   $ Urethral catheter insertion $ Not inserted for procedure 12/03/24 1630   Catheter Indications Need for fluid volume management of the critically ill patient in a critical care setting 12/04/24 0600   Site Assessment Retracted 12/04/24 0600   Urine Color Pink 12/04/24 0600   Urine Appearance Clear 12/04/24 0600   Collection Container Standard 12/04/24 0600   Securement Method Securing device (Describe) 12/04/24 0600   Catheter Care  Soap and water 12/04/24 0000   Catheter Best Practices  Drainage tube clipped to bed;Catheter secured to thigh;Tamper seal intact;Bag below bladder;Bag not on floor;Lack of dependent loop in tubing;Drainage bag less than half full 12/04/24 0600   Status Draining;Patent 12/04/24 0600   Output (mL) 110 mL 12/04/24 0600       Findings:  Infection Present At Time Of Surgery (PATOS) (choose all levels that have infection present):  No infection 
transfer to the operating room table in the supine position.  The patient underwent adequate analgesia and anesthesia per the anesthesiology team.  The neck was positioned by extending the head and placing a shoulder bump. The neck and abdomen were then prepped and draped in a sterile fashion. Attention was directed at the midline trachea, where the cricothyroid membrane was palpated. A 2 cm midline vertical incision was created with a #15 blade scalpel. Next, dissection was carried  down to the subcutaneous tissue in a careful blunt manner with hemostats.  The tracheal rings were palpated. Approximately the second tracheal ring was palpated.  Bronchoscope was advanced through the ET tube. The balloon of the 8 oh Shiley tube was checked and found to be intact. Anesthesia withdrew the ET tube under visualization. We then placed a needle with an overlying sheath into the trachea with return of air into the syringe and visualization of needle within trachea on bronchoscope. The syringe and needle were then removed and the wire was passed through the sheath into the trachea using the Seldinger technique ensuring it was going down through the trachea towards cricoid.  The 14 Slovenian tracheal dilator was advanced. Next, we dilated the trachea further with the Blue Rhino dilator 3 times under direct visualization.  The 8-0 Shiley tracheostomy tube and dilator was passed over the wire into the trachea. The wire was then removed.  A obturator was removed and the inner cannula was placed and connected to the vent.  Anesthesia confirmed end tidal CO2.  Adequate tidal volumes were noted. The cuff was inflated and no evidence of air leak was noted. The tracheostomy tube was secured with 4 Prolene stay sutures.  The tracheostomy was then secured using the trach velcro strap. No evidence of bleeding was noted.      Next we prepared for the PEG tube placement.   A mouthpiece was placed in pt's mouth.  The orogastric tube was

## 2024-12-21 ENCOUNTER — APPOINTMENT (OUTPATIENT)
Dept: DIALYSIS | Age: 74
DRG: 003 | End: 2024-12-21
Payer: MEDICARE

## 2024-12-21 LAB
ANION GAP SERPL CALCULATED.3IONS-SCNC: 16 MMOL/L (ref 9–16)
ANTI-XA UNFRAC HEPARIN: <0.1 IU/L
BASOPHILS # BLD: 0.03 K/UL (ref 0–0.2)
BASOPHILS NFR BLD: 0 % (ref 0–2)
BUN SERPL-MCNC: 77 MG/DL (ref 8–23)
CALCIUM SERPL-MCNC: 7.9 MG/DL (ref 8.6–10.4)
CHLORIDE SERPL-SCNC: 97 MMOL/L (ref 98–107)
CO2 SERPL-SCNC: 25 MMOL/L (ref 20–31)
CREAT SERPL-MCNC: 4.6 MG/DL (ref 0.7–1.2)
EOSINOPHIL # BLD: 0.04 K/UL (ref 0–0.44)
EOSINOPHILS RELATIVE PERCENT: 0 % (ref 1–4)
ERYTHROCYTE [DISTWIDTH] IN BLOOD BY AUTOMATED COUNT: 18.2 % (ref 11.8–14.4)
FIO2: 30
GFR, ESTIMATED: 13 ML/MIN/1.73M2
GLUCOSE BLD-MCNC: 136 MG/DL (ref 75–110)
GLUCOSE BLD-MCNC: 167 MG/DL (ref 75–110)
GLUCOSE BLD-MCNC: 168 MG/DL (ref 75–110)
GLUCOSE BLD-MCNC: 169 MG/DL (ref 74–100)
GLUCOSE BLD-MCNC: 173 MG/DL (ref 75–110)
GLUCOSE SERPL-MCNC: 177 MG/DL (ref 74–99)
HCT VFR BLD AUTO: 27.3 % (ref 40.7–50.3)
HGB BLD-MCNC: 8.4 G/DL (ref 13–17)
IMM GRANULOCYTES # BLD AUTO: 0.07 K/UL (ref 0–0.3)
IMM GRANULOCYTES NFR BLD: 1 %
LYMPHOCYTES NFR BLD: 0.71 K/UL (ref 1.1–3.7)
LYMPHOCYTES RELATIVE PERCENT: 7 % (ref 24–43)
MCH RBC QN AUTO: 30.7 PG (ref 25.2–33.5)
MCHC RBC AUTO-ENTMCNC: 30.8 G/DL (ref 28.4–34.8)
MCV RBC AUTO: 99.6 FL (ref 82.6–102.9)
MONOCYTES NFR BLD: 1.37 K/UL (ref 0.1–1.2)
MONOCYTES NFR BLD: 13 % (ref 3–12)
NEUTROPHILS NFR BLD: 79 % (ref 36–65)
NEUTS SEG NFR BLD: 8.01 K/UL (ref 1.5–8.1)
NRBC BLD-RTO: 0 PER 100 WBC
PLATELET # BLD AUTO: 346 K/UL (ref 138–453)
PMV BLD AUTO: 10.3 FL (ref 8.1–13.5)
POC HCO3: 30.1 MMOL/L (ref 21–28)
POC O2 SATURATION: 95 % (ref 94–98)
POC PCO2: 44 MM HG (ref 35–48)
POC PH: 7.44 (ref 7.35–7.45)
POC PO2: 73.2 MM HG (ref 83–108)
POSITIVE BASE EXCESS, ART: 5.3 MMOL/L (ref 0–3)
POTASSIUM SERPL-SCNC: 3.8 MMOL/L (ref 3.7–5.3)
RBC # BLD AUTO: 2.74 M/UL (ref 4.21–5.77)
RBC # BLD: ABNORMAL 10*6/UL
SODIUM SERPL-SCNC: 138 MMOL/L (ref 136–145)
WBC OTHER # BLD: 10.2 K/UL (ref 3.5–11.3)

## 2024-12-21 PROCEDURE — 90935 HEMODIALYSIS ONE EVALUATION: CPT

## 2024-12-21 PROCEDURE — 6360000002 HC RX W HCPCS

## 2024-12-21 PROCEDURE — 99233 SBSQ HOSP IP/OBS HIGH 50: CPT | Performed by: INTERNAL MEDICINE

## 2024-12-21 PROCEDURE — 85025 COMPLETE CBC W/AUTO DIFF WBC: CPT

## 2024-12-21 PROCEDURE — 95720 EEG PHY/QHP EA INCR W/VEEG: CPT | Performed by: PSYCHIATRY & NEUROLOGY

## 2024-12-21 PROCEDURE — 99232 SBSQ HOSP IP/OBS MODERATE 35: CPT | Performed by: INTERNAL MEDICINE

## 2024-12-21 PROCEDURE — 94761 N-INVAS EAR/PLS OXIMETRY MLT: CPT

## 2024-12-21 PROCEDURE — 6370000000 HC RX 637 (ALT 250 FOR IP)

## 2024-12-21 PROCEDURE — 80048 BASIC METABOLIC PNL TOTAL CA: CPT

## 2024-12-21 PROCEDURE — 95714 VEEG EA 12-26 HR UNMNTR: CPT

## 2024-12-21 PROCEDURE — 2500000003 HC RX 250 WO HCPCS

## 2024-12-21 PROCEDURE — 36600 WITHDRAWAL OF ARTERIAL BLOOD: CPT

## 2024-12-21 PROCEDURE — 2000000000 HC ICU R&B

## 2024-12-21 PROCEDURE — 94003 VENT MGMT INPAT SUBQ DAY: CPT

## 2024-12-21 PROCEDURE — 6370000000 HC RX 637 (ALT 250 FOR IP): Performed by: STUDENT IN AN ORGANIZED HEALTH CARE EDUCATION/TRAINING PROGRAM

## 2024-12-21 PROCEDURE — 99232 SBSQ HOSP IP/OBS MODERATE 35: CPT | Performed by: PSYCHIATRY & NEUROLOGY

## 2024-12-21 PROCEDURE — 36415 COLL VENOUS BLD VENIPUNCTURE: CPT

## 2024-12-21 PROCEDURE — 82803 BLOOD GASES ANY COMBINATION: CPT

## 2024-12-21 PROCEDURE — 99231 SBSQ HOSP IP/OBS SF/LOW 25: CPT | Performed by: SURGERY

## 2024-12-21 PROCEDURE — 99291 CRITICAL CARE FIRST HOUR: CPT | Performed by: INTERNAL MEDICINE

## 2024-12-21 PROCEDURE — 6370000000 HC RX 637 (ALT 250 FOR IP): Performed by: PSYCHIATRY & NEUROLOGY

## 2024-12-21 PROCEDURE — 2700000000 HC OXYGEN THERAPY PER DAY

## 2024-12-21 PROCEDURE — 85520 HEPARIN ASSAY: CPT

## 2024-12-21 PROCEDURE — 82947 ASSAY GLUCOSE BLOOD QUANT: CPT

## 2024-12-21 RX ORDER — LEVETIRACETAM 100 MG/ML
500 SOLUTION ORAL
Status: DISCONTINUED | OUTPATIENT
Start: 2024-12-21 | End: 2024-12-23 | Stop reason: HOSPADM

## 2024-12-21 RX ORDER — OXYCODONE HYDROCHLORIDE 5 MG/1
10 TABLET ORAL EVERY 6 HOURS
Status: DISCONTINUED | OUTPATIENT
Start: 2024-12-21 | End: 2024-12-23 | Stop reason: HOSPADM

## 2024-12-21 RX ORDER — LEVETIRACETAM 500 MG/5ML
500 INJECTION, SOLUTION, CONCENTRATE INTRAVENOUS ONCE
Status: COMPLETED | OUTPATIENT
Start: 2024-12-21 | End: 2024-12-21

## 2024-12-21 RX ORDER — LEVETIRACETAM 100 MG/ML
500 SOLUTION ORAL DAILY
Status: DISCONTINUED | OUTPATIENT
Start: 2024-12-21 | End: 2024-12-23 | Stop reason: HOSPADM

## 2024-12-21 RX ADMIN — OXYCODONE 10 MG: 5 TABLET ORAL at 13:22

## 2024-12-21 RX ADMIN — TICAGRELOR 90 MG: 90 TABLET ORAL at 08:34

## 2024-12-21 RX ADMIN — ASPIRIN 81 MG 81 MG: 81 TABLET ORAL at 08:35

## 2024-12-21 RX ADMIN — AMIODARONE HYDROCHLORIDE 200 MG: 200 TABLET ORAL at 20:28

## 2024-12-21 RX ADMIN — APIXABAN 10 MG: 5 TABLET, FILM COATED ORAL at 10:50

## 2024-12-21 RX ADMIN — HEPARIN SODIUM 1900 UNITS: 1000 INJECTION INTRAVENOUS; SUBCUTANEOUS at 19:20

## 2024-12-21 RX ADMIN — TICAGRELOR 90 MG: 90 TABLET ORAL at 20:28

## 2024-12-21 RX ADMIN — INSULIN GLARGINE 20 UNITS: 100 INJECTION, SOLUTION SUBCUTANEOUS at 08:35

## 2024-12-21 RX ADMIN — SODIUM CHLORIDE, PRESERVATIVE FREE 10 ML: 5 INJECTION INTRAVENOUS at 08:36

## 2024-12-21 RX ADMIN — INSULIN GLARGINE 20 UNITS: 100 INJECTION, SOLUTION SUBCUTANEOUS at 20:29

## 2024-12-21 RX ADMIN — QUETIAPINE FUMARATE 25 MG: 25 TABLET ORAL at 20:29

## 2024-12-21 RX ADMIN — AMIODARONE HYDROCHLORIDE 200 MG: 200 TABLET ORAL at 08:34

## 2024-12-21 RX ADMIN — APIXABAN 10 MG: 5 TABLET, FILM COATED ORAL at 20:28

## 2024-12-21 RX ADMIN — ATORVASTATIN CALCIUM 40 MG: 40 TABLET, FILM COATED ORAL at 20:29

## 2024-12-21 RX ADMIN — LANSOPRAZOLE 30 MG: 30 TABLET, ORALLY DISINTEGRATING, DELAYED RELEASE ORAL at 08:35

## 2024-12-21 RX ADMIN — FUROSEMIDE 80 MG: 10 INJECTION, SOLUTION INTRAMUSCULAR; INTRAVENOUS at 08:35

## 2024-12-21 RX ADMIN — LEVETIRACETAM 500 MG: 500 SOLUTION ORAL at 17:10

## 2024-12-21 RX ADMIN — LEVETIRACETAM 500 MG: 100 INJECTION INTRAVENOUS at 00:54

## 2024-12-21 RX ADMIN — Medication 175 MCG/HR: at 01:00

## 2024-12-21 RX ADMIN — SODIUM CHLORIDE, PRESERVATIVE FREE 10 ML: 5 INJECTION INTRAVENOUS at 20:29

## 2024-12-21 RX ADMIN — LEVETIRACETAM 500 MG: 500 SOLUTION ORAL at 08:36

## 2024-12-21 RX ADMIN — QUETIAPINE FUMARATE 25 MG: 25 TABLET ORAL at 08:34

## 2024-12-21 RX ADMIN — OXYCODONE 10 MG: 5 TABLET ORAL at 20:28

## 2024-12-21 ASSESSMENT — PULMONARY FUNCTION TESTS
PIF_VALUE: 16
PIF_VALUE: 19
PIF_VALUE: 9
PIF_VALUE: 16
PIF_VALUE: 18
PIF_VALUE: 11

## 2024-12-21 ASSESSMENT — PAIN SCALES - GENERAL
PAINLEVEL_OUTOF10: 0

## 2024-12-22 PROBLEM — A49.8 BACTERIAL INFECTION DUE TO PROTEUS MIRABILIS: Status: ACTIVE | Noted: 2024-12-22

## 2024-12-22 PROBLEM — J15.61: Status: ACTIVE | Noted: 2024-12-22

## 2024-12-22 PROBLEM — J15.69 ENTEROBACTER CLOACAE PNEUMONIA (HCC): Status: ACTIVE | Noted: 2024-12-22

## 2024-12-22 LAB
ALLEN TEST: POSITIVE
ANION GAP SERPL CALCULATED.3IONS-SCNC: 15 MMOL/L (ref 9–16)
BASOPHILS # BLD: 0 K/UL (ref 0–0.2)
BASOPHILS NFR BLD: 0 % (ref 0–2)
BUN SERPL-MCNC: 49 MG/DL (ref 8–23)
CALCIUM SERPL-MCNC: 8.7 MG/DL (ref 8.6–10.4)
CHLORIDE SERPL-SCNC: 94 MMOL/L (ref 98–107)
CO2 SERPL-SCNC: 25 MMOL/L (ref 20–31)
CREAT SERPL-MCNC: 3.4 MG/DL (ref 0.7–1.2)
EOSINOPHIL # BLD: 0 K/UL (ref 0–0.4)
EOSINOPHILS RELATIVE PERCENT: 0 % (ref 1–4)
ERYTHROCYTE [DISTWIDTH] IN BLOOD BY AUTOMATED COUNT: 17.3 % (ref 11.8–14.4)
FIO2: 30
GFR, ESTIMATED: 18 ML/MIN/1.73M2
GLUCOSE BLD-MCNC: 174 MG/DL (ref 75–110)
GLUCOSE BLD-MCNC: 181 MG/DL (ref 75–110)
GLUCOSE BLD-MCNC: 182 MG/DL (ref 75–110)
GLUCOSE BLD-MCNC: 185 MG/DL (ref 75–110)
GLUCOSE BLD-MCNC: 197 MG/DL (ref 74–100)
GLUCOSE SERPL-MCNC: 185 MG/DL (ref 74–99)
HCT VFR BLD AUTO: 28.5 % (ref 40.7–50.3)
HGB BLD-MCNC: 8.9 G/DL (ref 13–17)
IMM GRANULOCYTES # BLD AUTO: 0 K/UL (ref 0–0.3)
IMM GRANULOCYTES NFR BLD: 0 %
LYMPHOCYTES NFR BLD: 1.02 K/UL (ref 1–4.8)
LYMPHOCYTES RELATIVE PERCENT: 8 % (ref 24–44)
MCH RBC QN AUTO: 30.4 PG (ref 25.2–33.5)
MCHC RBC AUTO-ENTMCNC: 31.2 G/DL (ref 28.4–34.8)
MCV RBC AUTO: 97.3 FL (ref 82.6–102.9)
MICROORGANISM SPEC CULT: NORMAL
MODE: ABNORMAL
MONOCYTES NFR BLD: 0.51 K/UL (ref 0.1–0.8)
MONOCYTES NFR BLD: 4 % (ref 1–7)
MORPHOLOGY: ABNORMAL
NEUTROPHILS NFR BLD: 88 % (ref 36–66)
NEUTS SEG NFR BLD: 11.27 K/UL (ref 1.8–7.7)
NRBC BLD-RTO: 0 PER 100 WBC
O2 DELIVERY DEVICE: ABNORMAL
PLATELET # BLD AUTO: 388 K/UL (ref 138–453)
PMV BLD AUTO: 10.1 FL (ref 8.1–13.5)
POC HCO3: 26.7 MMOL/L (ref 21–28)
POC O2 SATURATION: 96.1 % (ref 94–98)
POC PCO2: 33.6 MM HG (ref 35–48)
POC PH: 7.51 (ref 7.35–7.45)
POC PO2: 74 MM HG (ref 83–108)
POSITIVE BASE EXCESS, ART: 4 MMOL/L (ref 0–3)
POTASSIUM SERPL-SCNC: 3.7 MMOL/L (ref 3.7–5.3)
RBC # BLD AUTO: 2.93 M/UL (ref 4.21–5.77)
SAMPLE SITE: ABNORMAL
SERVICE CMNT-IMP: NORMAL
SODIUM SERPL-SCNC: 134 MMOL/L (ref 136–145)
SPECIMEN DESCRIPTION: NORMAL
WBC OTHER # BLD: 12.8 K/UL (ref 3.5–11.3)

## 2024-12-22 PROCEDURE — 6370000000 HC RX 637 (ALT 250 FOR IP)

## 2024-12-22 PROCEDURE — 82803 BLOOD GASES ANY COMBINATION: CPT

## 2024-12-22 PROCEDURE — 97110 THERAPEUTIC EXERCISES: CPT

## 2024-12-22 PROCEDURE — 85025 COMPLETE CBC W/AUTO DIFF WBC: CPT

## 2024-12-22 PROCEDURE — 94003 VENT MGMT INPAT SUBQ DAY: CPT

## 2024-12-22 PROCEDURE — 6370000000 HC RX 637 (ALT 250 FOR IP): Performed by: PSYCHIATRY & NEUROLOGY

## 2024-12-22 PROCEDURE — 2700000000 HC OXYGEN THERAPY PER DAY

## 2024-12-22 PROCEDURE — 6360000002 HC RX W HCPCS

## 2024-12-22 PROCEDURE — 94761 N-INVAS EAR/PLS OXIMETRY MLT: CPT

## 2024-12-22 PROCEDURE — 36415 COLL VENOUS BLD VENIPUNCTURE: CPT

## 2024-12-22 PROCEDURE — 2500000003 HC RX 250 WO HCPCS

## 2024-12-22 PROCEDURE — 99232 SBSQ HOSP IP/OBS MODERATE 35: CPT | Performed by: PSYCHIATRY & NEUROLOGY

## 2024-12-22 PROCEDURE — 80048 BASIC METABOLIC PNL TOTAL CA: CPT

## 2024-12-22 PROCEDURE — 95714 VEEG EA 12-26 HR UNMNTR: CPT

## 2024-12-22 PROCEDURE — 2000000000 HC ICU R&B

## 2024-12-22 PROCEDURE — 82947 ASSAY GLUCOSE BLOOD QUANT: CPT

## 2024-12-22 PROCEDURE — 95720 EEG PHY/QHP EA INCR W/VEEG: CPT | Performed by: PSYCHIATRY & NEUROLOGY

## 2024-12-22 PROCEDURE — 36600 WITHDRAWAL OF ARTERIAL BLOOD: CPT

## 2024-12-22 PROCEDURE — 99232 SBSQ HOSP IP/OBS MODERATE 35: CPT | Performed by: INTERNAL MEDICINE

## 2024-12-22 PROCEDURE — 97163 PT EVAL HIGH COMPLEX 45 MIN: CPT

## 2024-12-22 PROCEDURE — 99291 CRITICAL CARE FIRST HOUR: CPT | Performed by: INTERNAL MEDICINE

## 2024-12-22 PROCEDURE — 97530 THERAPEUTIC ACTIVITIES: CPT

## 2024-12-22 PROCEDURE — 6370000000 HC RX 637 (ALT 250 FOR IP): Performed by: STUDENT IN AN ORGANIZED HEALTH CARE EDUCATION/TRAINING PROGRAM

## 2024-12-22 RX ORDER — OXYCODONE HYDROCHLORIDE 10 MG/1
10 TABLET ORAL EVERY 6 HOURS
Status: SHIPPED | OUTPATIENT
Start: 2024-12-22 | End: 2024-12-22

## 2024-12-22 RX ORDER — QUETIAPINE FUMARATE 25 MG/1
25 TABLET, FILM COATED ORAL 2 TIMES DAILY
Status: ON HOLD | DISCHARGE
Start: 2024-12-22

## 2024-12-22 RX ORDER — FUROSEMIDE 10 MG/ML
80 INJECTION INTRAMUSCULAR; INTRAVENOUS 2 TIMES DAILY
Status: ON HOLD | DISCHARGE
Start: 2024-12-22

## 2024-12-22 RX ORDER — LANSOPRAZOLE 30 MG/1
30 TABLET, ORALLY DISINTEGRATING, DELAYED RELEASE ORAL
Status: ON HOLD | DISCHARGE
Start: 2024-12-23

## 2024-12-22 RX ORDER — FENTANYL CITRATE 50 UG/ML
50 INJECTION, SOLUTION INTRAMUSCULAR; INTRAVENOUS
Status: DISCONTINUED | OUTPATIENT
Start: 2024-12-22 | End: 2024-12-23 | Stop reason: HOSPADM

## 2024-12-22 RX ORDER — INSULIN GLARGINE 100 [IU]/ML
20 INJECTION, SOLUTION SUBCUTANEOUS 2 TIMES DAILY
Status: ON HOLD | DISCHARGE
Start: 2024-12-22

## 2024-12-22 RX ORDER — ATORVASTATIN CALCIUM 40 MG/1
40 TABLET, FILM COATED ORAL NIGHTLY
Status: ON HOLD | DISCHARGE
Start: 2024-12-22

## 2024-12-22 RX ORDER — FENTANYL CITRATE 50 UG/ML
100 INJECTION, SOLUTION INTRAMUSCULAR; INTRAVENOUS
Status: DISCONTINUED | OUTPATIENT
Start: 2024-12-22 | End: 2024-12-23 | Stop reason: HOSPADM

## 2024-12-22 RX ORDER — LEVETIRACETAM 100 MG/ML
500 SOLUTION ORAL DAILY
Status: ON HOLD | DISCHARGE
Start: 2024-12-23

## 2024-12-22 RX ORDER — OXYCODONE HYDROCHLORIDE 10 MG/1
10 TABLET ORAL EVERY 6 HOURS
Status: CANCELLED | DISCHARGE
Start: 2024-12-22 | End: 2024-12-25

## 2024-12-22 RX ORDER — INSULIN LISPRO 100 [IU]/ML
0-16 INJECTION, SOLUTION INTRAVENOUS; SUBCUTANEOUS
Status: ON HOLD | DISCHARGE
Start: 2024-12-22

## 2024-12-22 RX ORDER — AMIODARONE HYDROCHLORIDE 200 MG/1
200 TABLET ORAL 2 TIMES DAILY
Status: ON HOLD | DISCHARGE
Start: 2024-12-22

## 2024-12-22 RX ORDER — OXYCODONE HYDROCHLORIDE 10 MG/1
10 TABLET ORAL EVERY 6 HOURS
Qty: 12 TABLET | Refills: 0 | Status: ON HOLD | OUTPATIENT
Start: 2024-12-22 | End: 2024-12-25

## 2024-12-22 RX ORDER — MIDODRINE HYDROCHLORIDE 10 MG/1
10 TABLET ORAL
Status: ON HOLD | DISCHARGE
Start: 2024-12-22

## 2024-12-22 RX ORDER — LEVETIRACETAM 100 MG/ML
500 SOLUTION ORAL PRN
Status: ON HOLD | DISCHARGE
Start: 2024-12-22

## 2024-12-22 RX ADMIN — OXYCODONE 10 MG: 5 TABLET ORAL at 20:02

## 2024-12-22 RX ADMIN — AMIODARONE HYDROCHLORIDE 200 MG: 200 TABLET ORAL at 20:02

## 2024-12-22 RX ADMIN — ATORVASTATIN CALCIUM 40 MG: 40 TABLET, FILM COATED ORAL at 20:02

## 2024-12-22 RX ADMIN — ASPIRIN 81 MG 81 MG: 81 TABLET ORAL at 08:09

## 2024-12-22 RX ADMIN — TICAGRELOR 90 MG: 90 TABLET ORAL at 20:02

## 2024-12-22 RX ADMIN — TICAGRELOR 90 MG: 90 TABLET ORAL at 08:07

## 2024-12-22 RX ADMIN — SODIUM CHLORIDE, PRESERVATIVE FREE 10 ML: 5 INJECTION INTRAVENOUS at 08:08

## 2024-12-22 RX ADMIN — INSULIN GLARGINE 20 UNITS: 100 INJECTION, SOLUTION SUBCUTANEOUS at 20:03

## 2024-12-22 RX ADMIN — LEVETIRACETAM 500 MG: 500 SOLUTION ORAL at 08:33

## 2024-12-22 RX ADMIN — APIXABAN 10 MG: 5 TABLET, FILM COATED ORAL at 20:02

## 2024-12-22 RX ADMIN — INSULIN LISPRO 4 UNITS: 100 INJECTION, SOLUTION INTRAVENOUS; SUBCUTANEOUS at 20:03

## 2024-12-22 RX ADMIN — LANSOPRAZOLE 30 MG: 30 TABLET, ORALLY DISINTEGRATING, DELAYED RELEASE ORAL at 08:05

## 2024-12-22 RX ADMIN — QUETIAPINE FUMARATE 25 MG: 25 TABLET ORAL at 08:07

## 2024-12-22 RX ADMIN — INSULIN GLARGINE 20 UNITS: 100 INJECTION, SOLUTION SUBCUTANEOUS at 08:06

## 2024-12-22 RX ADMIN — AMIODARONE HYDROCHLORIDE 200 MG: 200 TABLET ORAL at 08:05

## 2024-12-22 RX ADMIN — APIXABAN 10 MG: 5 TABLET, FILM COATED ORAL at 08:04

## 2024-12-22 RX ADMIN — INSULIN LISPRO 4 UNITS: 100 INJECTION, SOLUTION INTRAVENOUS; SUBCUTANEOUS at 16:23

## 2024-12-22 RX ADMIN — FUROSEMIDE 80 MG: 10 INJECTION, SOLUTION INTRAMUSCULAR; INTRAVENOUS at 08:06

## 2024-12-22 RX ADMIN — FENTANYL CITRATE 50 MCG: 50 INJECTION, SOLUTION INTRAMUSCULAR; INTRAVENOUS at 16:57

## 2024-12-22 RX ADMIN — OXYCODONE 10 MG: 5 TABLET ORAL at 08:05

## 2024-12-22 RX ADMIN — QUETIAPINE FUMARATE 25 MG: 25 TABLET ORAL at 20:02

## 2024-12-22 RX ADMIN — FENTANYL CITRATE 100 MCG: 50 INJECTION, SOLUTION INTRAMUSCULAR; INTRAVENOUS at 00:33

## 2024-12-22 RX ADMIN — FUROSEMIDE 80 MG: 10 INJECTION, SOLUTION INTRAMUSCULAR; INTRAVENOUS at 16:24

## 2024-12-22 ASSESSMENT — PULMONARY FUNCTION TESTS
PIF_VALUE: 4
PIF_VALUE: 11
PIF_VALUE: 18
PIF_VALUE: 15
PIF_VALUE: 19
PIF_VALUE: 9
PIF_VALUE: 16

## 2024-12-22 ASSESSMENT — PAIN SCALES - GENERAL
PAINLEVEL_OUTOF10: 0
PAINLEVEL_OUTOF10: 5
PAINLEVEL_OUTOF10: 4
PAINLEVEL_OUTOF10: 0
PAINLEVEL_OUTOF10: 9
PAINLEVEL_OUTOF10: 5

## 2024-12-22 ASSESSMENT — PAIN DESCRIPTION - DESCRIPTORS: DESCRIPTORS: ACHING;CRAMPING

## 2024-12-22 ASSESSMENT — PAIN DESCRIPTION - LOCATION: LOCATION: HEAD

## 2024-12-22 NOTE — DISCHARGE INSTR - COC
purple/redness (Active)   Wound Image   12/13/24 2000   Wound Etiology Skin Tear 12/22/24 0800   Dressing Status New dressing applied 12/22/24 0800   Wound Cleansed Soap and water 12/22/24 0800   Dressing/Treatment Zinc paste 12/22/24 0800   Wound Length (cm) 2 cm 12/11/24 2000   Wound Assessment Pink/red;Purple/maroon 12/22/24 0800   Drainage Amount None (dry) 12/22/24 0800   Odor None 12/22/24 0800   Janey-wound Assessment Blanchable erythema 12/22/24 0800   Number of days: 10       Wound 12/13/24 Other (Comment) Proximal;Right (Active)   Wound Image   12/18/24 1215   Wound Etiology Other 12/22/24 0800   Dressing Status New dressing applied 12/22/24 0800   Wound Cleansed Soap and water 12/22/24 0800   Dressing/Treatment Interdry Ag/wicking fabric with Ag 12/22/24 0800   Dressing Change Due 12/21/24 12/22/24 0800   Wound Assessment Pink/red 12/22/24 0800   Drainage Amount None (dry) 12/22/24 0800   Drainage Description Sanguinous 12/18/24 1215   Odor None 12/22/24 0800   Janey-wound Assessment Ecchymosis 12/22/24 0800   Number of days: 8       Incision 10/16/23 Sternum Left (Active)   Number of days: 433       Incision 10/16/23 Brachial Proximal;Right (Active)   Number of days: 433       Incision 12/20/24 (Active)   Number of days: 2       Incision 12/20/24 Abdomen Left;Upper (Active)   Number of days: 2        Elimination:  Continence:   Bowel: {YES / NO:19727}  Bladder: {YES / NO:19727}  Urinary Catheter: {Urinary Catheter:[id number]}   Colostomy/Ileostomy/Ileal Conduit: {YES / NO:19727}  [REMOVED] Rectal Tube-Stool Appearance: Loose  [REMOVED] Rectal Tube-Stool Appearance: Loose  [REMOVED] Rectal Tube-Stool Color: Brown  [REMOVED] Rectal Tube-Stool Color: Brown  [REMOVED] Rectal Tube-Stool Amount: Small  [REMOVED] Rectal Tube-Stool Amount: Small    Date of Last BM: ***    Intake/Output Summary (Last 24 hours) at 12/22/2024 1211  Last data filed at 12/22/2024 0800  Gross per 24 hour   Intake 1039.23 ml   Output 3600

## 2024-12-22 NOTE — PROCEDURES
PROCEDURE NOTE - CENTRAL VENOUS CATHETER     PATIENT NAME: Lukasz Keller  MEDICAL RECORD NO. [id number]  DATE: 12/4/2024  SURGEON:  Dr. Delos Reyes / Janel Fierro DO  PREOPERATIVE DIAGNOSIS:  Need for IV access  POSTOPERATIVE DIAGNOSIS:  Same  PROCEDURE PERFORMED:  Right Internal Jugular Vein Central Line Insertion  ANESTHESIA:  Local utilizing 1% lidocaine  ESTIMATED BLOOD LOSS:  Less than 25 ml  COMPLICATIONS:  None immediately appreciated.  OPERATIVE NOTE PREPARED BY: Janel Fierro DO     DISCUSSION:  Lukasz Keller is a 74 y.o.-year-old male who requires additional IV access and central pressure monitoring. The history and physical examination were reviewed and confirmed. Consent was implied as this procedure was required emergently. The patient was then prepared for the procedure.    PROCEDURE:  A timeout was initiated by the bedside nurse and was confirmed by those present. The patient was placed in a supine position. The skin overlying the Right Internal Jugular Vein was prepped with chlorhexadine and draped in sterile fashion. The skin was infiltrated with local anesthetic. Through the anesthetized region, the introducer needle was inserted into the internal jugular vein returning dark red non pulsatile blood. A guidewire was placed through the center of the needle with no resistance. A small incision made in the skin with a #11 scalpel blade. The dilator was inserted into the skin and vein over guidewire using Seldinger technique. The dilator was then removed and the catheter was placed in the vein over the guidewire using Seldinger technique. The guidewire was then removed and all ports aspirated and flushed appropriately. The catheter then secured using silk suture and a temporary sterile dressing was applied.  No immediate complication was evident.  All sponge, instrument and needle counts were correct at the completion of the procedure.    Postprocedural chest x-ray showed good position 
[facility]    Intra-aortic Balloon Pump Insertion Note               Today's Date:  12/5/2024  Patient name:  Lukasz Keller  MRN:   [id number]  YOB: 1950  PCP:    Christal Jang MD      Procedure:  Intra-aortic Balloon Pump Insertion    Operators:  Primary: Dr. Lenz (Attending Physician  Assistant: Jackelin Stone MD (CV Fellow)    Indications: Hemodynamic support, cardiogenic shock      Procedure:  Consent was obtained from spouse over the phone. Patient was brought to the cath lab. The access area was prepped and draped in sterile fashion. The left femoral artery sheath was removed over the wise and dilator was passed over the guidewire prior to the insertion of a 8 Fr sheath. The port was flushed with heparinized saline. The IABP catheter was advanced over guide wire using fluoroscopy guidance. The sheath was sutured into place after being in a satisfactory position. The catheter was secured in place. Proper augmentation was established with IABP synchronized to 1:1.       Complications: None    Bleeding: minimal      Conclusions:  Successful placement of intra-aortic balloon pump.      Electronically signed on 12/05/24 at 10:51 AM by:    Jackelin Stone MD, MD   Fellow, Cardiovascular Diseases  [facility]      Attending Physician Statement  I have discussed the case of Lukasz Keller including pertinent history and exam findings with the student/resident/fellow. I have seen and examined the patient and the key elements of the encounter have been performed by me. I agree with the assessment, plan and orders as documented by the resident With changes made to the note.  I was present during entire procedure and performed all critical elements of the procedure    Electronically signed by Jamal Lenz MD on 12/5/2024 at 11:46 AM.    [facility]      [phone]          
LONG-TERM EEG-VIDEO MONITORING   CLINICAL NEUROPHYSIOLOGY LABORATORY  DEPARTMENT OF NEUROLOGY  [facility]    Patient: Lukasz Keller  Age: 74 y.o.  MRN: [id number]    Referring Physician: No ref. provider found  History: The patient is a 74 y.o. male who presented breakthrough seizure/encephalopathy. This long-term video-EEG monitoring study was performed to determine the nature of the patient's clinical events. The patient is on neuroactive medications.   Lukasz Keller   Current Facility-Administered Medications   Medication Dose Route Frequency Provider Last Rate Last Admin    fentaNYL (SUBLIMAZE) injection 50 mcg  50 mcg IntraVENous Q2H PRN Greg Nj MD        fentaNYL (SUBLIMAZE) injection 100 mcg  100 mcg IntraVENous Q2H PRN Greg Nj MD   100 mcg at 12/22/24 0033    levETIRAcetam (KEPPRA) 100 MG/ML oral solution 500 mg  500 mg PEG Tube Once per day on Tuesday Thursday Saturday Bhupinder Avendaño DO   500 mg at 12/21/24 1710    levETIRAcetam (KEPPRA) 100 MG/ML oral solution 500 mg  500 mg PEG Tube Daily Bhupinder Avendaño DO   500 mg at 12/22/24 0833    apixaban (ELIQUIS) tablet 10 mg  10 mg Oral BID Jackelin Stone MD   10 mg at 12/22/24 0804    Followed by    [START ON 12/28/2024] apixaban (ELIQUIS) tablet 5 mg  5 mg Oral BID Jackelin Stone MD        oxyCODONE (ROXICODONE) immediate release tablet 10 mg  10 mg Oral Q6H Anand Pittman MD   10 mg at 12/22/24 0805    ticagrelor (BRILINTA) tablet 90 mg  90 mg Oral BID Sejal Paz MD   90 mg at 12/22/24 0807    insulin glargine (LANTUS) injection vial 20 Units  20 Units SubCUTAneous BID Franchesca Early DO   20 Units at 12/22/24 0806    sodium chloride 0.9 % bolus 250 mL  250 mL IntraVENous PRN Franchesca Early DO        heparin (porcine) injection 1,900 Units  1,900 Units IntraCATHeter PRN Franchesca Early DO   1,900 Units at 12/21/24 1920    heparin (porcine) injection 1,900 Units  1,900 Units 
LONG-TERM EEG-VIDEO MONITORING   CLINICAL NEUROPHYSIOLOGY LABORATORY  DEPARTMENT OF NEUROLOGY  [facility]    Patient: Lukasz Keller  Age: 74 y.o.  MRN: [id number]    Referring Physician: No ref. provider found  History: The patient is a 74 y.o. male who presented breakthrough seizure/encephalopathy. This long-term video-EEG monitoring study was performed to determine the nature of the patient's clinical events. The patient is on neuroactive medications.   Lukasz Keller   Current Facility-Administered Medications   Medication Dose Route Frequency Provider Last Rate Last Admin    levETIRAcetam (KEPPRA) injection 500 mg  500 mg IntraVENous Q12H Franchesca Early, DO   500 mg at 12/20/24 1216    midazolam PF (VERSED) injection 2 mg  2 mg IntraVENous Q2H PRN Melissa Olguin MD   2 mg at 12/20/24 1236    ticagrelor (BRILINTA) tablet 90 mg  90 mg Oral BID Sejal Paz MD        insulin glargine (LANTUS) injection vial 20 Units  20 Units SubCUTAneous BID Franchesca Early, DO   20 Units at 12/20/24 0750    sodium chloride 0.9 % bolus 250 mL  250 mL IntraVENous PRN Franchesca Early, DO        heparin (porcine) injection 1,900 Units  1,900 Units IntraCATHeter PRN Franchesca Early, DO   1,900 Units at 12/19/24 1916    heparin (porcine) injection 1,900 Units  1,900 Units IntraCATHeter PRN Franchesca Early, DO   1,900 Units at 12/19/24 1915    lansoprazole (PREVACID SOLUTAB) disintegrating tablet 30 mg  30 mg Per NG tube QAM AC Franchesca Early, DO   30 mg at 12/20/24 0633    insulin lispro (HUMALOG,ADMELOG) injection vial 0-16 Units  0-16 Units SubCUTAneous 4x Daily AC & HS Franchesca Early, DO   4 Units at 12/20/24 1659    fentaNYL (SUBLIMAZE) injection 50 mcg  50 mcg IntraVENous Q2H PRN Franchesca Early, DO        Or    fentaNYL (SUBLIMAZE) injection 100 mcg  100 mcg IntraVENous Q2H PRN Franchesca Early, DO        QUEtiapine (SEROQUEL) tablet 25 mg  25 mg Oral BID Franchesca Early, DO   25 mg at 
PROCEDURE NOTE  Insert Arterial Line  Date/Time:  12/03/24, 4:46 PM  Performed by: JINNY TORO RCP    Patient identity confirmed: arm band and provided demographic data   Time out: Immediately prior to procedure a \"time out\" was called to verify the correct patient, procedure, equipment, support staff.    Preparation: Patient was prepped and draped in the usual sterile fashion.    Location:left radial    Erick's test normal: yes  Needle gauge: 20     Number of attempts: 1  Post-procedure: transparent dressing applied and line secured    Patient tolerance: well          
0-16 Units SubCUTAneous 4x Daily AC & HS Franchesca Early, DO   4 Units at 12/20/24 1659    fentaNYL (SUBLIMAZE) injection 50 mcg  50 mcg IntraVENous Q2H PRN Franchesca Early, DO        Or    fentaNYL (SUBLIMAZE) injection 100 mcg  100 mcg IntraVENous Q2H PRN Franchesca Early, DO        QUEtiapine (SEROQUEL) tablet 25 mg  25 mg Oral BID Franchesca Early, DO   25 mg at 12/21/24 0834    fluticasone (FLONASE) 50 MCG/ACT nasal spray 2 spray  2 spray Each Nostril Daily PRN Franchesca Early, DO   2 spray at 12/16/24 0405    amiodarone (CORDARONE) tablet 200 mg  200 mg Oral BID Franchesca Early, DO   200 mg at 12/21/24 0834    midodrine (PROAMATINE) tablet 10 mg  10 mg Oral TID WC Franchesca Early, DO   10 mg at 12/20/24 0751    fentaNYL 2500 mcg/ 50 mL IV infusion   mcg/hr IntraVENous Continuous Franchesca Early, DO 3.5 mL/hr at 12/21/24 0655 175 mcg/hr at 12/21/24 0655    furosemide (LASIX) injection 80 mg  80 mg IntraVENous BID Franchesca Early, DO   80 mg at 12/21/24 0835    sodium chloride flush 0.9 % injection 5-40 mL  5-40 mL IntraVENous 2 times per day Franchesca Early, DO   10 mL at 12/21/24 0836    acetaminophen (TYLENOL) tablet 650 mg  650 mg Oral Q4H PRN Franchesca Early, DO   650 mg at 12/18/24 1818    dextrose bolus 10% 125 mL  125 mL IntraVENous PRN Franchesca Early, DO        Or    dextrose bolus 10% 250 mL  250 mL IntraVENous PRN Franchesca Early, DO        glucagon injection 1 mg  1 mg SubCUTAneous PRN Franchesca Early, DO        dextrose 10 % infusion   IntraVENous Continuous PRN Franchesca Early, DO        0.9 % sodium chloride infusion   IntraVENous PRN Franchesca Early, DO 10 mL/hr at 12/21/24 0655 Rate Verify at 12/21/24 0655    promethazine (PHENERGAN) tablet 12.5 mg  12.5 mg Oral Q6H PRN Franchesca Early DO        Or    ondansetron (ZOFRAN) injection 4 mg  4 mg IntraVENous Q6H PRN Franchesca Early DO   4 mg at 12/20/24 6354    polyethylene glycol (GLYCOLAX) packet 17 g  17 g Oral Daily PRN Franchesca Early,

## 2024-12-22 NOTE — CARE COORDINATION
Transitional Planning    1025 PC from Ernestine at [facility], asked if pt was ready for admission today.  CM will F/U with team and let Ernestine know.  Consent has already been signed so pt is good to be admitted, just need transport.    1300 Faxed transport request for 4pm to [facility].     1316 PC to Ernestine at [facility], updated that D/C orders are in and transport is tentative for 5pm.    1319 PC to [facility], will call back soon with transport time.     1410 PC from Hannah at [facility], cannot accommodate ALS transport today unless it's in the next half hour.  Scheduled transport for tomorrow 12/23 at 10am.

## 2024-12-23 VITALS
RESPIRATION RATE: 19 BRPM | HEART RATE: 90 BPM | DIASTOLIC BLOOD PRESSURE: 61 MMHG | SYSTOLIC BLOOD PRESSURE: 132 MMHG | TEMPERATURE: 98.8 F | HEIGHT: 69 IN | WEIGHT: 276.46 LBS | OXYGEN SATURATION: 96 % | BODY MASS INDEX: 40.95 KG/M2

## 2024-12-23 LAB
ALLEN TEST: POSITIVE
ANION GAP SERPL CALCULATED.3IONS-SCNC: 19 MMOL/L (ref 9–16)
ANION GAP SERPL CALCULATED.3IONS-SCNC: 19 MMOL/L (ref 9–16)
BASOPHILS # BLD: 0.12 K/UL (ref 0–0.2)
BASOPHILS NFR BLD: 1 % (ref 0–2)
BUN SERPL-MCNC: 75 MG/DL (ref 8–23)
BUN SERPL-MCNC: 75 MG/DL (ref 8–23)
CALCIUM SERPL-MCNC: 8.8 MG/DL (ref 8.6–10.4)
CALCIUM SERPL-MCNC: 8.9 MG/DL (ref 8.6–10.4)
CHLORIDE SERPL-SCNC: 94 MMOL/L (ref 98–107)
CHLORIDE SERPL-SCNC: 95 MMOL/L (ref 98–107)
CO2 SERPL-SCNC: 22 MMOL/L (ref 20–31)
CO2 SERPL-SCNC: 23 MMOL/L (ref 20–31)
CREAT SERPL-MCNC: 4.3 MG/DL (ref 0.7–1.2)
CREAT SERPL-MCNC: 4.4 MG/DL (ref 0.7–1.2)
EOSINOPHIL # BLD: 0.12 K/UL (ref 0–0.44)
EOSINOPHILS RELATIVE PERCENT: 1 % (ref 1–4)
ERYTHROCYTE [DISTWIDTH] IN BLOOD BY AUTOMATED COUNT: 17.3 % (ref 11.8–14.4)
FIO2: 30
GFR, ESTIMATED: 13 ML/MIN/1.73M2
GFR, ESTIMATED: 14 ML/MIN/1.73M2
GLUCOSE BLD-MCNC: 184 MG/DL (ref 75–110)
GLUCOSE BLD-MCNC: 246 MG/DL (ref 74–100)
GLUCOSE SERPL-MCNC: 194 MG/DL (ref 74–99)
GLUCOSE SERPL-MCNC: 203 MG/DL (ref 74–99)
HCT VFR BLD AUTO: 31 % (ref 40.7–50.3)
HGB BLD-MCNC: 9.5 G/DL (ref 13–17)
IMM GRANULOCYTES # BLD AUTO: 0.12 K/UL (ref 0–0.3)
IMM GRANULOCYTES NFR BLD: 1 %
LYMPHOCYTES NFR BLD: 1.19 K/UL (ref 1.1–3.7)
LYMPHOCYTES RELATIVE PERCENT: 10 % (ref 24–43)
MCH RBC QN AUTO: 30.4 PG (ref 25.2–33.5)
MCHC RBC AUTO-ENTMCNC: 30.6 G/DL (ref 28.4–34.8)
MCV RBC AUTO: 99.4 FL (ref 82.6–102.9)
MONOCYTES NFR BLD: 1.67 K/UL (ref 0.1–1.2)
MONOCYTES NFR BLD: 14 % (ref 3–12)
MORPHOLOGY: ABNORMAL
NEUTROPHILS NFR BLD: 73 % (ref 36–65)
NEUTS SEG NFR BLD: 8.68 K/UL (ref 1.5–8.1)
NRBC BLD-RTO: 0 PER 100 WBC
O2 DELIVERY DEVICE: ABNORMAL
PLATELET # BLD AUTO: 393 K/UL (ref 138–453)
PMV BLD AUTO: 9.9 FL (ref 8.1–13.5)
POC HCO3: 26.8 MMOL/L (ref 21–28)
POC O2 SATURATION: 93.8 % (ref 94–98)
POC PCO2: 38.8 MM HG (ref 35–48)
POC PH: 7.45 (ref 7.35–7.45)
POC PO2: 67 MM HG (ref 83–108)
POSITIVE BASE EXCESS, ART: 2.6 MMOL/L (ref 0–3)
POTASSIUM SERPL-SCNC: 3.4 MMOL/L (ref 3.7–5.3)
POTASSIUM SERPL-SCNC: 3.5 MMOL/L (ref 3.7–5.3)
RBC # BLD AUTO: 3.12 M/UL (ref 4.21–5.77)
SAMPLE SITE: ABNORMAL
SODIUM SERPL-SCNC: 136 MMOL/L (ref 136–145)
SODIUM SERPL-SCNC: 136 MMOL/L (ref 136–145)
WBC OTHER # BLD: 11.9 K/UL (ref 3.5–11.3)

## 2024-12-23 PROCEDURE — 2700000000 HC OXYGEN THERAPY PER DAY

## 2024-12-23 PROCEDURE — 2500000003 HC RX 250 WO HCPCS

## 2024-12-23 PROCEDURE — 6370000000 HC RX 637 (ALT 250 FOR IP)

## 2024-12-23 PROCEDURE — 6370000000 HC RX 637 (ALT 250 FOR IP): Performed by: PSYCHIATRY & NEUROLOGY

## 2024-12-23 PROCEDURE — 80048 BASIC METABOLIC PNL TOTAL CA: CPT

## 2024-12-23 PROCEDURE — 82947 ASSAY GLUCOSE BLOOD QUANT: CPT

## 2024-12-23 PROCEDURE — 99291 CRITICAL CARE FIRST HOUR: CPT | Performed by: INTERNAL MEDICINE

## 2024-12-23 PROCEDURE — 6360000002 HC RX W HCPCS

## 2024-12-23 PROCEDURE — 6370000000 HC RX 637 (ALT 250 FOR IP): Performed by: STUDENT IN AN ORGANIZED HEALTH CARE EDUCATION/TRAINING PROGRAM

## 2024-12-23 PROCEDURE — 36415 COLL VENOUS BLD VENIPUNCTURE: CPT

## 2024-12-23 PROCEDURE — 36600 WITHDRAWAL OF ARTERIAL BLOOD: CPT

## 2024-12-23 PROCEDURE — 85025 COMPLETE CBC W/AUTO DIFF WBC: CPT

## 2024-12-23 PROCEDURE — 94003 VENT MGMT INPAT SUBQ DAY: CPT

## 2024-12-23 PROCEDURE — 82803 BLOOD GASES ANY COMBINATION: CPT

## 2024-12-23 PROCEDURE — 94761 N-INVAS EAR/PLS OXIMETRY MLT: CPT

## 2024-12-23 PROCEDURE — 99233 SBSQ HOSP IP/OBS HIGH 50: CPT | Performed by: INTERNAL MEDICINE

## 2024-12-23 RX ADMIN — LANSOPRAZOLE 30 MG: 30 TABLET, ORALLY DISINTEGRATING, DELAYED RELEASE ORAL at 07:42

## 2024-12-23 RX ADMIN — QUETIAPINE FUMARATE 25 MG: 25 TABLET ORAL at 07:42

## 2024-12-23 RX ADMIN — LEVETIRACETAM 500 MG: 500 SOLUTION ORAL at 07:45

## 2024-12-23 RX ADMIN — INSULIN GLARGINE 20 UNITS: 100 INJECTION, SOLUTION SUBCUTANEOUS at 07:40

## 2024-12-23 RX ADMIN — SODIUM CHLORIDE, PRESERVATIVE FREE 10 ML: 5 INJECTION INTRAVENOUS at 07:42

## 2024-12-23 RX ADMIN — ASPIRIN 81 MG 81 MG: 81 TABLET ORAL at 07:42

## 2024-12-23 RX ADMIN — OXYCODONE 10 MG: 5 TABLET ORAL at 07:41

## 2024-12-23 RX ADMIN — FUROSEMIDE 80 MG: 10 INJECTION, SOLUTION INTRAMUSCULAR; INTRAVENOUS at 07:41

## 2024-12-23 RX ADMIN — POTASSIUM BICARBONATE 20 MEQ: 782 TABLET, EFFERVESCENT ORAL at 09:25

## 2024-12-23 RX ADMIN — APIXABAN 10 MG: 5 TABLET, FILM COATED ORAL at 07:41

## 2024-12-23 RX ADMIN — AMIODARONE HYDROCHLORIDE 200 MG: 200 TABLET ORAL at 07:42

## 2024-12-23 RX ADMIN — INSULIN LISPRO 4 UNITS: 100 INJECTION, SOLUTION INTRAVENOUS; SUBCUTANEOUS at 07:40

## 2024-12-23 RX ADMIN — TICAGRELOR 90 MG: 90 TABLET ORAL at 07:44

## 2024-12-23 RX ADMIN — MIDODRINE HYDROCHLORIDE 10 MG: 5 TABLET ORAL at 07:41

## 2024-12-23 ASSESSMENT — PULMONARY FUNCTION TESTS
PIF_VALUE: 17
PIF_VALUE: 17

## 2024-12-23 ASSESSMENT — PAIN SCALES - GENERAL: PAINLEVEL_OUTOF10: 10

## 2024-12-23 NOTE — PROGRESS NOTES
Pulmonary/Critical Care progress note    Patient's name:  Lukasz Keller  Medical Record Number: [id number]  Patient's account/billing number: [id number]  Patient's YOB: 1950  Age: 74 y.o.  Date of Admission: 12/3/2024  1:43 PM  Date of Consult: 12/10/2024      Primary Care Physician: Christal Jang MD      Code Status: Full Code    Reason for consult: Acute hypoxemic respiratory failure secondary to STEMI, pulmonary embolism with shock due to cardiogenic and obstructive shock      HISTORY OF PRESENT ILLNESS:   History was obtained from chart review.  History was unable to be obtained from the patient secondary to his mentation.  Lukasz Keller is a 74 y.o. white gentleman with known history of pulmonary embolism in the past was admitted with cardiogenic shock and cardiac arrest secondary to V. tach/PEA arrest.  Patient was diagnosed with STEMI and had 2 stents placed.  Subsequently a CT scan of the chest revealed bilateral pulmonary emboli with right heart strain.  Patient continued to be on anticoagulation and antiplatelet agents and this morning had thrombectomy done by vascular surgery for the saddle pulmonary embolism  Patient remains on the ventilator on 100% oxygen  Patient is sedated  ABG showed a combined respiratory and metabolic acidosis  Rate on the ventilator has been increased to 30 and patient receiving bicarbonate  Having small tracheal secretions  Needs pressor support with norepinephrine and vasopressin  Sedation and analgesia with Precedex, midazolam and fentanyl  Other than history of pulmonary embolism, no known history of COPD or asthma  Was not on any anticoagulants at home    Interval history:  12/10/2024  I have seen and examined the patient personally  No acute issues overnight  VA ECMO and IABP was discontinued yesterday.  CRRT was discontinued yesterday.  Currently on PEEP 10 and FiO2 70%.  With the sedation vacation he 
                                                Pulmonary/Critical Care progress note    Patient's name:  Lukasz Keller  Medical Record Number: [id number]  Patient's account/billing number: [id number]  Patient's YOB: 1950  Age: 74 y.o.  Date of Admission: 12/3/2024  1:43 PM  Date of Consult: 12/7/2024      Primary Care Physician: Christal Jang MD      Code Status: Full Code    Reason for consult: Acute hypoxemic respiratory failure secondary to STEMI, pulmonary embolism with shock due to cardiogenic and obstructive shock      HISTORY OF PRESENT ILLNESS:   History was obtained from chart review.  History was unable to be obtained from the patient secondary to his mentation.  Lukasz Keller is a 74 y.o. white gentleman with known history of pulmonary embolism in the past was admitted with cardiogenic shock and cardiac arrest secondary to V. tach/PEA arrest.  Patient was diagnosed with STEMI and had 2 stents placed.  Subsequently a CT scan of the chest revealed bilateral pulmonary emboli with right heart strain.  Patient continued to be on anticoagulation and antiplatelet agents and this morning had thrombectomy done by vascular surgery for the saddle pulmonary embolism  Patient remains on the ventilator on 100% oxygen  Patient is sedated  ABG showed a combined respiratory and metabolic acidosis  Rate on the ventilator has been increased to 30 and patient receiving bicarbonate  Having small tracheal secretions  Needs pressor support with norepinephrine and vasopressin  Sedation and analgesia with Precedex, midazolam and fentanyl  Other than history of pulmonary embolism, no known history of COPD or asthma  Was not on any anticoagulants at home    Interval history:  12/7/2024  No acute changes       Past Medical History:        Diagnosis Date    Abnormal EKG     Acute respiratory failure 03/17/2020    Anesthesia complication 2005    woke up during neck surgery    Anxiety     Dr. Marlow    Cancer 
                                                Pulmonary/Critical Care progress note    Patient's name:  Lukasz Keller  Medical Record Number: [id number]  Patient's account/billing number: [id number]  Patient's YOB: 1950  Age: 74 y.o.  Date of Admission: 12/3/2024  1:43 PM  Date of Consult: 12/8/2024      Primary Care Physician: Christal Jang MD      Code Status: Full Code    Reason for consult: Acute hypoxemic respiratory failure secondary to STEMI, pulmonary embolism with shock due to cardiogenic and obstructive shock      HISTORY OF PRESENT ILLNESS:   History was obtained from chart review.  History was unable to be obtained from the patient secondary to his mentation.  Lukasz Keller is a 74 y.o. white gentleman with known history of pulmonary embolism in the past was admitted with cardiogenic shock and cardiac arrest secondary to V. tach/PEA arrest.  Patient was diagnosed with STEMI and had 2 stents placed.  Subsequently a CT scan of the chest revealed bilateral pulmonary emboli with right heart strain.  Patient continued to be on anticoagulation and antiplatelet agents and this morning had thrombectomy done by vascular surgery for the saddle pulmonary embolism  Patient remains on the ventilator on 100% oxygen  Patient is sedated  ABG showed a combined respiratory and metabolic acidosis  Rate on the ventilator has been increased to 30 and patient receiving bicarbonate  Having small tracheal secretions  Needs pressor support with norepinephrine and vasopressin  Sedation and analgesia with Precedex, midazolam and fentanyl  Other than history of pulmonary embolism, no known history of COPD or asthma  Was not on any anticoagulants at home    Interval history:  12/8/2024  No acute changes       Past Medical History:        Diagnosis Date    Abnormal EKG     Acute respiratory failure 03/17/2020    Anesthesia complication 2005    woke up during neck surgery    Anxiety     Dr. Marlow    Cancer 
                                                Pulmonary/Critical Care progress note    Patient's name:  Lukasz Keller  Medical Record Number: [id number]  Patient's account/billing number: [id number]  Patient's YOB: 1950  Age: 74 y.o.  Date of Admission: 12/3/2024  1:43 PM  Date of Consult: 12/9/2024      Primary Care Physician: Christal Jang MD      Code Status: Full Code    Reason for consult: Acute hypoxemic respiratory failure secondary to STEMI, pulmonary embolism with shock due to cardiogenic and obstructive shock      HISTORY OF PRESENT ILLNESS:   History was obtained from chart review.  History was unable to be obtained from the patient secondary to his mentation.  Lukasz Keller is a 74 y.o. white gentleman with known history of pulmonary embolism in the past was admitted with cardiogenic shock and cardiac arrest secondary to V. tach/PEA arrest.  Patient was diagnosed with STEMI and had 2 stents placed.  Subsequently a CT scan of the chest revealed bilateral pulmonary emboli with right heart strain.  Patient continued to be on anticoagulation and antiplatelet agents and this morning had thrombectomy done by vascular surgery for the saddle pulmonary embolism  Patient remains on the ventilator on 100% oxygen  Patient is sedated  ABG showed a combined respiratory and metabolic acidosis  Rate on the ventilator has been increased to 30 and patient receiving bicarbonate  Having small tracheal secretions  Needs pressor support with norepinephrine and vasopressin  Sedation and analgesia with Precedex, midazolam and fentanyl  Other than history of pulmonary embolism, no known history of COPD or asthma  Was not on any anticoagulants at home    Interval history:  12/9/2024  I have seen and examined the patient personally  No acute issues overnight  Off dobutamine.  Cardiology/CT surgery planning to remove the VA ECMO/AIBP today  CRRT      REVIEW OF SYSTEMS:    Review of Systems -   Could not 
                                                Pulmonary/Critical Care progress note    Patient's name:  Lukasz Keller  Medical Record Number: [id number]  Patient's account/billing number: [id number]  Patient's YOB: 1950  Age: 74 y.o.  Date of Admission: 12/3/2024  1:43 PM  Date of Consult: 12/12/2024      Primary Care Physician: Christal Jang MD      Code Status: Full Code    Reason for consult: Acute hypoxemic respiratory failure secondary to STEMI, pulmonary embolism with shock due to cardiogenic and obstructive shock      HISTORY OF PRESENT ILLNESS:   History was obtained from chart review.  History was unable to be obtained from the patient secondary to his mentation.  Lukasz Keller is a 74 y.o. white gentleman with known history of pulmonary embolism in the past was admitted with cardiogenic shock and cardiac arrest secondary to V. tach/PEA arrest.  Patient was diagnosed with STEMI and had 2 stents placed.  Subsequently a CT scan of the chest revealed bilateral pulmonary emboli with right heart strain.  Patient continued to be on anticoagulation and antiplatelet agents and this morning had thrombectomy done by vascular surgery for the saddle pulmonary embolism  Patient remains on the ventilator on 100% oxygen  Patient is sedated  ABG showed a combined respiratory and metabolic acidosis  Rate on the ventilator has been increased to 30 and patient receiving bicarbonate  Having small tracheal secretions  Needs pressor support with norepinephrine and vasopressin  Sedation and analgesia with Precedex, midazolam and fentanyl  Other than history of pulmonary embolism, no known history of COPD or asthma  Was not on any anticoagulants at home    Interval history:  12/12/2024  Still on 70 % fio2       Past Medical History:        Diagnosis Date    Abnormal EKG     Acute respiratory failure 03/17/2020    Anesthesia complication 2005    woke up during neck surgery    Anxiety     Dr. Marlow    
                                                Pulmonary/Critical Care progress note    Patient's name:  Lukasz Keller  Medical Record Number: [id number]  Patient's account/billing number: [id number]  Patient's YOB: 1950  Age: 74 y.o.  Date of Admission: 12/3/2024  1:43 PM  Date of Consult: 12/11/2024      Primary Care Physician: Christal Jang MD      Code Status: Full Code    Reason for consult: Acute hypoxemic respiratory failure secondary to STEMI, pulmonary embolism with shock due to cardiogenic and obstructive shock      HISTORY OF PRESENT ILLNESS:   History was obtained from chart review.  History was unable to be obtained from the patient secondary to his mentation.  Lukasz Keller is a 74 y.o. white gentleman with known history of pulmonary embolism in the past was admitted with cardiogenic shock and cardiac arrest secondary to V. tach/PEA arrest.  Patient was diagnosed with STEMI and had 2 stents placed.  Subsequently a CT scan of the chest revealed bilateral pulmonary emboli with right heart strain.  Patient continued to be on anticoagulation and antiplatelet agents and this morning had thrombectomy done by vascular surgery for the saddle pulmonary embolism  Patient remains on the ventilator on 100% oxygen  Patient is sedated  ABG showed a combined respiratory and metabolic acidosis  Rate on the ventilator has been increased to 30 and patient receiving bicarbonate  Having small tracheal secretions  Needs pressor support with norepinephrine and vasopressin  Sedation and analgesia with Precedex, midazolam and fentanyl  Other than history of pulmonary embolism, no known history of COPD or asthma  Was not on any anticoagulants at home    Interval history:  12/11/2024  Off pressors   On precedex gtt and fentanyl gtt   Tf       Past Medical History:        Diagnosis Date    Abnormal EKG     Acute respiratory failure 03/17/2020    Anesthesia complication 2005    woke up during neck 
                     ECMO End of Shift Note:       Cannulation Date/Time:  12/4/2024 @ 1745      ECMO type:  V/A     Cannula size/location:  25 Fr. - LT Femoral Vein  17 Fr.- RT Femoral Artery     Current Flows:  2.63 LPM     Current FiO2:  100 %     Current Sweep:  3     Delta Pressure:  10     Clot [place]:         Oxygenator:  Fibrin stranding down middle of the face of the MO          Circuit:  none     Anticoagulation:  Argatroban @ 0.25 mcg/kg/min (therapeutic)      Products Given:         PRBCs:  0       PLTs:  0       FFP:  0       Cryo:  0       Albumin:  0         Significant shifts events  Hgb goal >8  Echo ordered daily thru 12/9  T & S due 12/10/24 @ 2818    
                     ECMO End of Shift Note:       Cannulation Date/Time:  12/4/2024 @ 3075     ECMO type:  V/A     Cannula size/location:  25 Fr./Left fem vein  17 Fr./Right fem artery  6 Fr SFA reperfusion     Current Flows:  2.68 LPM     Current FiO2:  100 %     Current Sweep:  2     Delta Pressure:  11     Clot [place]:         Oxygenator:  Fibrin streaking down the center face of MO from the 12 o'clock position          Circuit:  none     Anticoagulation:  Argatroban 0.35 mcg/kg/min (therapeutic)     Products Given:         PRBCs:  0       PLTs:  0       FFP:  0       Cryo:  0       Albumin:  0        Significant shift events  FIO2 on vent and NO weaned per orders  Sweep weaned to 2  CRRT neg 100/hr   T & S expires 12/10/24 @8892  Possible switch to VV tomorrow  Levo off today, Doputamine remains 2.5 mcg/kg/min    
                     ECMO End of Shift Note:       Cannulation Date/Time:  12/4/2024 @ 7555      ECMO type:  V/A     Cannula size/location:  25 Fr./Left femoral vein  17 Fr. Right femoral artery  6Fr. SFA reperfusion        Current Flows:  2.31     Current FiO2:  100%     Current Sweep:  2     Delta Pressure:  10     Clot [place]:         Oxygenator:  Moderate fibrin strands noted from 12 o'clock to center of MO          Circuit:  None     Anticoagulation:  Argatroban 0.49 mcg/kg/min     Products Given:         PRBCs:  0       PLTs:  0       FFP:  0       Cryo:  0       Albumin:  0     Shift Events:   Tube feed held by bedside RN, patient had episode of vomiting OG placed to LIWS.   Per Cardiology address electrolytes with Nephrology.   
          [facility] -   Infectious diseases evaluation  admission date 12/3/2024    reason for consultation:   Concern for sepsis    Impression :   Current:  Cardiac arrest Vfib and 20 min downtime  Card cath RCA PCI drug eluting  Cardiogenic shock  ECMO, intra aortic balloon pump,pressors  Thrombocytopenia  PONCE  CHF with preserved EF  PE by history  Saddle PE found 12/3 - thrombectomy 12/3/24 pm  Was on ECMO - removed 12/9  Balloon pump removed 12/9   Bilat LL  infil 12/9 R> L - possible pneumonia -  sp cx enterobacter acinetobacter proteus ESBL neg -   treated  Bandemia  RUBEN   Elevated procal 6 - suspect non specific and inflammatory   improved 1  Trach peg 12/20  Other:    Discussion / summary of stay / plan of care/ Recommendations:   Cardiogenic shock  12/9 - off the ECMO  and off the aortic balloon   Bilat LL  infil 12/9 R> L -GNR pneumonia -  12/12 antibiotics  - levaquin and unasyn till 12/20 completed  12/14 fever persistent - BC neg -W 15 up -CXR congested- mentally better  start flonase for possible sinusitis ( despite oral intubation) -watch fever   12/15 blood cultures are negative, WBC 15, procalcitonin much improved, 1 -fever improved  HENCE:    fever  12/16 LGF - BC neg - ok for tunneled cath   12/17 101 over night and fem  line 8 d old, art line 12 days old-  12/18 fever better- fem line out - art line still present - BC neg   Otherwise CXR better and procal better - tunneled HD cath placed  12/20 fever seems resolved   levaquin till 12/20 completed    Infection Control Recommendations   [place] Precautions    Antimicrobial Stewardship Recommendations   Simplification of therapy  Targeted therapy    History of Present Illness:   Initial history:  Lukasz Keller is a 74 y.o.-year-old male with last labs/cardiac arrest, EMS called and downtime was 20 minutes, defibrillation x 1 for pulseless V. tach.  Picture of NSTEMI, received epinephrine-defibrillation X1 
          Pharmacy Note     Renal Dose Adjustment    Lukasz Keller is a 74 y.o. male. Pharmacist assessment of renally cleared medications.    Recent Labs     12/09/24  1941 12/10/24  0413   BUN 39* 47*       Recent Labs     12/09/24  1941 12/10/24  0413   CREATININE 2.2* 2.7*       Estimated Creatinine Clearance: 35 mL/min (A) (based on SCr of 2.7 mg/dL (H)).    Height:   Ht Readings from Last 1 Encounters:   12/07/24 1.74 m (5' 8.5\")     Weight:  Wt Readings from Last 1 Encounters:   12/10/24 (!) 147 kg (324 lb 1.2 oz)       The following medication dose has been adjusted based upon renal function per P&T Guidelines:             Zosyn 3.375 g IV q8h --> Zosyn 3.375 g IV q12h (HD)    Lyubov Machado, DivyaD, BCCCP 12/10/2024 1:11 PM     
          Pharmacy Note     Renal Dose Adjustment    Lukasz Keller is a 74 y.o. male. Pharmacist assessment of renally cleared medications.    Recent Labs     12/11/24  0320 12/12/24  0546   BUN 56* 81*       Recent Labs     12/11/24  0320 12/12/24  0546   CREATININE 3.6* 4.7*       Estimated Creatinine Clearance: 19 mL/min (A) (based on SCr of 4.7 mg/dL (H)).  Estimated CrCl using Ideal Body Weight: dialysis.    Height:   Ht Readings from Last 1 Encounters:   12/07/24 1.74 m (5' 8.5\")     Weight:  Wt Readings from Last 1 Encounters:   12/12/24 (!) 138.7 kg (305 lb 12.5 oz)       The following medication dose has been adjusted based upon renal function per P&T Guidelines:             Ampicillin-sulbactam 3000 mg every 6 hours changed to ampicillin-sulbactam 1500 mg every 12 hours.     
          Pharmacy Note     Renal Dose Adjustment    Lukasz Keller is a 74 y.o. male. Pharmacist assessment of renally cleared medications.    Recent Labs     12/11/24  0320 12/12/24  0546   BUN 56* 81*       Recent Labs     12/11/24  0320 12/12/24  0546   CREATININE 3.6* 4.7*       Estimated Creatinine Clearance: 19 mL/min (A) (based on SCr of 4.7 mg/dL (H)).  Estimated CrCl using Ideal Body Weight: dialysis.    Height:   Ht Readings from Last 1 Encounters:   12/07/24 1.74 m (5' 8.5\")     Weight:  Wt Readings from Last 1 Encounters:   12/12/24 (!) 138.7 kg (305 lb 12.5 oz)       The following medication dose has been adjusted based upon renal function per P&T Guidelines:             Levofloxacin 750 mg daily changed to levofloxacin 750 mg X1 followed by levofloxacin 500 mg every 48 hours.      
          Pharmacy Note     Renal Dose Adjustment    Lukasz Keller is a 74 y.o. male. Pharmacist assessment of renally cleared medications.    Recent Labs     12/11/24  0320 12/12/24  0546   BUN 56* 81*       Recent Labs     12/11/24  0320 12/12/24  0546   CREATININE 3.6* 4.7*       Estimated Creatinine Clearance: 19 mL/min (A) (based on SCr of 4.7 mg/dL (H)). HD patient.    Height:   Ht Readings from Last 1 Encounters:   12/07/24 1.74 m (5' 8.5\")     Weight:  Wt Readings from Last 1 Encounters:   12/12/24 (!) 140.9 kg (310 lb 10.1 oz)       The following medication dose has been adjusted based upon renal function per P&T Guidelines:             Metoclopramide 10 mg IV q6h --> Metoclopramide 5 mg IV q6h  Meropenem 1 g IV q24h -> Meropenem 500 mg IV q24h     Lyubov Machado, DivyaD, BCCCP 12/12/2024 12:16 PM     
     Division of Vascular Surgery             Progress Note      Name: Lukasz Keller  MRN: [id number]         Overnight Events:      No acute overnight events.      Subjective:     Patient seen and examined at bedside. No acute overnight events.    Physical Exam:     Vitals:  BP 92/62   Pulse 80   Temp 98.6 °F (37 °C)   Resp 20   Ht 1.74 m (5' 8.5\")   Wt (!) 140.2 kg (309 lb)   SpO2 (!) 83%   BMI 46.29 kg/m²     ***  General appearance - alert, well appearing and in no acute distress  Mental status - oriented to person, place and time with normal affect  Head - normocephalic and atraumatic  Neck - supple, no carotid bruits, thyroid not palpable, no JVD  Chest - clear to auscultation, normal effort  Heart - normal rate, regular rhythm, no murmurs  Abdomen - soft, non-tender, non-distended, bowel sounds present all four quadrants, no masses  Neurological - normal speech, no focal findings or movement disorder noted, cranial nerves II through XII grossly intact  Extremities - peripheral pulses palpable, no pedal edema or calf pain with palpation  Skin - no gross lesions, rashes, or induration noted  Foot Exam - ***  Vascular Exam -***      Imaging:   XR CHEST PORTABLE    Result Date: 12/5/2024  EXAMINATION: ONE XRAY VIEW OF THE CHEST 12/5/2024 12:31 am COMPARISON: 12/04/2024 HISTORY: ORDERING SYSTEM PROVIDED HISTORY: line placement TECHNOLOGIST PROVIDED HISTORY: line placement FINDINGS: An inferior vena cava catheter terminates in the right main pulmonary artery. Otherwise, no change in the lines and tubes.  There small bilateral pleural effusions with atelectasis.  Cardiac silhouette is stable.  There is no pneumothorax.  Status post anterior discectomy and fusion of the lower cervical spine.     1. Inferior vena cava catheter terminating within the right main pulmonary artery.     US RENAL LIMITED    Result Date: 12/5/2024  EXAMINATION: ULTRASOUND OF THE KIDNEYS 12/4/2024 10:09 pm COMPARISON: CT abdomen and 
     Division of Vascular Surgery             Progress Note      Name: Lukasz Keller  MRN: [id number]         Overnight Events:     No acute event overnight      Subjective:     Patient seen examined this morning, no acute event overnight from vascular surgery standpoint.  Patient has had thrombectomy for bilateral pulmonary embolism yesterday, patient tolerated procedure well.  Patient remains intubated and sedated, he is currently still on 3 pressor norepinephrine, vasopressin and phenylephrine, but the pressor requirement has slowly going down.  Patient has ECMO placement, receiving his dialysis for the ECMO circuit as well.  Vascular surgery team was consulted for placement of central line yesterday, central line is intact without issue.    Physical Exam:     Vitals:  BP 92/62   Pulse 73   Temp 98.6 °F (37 °C)   Resp (!) 0   Ht 1.74 m (5' 8.5\")   Wt (!) 140.2 kg (309 lb)   SpO2 92%   BMI 46.29 kg/m²       General appearance -intubated and sedated, in critical condition  Head - normocephalic and atraumatic  Chest -intubated, ventilated by mechanical ventilation  Heart - normal rate  Abdomen - soft, non-tender, non-distended, bowel sounds present all four quadrants, no masses  Extremities -at goal located at right lower extremity, able to obtain DP signal on the right.      Data:  CBC:   Recent Labs     12/04/24 2202 12/05/24  0351 12/05/24  0905   WBC 6.5 7.1 7.0   HGB 8.2* 8.8* 8.7*   PLT 75* See Reflexed IPF Result See Reflexed IPF Result     Chemistry:   Recent Labs     12/04/24  0234 12/04/24  0341 12/04/24  0909 12/04/24  1057 12/04/24  1353 12/04/24  1556 12/04/24  1950 12/04/24  2202 12/04/24  2218 12/05/24  0351 12/05/24  0902 12/05/24  0905   NA  --    < >  --    < >  --    < > 141  --  141 141 143  --    K  --    < >  --    < >  --    < > 4.5  --  4.1 4.0 3.8  --    CL  --    < >  --    < >  --    < > 111*  --  109* 110* 112*  --    CO2  --    < >  --    < >  --    < > 20  --  19* 20 22  --  
     Nutrition Note    RN discussed TF with writer. Tolerating Immune Enhancing (Pivot 1.5) trickle rate at 15 mL/hr with protein modular TID. ECMO dc, balloon pump dc, CVVHD dc. Pt underwent HD yesterday,  plan for HD tomorrow. Noted HOB elevated to 30 degrees at visit. Per RN, pt would likely needs HOB lowered during HD tx for 4 hours, plan to advance TF, monitor tolerance then cycle 20 hours per day. Scheduled f/u with RD is tomorrow.    Recommend modify TF to Immune Enhancing at 15 mL/hr and advance 10 mL/hr q 6 hours to a goal rate of 35 mL/hr + protein modular BID to provide 1468 kcal, 130 gm protein, 630 mL free water. With flushes 30 mL q 6 hours = 750 mL water/day    See full nutrition notes 12/9, 12/6, 12/4.     Electronically signed by Viri Gomez RD on 12/11/24 at 2:57 PM EST    Contact: [phone]    
    [facility]  Internal Medicine Teaching Residency Program  Inpatient Daily Progress Note  ______________________________________________________________________________    Patient: Lukasz Keller  YOB: 1950   MRN:[id number]    Acct: [id number]     Room: 1014/1014-01  Admit date: 12/3/2024  Today's date: 12/04/24  Number of days in the hospital: 1    SUBJECTIVE   Admitting Diagnosis: Cardiac arrest  CC: Cardiac arrest  Pt examined at bedside. Chart & results reviewed.     Patient intubated and sedated.  On FiO2 of 100, PEEP of 14, respiratory rate of 30, tidal volume of 560.  Saturating in high 80s.    ROS:  Unable to obtain  BRIEF HISTORY     The patient is a  74 y.o. male with past medical history significant for  PE not noted to be on anticoagulation per chart review  Hyperlipidemia  Acute respiratory failure secondary to PE  Colon polyp s/p right hemicolectomy with ileocolic anastomosis  History of prostate cancer s/p robotic prostatectomy 4 years ago.  PSA currently undetectable  PONCE  CHF with preserved ejection fraction  Asymptomatic ventral hernia  Primary hypertension     That presented via EMS after witnessed collapse cardiac arrest.  EMS was initially called.  Patient had downtime of about 20 minutes.  Interventional cardiologist was paged with the picture of STEMI faxed by EMS.  Initial rhythm noted by EMS was pulseless V. tach and he received defibrillation x 1.  He also received epi x 1 and was started on amnio bolus by EMS.  ROSC was obtained en route.  On arrival to the ED shortly patient became pulseless and CPR and ACLS was started.  Patient received epi x 2 and defibrillation x 1.  Junctional rhythm with a rate of 60-70 was achieved on the ROSC.  Patient was intubated.  Patient was transferred to Cath Lab.  En route to the Cath Lab patient became bradycardic and lost pulse and received 1 cc push of epi along with 1 mg of atropine.  He then 
    [facility]  Internal Medicine Teaching Residency Program  Inpatient Daily Progress Note  ______________________________________________________________________________    Patient: Lukasz Keller  YOB: 1950   MRN:[id number]    Acct: [id number]     Room: 1014/1014-01  Admit date: 12/3/2024  Today's date: 12/05/24  Number of days in the hospital: 2    SUBJECTIVE   Admitting Diagnosis: Cardiac arrest  CC: Cardiac arrest  Pt examined at bedside. Chart & results reviewed.     Patient intubated and sedated.  On FiO2 of 100, PEEP of 10, respiratory rate of 20, tidal volume of 560.  Saturating in high 80s. On Ecmo now  On phenylephrine, heparin, vasopressin, fentanyl, midazolam, dexmedetomidine.    ROS:  Unable to obtain  BRIEF HISTORY     The patient is a  74 y.o. male with past medical history significant for  PE not noted to be on anticoagulation per chart review  Hyperlipidemia  Acute respiratory failure secondary to PE  Colon polyp s/p right hemicolectomy with ileocolic anastomosis  History of prostate cancer s/p robotic prostatectomy 4 years ago.  PSA currently undetectable  PONCE  CHF with preserved ejection fraction  Asymptomatic ventral hernia  Primary hypertension     That presented via EMS after witnessed collapse cardiac arrest.  EMS was initially called.  Patient had downtime of about 20 minutes.  Interventional cardiologist was paged with the picture of STEMI faxed by EMS.  Initial rhythm noted by EMS was pulseless V. tach and he received defibrillation x 1.  He also received epi x 1 and was started on amnio bolus by EMS.  ROSC was obtained en route.  On arrival to the ED shortly patient became pulseless and CPR and ACLS was started.  Patient received epi x 2 and defibrillation x 1.  Junctional rhythm with a rate of 60-70 was achieved on the ROSC.  Patient was intubated.  Patient was transferred to Cath Lab.  En route to the Cath Lab patient became 
    PROGRESS NOTE      PATIENT NAME: Lukasz Keller  MEDICAL RECORD NO. [id number]  DATE: 12/20/2024  PRIMARY CARE PHYSICIAN: Christal Jang MD    HD: # 17    ASSESSMENT    Patient Active Problem List   Diagnosis    Hypoxia    Acute hypoxemic respiratory failure    Atelectasis    Anxiety disorder, unspecified    Atopic rhinitis    Benign prostatic hyperplasia    Depression    Hypercholesteremia    Localized osteoarthrosis of right hip    Low back pain    Neck pain    Polyp of sigmoid colon    Primary localized osteoarthritis of right knee    Obesity, Class III, BMI 40-49.9 (morbid obesity)    Sigmoid diverticulosis    SOBOE (shortness of breath on exertion)    Urinary incontinence without sensory awareness    Acute saddle pulmonary embolism with acute cor pulmonale ([facility])    Chronic fatigue    Abdominal wall hernia    Severe obstructive sleep apnea    Chronic heart failure with preserved ejection fraction ([facility])    Cardiac arrest    Acute coronary syndrome ([facility])    ST elevation myocardial infarction involving right coronary artery ([facility])    Encounter for palliative care    Goals of care, counseling/discussion    Cardiogenic shock    Admitted to intensive care unit    ST elevation myocardial infarction (STEMI) ([facility])    Metabolic acidemia    Respiratory acidosis    RUBEN (acute kidney injury) ([facility])    Hyperkalemia    Patient receiving ECMO    Aspiration pneumonia of both lower lobes ([facility])    Bandemia    Elevated procalcitonin    Diabetes mellitus ([facility])    Stented coronary artery    Dysphagia    Seizure-like activity ([facility])       MEDICAL DECISION MAKING AND PLAN    74M admitted 12/3 following VT arrest seen in consult for tracheostomy and PEG placement   Planning Trach and peg today  No seizures overnight  Hold tube feeds  Hep gtt held  On cangrelor      PEG Post-op Instructions    - Please keep PEG tube to gravity drainage via Jones bag.  - Ok to use PEG tube for medications immediately (administer meds, clamp tube for 1 
    PROGRESS NOTE      PATIENT NAME: Lukasz Keller  MEDICAL RECORD NO. [id number]  DATE: 12/19/2024  PRIMARY CARE PHYSICIAN: Christal Jang MD    HD: # 16    ASSESSMENT    Patient Active Problem List   Diagnosis    Hypoxia    Acute hypoxemic respiratory failure    Atelectasis    Anxiety disorder, unspecified    Atopic rhinitis    Benign prostatic hyperplasia    Depression    Hypercholesteremia    Localized osteoarthrosis of right hip    Low back pain    Neck pain    Polyp of sigmoid colon    Primary localized osteoarthritis of right knee    Obesity, Class III, BMI 40-49.9 (morbid obesity)    Sigmoid diverticulosis    SOBOE (shortness of breath on exertion)    Urinary incontinence without sensory awareness    Acute saddle pulmonary embolism with acute cor pulmonale ([facility])    Chronic fatigue    Abdominal wall hernia    Severe obstructive sleep apnea    Chronic heart failure with preserved ejection fraction ([facility])    Cardiac arrest    Acute coronary syndrome ([facility])    ST elevation myocardial infarction involving right coronary artery ([facility])    Encounter for palliative care    Goals of care, counseling/discussion    Cardiogenic shock    Admitted to intensive care unit    ST elevation myocardial infarction (STEMI) ([facility])    Metabolic acidemia    Respiratory acidosis    RUBEN (acute kidney injury) ([facility]CC)    Hyperkalemia    Patient receiving ECMO    Aspiration pneumonia of both lower lobes (HCC)    Bandemia    Elevated procalcitonin    Diabetes mellitus ([facility]CC)    Stented coronary artery    Dysphagia       MEDICAL DECISION MAKING AND PLAN    74M admitted 12/3 following VT arrest seen in consult for tracheostomy and PEG placement   Concern for possible seizures overnight, would delay case for further neurologic workup today, can tentatively schedule for 11/20  TF would need to be held at midnight and heparin 6 hours prior to procedure, ok to continue with ASA and Brilinta       Chief Complaint: \"okay\"    SUBJECTIVE    History 
    Palliative Care Progress Note    NAME:  Lukasz Keller  MEDICAL RECORD NUMBER:  [id number]  AGE: 74 y.o.   GENDER: male  : 1950  TODAY'S DATE:  2024    Reason for Consult:  goals of care      Plan        Palliative Interaction: Patient seen on rounds. He remains intubated at this time. He is alert and able to follow commands.     Able to shake his head yes and no however this is not reliable as it is inconsistent and patient nods yes to when asked if he is at home.     He does give me a thumbs down when asked how he is doing.     Called and spoke with his wife Anaya. She states that her \"nerves are shot\" over this situation.     She understands that they will be placing a tunneled cath for dialysis and she has consented for this.    Again discuss the possibility of trach and peg given that patient remains on 60% FiO2. She states feeling conflicted over this and does not know how to proceed if we get to that point. She states she would want to talk to her doctor surrounding this.     Explained that she should make a decision based on what Lukasz would want or things that he has expressed in the past. We discuss how a trach would affect his quality of life. She notes that he would never want to remain in a facility however she states she would need more time to consider this.     Will continue to monitor his progress should we get to the point of trach and peg will discuss further with Anaya.               Principle Problem/Diagnosis:  Cardiac arrest    We feel the patient symptoms are being controlled. his current regimen is reviewed by myself and discussed with the staff.       2- Goals of care evaluation   The patient goals of care are improve or maintain function/quality of life   Goals of care discussed with:    [] Patient independently    [] Patient and Family    [x] Family or Healthcare DPOA independently    [] Unable to discuss with patient, family/DPOA not present    3- Code Status  Full 
    Palliative Care Progress Note    NAME:  Lukasz Keller  MEDICAL RECORD NUMBER:  [id number]  AGE: 74 y.o.   GENDER: male  : 1950  TODAY'S DATE:  2024    Reason for Consult:  goals of care      Plan        Palliative Interaction: Patient seen on rounds. He remains intubated at this time. FiO2 70%.     I was able to call and speak with his wife Anaya. Provided her an update regarding patient.     Discussed goals moving forward. Explained that he continues on rather high ventilator settings.     Although only on day 8 of intubation, explained that should he be unable to wean from ventilator discussion may be had surrounding need for long term ventilation.     While not there yet, encouraged her to start thinking about what patient would want in that situation.     She expresses patient would not want long term interventions. She is quite tearful surrounding this.     Explained we will continue to see how patient progresses in the coming days.           Principle Problem/Diagnosis:  Cardiac arrest    Additional Assessments:    1- Symptom management/ pain control     Pain Assessment:  The patient is not having any pain.               Anxiety:  none                          Dyspnea:   On vent                           Fatigue:   generalized         We feel the patient symptoms are being controlled. his current regimen is reviewed by myself and discussed with the staff.       2- Goals of care evaluation   The patient goals of care are improve or maintain function/quality of life   Goals of care discussed with:    [] Patient independently    [] Patient and Family    [] Family or Healthcare DPOA independently    [x] Unable to discuss with patient, family/DPOA not present    3- Code Status  Full Code    4- Other recommendations  - We will continue to provide comfort and support to the patient and the family      History of Present Illness     The patient is a 74 y.o.  Non- / non  male who presents 
    Palliative Care Progress Note    NAME:  Lukasz Keller  MEDICAL RECORD NUMBER:  [id number]  AGE: 74 y.o.   GENDER: male  : 1950  TODAY'S DATE:  12/10/2024    Reason for Consult:  goals of care      Plan        Palliative Interaction: Patient seen on rounds. He remains intubated at this time. Clinically doing better. He has been transitioned to hemodialysis. No longer requiring pressor support. Decannulated and balloon pump removed.     He continues to wake up and follow commands. No family present at bedside.      Will continue to monitor his progress. Will conduct further goals of care conversations as necessary. Palliative to follow.           Principle Problem/Diagnosis:  Cardiac arrest    Additional Assessments:    1- Symptom management/ pain control     Pain Assessment:  The patient is not having any pain.               Anxiety:  none                          Dyspnea:   On vent                           Fatigue:   generalized         We feel the patient symptoms are being controlled. his current regimen is reviewed by myself and discussed with the staff.       2- Goals of care evaluation   The patient goals of care are improve or maintain function/quality of life   Goals of care discussed with:    [] Patient independently    [] Patient and Family    [] Family or Healthcare DPOA independently    [x] Unable to discuss with patient, family/DPOA not present    3- Code Status  Full Code    4- Other recommendations  - We will continue to provide comfort and support to the patient and the family      History of Present Illness     The patient is a 74 y.o.  Non- / non  male who presents with No chief complaint on file.      Referred to Palliative Care by  [x] Physician   [] Nursing  [] Family Request   [] Other:       Patient is a 74 year old male, who presented following a witnessed cardiac arrest. Initial downtime about 20 minutes. Patient then noted to arrest a 2nd time in ED as well as a 
    [facility]  Internal Medicine Teaching Residency Program  Inpatient Daily Progress Note  ______________________________________________________________________________    Patient: Lukasz Keller  YOB: 1950   MRN:[id number]    Acct: [id number]     Room: 1014/1014-01  Admit date: 12/3/2024  Today's date: 12/13/24  Number of days in the hospital: 10    SUBJECTIVE   Admitting Diagnosis: Cardiac arrest  CC: Cardiac arrest    - Pt examined at bedside. Chart & results reviewed.   Patient intubated.  Patient awake today.  Continues to be on some analgesic.      ROS:  Unable to obtain due to patient being intubated   BRIEF HISTORY     The patient is a  74 y.o. male with past medical history significant for  PE not noted to be on anticoagulation per chart review  Hyperlipidemia  Acute respiratory failure secondary to PE  Colon polyp s/p right hemicolectomy with ileocolic anastomosis  History of prostate cancer s/p robotic prostatectomy 4 years ago.  PSA currently undetectable  PONCE  CHF with preserved ejection fraction  Asymptomatic ventral hernia  Primary hypertension     That presented via EMS after witnessed collapse cardiac arrest.  EMS was initially called.  Patient had downtime of about 20 minutes.  Interventional cardiologist was paged with the picture of STEMI faxed by EMS.  Initial rhythm noted by EMS was pulseless V. tach and he received defibrillation x 1.  He also received epi x 1 and was started on amnio bolus by EMS.  ROSC was obtained en route.  On arrival to the ED shortly patient became pulseless and CPR and ACLS was started.  Patient received epi x 2 and defibrillation x 1.  Junctional rhythm with a rate of 60-70 was achieved on the ROSC.  Patient was intubated.  Patient was transferred to Cath Lab.  En route to the Cath Lab patient became bradycardic and lost pulse and received 1 cc push of epi along with 1 mg of atropine.  He then again briefly 
   12/13/24 1110   Care Plan - Respiratory Goals   Achieves optimal ventilation and oxygenation Assess for changes in respiratory status;Assess for changes in mentation and behavior;Position to facilitate oxygenation and minimize respiratory effort;Oxygen supplementation based on oxygen saturation or arterial blood gases;Encourage broncho-pulmonary hygiene including cough, deep breathe, incentive spirometry;Assess the need for suctioning and aspirate as needed;Assess and instruct to report shortness of breath or any respiratory difficulty;Respiratory therapy support as indicated       
   12/14/24 1143   Ventilator Settings   FiO2  (S)  50 %  (SpO2 98%)       
   12/14/24 1632   Ventilator Settings   PEEP/CPAP (cmH2O) (S)  8  (SpO2 96%)       
   12/19/24 0838   Care Plan - Respiratory Goals   Achieves optimal ventilation and oxygenation Respiratory therapy support as indicated;Assess and instruct to report shortness of breath or any respiratory difficulty;Assess the need for suctioning and aspirate as needed;Encourage broncho-pulmonary hygiene including cough, deep breathe, incentive spirometry;Initiate smoking cessation protocol as indicated;Oxygen supplementation based on oxygen saturation or arterial blood gases;Position to facilitate oxygenation and minimize respiratory effort;Assess for changes in mentation and behavior;Assess for changes in respiratory status       
   12/20/24 0815   Care Plan - Respiratory Goals   Achieves optimal ventilation and oxygenation Assess for changes in respiratory status;Assess for changes in mentation and behavior;Position to facilitate oxygenation and minimize respiratory effort;Oxygen supplementation based on oxygen saturation or arterial blood gases;Encourage broncho-pulmonary hygiene including cough, deep breathe, incentive spirometry;Assess the need for suctioning and aspirate as needed;Assess and instruct to report shortness of breath or any respiratory difficulty;Respiratory therapy support as indicated       
  [facility] [place]    PATIENT NAME: Lukasz Keller    Shift date: 12/3/2024   Shift day: Tuesday   Shift # 1    Room # Cath Pool Room/PL   Name: Lukasz Keller            Age: 74 y.o.  Gender: male          Orthodox: None   Place of Jain:     Trauma/Incident type: Stemi Alert  Admit Date & Time: 12/3/2024  1:43 PM    PATIENT/EVENT DESCRIPTION:  Lukasz Keller is a 74 y.o. male who arrived via ground ambulance from home.  Per EMS, wife was present and on her way. Pt coded in ambulance and again on arrival.  Pt taken to cath lab. Pt to be admitted to Cath Pool Room/PL.         SPIRITUAL ASSESSMENT-INTERVENTION-OUTCOME:  Wife, Anaya, appeared concerned and anxious as doctors updated her and explained the need to take pt to the cath lab. She became tearful but was coping. Writer escorted her to cath lab waiting room and spoke with her daughter, Rochelle, on the phone to give her directions. Writer provided presence and support as wife waited for an update and escorted Rochelle to cath lab waiting room when she arrived.      PATIENT BELONGINGS:  Given to Family    ANY BELONGINGS OF SIGNIFICANT VALUE NOTED:  Watch found in ambulance by EMS.    REGISTRATION STAFF NOTIFIED?  No      WHAT IS YOUR SPIRITUAL CARE PLAN FOR THIS PATIENT?:   Spiritual health team will remain available for spiritual and emotional support.     Electronically signed by Chaplain Jorge, on 12/3/2024 at 3:19 PM.  [facility]  [phone]   
  Neurology Nurse Practitioner Progress Note      INTERVAL HISTORY: This is a 74 y.o.  male admitted 12/3/2024 for STEMI after witnessed collapse at home. This is a follow-up neurology progress note. The patient was examined and the chart was reviewed. Discussed with RN. There were no acute events overnight. Patient s/p trach & PEG tube placement (12/20). He was wide-awake, nodding head appropriately, knew he was in a hospital, trying to verbalize, followed simple commands; able to move all limbs purposefully. He is on fentanyl infusion. No more seizures reported.    HPI: Lukasz Keller is a 74 y.o. male with H/O prior PE, HTN, HLD, DM, CAD, prostate cancer s/p prostatectomy (2020), s/p cervical spine surgery, who was admitted 12/3/2024 for STEMI after witnessed collapse at home.  Family reported that patient was not feeling well for almost 1 week PTA.  EMS were called.  Inferior STEMI was noted on EKG.  Patient went into V. tach arrest and underwent 1 round of CPR and 1 epi.  Patient arrested again while being transferred to [facility] ED for immediate cardiac cath.    Patient had prolonged and complex hospitalization.  Neurology was consulted on 12/18 when patient had 2 episodes of witnessed seizure-like activity that was described as shaking of all limbs with eyes rolling up; he received 4 mg Versed.  Patient was unable to follow commands.  RN reported that prior to episode patient was following commands.  Family denied any prior history of seizures.     levETIRAcetam  500 mg IntraVENous Q12H    ticagrelor  90 mg Oral BID    insulin glargine  20 Units SubCUTAneous BID    lansoprazole  30 mg Per NG tube QAM AC    insulin lispro  0-16 Units SubCUTAneous 4x Daily AC & HS    QUEtiapine  25 mg Oral BID    amiodarone  200 mg Oral BID    midodrine  10 mg Oral TID WC    furosemide  80 mg IntraVENous BID    sodium chloride flush  5-40 mL IntraVENous 2 times per day    aspirin  81 mg Oral Daily    atorvastatin  40 mg Oral 
  Physician Progress Note      PATIENT:               NAHUM GUSMAN  CSN #:                  [id number]  :                       1950  ADMIT DATE:       12/3/2024 1:43 PM  DISCH DATE:  RESPONDING  PROVIDER #:        NICK RAZO          QUERY TEXT:    Pt admitted with Inf STEMI, Cardiac arrest, Jaquan Saddle PE's with right heart   strain. Pt noted to have a pulmonary embolism. If possible, please document in   the progress notes and discharge summary if you are evaluating and / or   treating any of the following:    The medical record reflects the following:  Risk Factors: Inf STEMI VT Cardiac arrest, PE Jaquan with right heart strain  Clinical Indicators: H/P Inferior STEMI  Vtach/PEA Cardiac arrest in the   setting of STEMI Former smoker  CT Chest Large volume bilateral pulmonary   embolus with evidence of right heart strain. Anterior mediastinal edema or   hemorrhage small volume pericardial fluid or hemorrhage ball likely related to   multiple anterior rib fractures. Vascular consult Bilateral PE with right   heart strain  Treatment: Thrombectomy, LHC with 2 ERNST placed, labs, monitor, Consults   Nephrology/Vascular/ Pulmonary/IM, CT Chest, Ventilator support    Any questions  Please Call  Kady Metzger RN,BSN,[facility]-Fri  6a,-230pm  [phone]  Options provided:  -- Pulmonary Embolism with Acute Cor Pulmonale  -- Pulmonary Embolism without Acute Cor Pulmonale  -- Other - I will add my own diagnosis  -- Disagree - Not applicable / Not valid  -- Disagree - Clinically unable to determine / Unknown  -- Refer to Clinical Documentation Reviewer    PROVIDER RESPONSE TEXT:    This patient has Pulmonary Embolism with acute cor pulmonale.    Query created by: Kady Metzger on 2024 12:10 PM      Electronically signed by:  NICK RAZO 2024 10:56 AM          
  [facility]   IN-PATIENT SERVICE      NEUROLOGY PROGRESS  NOTE            Date:   12/22/2024  Patient name:  Lukasz Keller  Date of admission:  12/3/2024  YOB: 1950      Interval History:     12/22: No acute events.  No further episodes of staring/confusion.  He appears more alert.  No new neurological changes.  Plan for possible DC to LTAC today.    12/21: Overnight and this morning, nursing staff reports that he had another couple of episodes of staring, not responding.  These can last 5 to 7 minutes.  No clear tonic-clonic activity associated.    Continued on LTM.    Past Medical History:     Past Medical History:   Diagnosis Date    Abnormal EKG     Acute respiratory failure 03/17/2020    Anesthesia complication 2005    woke up during neck surgery    Anxiety     Dr. Marlow    Cancer ([facility])     Prostate    Colon polyps     Hx of blood clots 06/28/2021    [facility]    Hyperlipidemia     Dr. Hoffman    Prostate CA (HCC)     Pulmonary emboli (HCC) 06/28/2021    Per CTA chest, 6-28-21-\" Large saddle pulmonary embolism with extensive thrombus load and severe right heart strain.\"    Pulmonary emboli (HCC) 12/03/2024    SOB (shortness of breath) on exertion     STEMI (ST elevation myocardial infarction) (HCC)     Wears partial dentures     upper & lower    Wellness examination     Dr. Hoffman- PCP        Past Surgical History:     Past Surgical History:   Procedure Laterality Date    ARM SURGERY Right 10/16/2023    ARM LESION BIOPSY EXCISION  - RIGHT ARM LATERALLY performed by Morgan Diaz MD at [facility] OR    BACK SURGERY      x2, hx of hardware exchange- patient states no longer has hardware    BREAST SURGERY Left 10/16/2023    MEDIAL BREAST X 3  LIPOMAS  LEFT performed by Morgan Diaz MD at [facility] OR    CARDIAC PROCEDURE N/A 12/3/2024    Left heart cath / coronary angiography performed by Christiano Smith MD at [facility] CARDIAC CATH LAB    CARDIAC PROCEDURE N/A 12/3/2024    
 Latest Reference Range & Units Most Recent   Pt Temp  37.0  12/3/24 22:00   Sample Site  Arterial Line  12/3/24 22:00   Mode  PRVC  12/3/24 22:00   POC pH 7.350 - 7.450  7.256 (L)  12/3/24 22:00   POC pCO2 35.0 - 48.0 mm Hg 41.1  12/3/24 22:00   POC PO2 83.0 - 108.0 mm Hg 62.7 (L)  12/3/24 22:00   POC HCO3 21.0 - 28.0 mmol/L 18.3 (L)  12/3/24 22:00   POC O2 SAT 94.0 - 98.0 % 87.9 (L)  12/3/24 22:00   (L): Data is abnormally low    ABG results slightly changed from earlier will give to RN  
 [facility] Nurse  Consult Note       NAME:  Lukasz Keller  MEDICAL RECORD NUMBER:  [id number]  AGE: 74 y.o.   GENDER: male  : 1950  TODAY'S DATE:  2024    Subjective:     Reason for WOCN Evaluation and Assessment: \"       Lukasz Keller is a 74 y.o. male referred by:   [x] Physician  [] Nursing  [] Other:     Wound Identification:  Wound Type:  intertriginous dermatitis  Contributing Factors: diabetes, poor glucose control, chronic pressure, decreased mobility, shear force, decreased tissue oxygenation, immunosuppression, anticoagulation therapy, and incontinence of stool        Gluteal crease with dusky erosion- monitor for evolution of DTI  FMS in use due to high volume diarrhea  Objective:      BP (!) 124/55   Pulse 82   Temp 99.5 °F (37.5 °C)   Resp 22   Ht 1.74 m (5' 8.5\")   Wt 133.2 kg (293 lb 10.4 oz)   SpO2 100%   BMI 44.00 kg/m²   Ty Risk Score: Ty Scale Score: 13    LABS    CBC:   Lab Results   Component Value Date/Time    WBC 14.3 2024 04:50 AM    RBC 2.44 2024 04:50 AM    RBC 4.44 2012 08:48 AM    HGB 7.6 2024 04:50 AM    HCT 23.8 2024 04:50 AM     CMP:  Albumin:  No results found for: \"LABALBU\"  PT/INR:    Lab Results   Component Value Date/Time    PROTIME 15.9 12/15/2024 04:42 AM    INR 1.3 12/15/2024 04:42 AM     HgBA1c:    Lab Results   Component Value Date/Time    LABA1C 8.1 2024 03:45 AM     PTT: No components found for: \"LABPTT\"      Assessment:       Measurements:     Base of gluteal crease erosion     Groin fold erosion              Plan:   Gluteal crease: zinc oxide cream BID and prn incontinence care. Use comfort glide sheet for repositioning and lateral transfers  Groin folds: dry cloths to separate the folds.   Plan of Care:   [x]  Turn and reposition every 2 hours while in bed.     [x] Float heels off of bed with pillows under calves.     [x] Apply zinc oxide cream twice daily and as needed after 
0136: Notified Cardiology fellow of increased PAPs throughout shifts, upper 50s. Goal was below 60s. SVR currently 814. Awaiting recommendation.       Electronically signed by Manisha Meyer RN on 12/7/2024 at 2:22 AM        
2315- The writer updated Dr Peters about new onset hypotension and fever. All questions were answered. The writer was given orders for tylenol and levophed to keep MAPs >60.    0034- Dr Peters was updated by the writer that the patient is requiring blood pressure support from levophed and is breathing in the 30s over the vent. Orders received to obtain an ABG.     0128- Dr Peters was updated that the ABG was completed and the continual need for pressure support. No new orders at this time.    
Called [facility] patients spouse notified that patient is being transferred. Patient transferred to 3020 with just partial upper and lower plates for belongings.   
Comprehensive Nutrition Assessment    Type and Reason for Visit:  Initial (Vent Check)    Nutrition Recommendations/Plan:   When able, recommend initiate Peptide Based TF (Vital 1.5) at trickle rate 15 mL/hr + protein modular BID (r/t availability) to provide 748 kcal, 76 gm protein, 275 mL free water per day.  Monitor progression of nutrition, weight, labs, GI status, fluid status.     Malnutrition Assessment:  Malnutrition Status:  Insufficient data (12/04/24 1442)    Context:  Acute Illness     Findings of the 6 clinical characteristics of malnutrition:  Energy Intake:  Unable to assess  Weight Loss:  No weight loss     Body Fat Loss:  No body fat loss (visual)     Muscle Mass Loss:  Unable to assess    Fluid Accumulation:  Mild Extremities, Generalized   Strength:  Not Performed    Nutrition Assessment:    73 yo M adm s/p STEMI, cardiac arrest. PMHx of HF. Pt is current intubated and sedated. Noted pressor support x3. +NGT. Per RN, no plan to start enteral nutrition today r/t ECMO cannulation. Also note Oliguric RUBEN per Nephrology with plans to initiate CRRT. Hyperkalemia on adm (6.4 mmol/L) corrected to wnl (4.8 mmol/L). Weight hx reviewed. Will provide TF recommendations, pt would likely benefit from early initiation of enteral nutrition.    Nutrition Related Findings:    Meds/labs reviewed Wound Type: Multiple, Surgical Incision (sternum and brachial incisions, femoral puncture)       Current Nutrition Intake & Therapies:    Average Meal Intake: NPO  Average Supplements Intake: NPO  Diet NPO  Additional Calorie Sources:  None    Anthropometric Measures:  Height: 174 cm (5' 8.5\")  Ideal Body Weight (IBW): 157 lbs (71 kg)    Admission Body Weight: 135.6 kg (298 lb 15.1 oz)  Current Body Weight: 140.3 kg (309 lb 4.9 oz), 197 % IBW. Weight Source: Bed scale  Current BMI (kg/m2): 46.3  Usual Body Weight: 124.3 kg (274 lb 0.5 oz) (10/16/2023)  % Weight Change (Calculated): 12.9  BMI Categories: Obese Class 3 
Comprehensive Nutrition Assessment    Type and Reason for Visit:  Reassess    Nutrition Recommendations/Plan:   As able, resume trickle TF Immune Enhancing at 15 mL/hr trickle rate + protein modular TID to provide 852 kcal, 111 gm protein, 261 mL free water. With flushes 30 mL q 6 hours = 381 mL water/day.   If HOB is 30 degrees or higher, consider modify TF order to advance Pivot 1.5 TF by 10 mL/hr q 6-8 hours to a goal rate of 35 mL/hr + protein modular BID.   Monitor TF tolerance, adequacy, weight, labs, GI status, fluid status, progression of nutrition      Malnutrition Assessment:  Malnutrition Status:  No malnutrition (12/09/24 1057)    Context:  Acute Illness     Findings of the 6 clinical characteristics of malnutrition:  Energy Intake:  Mild decrease in energy intake (less than 75% x 5 days)  Weight Loss:  No weight loss     Body Fat Loss:  No body fat loss (visual)     Muscle Mass Loss:  No muscle mass loss (visual)    Fluid Accumulation:  Mild Extremities, Generalized   Strength:  Not Performed    Nutrition Assessment:    Pt continues intubated and sedated. Off pressor support. VA ECMO, IABP and CVVHD continue. Previously had Immune Enhancing TF at trickle rate 15 mL/hr + protein modular TID providing 71% of kcal and 85% of protein needs. Per RN, TF held r/t intolerance and cath lab later today. Per RN and chart, TF was advanced from trickle rate 15 mL/hr to goal 35 mL/hr yesterday. Pt had emesis in evening - 400 mL suctioned and continues with NGT to LIS overnight. BRANDON HOB elevation yesterday. Current HOB elevation per bed setting is 5 degrees. D/w RN. Report to RN manager TF modified without order. Labs reviewed: Glu 155-146 mg/dL. Meds reviewed.    Nutrition Related Findings:    +2 generalized and extremity edema. Per chart, abdomen is soft with hypoactive; distant bowel sounds. LBM 12/8. Wound Type: Multiple, Surgical Incision (sternum and brachial incisions, femoral puncture)       Current 
Comprehensive Nutrition Assessment    Type and Reason for Visit:  Reassess    Nutrition Recommendations/Plan:   Continue NPO.  Monitor plans for repeat of Tube Feedings.  As able, restart feedings of Renal (Nepro with Carb Steady) formula at goal 40 mL/hr + 2 Protein modulars daily.  Will provide 1936 kcals, 130 gm protein/day.  Monitor TF tolerance/adequacy of intakes, rectal tube output, labs, weights, and plan of care.     Malnutrition Assessment:  Malnutrition Status:  At risk for malnutrition (12/16/24 3002)    Context:  Acute Illness       Nutrition Assessment:    Pt remains intubated and sedated.  TF held as plans for tunneled dialysis catheter today.  Noted plans for trach and PEG tomorrow.  Pt has been tolerating Renal TF at goal rate previously well.  +rectal tube with 700 mL output x 24 hrs.  Weights reviewed.  Labs reviewed: Na 135 mmol/L, BUN 91 mg/dL, CR 4.6 mg/dL, Glucose 162-256 mg/dL.  Meds include: Lasix, Lantus, Humalog SS.    Nutrition Related Findings:    labs/meds reviewed.  +1 generalized/BUE, +2 pitting BLE edema.  +rectal tube.   Wound Type: Multiple, Skin Tears, Surgical Incision       Current Nutrition Intake & Therapies:    Average Meal Intake: NPO  Average Supplements Intake: NPO  Diet NPO  Diet NPO  Current Tube Feeding (TF) Orders:  Feeding Route: Orogastric  Formula: Renal Formula  Schedule: Continuous  Feeding Regimen: TF on hold for tunneled dialysis catheter placement  Additives/Modulars: Protein (TID)  Water Flushes: 30 mL q 6 hrs  Current TF Provides: 0 kcals, 0 gm protein    Anthropometric Measures:  Height: 174 cm (5' 8.5\")  Ideal Body Weight (IBW): 157 lbs (71 kg)    Admission Body Weight: 135.6 kg (298 lb 15.1 oz)  Current Body Weight: 131.2 kg (289 lb 3.9 oz), 184.2 % IBW. Weight Source: Bed scale  Current BMI (kg/m2): 43.3  Usual Body Weight: 124.3 kg (274 lb 0.5 oz) (10/16/2023)     % Weight Change (Calculated): 12.9  Weight Adjustment For: No Adjustment               
Comprehensive Nutrition Assessment    Type and Reason for Visit:  Reassess    Nutrition Recommendations/Plan:   Continue Renal TF at goal 35 mL/hr + 3 Protein modulars daily.  Will provide 1 packet of Banatrol daily - flush tube with 30 mL free water before and after giving.  Will provide additional fiber to help with stools.  Monitor TF/modular/fiber tolerance, labs, weights, and plan of care.     Malnutrition Assessment:  Malnutrition Status:  At risk for malnutrition (12/16/24 1532)    Context:  Acute Illness       Nutrition Assessment:    Pt remains intubated and sedated.  RN reports pt tolerating Renal TF at goal 35 mL/hr along with 3 Protein modulars daily.  Pt with FMS in place and has been having loose stools - output of 700 mL x 24 hrs noted.  Discussed with RN about starting Banatrol supplement to provide additional fiber and help with stools - will provide 1 per day.  Weights reviewed.  Labs reviewed: K 3.5 mmol/L,  mg/dL, CR 5.7 mg/dL, Glucose 157-243 mg/dL.  Meds include: Lasix, Humalog SS, Reglan.    Nutrition Related Findings:    labs/meds reviewed.  +1 generalized/extremity edema. Wound Type: Multiple, Skin Tears, Surgical Incision       Current Nutrition Intake & Therapies:    Average Meal Intake: NPO  Average Supplements Intake: NPO  Diet NPO  ADULT TUBE FEEDING; Orogastric; Renal Formula; Continuous; 10; Yes; 10; Q 4 hours; 35; 30; Q 6 hours; Protein; 1 Dose; TID  Current Tube Feeding (TF) Orders:  Feeding Route: Orogastric  Formula: Renal Formula  Schedule: Continuous  Feeding Regimen: 35 mL/hr  Additives/Modulars: Protein (TID)  Water Flushes: 30 mL q 6 hrs  Current TF Provides: Renal at 35 mL/hr + 3 Protein modulars = 1824 kcals, 146 gm protein, 956 mL free water (TF, modulars, FWF)    Anthropometric Measures:  Height: 174 cm (5' 8.5\")  Ideal Body Weight (IBW): 157 lbs (71 kg)    Admission Body Weight: 135.6 kg (298 lb 15.1 oz)  Current Body Weight: 133.2 kg (293 lb 10.4 oz), 187 % IBW. 
Comprehensive Nutrition Assessment    Type and Reason for Visit:  Reassess    Nutrition Recommendations/Plan:   Okay to finish current TF bottle of Pivot 1.5; Modify TF to Renal (Nepro with CarbSteady) at 35 mL/hr x 20 hours cyclic + protein modular TID to provide 1551 kcal, 134 gm protein, 508 mL free water. With flushes 30 mL q 6 hours = 628 mL water/day.  Monitor TF tolerance, adequacy, weight, labs, GI status, fluid status, POC.     Malnutrition Assessment:  Malnutrition Status:  No malnutrition (12/09/24 1057)    Context:  Acute Illness     Findings of the 6 clinical characteristics of malnutrition:  Energy Intake:  Mild decrease in energy intake (less than 75% x 5 days)  Weight Loss:  No weight loss     Body Fat Loss:  No body fat loss (visual)     Muscle Mass Loss:  No muscle mass loss (visual)    Fluid Accumulation:  Mild Extremities, Generalized   Strength:  Not Performed    Nutrition Assessment:    Pt continues intubated and sedated. Off ECMO, balloon pump, and CVVHD (12/9). Per RN, tolerating Pivot 1.5 TF at goal rate 35 mL/hr + 2 protein modular per day. Per chart, abdomen is soft with hypoactive bowel sounds. Bowel meds stopped r/t loose stool, FMS placed. Discussed TF with RN, plan is cycle over 20 hours to be off 4 hours during HD r/t HOB elevation lower than 30 degrees during tx.    Nutrition Related Findings:    Meds/labs reviewed. +1/+2 generalized and extremity edema. Wound Type: Multiple, Surgical Incision (sternum and brachial incisions, femoral puncture)       Current Nutrition Intake & Therapies:    Average Meal Intake: NPO  Average Supplements Intake: NPO  Diet NPO  ADULT TUBE FEEDING; Orogastric; Immune Enhancing; Continuous; 15; Yes; 10; Q 6 hours; 35; 30; Q 6 hours; Protein; 1 Dose; BID  Current Tube Feeding (TF) Orders:  Feeding Route: Orogastric  Formula: Immune Enhancing  Schedule: Continuous  Feeding Regimen: 35 mL/hr  Additives/Modulars: Protein (BID)  Water Flushes: 30 mL q 6 
Comprehensive Nutrition Assessment    Type and Reason for Visit:  Reassess, Nutrition support    Nutrition Recommendations/Plan:   Continue Renal (Nepro with Carb Steady) formula at 40 mL/hr along with 2 Protein modulars daily.  Will provide 1936 kcals, 130 gm protein.   Monitor TF tolerance/adequacy of intakes, labs, weights, and plan of care.     Malnutrition Assessment:  Malnutrition Status:  At risk for malnutrition (12/16/24 1532)    Context:  Acute Illness     Findings of the 6 clinical characteristics of malnutrition:  Energy Intake:  Mild decrease in energy intake (less than 75% x 5 days)  Weight Loss:  No weight loss     Body Fat Loss:  No body fat loss (visual)  Muscle Mass Loss:  No muscle mass loss (visual)  Fluid Accumulation:  Mild Extremities, Generalized   Strength:  Not Performed    Nutrition Assessment:    Pt s/p trach and PEG placement 12/20.  Continues to tolerate Renal TF at goal 40 mL/hr.  RN reports continuing to give protein modulars x 2 daily.  Weights reviewed - fluctuations noted - ? due to fluids/dialysis.  Per RN, plans for discharge today.    Nutrition Related Findings:    labs/meds reviewed.  LBM 12/20.   Wound Type: Multiple, Skin Tears, Surgical Incision       Current Nutrition Intake & Therapies:    Average Meal Intake: NPO  Average Supplements Intake: NPO  Diet NPO  ADULT TUBE FEEDING; PEG; Renal Formula; Continuous; 10; Yes; 10; Q 4 hours; 40; 30; Q 4 hours  Current Tube Feeding (TF) Orders:  Feeding Route: PEG  Formula: Renal Formula  Schedule: Continuous  Feeding Regimen: 40 mL/hr  Additives/Modulars: Protein (x 2 per day)  Water Flushes: 30 mL q 4 hrs  Current TF Provides: 1936 kcals, 130 gm protein, 1028 mL free water (TF + flushes)    Anthropometric Measures:  Height: 174 cm (5' 8.5\")  Ideal Body Weight (IBW): 157 lbs (71 kg)    Admission Body Weight: 135.6 kg (298 lb 15.1 oz)  Current Body Weight: 125.4 kg (276 lb 7.3 oz), 176.1 % IBW. Weight Source: Bed scale  Current 
Date: 12/3/2024  Time: 1355  Patient identity confirmed:  Yes  Indications: code situation  Preoxygenation: yes    Laryngoscope size and type Glidescope  Airway introducer used: No  Evac: Yes  ETT size:an 8.0 cuffed  Number of attempts:1     Cords visualized:  [x] Clearly  [] Poorly  Breath sounds present bilaterally: Yes   ETCO2   [x] Positive   ETT secured at  27    ETT secured with ju  Chest x-ray ordered: No , pt taken to cath lab    Difficult airway:    No       If yes, was red tape placed around ETT:   No    Was this a Code Situation:    Yes             BP: 93/62        Procedure performed by: dr rosanne MENJIVAR RCP  4:04 PM                  
Dialysis Post Treatment Note  Patient tolerated treatment well. Denies complaints at time of discharge.   Vitals:    12/13/24 1420   BP: (!) 101/44   Pulse: 52   Resp: 20   Temp: 99.6 °F (37.6 °C)   SpO2: 98%     Pre-Weight = 147 kg  Post-weight = Weight - Scale: (!) 145 kg (319 lb 10.7 oz)  Total Liters Processed = Blood Volume Processed (Liters): 67.1 L  Rinseback Volume (mL) = Rinseback Volume (ml): 300 ml  Net Removal (mL) = 2000 ml  Length of treatment=3 hrs  Access:  right fem temporary catheter    Dialysis was completed. The pt tolerated dialysis well,BP soft at times. Vitals stable post treatment. Report was given to the pts PCN Edvin STEELE. The pt is to remain in ICU at this time.   
Dialysis Post Treatment Note  Vitals:    12/10/24 1400   BP: (!) 124/41   Pulse: 73   Resp: (!) 31   Temp: 99.1 °F (37.3 °C)   SpO2: 94%     Pre-Weight = 147 kg  Post-weight = Weight - Scale: (!) 145 kg (319 lb 10.7 oz)  Total Liters Processed = 1860   Rinseback Volume (mL) = 0   Net Removal (mL) = 1560   Patient's dry weight=TBD  Type of access used=Temp Fem Cath  Length of treatment=180 mins    Patient tolerated HD treatment and rinseback with out incident. Fem cath lines sluggish. HD treatment concluded thirty minutes early due to sluggish lines and lack of flow.  1.5 L of fluid removed. Patient has a fem cath. Report given to Michael STEELE.        
Dialysis Post Treatment Note  Vitals:    12/12/24 1508   BP: (!) 110/45   Pulse: 73   Resp: 20   Temp: 98.9 °F (37.2 °C)   SpO2: 99%     Pre-Weight = 140.3kg  Post-weight = Weight - Scale: (!) 138.7 kg (305 lb 12.5 oz)  Total Liters Processed = Blood Volume Processed (Liters): 77.1 L  Rinseback Volume (mL) = Rinseback Volume (ml): 300 ml  Net Removal (mL) = 1600mL   Type of access used=RIGHT TUNNELED FEMORAL CATH  Length of treatment=210 MINUTES    Patient tolerated treatment well. At treatment start BP was soft and patient received 1 dose of albumin with decreased UF goal. As treatment progressed, patient blood pressure and HR improved with fluid removal and UF goal was increased to max ordered (2L). Patient was given midodrine by primary RN during tx as well. CVC functioned without issues and ran at 400 BFR reversed. VSS throughout remainder of treatment until completion. Report given to Grace STEELE & Louann STEELE  
Dialysis Post Treatment Note  Vitals:    12/14/24 1927   BP: (!) 140/74   Pulse: 60   Resp: 19   Temp: 99.1 °F (37.3 °C)   SpO2: 96%     Pre-Weight = 134.4 kg  Post-weight = Weight - Scale: 132.4 kg (291 lb 14.2 oz)  Total Liters Processed = Blood Volume Processed (Liters): 40.82 L  Rinseback Volume (mL) = Rinseback Volume (ml): 300 ml  Net Removal (mL) = 2000   Patient's dry weight=  Type of access used= R fem cath  Length of treatment=120    2 hour UF completed. Levophed restarted to maintain stable BP. CVC ran reversed. Primary RN able to start downward titration of pressor after blood returned. Venous chamber clotting pre rinse back. Was able to return blood.   
Dialysis Post Treatment Note  Vitals:    12/16/24 1346   BP: (!) 124/55   Pulse: 82   Resp: 22   Temp: 99.5 °F (37.5 °C)   SpO2: 100%     Pre-Weight = 134.1 kg  Post-weight = Weight - Scale: 133.2 kg (293 lb 10.4 oz)  Total Liters Processed = Blood Volume Processed (Liters): 64.92 L  Rinseback Volume (mL) = Rinseback Volume (ml): 290 ml  Net Removal (mL) =  1000 ml  Patient's dry weight=  Type of access used=R Femoral perm cath  Length of treatment=212 minutes    Pt tolerated treatment fairly, FU goal decreased due to soft BP during treatment, no issues with Femoral perm cath. Report given to primary RN Joni MARQUIS    
Dialysis Post Treatment Note  Vitals:    12/17/24 1830   BP: (!) 151/60   Pulse: 53   Resp: 18   Temp: 100 °F (37.8 °C)   SpO2: 97%     Pre-Weight = 131kg  Post-weight = Weight - Scale: 130 kg (286 lb 9.6 oz)  Total Liters Processed = Blood Volume Processed (Liters): 48.03 L  Rinseback Volume (mL) = Rinseback Volume (ml): 220 ml  Net Removal (mL) =  1000 ML  Patient's dry weight=  Type of access used= Right Femoral Temp Catheter   Length of treatment=3 Hours    Pt tolerated tx well, no s/s of acute distress noted with HD, 1 Liter removed, Temp Catheter worked well, Temp fem cath removed post tx by Primary RN as ordered. Post tx report given to Primary RN Jennifer SHELTON    
Dialysis Post Treatment Note  Vitals:    12/19/24 1917   BP: (!) 177/74   Pulse: 84   Resp: (!) 0   Temp: 98.6 °F (37 °C)   SpO2: 97%     Pre-Weight = 132.2 kg  Post-weight = Weight - Scale: 129 kg (284 lb 6.3 oz)  Total Liters Processed = Blood Volume Processed (Liters): 74.41 L  Rinseback Volume (mL) = Rinseback Volume (ml): 300 ml  Net Removal (mL) =  1600  Patient's dry weight= TBD  Type of access used= R chest perm cath  Length of treatment=210    Tolerated HD w/o issues. Frequent flushing needing to prevent dialyzer and venous chamber clotting. Goal adjusted accordingly.    
Dialysis Post Treatment Note  Vitals:    12/21/24 1950   BP: 135/62   Pulse: (!) 107   Resp: 15   Temp: 98.4 °F (36.9 °C)   SpO2: 98%     Pre-Weight = 126.8 kg  Post-weight = Weight - Scale: 125.2 kg (276 lb 0.3 oz) (wt on bed less wt of sheets)  Total Liters Processed = Blood Volume Processed (Liters): 77.4 L  Rinseback Volume (mL) = Rinseback Volume (ml): 300 ml  Net Removal (mL) =  2000 ml  Patient's dry weight= TBD  Type of access used= CVC Tunneled @R Chest  Length of treatment=212 minutes      Pt completed Hd tx with stable v/s, no issue with arterial/venous flow running max BFR. Flushed line with saline every 30 minutes due to high TMP and clots visible in the venous chamber. Cathter care done. Report given to [facility]3 nurse  
Dialysis Time Out  To be done by RN and tech or 2 RNs  Staff Names : Basilia CLEMENS RN & Grace METZ RN    [x]  Identity of the patient using 2 patient identifiers  [x]  Consent for treatment  [x]  Equipment-proper machine and dialyzer  [x]  B-Hep B status- HEPATITIS B ANTIGEN NEGATIVE 12/4/2024  [x]  Orders- to include bath, blood flow, dialyzer, time and fluid removal  [x]  Access-Correct site and in working order  [x]  Time for patient to ask questions.      Activase removed by Grace DURHAM RN. Lines aspirated and flushed with ease.   
Dialysis Time Out  To be done by RN and tech or 2 RNs  Staff Names Cesar ZURITA RN and Quynh MORA RN    [x]  Identity of the patient using 2 patient identifiers  [x]  Consent for treatment  [x]  Equipment-proper machine and dialyzer  [x]  B-Hep B status Antigen Negative 12/4/2024)  [x]  Orders- to include bath, blood flow, dialyzer, time and fluid removal  [x]  Access-Correct site and in working order  [x]  Time for patient to ask questions.    
Dialysis Time Out  To be done by RN and tech or 2 RNs  Staff Names Danita JETT RN and Dalia RAYO RN    [x]  Identity of the patient using 2 patient identifiers  [x]  Consent for treatment  [x]  Equipment-proper machine and dialyzer  [x]  B-Hep B status HBsAG Neg 12.4.24  [x]  Orders- to include bath, blood flow, dialyzer, time and fluid removal  [x]  Access-Correct site and in working order  [x]  Time for patient to ask questions.    
Dialysis Time Out  To be done by RN and tech or 2 RNs  Staff Names Laurita RN and Natali RN    [x]  Identity of the patient using 2 patient identifiers  [x]  Consent for treatment  [x]  Equipment-proper machine and dialyzer  [x]  B-Hep B status (hep ag neg 12/04/24)  [x]  Orders- to include bath, blood flow, dialyzer, time and fluid removal  [x]  Access-Correct site and in working order  [x]  Time for patient to ask questions. On vent, alert  
Dialysis Time Out  To be done by RN and tech or 2 RNs  Staff Names Laurita RN and Pratibha HILTON RN    [x]  Identity of the patient using 2 patient identifiers  [x]  Consent for treatment  [x]  Equipment-proper machine and dialyzer  [x]  B-Hep B status (hep Ag neg 12/4/24)  [x]  Orders- to include bath, blood flow, dialyzer, time and fluid removal  [x]  Access-Correct site and in working order  [x]  Time for patient to ask questions.   
Dialysis Time Out  To be done by RN and tech or 2 RNs  Staff Names Mary STANTON RN, Edvin GUZMÁN RN    [x]  Identity of the patient using 2 patient identifiers  [x]  Consent for treatment  [x]  Equipment-proper machine and dialyzer  [x]  B-Hep B status HbSAg 12/4/24 Neg  [x]  Orders- to include bath, blood flow, dialyzer, time and fluid removal  [x]  Access-Correct site and in working order  [x]  Time for patient to ask questions.   
Dialysis Time Out  To be done by RN and tech or 2 RNs  Staff Names Nathalie GASPAR RN and Michael BURNHAM RN     [x]  Identity of the patient using 2 patient identifiers  [x]  Consent for treatment  [x]  Equipment-proper machine and dialyzer  [x]  B-Hep B status (Nonreactive 12/04/2024)  [x]  Orders- to include bath, blood flow, dialyzer, time and fluid removal  [x]  Access-Correct site and in working order  [x]  Time for patient to ask questions.   
Dialysis Time Out  To be done by RN and tech or 2 RNs  Staff Names Syl RN and Joni TYLER RN    [x]  Identity of the patient using 2 patient identifiers  [x]  Consent for treatment  [x]  Equipment-proper machine and dialyzer  [x]  B-Hep B status: Hep B Ag 12/4/24  [x]  Orders- to include bath, blood flow, dialyzer, time and fluid removal  [x]  Access-Correct site and in working order  []  Time for patient to ask questions.    
Discussed with ID about tunneled catheter placement tomorrow.  He is cleared from their standpoint to proceed.  No evidence of sepsis.  Elevated white count more indicative of systemic inflammation.  Patient recently had RCA stent so Brilinta cannot be discontinued.  
ECMO DECANNULATION NOTE:     Patient decannulated in cathlab with Marixa JIANG ECMO specialist present and assisting (including the person documenting) Dr. Mabry with the removal of the right arterial return cannula and left venous drainage, after adequate cleansing to the site,  20 minutes of manual pressure held. Hemostasis achieved with no complications. Patient tolerated with no incidence. No hematoma or bleeding noted. will continue to monitor.      OFF ECMO TIME: 5992   
ECMO End of Shift Note        Cannulation Date/Time: 12/4/2024 @0567   ECMO type: V/A   Cannula size/location: 25 Fr. ~ LT fem vein  17 Fr. ~ RT fem artery   Current Flows:  2.95 LPM (flows weaned down due to improved BP)   Current FiO2: 100%   Current Sweep:  4 LPM   Delta Pressure: 11   Clot [place]:         Oxygenator: Small clot at 9 o'clock, fibrin stranding cascading down from 12 o'clock.        Circuit: none   Anticoagulation: Argatroban 0.25 mcg/kg/min (not therapeutic at this time)   Products Given:         PRBCs: 1 (HBG 7.9)       PLTs: 2 (PLT 38)       FFP: 0       Cryo: 0       Albumin: 0       Shift Events:    ~ Per Dr Gracie fall to wean vent FIO2 to 60%, then wean nitric oxide  ~ Dr Smith keep HGB above 8  ~ Per Cardiology concern for HIT due to low PLT, labs sent, pt changed to Argatroban  ~ ECHO ordered daily      
ECMO End of Shift Note        Cannulation Date/Time: 12/4/2024 @1745   ECMO type: V/A   Cannula size/location: 25 Fr. ~ LT fem vein  17 Fr. ~ RT fem artery   Current Flows: 3.41 LPM   Current FiO2: 100%   Current Sweep:  4 LPM   Delta Pressure: 12   Clot [place]:         Oxygenator: Small clot at 9 o'clock, fibrin stranding cascading down from 12 o'clock.        Circuit: none   Anticoagulation: Heparin and Kangrelor   Products Given:         PRBCs:  3 (HBG below 8)       PLTs: 0       FFP: 0       Cryo: 0       Albumin: 2 ( chugging )       Shift Events:    ~ Cath Lab for Balloon pump placement  ~ Per Dr Smith keep HGB above 8  ~ Nitric Oxide started @ 20 ppm due to decreased PaO2      
ECMO End of Shift Note      Cannulation Date/Time: 12/4/2024 @1740   ECMO type: V/A   Cannula size/location: 25 Fr./LEFT fem vein  17 Fr./RIGHT fem artery   Current Flows: 2.6   Current FiO2: 100%   Current Sweep:  4 LPM   Delta Pressure: 8   Clot [place]:        Oxygenator: Small clot at 9 o'clock        Circuit: none   Anticoagulation: Heparin and Kangrelor   Products Given:        PRBCs: 1-Per Dr. Alvarado       PLTs: 0       FFP: 0       Cryo: 0       Albumin: 0       Significant Shift Events:     Dr Alvarado at bedside-ECHO completed-concern for tamponade-see results  See LABS, ABG's and orders  Per Dr Alvarado, keep flows on the lower side for now  
ECMO End of Shift Note      Cannulation Date/Time: 12/4/2024 @1745   ECMO type: V/A   Cannula size/location: 25 Fr./Left fem vein  17 Fr./Right fem artery   Current Flows: 3.65   Current FiO2: 100%   Current Sweep:  4 LPM   Delta Pressure: 13-15   Clot [place]:        Oxygenator: Small clot at 9 o'clock, precipitate noted at 12 o'clock streaking down toward center of MO        Circuit: none   Anticoagulation: Heparin and Cangrelor   Products Given:        PRBCs: 1       PLTs: 1       FFP: 0       Cryo: 0       Albumin: 0       Significant Shift Events:   See LABS, ABG's and orders  Nitric @ 20 ppm  Balloon pump remains on  CRRT-keep even  
ECMO End of Shift Note      Cannulation Date/Time: 12/4/2024 @1747   ECMO type: V/A   Cannula size/location: 25 Fr./Left fem vein  17 Fr./Right fem artery   Current Flows: 2.90   Current FiO2: 100%   Current Sweep:  3 LPM   Delta Pressure: 10-12   Clot [place]:        Oxygenator: Fibrin stranding noted at 12 o'clock streaking down toward center of MO        Circuit: none   Anticoagulation: Argatroban 0.248 (therapeutic)   Products Given:        PRBCs: 0       PLTs: 0       FFP: 0       Cryo: 0       Albumin: 0       Significant Shift Events:   See LABS, ABG's and orders  Nitric @ 20 ppm  Balloon pump remains on  CRRT net negative 50  ECHO ordered daily  T&S expires 12/10/24 @6841  
ECMO End of Shift:         Cannulation date/time: 12/4/2024 @ 1745    ECMO Type: V/A   Cannula Size/Location: 25 Fr. - LT Femoral Vein  17 Fr. RT Femoral Artery              Current Flows: 2.80 LPM              Current FiO2: 4%              Current Sweep: 100 LPM              Delta Pressure: 12    Clot [place]:               Oxygenator: Small clot noted at 9 o'clock position. Fibrin stranding cascading down from 12 o'clock.              Circuit: None   Anticoagulation: Argatroban @ 0.25 mcg/kg/min (therapeutic)   Products given:               PRBC's: 1 unit (Hgb 7.9)              PLT's: 0              FFP: 0              Cryo: 0              Albumin: 0     Significant Shift Events:      - Hgb goal above 8 per Dr. Cronin   - ECHO ordered for today   - Type and screen sent at midnight    Electronically signed by Gladys Sanchez RN ECMO Specialist on 12/7/2024  
INTENSIVE CARE UNIT  Resident Physician Progress Note    Patient - Lukasz Keller  Date of Admission -  12/3/2024  1:43 PM  Date of Evaluation -  12/23/2024  Room and Bed Number -  3020/3020-01   Hospital Day - 20    SUBJECTIVE:     OVERNIGHT EVENTS:   Patient hemodynamically stable. Off fentanyl. On Mechanical ventilation    F.A.S.T. M. H.U.G.S. B.I.D.     Feeding Diet: Diet NPO  ADULT TUBE FEEDING; PEG; Renal Formula; Continuous; 10; Yes; 10; Q 4 hours; 40; 30; Q 4 hours  Fluids: NO  Family: Updated wife   Analgesic: Roxicodone as needed.  Sedation: None   Thrombo-prophylaxis: [] Enoxaparin, [] Unfract. Heparin Subcutaneously, [] EPC Cuffs Eliquis  Mobility: PTOT  Heads up: 30 degs  Ulcer prophylaxis: [x] PPI Agent, [] P6Bmcnl, [] Sucralfate, [] Other:  Glycemic control: adequate on lantus 20   Spontaneous breathing trial: As tolerated   Bowel regimen/urine output: Adequate  Indwelling catheter/lines: Trach, PEG, Right subclavian tunneled cath.   De-escalation: Transfer to step down.   HOSPITAL COURSE:     Patient initially presented status post pulseless Vtach cardiac arrest. Patient was found to have inferior STEMI. Patient underwent cardiac cath with placement of two drug-eluting stents to RCA. Later, patient was found to have saddle pulmonary embolism for which he underwent thrombectomy.    Patient was placed on VA ECMO. Patient was started on CV VHD on 12/4 and ballon pump 12/5. Off ECMO, balloon pump and CRRt on 12/9. Transitioned to HD.    Patient developed ventilator associated pneumonia. Respiratory culture grew multiple gram-negative bacteria. ID is currently on board. Patient is on levofloxacin and unison as per ID recommendations.    Patient was initially in cardiac ICU. Due to resolution of cardiac issues, patiiainn was transferred to MICU. Patient is still intubated but off vasopressors.    12/15- no acute events  12/16- no acute events. Consulted wound care for L inguinal wound and sacral wound 
INTENSIVE CARE UNIT  Resident Physician Progress Note    Patient - Lukasz Keller  Date of Admission -  12/3/2024  1:43 PM  Date of Evaluation -  2024  Room and Bed Number -  3020/3020-01   Hospital Day - 11      SUBJECTIVE:     OVERNIGHT EVENTS:     No acute overnight events    - Patient is currently hemodynamically stable off . HR in 50's. Patient is opening eyes spontaneously and following commands.   - Ventilator settings 570/20/60/10. Blood gas 7.43/41.5/92.4/28.1. On Precedex 0.4 and fentanyl 100.  - Urine output 1.7 L. Underwent dialysis 2.3 L removed.  - Labs this a.m. revealed sodium 139, potassium 3.9, bicarb 24, creatinine 4.2. WBC trending down. Patient is currently on Unasyn and levofloxacin.      HOSPITAL COURSE:     Patient initially presented status post pulseless Vtach cardiac arrest. Patient was found to have inferior STEMI. Patient underwent cardiac cath with placement of two drug-eluting stents to RCA. Later, patient was found to have saddle pulmonary embolism for which he underwent thrombectomy.    Patient was placed on VA ECMO. Patient was started on CV VHD on  and ballon pump . Off ECMO, balloon pump and CRRt on . Transitioned to HD.    Patient developed ventilator associated pneumonia. Respiratory culture grew multiple gram-negative bacteria. ID is currently on board. Patient is on levofloxacin and unison as per ID recommendations.    Patient was initially in cardiac ICU. Due to resolution of cardiac issues, margareth was transferred to MICU. Patient is still intubated but off vasopressors.          OBJECTIVE:     VITAL SIGNS:  BP (!) 119/53   Pulse 64   Temp (!) 100.7 °F (38.2 °C)   Resp 25   Ht 1.74 m (5' 8.5\")   Wt 134.4 kg (296 lb 4.8 oz)   SpO2 96%   BMI 44.39 kg/m²   Tmax over 24 hours:  Temp (24hrs), Av.2 °F (37.9 °C), Min:99.6 °F (37.6 °C), Max:100.9 °F (38.3 °C)      Patient Vitals for the past 8 hrs:   BP Temp Temp src Pulse Resp SpO2 Weight 
Increased rr prt dr miguel  
Insertion attempt of Arterial Line  Date/Time:  12/05/24, 11:16 AM  Performed by: Ale No RCP    Patient identity confirmed: arm band and provided demographic data   Time out: Immediately prior to procedure a \"time out\" was called to verify the correct patient, procedure, equipment, support staff.    Preparation: Patient was prepped and draped in the usual sterile fashion.    Location:right radial    Erick's test normal: yes  Needle gauge: 20     Number of attempts: 1, unsuccessful attempt  Post-procedure: dressing applied     Patient tolerance: well  
LTME running  
NEPHROLOGY DIALYSIS NOTE    PROCEDURE    Patient seen on Hemodialysis  12/16/2024 at 11:42 AM  Access cannulated without problems  Hospitalized with syncopal episode and found to have saddle pulmonary thromboembolism along with NSTEMI for which he underwent RCA stent.  Sustained acute kidney injury prompting nephrology consultation      TREATMENT ORDERS   See dialysis flowsheet for specifics on access, blood flow rate, dialysate baths, duration of dialysis, anticoagulation and other technical information.    Dialysis Bath  K+ (Potassium): 3  Ca+ (Calcium): 2.5  Na+ (Sodium): 140  HCO3 (Bicarb): 35       INVESTIGATIONS     Last 3 CMP:    Recent Labs     12/14/24  0452 12/15/24  0442 12/15/24  1516 12/15/24  2003 12/16/24  0450    137  --   --  138   K 3.9 3.1* 3.2* 3.8 3.5*    100  --   --  99   CO2 24 22  --   --  23   BUN 69* 89*  --   --  108*   CREATININE 4.2* 4.6*  --   --  5.7*   CALCIUM 8.5* 8.0*  --   --  8.5*   BILITOT 1.0 0.9  --   --   --    ALKPHOS 161* 144*  --   --   --    AST 43 34  --   --   --    ALT 25 22  --   --   --        Last 3 CBC:  Recent Labs     12/14/24  0452 12/15/24  0444 12/16/24  0450   WBC 15.0* 15.5* 14.3*   RBC 2.45* 2.41* 2.44*   HGB 7.5* 7.4* 7.6*   HCT 24.0* 23.7* 23.8*   MCV 98.0 98.3 97.5   MCH 30.6 30.7 31.1   MCHC 31.3 31.2 31.9   RDW 17.8* 17.6* 18.2*    270 325   MPV 11.6 11.4 11.4         GFR: Estimated Creatinine Clearance: 16 mL/min (A) (based on SCr of 5.7 mg/dL (HH)).  Phosphorus:  No results for input(s): \"PHOS\" in the last 72 hours.  Magnesium:   Recent Labs     12/14/24  0452 12/15/24  0442 12/16/24  0450   MG 1.8 2.0 2.0     Albumin: No results for input(s): \"LABALBU\" in the last 72 hours.  PTH                :No results found for: \"PTH\"           MEDICATIONS     Scheduled Meds:    QUEtiapine  25 mg Oral BID    insulin glargine  10 Units SubCUTAneous Daily    amiodarone  200 mg Oral BID    metoclopramide  5 mg IntraVENous Q6H    
NEPHROLOGY PROGRESS NOTE      ASSESSMENT     Acute kidney injury secondary to ischemic ATN from hypoperfusion complicated by contrast exposure.  Creatinine peaked to 4.7 and initiated on CRRT since 4 December baseline creatinine normal  Volume overload secondary to capillary leak  Syncope secondary to V. 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  Noted to have RCA occlusion to which he underwent thrombectomy and drug-eluting stent and further investigation disclosed pulm embolism for which he underwent thrombectomy.  Was on ECMO and CBD for a brief period of time and current evaluation hemodialysis with no signs of renal recovery yet    ID input noted.  On empiric antibiotics.  Had hemodialysis yesterday.  1 kg taken off.  On mechanical ventilation.  Settings noted.    OBJECTIVE     Vitals:    12/18/24 0630 12/18/24 0700 12/18/24 0730 12/18/24 0749   BP:       Pulse: 60 55 57 58   Resp: 21 20 19 20   Temp: 99.5 °F (37.5 °C) 99.4 °F (37.4 °C) 99.4 °F (37.4 °C)    TempSrc:       SpO2: 100% 98% 100% 98%   Weight:       Height:         24HR INTAKE/OUTPUT:    Intake/Output Summary (Last 24 hours) at 12/18/2024 0914  Last data filed at 12/18/2024 0717  Gross per 24 hour   Intake 2404.84 ml   Output 3605 ml   Net -1200.16 ml       General appearance:Ain no acute distress  HEENT: PERRLA  Respiratory::vesicular breath sounds,no wheeze/crackles  Cardiovascular:S1 S2 normal,no gallop or organic murmur.  Abdomen:Non tender/non distended.Bowel sounds present  Extremities: No Cyanosis or Clubbing,Lower extremity edema  Neurological:no 
NEPHROLOGY PROGRESS NOTE      ASSESSMENT     Acute kidney injury secondary to ischemic ATN from hypoperfusion complicated by contrast exposure.  Creatinine peaked to 4.7 and initiated on CRRT since 4 December baseline creatinine normal  Volume overload secondary to capillary leak/RUBEN  Syncope secondary to V. tach/PEA arrest status post coronary revascularization with drug-eluting stent placement  Saddle thrombus pulmonary embolism status post mechanical thrombectomy  Hypoxic respiratory failure on mechanical ventilation    PLAN     HD today.Orders reviewed  Abx as per ESRD dosing  Will follow      SUBJECTIVE   Presented after witnessed collapse at home followed by multiple rounds of CPR upon arrival in the emergency department.  Noted to have RCA occlusion to which he underwent thrombectomy and drug-eluting stent and further investigation disclosed pulm embolism for which he underwent thrombectomy.  Was on ECMO and CBD for a brief period of time and current evaluation hemodialysis with no signs of renal recovery yet    Cultures negative.  On mechanical ventilation.  Settings noted.  Still continues to lack and clearances.  Will do dialysis today.  Need to get tunneled catheter  No signs of renal recovery yet    OBJECTIVE     Vitals:    12/19/24 0600 12/19/24 0800 12/19/24 0834 12/19/24 0838   BP: (!) 196/65 (!) 187/71     Pulse: 89 82 83 88   Resp: 21 20 21 20   Temp:  98.3 °F (36.8 °C)     TempSrc:  Oral     SpO2: 95%  97% 98%   Weight: 131.2 kg (289 lb 3.9 oz)      Height:         24HR INTAKE/OUTPUT:    Intake/Output Summary (Last 24 hours) at 12/19/2024 0920  Last data filed at 12/19/2024 0800  Gross per 24 hour   Intake 1638.51 ml   Output 4095 ml   Net -2456.49 ml       General appearance:Ain no acute distress  HEENT: PERRLA  Respiratory::vesicular breath sounds,no wheeze/crackles  Cardiovascular:S1 S2 normal,no gallop or organic murmur.  Abdomen:Non tender/non distended.Bowel sounds present  Extremities: No 
NEPHROLOGY PROGRESS NOTE      ASSESSMENT     Acute kidney injury secondary to ischemic ATN from hypoperfusion complicated by contrast exposure.  Creatinine peaked to 4.7 and initiated on CRRT since 4 December baseline creatinine normal.  Now on conventional hemodialysis TTS  Volume overload secondary to capillary leak/RUBEN improved  Syncope secondary to V. tach/PEA arrest status post coronary revascularization with drug-eluting stent placement  Saddle thrombus pulmonary embolism status post mechanical thrombectomy  Hypoxic respiratory failure on mechanical ventilation, now status post trach and PEG.    PLAN     Hemodialysis today.  Patient will need outpatient dialysis spot prior to discharge.  Will follow      SUBJECTIVE   Presented after witnessed collapse at home followed by multiple rounds of CPR upon arrival in the emergency department.  Noted to have RCA occlusion to which he underwent thrombectomy and drug-eluting stent and further investigation disclosed pulm embolism for which he underwent thrombectomy.  Was on ECMO and CBD for a brief period of time and current evaluation hemodialysis with no signs of renal recovery yet    HD today.  Status post trach and PEG yesterday.  Still no signs of renal recovery.  Tunneled cath was placed on 12/19/2024.    OBJECTIVE     Vitals:    12/21/24 0743 12/21/24 0800 12/21/24 0830 12/21/24 1120   BP:  (!) 145/106     Pulse: 76 79 82 87   Resp: 14 14 18 15   Temp:  100 °F (37.8 °C)     TempSrc:  Axillary     SpO2: 93% 92% 92% 94%   Weight:       Height:         24HR INTAKE/OUTPUT:    Intake/Output Summary (Last 24 hours) at 12/21/2024 1230  Last data filed at 12/21/2024 0900  Gross per 24 hour   Intake 297.27 ml   Output 2275 ml   Net -1977.73 ml       General appearance:Ain no acute distress  HEENT: PERRLA  Respiratory::vesicular breath sounds,no wheeze/crackles  Cardiovascular:S1 S2 normal,no gallop or organic murmur.  Abdomen:Non tender/non distended.Bowel sounds 
NEPHROLOGY PROGRESS NOTE      ASSESSMENT     Acute kidney injury secondary to ischemic ATN from hypoperfusion complicated by contrast exposure.  Creatinine peaked to 4.7 and initiated on CRRT since 4 December baseline creatinine normal.  Now on conventional hemodialysis TTS  Volume overload secondary to capillary leak/RUBEN improved  Syncope secondary to V. tach/PEA arrest status post coronary revascularization with drug-eluting stent placement  Saddle thrombus pulmonary embolism status post mechanical thrombectomy  Hypoxic respiratory failure on mechanical ventilation, now status post trach and PEG.    PLAN     No HD today, plan for next treatment tomorrow.   Patient will need outpatient dialysis spot prior to discharge.  Will follow      SUBJECTIVE   Presented after witnessed collapse at home followed by multiple rounds of CPR upon arrival in the emergency department.  Noted to have RCA occlusion to which he underwent thrombectomy and drug-eluting stent and further investigation disclosed pulm embolism for which he underwent thrombectomy.  Was on ECMO and CBD for a brief period of time and current evaluation hemodialysis with no signs of renal recovery yet    No need for HD today.  Status post trach and PEG 12/20.  Still no signs of renal recovery.  Tunneled cath was placed on 12/19/2024.    OBJECTIVE     Vitals:    12/22/24 0513 12/22/24 0600 12/22/24 0743 12/22/24 0800   BP:  101/79     Pulse: 85 83 94    Resp: 18 19 26    Temp:    98.5 °F (36.9 °C)   TempSrc:    Axillary   SpO2: 95% 97% 94%    Weight:       Height:         24HR INTAKE/OUTPUT:    Intake/Output Summary (Last 24 hours) at 12/22/2024 1001  Last data filed at 12/22/2024 0800  Gross per 24 hour   Intake 1039.23 ml   Output 3800 ml   Net -2760.77 ml       General appearance:Ain no acute distress  HEENT: PERRLA  Respiratory::vesicular breath sounds,no wheeze/crackles  Cardiovascular:S1 S2 normal,no gallop or organic murmur.  Abdomen:Non tender/non 
NEPHROLOGY PROGRESS NOTE      ASSESSMENT     Acute kidney injury secondary to ischemic ATN from hypoperfusion complicated by contrast exposure.  Creatinine peaked to 4.7 and initiated on CRRT since 4 December baseline creatinine normal.  Now on conventional hemodialysis TTS.  Tunneled catheter in place.  Volume overload secondary to capillary leak/RUBEN improved  Syncope secondary to V. tach/PEA arrest status post coronary revascularization with drug-eluting stent placement  Saddle thrombus pulmonary embolism status post mechanical thrombectomy  Hypoxic respiratory failure on mechanical ventilation, now status post trach and PEG.    PLAN     Hemodialysis today but patient is going to [facility] at 10:00.  Will call the unit.  He can get dialysis today or tomorrow  Watch for renal recovery.  Urine output is excellent.      SUBJECTIVE   Presented after witnessed collapse at home followed by multiple rounds of CPR upon arrival in the emergency department.  Noted to have RCA occlusion to which he underwent thrombectomy and drug-eluting stent and further investigation disclosed pulm embolism for which he underwent thrombectomy.  Was on ECMO and CBD for a brief period of time and current evaluation hemodialysis with no signs of renal recovery yet  Status post trach and PEG 12/20.  Still no signs of renal recovery.  Tunneled cath was placed on 12/19/2024.    OBJECTIVE     Vitals:    12/23/24 0516 12/23/24 0528 12/23/24 0530 12/23/24 0822   BP:       Pulse: 70  73 90   Resp: 18 23 19   Temp:       TempSrc:       SpO2: 96%  95% 96%   Weight:  125.4 kg (276 lb 7.3 oz)     Height:         24HR INTAKE/OUTPUT:    Intake/Output Summary (Last 24 hours) at 12/23/2024 0906  Last data filed at 12/23/2024 0400  Gross per 24 hour   Intake 953 ml   Output 550 ml   Net 403 ml       General appearance:Ain no acute distress  HEENT: PERRLA  Respiratory::vesicular breath sounds,no wheeze/crackles  Cardiovascular:S1 S2 normal,no gallop or organic 
[facility]   Progress Note                   Date:   12/10/2024  Patient name: Lukasz Keller  Date of admission:  12/3/2024  1:43 PM  MRN:   [id number]  YOB: 1950  PCP: Christal Jang MD      Subjective:       Patient seen and examined at bedside.  Overnight events noted.  No signs of bleeding from groin access sites. Intubated, off pressors. On IV amiodarone      Medications:   Scheduled Meds:   sodium chloride flush  5-40 mL IntraVENous 2 times per day    metoclopramide  10 mg IntraVENous Q6H    insulin lispro  0-8 Units SubCUTAneous Q6H    sodium chloride flush  10 mL IntraVENous 2 times per day    aspirin  81 mg Oral Daily    ticagrelor  90 mg Oral BID    atorvastatin  40 mg Oral Nightly    sodium chloride flush  5-40 mL IntraVENous 2 times per day    midazolam  2 mg IntraVENous Once    pantoprazole (PROTONIX) 40 mg in sodium chloride (PF) 0.9 % 10 mL injection  40 mg IntraVENous Q12H    piperacillin-tazobactam  3,375 mg IntraVENous Q8H     Continuous Infusions:   dexmedeTOMIDine      fentaNYL 100 mcg/hr (12/10/24 0629)    midazolam 6 mg/hr (12/10/24 0629)    prismaSATE BK 0/3.5 5,000 mL with potassium chloride 17.5 mEq solution 2,000 mL/hr (12/09/24 1309)    sodium chloride      sodium chloride      argatroban infusion Stopped (12/09/24 1001)    DOBUTamine Stopped (12/09/24 0801)    amiodarone 0.5 mg/min (12/10/24 0629)    dextrose      sodium chloride 10 mL/hr at 12/10/24 0629    sodium chloride Stopped (12/09/24 1833)    norepinephrine Stopped (12/08/24 0806)    VASOpressin Stopped (12/06/24 1104)    Phenylephrine Stopped (12/05/24 1309)         CBC:   Recent Labs     12/09/24  0934 12/09/24  1941 12/10/24  0413   WBC 12.7* 13.3* 12.4*   HGB 8.2* 8.6* 8.1*   PLT See Reflexed IPF Result 73* 80*     BMP:    Recent Labs     12/09/24  0934 12/09/24  1941 12/10/24  0413    137 137   K 3.8 3.7 3.7    101 102   CO2 23 22 24   BUN 32* 39* 47*   CREATININE 1.9* 2.2* 
[facility]   Progress Note                   Date:   12/11/2024  Patient name: Lukasz Keller  Date of admission:  12/3/2024  1:43 PM  MRN:   [id number]  YOB: 1950  PCP: Christal Jang MD    Reason for Admission:     Subjective:       Patient seen and examined at bedside.  Overnight events noted.  Intubated, sedated, off pressors, febrile overnight      Medications:   Scheduled Meds:   piperacillin-tazobactam  3,375 mg IntraVENous Q12H    furosemide  80 mg IntraVENous BID    sodium chloride flush  5-40 mL IntraVENous 2 times per day    metoclopramide  10 mg IntraVENous Q6H    insulin lispro  0-8 Units SubCUTAneous Q6H    sodium chloride flush  10 mL IntraVENous 2 times per day    aspirin  81 mg Oral Daily    ticagrelor  90 mg Oral BID    atorvastatin  40 mg Oral Nightly    sodium chloride flush  5-40 mL IntraVENous 2 times per day    midazolam  2 mg IntraVENous Once    pantoprazole (PROTONIX) 40 mg in sodium chloride (PF) 0.9 % 10 mL injection  40 mg IntraVENous Q12H     Continuous Infusions:   dexmedeTOMIDine 0.2 mcg/kg/hr (12/11/24 0656)    fentaNYL 75 mcg/hr (12/11/24 0656)    midazolam Stopped (12/10/24 1458)    heparin (PORCINE) Infusion 8 Units/kg/hr (12/11/24 0656)    norepinephrine 6 mcg/min (12/11/24 0656)    prismaSATE BK 0/3.5 5,000 mL with potassium chloride 17.5 mEq solution 2,000 mL/hr (12/09/24 1309)    sodium chloride      sodium chloride      amiodarone 0.5 mg/min (12/11/24 0656)    dextrose      sodium chloride 10 mL/hr at 12/11/24 0656    sodium chloride Stopped (12/09/24 1833)    VASOpressin Stopped (12/06/24 1104)         CBC:   Recent Labs     12/09/24  1941 12/10/24  0413 12/11/24  0320   WBC 13.3* 12.4* 13.4*   HGB 8.6* 8.1* 7.7*   PLT 73* 80* 99*     BMP:    Recent Labs     12/09/24  1941 12/10/24  0413 12/11/24  0320    137 137   K 3.7 3.7 3.6*    102 101   CO2 22 24 25   BUN 39* 47* 56*   CREATININE 2.2* 2.7* 3.6*   GLUCOSE 186* 177* 234* 
[facility]   Progress Note                   Date:   12/13/2024  Patient name: Lukasz Keller  Date of admission:  12/3/2024  1:43 PM  MRN:   [id number]  YOB: 1950  PCP: Christal Jang MD    Reason for Admission:     Subjective:       Patient seen and examined at bedside.  Overnight events noted. Normotensive, afebrile, high vent FiO2 requirements persistent, -1.6L yesterday with CRRT. On tube feeds.      Medications:   Scheduled Meds:   potassium chloride  20 mEq IntraVENous Q1H    insulin glargine  5 Units SubCUTAneous Daily    amiodarone  200 mg Oral BID    metoclopramide  5 mg IntraVENous Q6H    ampicillin-sulbactam  1,500 mg IntraVENous Q12H    [START ON 12/15/2024] levofloxacin  500 mg IntraVENous Q48H    midodrine  10 mg Oral TID WC    furosemide  80 mg IntraVENous BID    sodium chloride flush  5-40 mL IntraVENous 2 times per day    insulin lispro  0-8 Units SubCUTAneous Q6H    sodium chloride flush  10 mL IntraVENous 2 times per day    aspirin  81 mg Oral Daily    ticagrelor  90 mg Oral BID    atorvastatin  40 mg Oral Nightly    sodium chloride flush  5-40 mL IntraVENous 2 times per day    midazolam  2 mg IntraVENous Once    pantoprazole (PROTONIX) 40 mg in sodium chloride (PF) 0.9 % 10 mL injection  40 mg IntraVENous Q12H     Continuous Infusions:   dexmedeTOMIDine 0.2 mcg/kg/hr (12/13/24 0541)    fentaNYL 75 mcg/hr (12/13/24 0413)    heparin (PORCINE) Infusion 8 Units/kg/hr (12/13/24 0413)    norepinephrine Stopped (12/11/24 1030)    sodium chloride      sodium chloride 10 mL/hr at 12/13/24 0413    dextrose      sodium chloride 10 mL/hr at 12/11/24 2001    sodium chloride 10 mL/hr at 12/13/24 0413    VASOpressin Stopped (12/06/24 1104)         CBC:   Recent Labs     12/11/24 0320 12/12/24  0546 12/13/24  0545   WBC 13.4* 9.7 11.7*   HGB 7.7* 7.3* 7.4*   PLT 99* 120* 171     BMP:    Recent Labs     12/11/24 0320 12/12/24 0546 12/13/24  0521 12/13/24  0545    
[facility]   Progress Note                   Date:   12/15/2024  Patient name: Lukasz Keller  Date of admission:  12/3/2024  1:43 PM  MRN:   [id number]  YOB: 1950  PCP: Christal Jang MD      Subjective:       Patient seen and examined at bedside.  Overnight events noted.  High vent requirements. Stable cardiac wise. Possible trach planned.       Medications:   Scheduled Meds:   QUEtiapine  25 mg Oral BID    insulin glargine  10 Units SubCUTAneous Daily    amiodarone  200 mg Oral BID    metoclopramide  5 mg IntraVENous Q6H    ampicillin-sulbactam  1,500 mg IntraVENous Q12H    levofloxacin  500 mg IntraVENous Q48H    midodrine  10 mg Oral TID WC    furosemide  80 mg IntraVENous BID    sodium chloride flush  5-40 mL IntraVENous 2 times per day    insulin lispro  0-8 Units SubCUTAneous Q6H    sodium chloride flush  10 mL IntraVENous 2 times per day    aspirin  81 mg Oral Daily    ticagrelor  90 mg Oral BID    atorvastatin  40 mg Oral Nightly    sodium chloride flush  5-40 mL IntraVENous 2 times per day    midazolam  2 mg IntraVENous Once    pantoprazole (PROTONIX) 40 mg in sodium chloride (PF) 0.9 % 10 mL injection  40 mg IntraVENous Q12H     Continuous Infusions:   dexmedeTOMIDine Stopped (12/15/24 1156)    fentaNYL 100 mcg/hr (12/15/24 1156)    heparin (PORCINE) Infusion 12 Units/kg/hr (12/15/24 1156)    norepinephrine Stopped (12/15/24 0750)    sodium chloride 10 mL/hr at 12/15/24 1156    dextrose      sodium chloride Stopped (12/13/24 1258)         CBC:   Recent Labs     12/13/24  0545 12/14/24  0452 12/15/24  0444   WBC 11.7* 15.0* 15.5*   HGB 7.4* 7.5* 7.4*    228 270     BMP:    Recent Labs     12/13/24  0545 12/14/24  0452 12/15/24  0442    139 137   K 3.1* 3.9 3.1*    102 100   CO2 26 24 22   BUN 73* 69* 89*   CREATININE 4.1* 4.2* 4.6*   GLUCOSE 236* 211* 269*     Hepatic:   Recent Labs     12/13/24  0545 12/14/24  0452 12/15/24  0442   AST 45 43 34 
[facility]   Progress Note                   Date:   12/17/2024  Patient name: Lukasz Keller  Date of admission:  12/3/2024  1:43 PM  MRN:   [id number]  YOB: 1950  PCP: Christal Jang MD    Reason for Admission:     Subjective:       Patient seen and examined at bedside.  Overnight events noted.      Medications:   Scheduled Meds:   insulin lispro  0-16 Units SubCUTAneous 4x Daily AC & HS    insulin glargine  10 Units SubCUTAneous BID    QUEtiapine  25 mg Oral BID    amiodarone  200 mg Oral BID    ampicillin-sulbactam  1,500 mg IntraVENous Q12H    levofloxacin  500 mg IntraVENous Q48H    midodrine  10 mg Oral TID WC    furosemide  80 mg IntraVENous BID    sodium chloride flush  5-40 mL IntraVENous 2 times per day    aspirin  81 mg Oral Daily    ticagrelor  90 mg Oral BID    atorvastatin  40 mg Oral Nightly    midazolam  2 mg IntraVENous Once    pantoprazole (PROTONIX) 40 mg in sodium chloride (PF) 0.9 % 10 mL injection  40 mg IntraVENous Q12H     Continuous Infusions:   dexmedeTOMIDine 0.5 mcg/kg/hr (12/17/24 1655)    fentaNYL 100 mcg/hr (12/17/24 1655)    heparin (PORCINE) Infusion 12 Units/kg/hr (12/17/24 1655)    norepinephrine Stopped (12/15/24 0750)    sodium chloride 10 mL/hr at 12/17/24 1655    dextrose      sodium chloride Stopped (12/13/24 1258)         CBC:   Recent Labs     12/15/24  0444 12/16/24  0450 12/17/24  0418   WBC 15.5* 14.3* 11.9*   HGB 7.4* 7.6* 7.9*    325 346     BMP:    Recent Labs     12/15/24  0442 12/15/24  1516 12/15/24  2003 12/16/24  0450 12/17/24  0418     --   --  138 136   K 3.1*   < > 3.8 3.5* 3.8     --   --  99 97*   CO2 22  --   --  23 26   BUN 89*  --   --  108* 73*   CREATININE 4.6*  --   --  5.7* 4.1*   GLUCOSE 269*  --   --  233* 246*    < > = values in this interval not displayed.     Hepatic:   Recent Labs     12/15/24  0442   AST 34   ALT 22   BILITOT 0.9   ALKPHOS 144*          Lab Results   Component Value Date 
[facility]   Progress Note                   Date:   12/19/2024  Patient name: Lukasz Keller  Date of admission:  12/3/2024  1:43 PM  MRN:   [id number]  YOB: 1950  PCP: Christal Jang MD    Reason for Admission:     Subjective:       Patient seen and examined at bedside.  Overnight events noted.  Doing well from the cardiac standpoint. Stable on 40%FiO2, febrile overnight. Normotensive, heparin on hold.      Medications:   Scheduled Meds:   levETIRAcetam  500 mg IntraVENous Q12H    insulin glargine  20 Units SubCUTAneous BID    lansoprazole  30 mg Per NG tube QAM AC    insulin lispro  0-16 Units SubCUTAneous 4x Daily AC & HS    QUEtiapine  25 mg Oral BID    amiodarone  200 mg Oral BID    ampicillin-sulbactam  1,500 mg IntraVENous Q12H    levofloxacin  500 mg IntraVENous Q48H    midodrine  10 mg Oral TID WC    furosemide  80 mg IntraVENous BID    sodium chloride flush  5-40 mL IntraVENous 2 times per day    aspirin  81 mg Oral Daily    ticagrelor  90 mg Oral BID    atorvastatin  40 mg Oral Nightly     Continuous Infusions:   dexmedeTOMIDine Stopped (12/19/24 0438)    fentaNYL 75 mcg/hr (12/19/24 0653)    heparin (PORCINE) Infusion Stopped (12/19/24 0015)    dextrose      sodium chloride Stopped (12/13/24 1258)         CBC:   Recent Labs     12/17/24  0418 12/18/24  0436 12/19/24  0307   WBC 11.9* 13.0* 12.7*   HGB 7.9* 7.8* 7.8*    335 390     BMP:    Recent Labs     12/18/24  0436 12/18/24  1127 12/19/24  0307   * 136 135*   K 3.5* 3.6* 3.7   CL 96* 96* 94*   CO2 24 24 24   BUN 74* 82* 91*   CREATININE 4.1* 4.4* 4.6*   GLUCOSE 251* 239* 230*         Objective:   Vitals: BP (!) 196/65   Pulse 89   Temp (!) 100.7 °F (38.2 °C)   Resp 21   Ht 1.74 m (5' 8.5\")   Wt 131.2 kg (289 lb 3.9 oz)   SpO2 95%   BMI 43.33 kg/m²       General appearance: Intubated, responsive  HEENT: Normocephalic, atraumatic   Neck: no carotid bruit, no JVD, trachea midline and thyroid not 
[facility]   Progress Note                   Date:   12/20/2024  Patient name: Lukasz Keller  Date of admission:  12/3/2024  1:43 PM  MRN:   [id number]  YOB: 1950  PCP: Christal Jang MD    Subjective:       Patient seen and examined at bedside.  Overnight events noted.  Overnight events noted.  Doing well from the cardiac standpoint. Stable on 40%FiO2       Medications:   Scheduled Meds:   levETIRAcetam  500 mg IntraVENous Q12H    insulin glargine  20 Units SubCUTAneous BID    lansoprazole  30 mg Per NG tube QAM AC    insulin lispro  0-16 Units SubCUTAneous 4x Daily AC & HS    QUEtiapine  25 mg Oral BID    amiodarone  200 mg Oral BID    midodrine  10 mg Oral TID WC    furosemide  80 mg IntraVENous BID    sodium chloride flush  5-40 mL IntraVENous 2 times per day    aspirin  81 mg Oral Daily    atorvastatin  40 mg Oral Nightly     Continuous Infusions:   cangrelor (KENGREAL) 50 mg in sodium chloride 0.9 % 250 mL infusion 0.75 mcg/kg/min (12/20/24 0353)    dexmedeTOMIDine Stopped (12/19/24 0438)    fentaNYL 100 mcg/hr (12/19/24 2043)    dextrose      sodium chloride 10 mL/hr at 12/20/24 0207         CBC:   Recent Labs     12/18/24  0436 12/19/24  0307   WBC 13.0* 12.7*   HGB 7.8* 7.8*    390     BMP:    Recent Labs     12/18/24  1127 12/19/24  0307 12/20/24  0451    135* 135*   K 3.6* 3.7 3.7   CL 96* 94* 96*   CO2 24 24 25   BUN 82* 91* 52*   CREATININE 4.4* 4.6* 3.3*   GLUCOSE 239* 230* 180*       Objective:   Vitals: BP (!) 176/61   Pulse 70   Temp 98.7 °F (37.1 °C) (Axillary)   Resp (!) 0   Ht 1.74 m (5' 8.5\")   Wt 129 kg (284 lb 6.3 oz)   SpO2 93%   BMI 42.61 kg/m²       General appearance: Intubated, responsive  HEENT: Normocephalic, atraumatic   Neck: no carotid bruit, no JVD, trachea midline and thyroid not enlarged  Lungs: clear to auscultation bilaterally  Heart: regular rate and rhythm, S1, S2 normal, no murmur  Abdomen: soft, bowel sounds 
[facility]   Progress Note                   Date:   12/20/2024  Patient name: Lukasz Keller  Date of admission:  12/3/2024  1:43 PM  MRN:   [id number]  YOB: 1950  PCP: Christal Jang MD    Subjective:       Patient seen and examined at bedside: No acute issues overnight, NSR's 76, /55, on mechanical ventilation through tracheostomy      Medications:   Scheduled Meds:   levETIRAcetam  500 mg IntraVENous Q12H    insulin glargine  20 Units SubCUTAneous BID    lansoprazole  30 mg Per NG tube QAM AC    insulin lispro  0-16 Units SubCUTAneous 4x Daily AC & HS    QUEtiapine  25 mg Oral BID    amiodarone  200 mg Oral BID    midodrine  10 mg Oral TID WC    furosemide  80 mg IntraVENous BID    sodium chloride flush  5-40 mL IntraVENous 2 times per day    aspirin  81 mg Oral Daily    atorvastatin  40 mg Oral Nightly     Continuous Infusions:   cangrelor (KENGREAL) 50 mg in sodium chloride 0.9 % 250 mL infusion 0.75 mcg/kg/min (12/20/24 0718)    dexmedeTOMIDine Stopped (12/19/24 0438)    fentaNYL 175 mcg/hr (12/20/24 0718)    dextrose      sodium chloride 10 mL/hr at 12/20/24 0718         CBC:   Recent Labs     12/18/24  0436 12/19/24  0307 12/20/24  0653   WBC 13.0* 12.7* 10.6   HGB 7.8* 7.8* 8.2*    390 344     BMP:    Recent Labs     12/18/24  1127 12/19/24  0307 12/20/24  0451    135* 135*   K 3.6* 3.7 3.7   CL 96* 94* 96*   CO2 24 24 25   BUN 82* 91* 52*   CREATININE 4.4* 4.6* 3.3*   GLUCOSE 239* 230* 180*       Objective:   Vitals: BP (!) 193/136   Pulse 72   Temp 99.5 °F (37.5 °C) (Axillary)   Resp 20   Ht 1.74 m (5' 8.5\")   Wt 129 kg (284 lb 6.3 oz)   SpO2 95%   BMI 42.61 kg/m²       General appearance: Intubated, responsive  HEENT: Normocephalic, atraumatic   Neck: no carotid bruit, no JVD, trachea midline and thyroid not enlarged  Lungs: clear to auscultation bilaterally  Heart: regular rate and rhythm, S1, S2 normal, no murmur  Abdomen: soft, bowel 
[facility]   Progress Note                   Date:   12/23/2024  Patient name: Lukasz Keller  Date of admission:  12/3/2024  1:43 PM  MRN:   [id number]  YOB: 1950  PCP: Christal Jang MD    Subjective:       Patient seen and examined at bedside.  Overnight events noted.  Doing well from the cardiac standpoint. NSR, on tracheostomy.      Medications:   Scheduled Meds:   levETIRAcetam  500 mg PEG Tube Once per day on Tuesday Thursday Saturday    levETIRAcetam  500 mg PEG Tube Daily    apixaban  10 mg Oral BID    Followed by    [START ON 12/28/2024] apixaban  5 mg Oral BID    oxyCODONE  10 mg Oral Q6H    ticagrelor  90 mg Oral BID    insulin glargine  20 Units SubCUTAneous BID    lansoprazole  30 mg Per NG tube QAM AC    insulin lispro  0-16 Units SubCUTAneous 4x Daily AC & HS    QUEtiapine  25 mg Oral BID    amiodarone  200 mg Oral BID    midodrine  10 mg Oral TID WC    furosemide  80 mg IntraVENous BID    sodium chloride flush  5-40 mL IntraVENous 2 times per day    aspirin  81 mg Oral Daily    atorvastatin  40 mg Oral Nightly     Continuous Infusions:   fentaNYL Stopped (12/21/24 1610)    dextrose      sodium chloride Stopped (12/22/24 1631)         CBC:   Recent Labs     12/20/24  1158 12/21/24  0947 12/22/24  0737   WBC 12.8* 10.2 12.8*   HGB 8.8* 8.4* 8.9*    346 388     BMP:    Recent Labs     12/20/24  1158 12/21/24  0947 12/22/24  0737   * 138 134*   K 3.8 3.8 3.7   CL 94* 97* 94*   CO2 25 25 25   BUN 58* 77* 49*   CREATININE 3.3* 4.6* 3.4*   GLUCOSE 165* 177* 185*     Objective:   Vitals: /61   Pulse 73   Temp 98.8 °F (37.1 °C) (Axillary)   Resp 23   Ht 1.74 m (5' 8.5\")   Wt 125.4 kg (276 lb 7.3 oz)   SpO2 95%   BMI 41.42 kg/m²       General appearance: on tracheostomy, responsive  HEENT: Normocephalic, atraumatic   Neck: no carotid bruit, no JVD, trachea midline and thyroid not enlarged  Lungs: clear to auscultation bilaterally  Heart: regular 
[facility]   Progress Note                   Date:   12/7/2024  Patient name: Lukasz Keller  Date of admission:  12/3/2024  1:43 PM  MRN:   [id number]  YOB: 1950  PCP: Christal Jang MD    Subjective:       Patient seen and examined at bedside.  No acute events overnight.  Intubated, sedated and on mechanical ventilation.  On VA ECMO.  IABP and [place] in place.  Imaging studies and labs reviewed during the rounds.  Currently on Levophed and dobutamine.  Maintaining sinus rhythm with IV amiodarone.  Anticoagulated with argatroban.    Medications:   Scheduled Meds:   vancomycin  1,250 mg IntraVENous Q18H    potassium chloride  20 mEq IntraVENous Once    heparin (PF)  3 mL IntraVENous Daily    [Held by provider] heparin (PF)  3 mL IntraVENous Daily    [Held by provider] heparin (PF)  3 mL IntraVENous Daily    insulin regular  1-20 Units IntraVENous Once    sodium chloride flush  10 mL IntraVENous 2 times per day    aspirin  81 mg Oral Daily    ticagrelor  90 mg Oral BID    atorvastatin  40 mg Oral Nightly    sodium chloride flush  5-40 mL IntraVENous 2 times per day    midazolam  2 mg IntraVENous Once    pantoprazole (PROTONIX) 40 mg in sodium chloride (PF) 0.9 % 10 mL injection  40 mg IntraVENous Q12H    piperacillin-tazobactam  3,375 mg IntraVENous Q8H    vancomycin (VANCOCIN) intermittent dosing (placeholder)   Other RX Placeholder     Continuous Infusions:   argatroban infusion 0.25 mcg/kg/min (12/07/24 1258)    sodium chloride      sodium chloride      prismaSATE BK 0/3.5 5,000 mL with potassium chloride 15 mEq solution 2,000 mL/hr (12/07/24 0622)    DOBUTamine 2.5 mcg/kg/min (12/07/24 1258)    amiodarone 0.5 mg/min (12/07/24 1314)    midazolam 4 mg/hr (12/07/24 1258)    fentaNYL 150 mcg/hr (12/07/24 1258)    dexmedeTOMIDine 0.4 mcg/kg/hr (12/07/24 1258)    dextrose      insulin 6.5 Units/hr (12/07/24 1258)    sodium chloride 25 mL/hr at 12/07/24 1303    sodium chloride Stopped 
[facility]   Progress Note                   Date:   12/9/2024  Patient name: Lukasz Keller  Date of admission:  12/3/2024  1:43 PM  MRN:   [id number]  YOB: 1950  PCP: Christal Jang MD      Subjective:       Patient seen and examined at bedside.  Overnight events noted.        Medications:   Scheduled Meds:   insulin lispro  0-8 Units SubCUTAneous Q6H    heparin (PF)  3 mL IntraVENous Daily    [Held by provider] heparin (PF)  3 mL IntraVENous Daily    [Held by provider] heparin (PF)  3 mL IntraVENous Daily    sodium chloride flush  10 mL IntraVENous 2 times per day    aspirin  81 mg Oral Daily    ticagrelor  90 mg Oral BID    atorvastatin  40 mg Oral Nightly    sodium chloride flush  5-40 mL IntraVENous 2 times per day    midazolam  2 mg IntraVENous Once    pantoprazole (PROTONIX) 40 mg in sodium chloride (PF) 0.9 % 10 mL injection  40 mg IntraVENous Q12H    piperacillin-tazobactam  3,375 mg IntraVENous Q8H    vancomycin (VANCOCIN) intermittent dosing (placeholder)   Other RX Placeholder     Continuous Infusions:   sodium chloride      argatroban infusion 0.49 mcg/kg/min (12/08/24 2158)    sodium chloride      prismaSATE BK 0/3.5 5,000 mL with potassium chloride 15 mEq solution 2,000 mL/hr (12/08/24 2342)    DOBUTamine 2.5 mcg/kg/min (12/09/24 0523)    amiodarone 0.5 mg/min (12/08/24 2027)    midazolam 4 mg/hr (12/09/24 0632)    fentaNYL 100 mcg/hr (12/08/24 1858)    dexmedeTOMIDine Stopped (12/08/24 0400)    dextrose      sodium chloride 25 mL/hr at 12/08/24 1858    sodium chloride Stopped (12/08/24 1826)    norepinephrine Stopped (12/08/24 0806)    VASOpressin Stopped (12/06/24 1104)    Phenylephrine Stopped (12/05/24 1309)         CBC:   Recent Labs     12/08/24  1619 12/08/24 2117 12/09/24  0313   WBC 11.4* 11.9* 12.7*   HGB 8.6* 8.6* 8.3*   PLT See Reflexed IPF Result See Reflexed IPF Result See Reflexed IPF Result     BMP:    Recent Labs     12/08/24  1618 
Notified Cardiology of INR trending up, most recent result 4.7. Message was read.     Electronically signed by Manisha Meyer RN on 12/7/2024 at 4:53 AM              
PHARMACY NOTE:    The electrolyte replacement protocol for potassium/magnesium has been discontinued per P&T guidelines because the patient has reduced renal function - on CRRT.      The patient's most recent potassium & magnesium levels are:  Recent Labs     12/05/24  1604 12/05/24 2024 12/06/24  0237   K 3.7 3.6* 3.9   MG 1.5* 1.7 1.7     Estimated Creatinine Clearance: 78 mL/min (based on SCr of 1.2 mg/dL).    For patients with decreased renal function - on CRRT needing potassium/magnesium supplementation, please order individual bolus doses with appropriate monitoring.      Please contact the inpatient pharmacy with any concerns.  Thank you.  Lyubov Machado, DivyaD, BCCCP 12/6/2024 12:11 PM     
Patient was cannulated V/A ECMO with a 25 FR/Lt fem vein and 17 FR/RT fem artery in Cath Lab by Mac. Placement performed and verified under fluoroscopy. Patient on ECMO at 1745. Procedure tolerated by patient well without incident.                           ECMO End of Shift Note:       Cannulation Date/Time:  12/4/2024 @ 1745     ECMO type:  V/A     Cannula size/location:  25 FR / Lt fem vein     17 FR / Rt fem artery with 6 FR SFA reperf     Current Flows:  3.1 LPM     Current FiO2:  100     Current Sweep:  4 LPM     Delta Pressure:  11     Clot [place]:         Oxygenator:  none          Circuit:  none     Anticoagulation:  Heprarin     Products Given:         PRBCs:  0       PLTs:  0       FFP:  0       Cryo:  0       Albumin:  0      
Per Dr. Pérez request- IM team sent message about request to adjust ATB coverage to less nephrotoxic agents. Perfectserve message sent to On-Call Internal Medicine resident. Awaiting response at this time.   
Per orders to keep INR below 4.0. writer is aware argatroban may falsely elevate INR, however writer informed Dr. Grijalva INR is 4.1 per order set. Writer also addressed patients potassium of 3.6 Writer wanted to clarify if Cardiology wants to manage electrolytes or have nephrology. Per response, prefer nephrology's input, writer updated bedside nurse.   
Physical Therapy  Facility/Department: [facility] CAR 3- MICU  Physical Therapy Initial Assessment    Name: Lukasz Keller  : 1950  MRN: [id number]  Date of Service: 2024    Patient presented to ER via EMS with cardiac arrest after a witnessed collapse at home. EKG with inferior STEs. Required CPR a total of 20-25 minutes (including with EMS, ER and en-route to cath lab). Required defibrillation x1 with EMS. Patient was referred for emergent coronary angiography and possible PCI.     Per wife patient was having chest pain and not feeling well since last Tuesday. He is a former smoker, smoked almost 40-50 years.  Pt underwent PERCUTANEOUS BILATERAL PULMONARY THROMBECTOMY 24   Place on balloon pump ; pt also had to be placed on CVVHD and ECMO this hospital stay  Pt underwent tunneled catheter placement ; trach/PEG .     Discharge Recommendations:  Further therapy recommended at discharge.        PT Equipment Recommendations  Equipment Needed: No (defer equipment to rehab facility)      Patient Diagnosis(es): The primary encounter diagnosis was Stented coronary artery. Diagnoses of STEMI (ST elevation myocardial infarction) (HCC), Acute coronary syndrome (HCC), ST elevation myocardial infarction involving right coronary artery (HCC), Cardiac arrest, Cardiogenic shock, ST elevation myocardial infarction (STEMI), unspecified artery (HCC), Goals of care, counseling/discussion, and RUBEN (acute kidney injury) ([facility]) were also pertinent to this visit.  Past Medical History:  has a past medical history of Abnormal EKG, Acute respiratory failure, Anesthesia complication, Anxiety, Cancer (HCC), Colon polyps, Hx of blood clots, Hyperlipidemia, Prostate CA (HCC), Pulmonary emboli (HCC), Pulmonary emboli (HCC), SOB (shortness of breath) on exertion, STEMI (ST elevation myocardial infarction) (HCC), Wears partial dentures, and Wellness examination.  Past Surgical History:  has a past surgical history that 
Problem: OXYGENATION/RESPIRATORY FUNCTION  Goal: Patient will maintain patent airway  Outcome: Ongoing  Goal: Patient will achieve/maintain normal respiratory rate/effort  Respiratory rate and effort will be within normal limits for the patient  Outcome: Ongoing    Problem: MECHANICAL VENTILATION  Goal: Patient will maintain patent airway  Outcome: Ongoing  Goal: Oral health is maintained or improved  Outcome: Ongoing  Goal: ET tube will be managed safely  Outcome: Ongoing  Goal: Ability to express needs and understand communication  Outcome: Ongoing  Goal: Mobility/activity is maintained at optimum level for patient  Outcome: Ongoing    Problem: ASPIRATION PRECAUTIONS  Goal: Patient’s risk of aspiration is minimized  Outcome: Ongoing    Problem: SKIN INTEGRITY  Goal: Skin integrity is maintained or improved  Outcome: Ongoing                    
R Fem CVC bleeding through multiple dressings, Cardiology fellow made aware, Dr. Rothman to bedside. Dr. Rothman hubbed the CVC, which reduced bleeding, but did not stop the bleed. RN instructed to make MD aware if CVC continues to bleed. Dressing changed. 30 minutes later, dressing was saturated once again, Dr. Rothman made aware at bedside. Changed with quickclot and CVC dressing kit. Bleeding slowed but continues, IM resident made aware, no new orders.   
Radiology informed RN of Bilateral Large PE seen on scans. RN notified Cardiology physician Dr. Smith. No new orders at this time.   
Reached out to Dr. Grijalva to double check if patient needs to be NPO at midnight, and if argatroban or any other medications need held in the morning. Per Dr. Grijalva continue everything over night including tube feeds, and argatroban.  
Renal Progress Note    Patient :  Lukasz Keller; 74 y.o. MRN# [id number]  Location:  1014/1014-01  Attending:  Christiano Smith MD  Admit Date:  12/3/2024   Hospital Day: 10    Patient seen in dialysis  Subjective:     Urine output continues to be good responding to IV Lasix.  Continues on heparin, fentanyl and tube feeds total volume about 64 mL an hour.  Off all pressors.  Continues to be requiring ventilator support FiO2 70% unable to wean.  Chest x-ray showing increased vascular congestion.  Requiring almost daily dialysis.  ProAmatine continues.  Unable to maintain a negative fluid balance  This morning showed sodium 139 potassium 3.1 chloride 101 bicarb 26 BUN 73 creatinine 4.1           Renal workup reviewed.  Renal ultrasound demonstrates right kidney measuring 10.8 cm, left kidney measuring 12.4 cm, kidneys demonstrate normal cortical echogenicity with no evidence of hydronephrosis or intrarenal stones.    Urine studies reviewed, UA demonstrates 1+ glucose, small amount of ketones, specific gravity 1.049, 1+ protein, trace leukocytes, and RBCs too numerous to count.  Urine chloride 44, urine sodium 54, UPC 0.50    Serologies reviewed, no M spike on SPEP, free light chain ratio 1.49, complements within normal limits, hepatitis panel nonreactive.      Brief History reviewed.  Patient presented to ED on 12/3/2024 after witnessed collapse at home.  Patient underwent multiple rounds of CPR both via EMS and upon arrival to ED.  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  Patient was placed on VA ECMO during the afternoon of 12/4/2024.  CVVHD was initiated overnight on 12/4/2024.  Balloon pump was placed 12/5/2024.  He was taken off ECMO, balloon pump and CRRT on 12/9.  Transitioned to hemodialysis with first dialysis treatment on 12/10, done 
Renal Progress Note    Patient :  Lukasz Keller; 74 y.o. MRN# [id number]  Location:  1014/1014-01  Attending:  Christiano Smith MD  Admit Date:  12/3/2024   Hospital Day: 4    Subjective:       Patient seen and examined on CVVHD.  Patient has also been initiated on VA ECMO as of 12/4, remains sedated and intubated, on nitrous oxide and balloon pump, requiring pressor support x 2, required Levophed to be increased this morning and vasopressin at 0.03 units/min as well as dobutamine at 5 mcg/kg/min.  Patient is currently on 3K bath at 2 L an hour with UF -50ml/hr and blood flow rate of 200.    Urine output documented at 430 mL over the past 12 hours, remains net +8.8 L since admission.    Sodium 130, potassium 3.5, chloride 105, bicarb 24, creatinine 1.6, calcium 7.7, hemoglobin 8.6    Renal workup reviewed.  Renal ultrasound demonstrates right kidney measuring 10.8 cm, left kidney measuring 12.4 cm, kidneys demonstrate normal cortical echogenicity with no evidence of hydronephrosis or intrarenal stones.    Urine studies reviewed, UA demonstrates 1+ glucose, small amount of ketones, specific gravity 1.049, 1+ protein, trace leukocytes, and RBCs too numerous to count.  Urine chloride 44, urine sodium 54, UPC 0.50    Serologies reviewed, no M spike on SPEP, free light chain ratio 1.49, complements within normal limits, hepatitis panel nonreactive.    Brief History reviewed.  Patient presented to ED on 12/3/2024 after witnessed collapse at home.  Patient underwent multiple rounds of CPR both via EMS and upon arrival to ED.  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    Nephrology was consulted for hyperkalemia and oliguric RUBEN.    Patient was placed on VA ECMO during the afternoon of 12/4/2024.    CVVHD was initiated overnight on 
Renal Progress Note    Patient :  Lukasz Keller; 74 y.o. MRN# [id number]  Location:  1014/1014-01  Attending:  Christiano Smith MD  Admit Date:  12/3/2024   Hospital Day: 9    Patient seen in dialysis  Subjective:       Patient seen and examined at bedside.  Chart and results reviewed. No acute events reported overnight.  He is currently off pressors this morning hemodynamically stable.  Artline blood pressure measurements with systolics around 100-110.on midodrine 10 TID. Follows commands    Lab this morning with sodium 139, potassium 3.1, chloride 101, bicarb 25, creatinine peaked from 3.6-4.7 this morning.  BUN at 81.  Lactate 1.3.    He continues to be on tube feeds.Urine output documented at 2.6L cc over 24 hours.  Patient remains net +700cc  since admission.   On Lasix 80 IV BID.   X ray on 12/11 shows pulmonary edema and small bilateral pleural effusions.     He was taken off ECMO, balloon pump and CRRT on 12/9.  Patient underwent first hemodialysis treatment 12/10, ran for 3 hours with 1.5 L fluid removal via right FEM temp cath.  Dialysis documentation states HD treatment was concluded 30 minutes early due to sluggish lines and lack of flow.  CathFlo to HD lumens 12/11 due to sluggish lines and lack of flow during first HD treatment.     He is currently on meropenem.       Renal workup reviewed.  Renal ultrasound demonstrates right kidney measuring 10.8 cm, left kidney measuring 12.4 cm, kidneys demonstrate normal cortical echogenicity with no evidence of hydronephrosis or intrarenal stones.    Urine studies reviewed, UA demonstrates 1+ glucose, small amount of ketones, specific gravity 1.049, 1+ protein, trace leukocytes, and RBCs too numerous to count.  Urine chloride 44, urine sodium 54, UPC 0.50    Serologies reviewed, no M spike on SPEP, free light chain ratio 1.49, complements within normal limits, hepatitis panel nonreactive.      Brief History reviewed.  Patient presented to ED on 12/3/2024 after 
Renal Progress Note    Patient :  Lukasz Keller; 74 y.o. MRN# [id number]  Location:  1014/1014-01  Attending:  Christiano Smith MD  Admit Date:  12/3/2024   Hospital Day: 2    Subjective:       Patient seen and examined on CVVHD.  Patient has also been initiated on VA ECMO, remains sedated and intubated, requiring pressor support x 3, Levophed at 6 mcg/min, Rahul-Synephrine 150 mcg/min, vasopressin at 0.03 units/min..  Patient is currently on 2K bath at 2 L an hour with UF of net even and blood flow rate of 200.     Urine output documented at 1095 mL over the past 12 hours, remains net +7.5 L since admission.    Labs reviewed.  Recent Labs     12/04/24  1950 12/04/24 2218 12/05/24  0351    141 141   K 4.5 4.1 4.0   * 109* 110*   CO2 20 19* 20   BUN 19 19 17   CREATININE 1.6* 1.5* 1.5*   GLUCOSE 234* 222* 184*   CALCIUM 5.5* 6.2* 6.7*     Renal workup reviewed.  Renal ultrasound demonstrates right kidney measuring 10.8 cm, left kidney measuring 12.4 cm, kidneys demonstrate normal cortical echogenicity with no evidence of hydronephrosis or intrarenal stones.    Urine studies reviewed, UA demonstrates 1+ glucose, small amount of ketones, specific gravity 1.049, 1+ protein, trace leukocytes, and RBCs too numerous to count.  Urine chloride 44, urine sodium 54, UPC 0.50    Serologies reviewed, SPEP pending, free light chain ratio 1.49, complements within normal limits, hepatitis panel nonreactive.    Brief History reviewed.  Patient presented to ED on 12/3/2024 after witnessed collapse at home.  Patient underwent multiple rounds of CPR both via EMS and upon arrival to ED.  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    Nephrology was consulted for hyperkalemia and oliguric RUBEN.    Patient was placed on VA ECMO during the afternoon 
Renal Progress Note    Patient :  Lukasz Keller; 74 y.o. MRN# [id number]  Location:  1014/1014-01  Attending:  Christiano Smith MD  Admit Date:  12/3/2024   Hospital Day: 6    Patient seen on CVVHD  Subjective:     Patient seen and examined at bedside, remains on CVVHD at time of exam, tolerating treatment well.  Currently running 3K bath at 2 L an hour with blood flow rate of 200 and net -100 mL/h.  Patient also still remains on VA ECMO, balloon pump with 2-1 augmentation, ventilator, and nitrous oxide.  Dobutamine was placed on hold shortly before time of exam.  Pressors are off.  Plan for evaluation in the ER to see if he can be decannulated versus switch to VV ECMO.  Jones catheter in place urine output 30 to 40 mL an hour.  Continues on IV antibiotics including vancomycin and Zosyn.  Cultures negative.  Off all pressors    Urine output documented at 373 cc over 24 hours.  Patient remains net +4.4 L since admission.  Weight claros he still about 10 to 12 kg positive.  Currently on sedation although does follow commands.  Intra-aortic balloon pump continues.  Chest x-ray showing increasing vascular congestion and elevated infiltrates compared to before  Repeat echocardiogram on 12/7/2024 showed ejection fraction of 50%, which is a significant improvement from 20% few days earlier and likely from stunned myocardium    Labs reviewed.  Recent Labs     12/08/24  1619 12/08/24  2117 12/09/24  0313    138 139   K 3.8 3.8 3.6*    103 103   CO2 24 24 24   BUN 31* 33* 33*   CREATININE 2.0* 2.1* 2.0*   GLUCOSE 163* 167* 149*   CALCIUM 8.6 9.0 9.0   ABG showed pH 7.45 pCO2 39 pO2 90 HCO3 27    Renal workup reviewed.  Renal ultrasound demonstrates right kidney measuring 10.8 cm, left kidney measuring 12.4 cm, kidneys demonstrate normal cortical echogenicity with no evidence of hydronephrosis or intrarenal stones.    Urine studies reviewed, UA demonstrates 1+ glucose, small amount of ketones, specific gravity 1.049, 
Renal Progress Note    Patient :  Lukasz Keller; 74 y.o. MRN# [id number]  Location:  1014/1014-01  Attending:  Christiano Smith MD  Admit Date:  12/3/2024   Hospital Day: 5    Subjective:       Patient seen and examined on CVVHD.  Patient has also been initiated on ECMO as of 12/4, remains sedated and intubated, on nitrous oxide and balloon pump, requiring pressor support x 2, required Levophed, vasopressin at 0.03 units/min as well as dobutamine at 5 mcg/kg/min.  Patient is currently on 3K bath at 2 L an hour with UF -50ml/hr and blood flow rate of 200.    Overnight the filter clotted and he had a big bowel movement so he was off treatment for part of the night still a few 100 mL fluid were removed rather than 600 that would have been expected.  He has had 650 mL urine output is net +7.7 L since admission    Sodium is 139, potassium 3.5, chloride 105, bicarb 25, creatinine 2.0, calcium 7.9, magnesium 2.0     Renal workup reviewed.  Renal ultrasound demonstrates right kidney measuring 10.8 cm, left kidney measuring 12.4 cm, kidneys demonstrate normal cortical echogenicity with no evidence of hydronephrosis or intrarenal stones.    Urine studies reviewed, UA demonstrates 1+ glucose, small amount of ketones, specific gravity 1.049, 1+ protein, trace leukocytes, and RBCs too numerous to count.  Urine chloride 44, urine sodium 54, UPC 0.50    Serologies reviewed, no M spike on SPEP, free light chain ratio 1.49, complements within normal limits, hepatitis panel nonreactive.    Brief History reviewed.  Patient presented to ED on 12/3/2024 after witnessed collapse at home.  Patient underwent multiple rounds of CPR both via EMS and upon arrival to ED.  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  Patient was placed on VA ECMO during the 
[facility]   Pharmacy Pharmacokinetic Monitoring Service - Vancomycin    Consulting Provider: Dr. Miriam Jones   Indication: sepsis  Target Concentration: Dosing based on anticipated concentration <15 mg/L due to renal impairment/insufficiency  Day of Therapy: 2  Additional Antimicrobials: zosyn     Pertinent Laboratory Values:   Wt Readings from Last 1 Encounters:   12/04/24 (!) 140.3 kg (309 lb 4.9 oz)     Temp Readings from Last 1 Encounters:   12/04/24 (!) 100.9 °F (38.3 °C)     Estimated Creatinine Clearance: 57 mL/min (A) (based on SCr of 1.6 mg/dL (H)).  Recent Labs     12/03/24  2203 12/04/24  0105 12/04/24  0345 12/04/24  0425 12/04/24  1057   CREATININE  --    < > 1.6* 1.4* 1.6*   BUN  --   --  20  --  19   WBC 16.9*  --  14.2*  --   --     < > = values in this interval not displayed.       Pertinent Cultures:  Culture Date Source Results   12/3 Blood x2 No growth   12/4 MRSA swab Ordered      Assessment:  Date/Time Current Dose Concentration Timing of Concentration (h)   12/4 1353 Dosing per levels 6.6 ~18 h after last dose   Note: Serum concentrations collected for AUC dosing may appear elevated if collected in close proximity to the dose administered, this is not necessarily an indication of toxicity    Plan:  SCr elevated at 1.6 (baseline ~1), urine output stable but reduced; nephrology following - might start CVVHD in the next 24 hours   Vancomycin level 6.6 mcg/ml, collected ~18 h after last dose  Based on level assessment will order Vancomycin 1250 mg IV x1, and continue to dose per levels for now     Pharmacy will continue to monitor patient and adjust therapy as indicated    Thank you for the consult,  Lyubov Guillen, PharmD, 12/4/2024 3:10 PM     
[facility]   Pharmacy Pharmacokinetic Monitoring Service - Vancomycin    Consulting Provider: Miriam Jones    Indication: sepsis  Target Concentration:  400-600 AUC  Day of Therapy: 3  Additional Antimicrobials: Zosyn    Pertinent Laboratory Values:   Wt Readings from Last 1 Encounters:   12/05/24 (!) 140.2 kg (309 lb)     Temp Readings from Last 1 Encounters:   12/05/24 98.4 °F (36.9 °C)     Recent Labs     12/05/24  0351 12/05/24  0902 12/05/24  0905   CREATININE 1.5* 1.3*  --    BUN 17 17  --    WBC 7.1  --  7.0     Procalcitonin: NA    MRSA Nasal Swab: was ordered by provider, awaiting results.    Recent vancomycin administrations                     vancomycin (VANCOCIN) 1,250 mg in sodium chloride 0.9 % 250 mL IVPB ([id number]) (mg) 1,250 mg New Bag 12/04/24 2200    vancomycin (VANCOCIN) 1,250 mg in sodium chloride 0.9 % IVPB ([id number]) (mg) 1,250 mg New Bag 12/03/24 2028                    Assessment:  Date/Time Current Dose Concentration Dialysis Session   12/5 0902 Per levels 9.1 CRRT     Plan:  Concentration-guided dosing due to renal impairment and CRRT  Current dosing regimen per levels  re-dosing based on level    Vancomycin 1500 mg  once now  Vancomycin concentration ordered for 12/6 @ 0600  Pharmacy will continue to monitor patient and adjust therapy as indicated    Thank you for the consult,SUSAN Stroud [facility]  12/5/2024 11:46 AM   
[facility]   Pharmacy Pharmacokinetic Monitoring Service - Vancomycin    Consulting Provider: Dr. Miriam Jones   Indication: sepsis  Target Concentration: Dosing based on anticipated concentration <15 mg/L due to renal impairment/insufficiency  Day of Therapy: 6  Additional Antimicrobials: zosyn     Pertinent Laboratory Values:   Wt Readings from Last 1 Encounters:   12/08/24 (!) 148.4 kg (327 lb 2.6 oz)     Temp Readings from Last 1 Encounters:   12/08/24 98.4 °F (36.9 °C)     Estimated Creatinine Clearance: 47 mL/min (A) (based on SCr of 2 mg/dL (H)).  Recent Labs     12/08/24  0309 12/08/24  1017   CREATININE 2.0* 2.0*   BUN 33* 31*   WBC 8.8 9.7       Pertinent Cultures:  Culture Date Source Results   12/3 Blood x2 No growth   12/4 MRSA swab Ordered      Assessment:  Date/Time Current Dose Concentration Timing of Concentration (h)   12/08 1000 Dosing per levels 20.1 ~16 h after last dose   Note: Serum concentrations collected for AUC dosing may appear elevated if collected in close proximity to the dose administered, this is not necessarily an indication of toxicity    Plan:  Continues on CVVHD (2 L/h) - no planned changes to rate (only UF was increased), urine output ~0.1 ml/kg/hr  Vancomycin random level 20.1 mcg/ml, collected ~16 h after last dose  Based on level assessment will hold off on further doses until repeat level at 2200  Pharmacy will continue to monitor patient and adjust therapy as indicated    Thank you for the consult,  Lyubov Guillen, PharmD, 12/8/2024 2:30 PM     
[facility]   Pharmacy Pharmacokinetic Monitoring Service - Vancomycin    Consulting Provider: Miriam Jones    Indication: sepsis  Target Concentration: Goal trough of 10-15 mg/L and AUC/TETE <500 mg*hr/L  Day of Therapy: 5  Additional Antimicrobials: Zosyn    Pertinent Laboratory Values:   Wt Readings from Last 1 Encounters:   12/08/24 (!) 148.4 kg (327 lb 2.6 oz)     Temp Readings from Last 1 Encounters:   12/08/24 98.6 °F (37 °C)     Estimated Creatinine Clearance: 45 mL/min (A) (based on SCr of 2.1 mg/dL (H)).  Recent Labs     12/08/24  1619 12/08/24  2117   CREATININE 2.0* 2.1*   BUN 31* 33*   WBC 11.4* 11.9*       Recent vancomycin administrations                     vancomycin (VANCOCIN) 1,250 mg in sodium chloride 0.9 % 250 mL IVPB ([id number]) (mg) 1,250 mg New Bag 12/07/24 1821    vancomycin (VANCOCIN) 1,500 mg in sodium chloride 0.9 % 250 mL IVPB ([id number]) (mg) 1,500 mg New Bag 12/07/24 0052    vancomycin (VANCOCIN) 1,500 mg in sodium chloride 0.9 % 250 mL IVPB ([id number]) (mg) 1,500 mg New Bag 12/06/24 0759                    Assessment:  Date/Time Current Dose Concentration Timing of Concentration (h) AUC   12/8/24 1250 mg  15.2 ~27 hrs    Note: Serum concentrations collected for AUC dosing may appear elevated if collected in close proximity to the dose administered, this is not necessarily an indication of toxicity    Plan:  Will give 1250 mg for one dose.  Pharmacy will continue to monitor patient and adjust therapy as indicated    Thank you for the consult,  Lavon St RPH  12/8/2024 10:27 PM      
[facility]   Pharmacy Pharmacokinetic Monitoring Service - Vancomycin     Lukasz Keller is a 74 y.o. male starting on vancomycin therapy for blood stream infection. Pharmacy consulted by Nilson Grijalva for monitoring and adjustment.    Target Concentration: Goal AUC/TETE 400-600 mg*hr/L    Additional Antimicrobials: Zosyn    Pertinent Laboratory Values:   Wt Readings from Last 1 Encounters:   12/03/24 135.6 kg (298 lb 15.1 oz)     Temp Readings from Last 1 Encounters:   12/03/24 98.6 °F (37 °C)     Estimated Creatinine Clearance: 50 mL/min (A) (based on SCr of 1.8 mg/dL (H)).  Recent Labs     12/03/24  1354 12/03/24  1656   CREATININE 1.2* 1.8*   BUN  --  20   WBC  --  20.6*     Procalcitonin:     Pertinent Cultures:  Culture Date Source Results   12/3/24 blood pending   MRSA Nasal Swab: N/A. Non-respiratory infection.    Plan:  Dosing recommendations based on Bayesian software  Start vancomycin 1250 mg q24h  Anticipated AUC of 465 and trough concentration of 14.6 at steady state  Renal labs as indicated   Vancomycin concentration ordered TBD  Pharmacy will continue to monitor patient and adjust therapy as indicated    Thank you for the consult,    Teo Lopez, DivyaD, [facility]  12/3/2024 8:13 PM    
[facility]   Pharmacy Pharmacokinetic Monitoring Service - Vancomycin    Consulting Provider: Dr. Miriam Jones   Indication: sepsis  Target Concentration: Dosing based on anticipated concentration <15 mg/L due to renal impairment/insufficiency  Day of Therapy: 7  Additional Antimicrobials: zosyn     Pertinent Laboratory Values:   Wt Readings from Last 1 Encounters:   12/08/24 (!) 148.4 kg (327 lb 2.6 oz)     Temp Readings from Last 1 Encounters:   12/09/24 98.8 °F (37.1 °C) (Bladder)     Estimated Creatinine Clearance: 50 mL/min (A) (based on SCr of 1.9 mg/dL (H)).  Recent Labs     12/09/24  0313 12/09/24  0934   CREATININE 2.0* 1.9*   BUN 33* 32*   WBC 12.7* 12.7*       Pertinent Cultures:  Culture Date Source Results   12/3 Blood x2 No growth   12/4 MRSA swab Negative   12/8 Respiratory No growth       Plan:  Continues on CVVHD (2 L/h) - no planned changes to rate, urine output ~0.1 ml/kg/hr  Based on prior levels and time of last dose will HOLD off on Vancomycin doses today, plan for repeat level tomorrow at 0400  Pharmacy will continue to monitor patient and adjust therapy as indicated    Thank you for the consult,  Lyubov Guillen, PharmD, 12/9/2024 12:19 PM     
[facility]   Pharmacy Pharmacokinetic Monitoring Service - Vancomycin    Consulting Provider: Dr. Miriam Jones   Indication: sepsis  Target Concentration: Dosing based on anticipated concentration <15 mg/L due to renal impairment/insufficiency  Day of Therapy: 5  Additional Antimicrobials: zosyn     Pertinent Laboratory Values:   Wt Readings from Last 1 Encounters:   12/07/24 (!) 150.6 kg (332 lb 0.2 oz)     Temp Readings from Last 1 Encounters:   12/07/24 98.4 °F (36.9 °C)     Estimated Creatinine Clearance: 59 mL/min (A) (based on SCr of 1.6 mg/dL (H)).  Recent Labs     12/06/24 2200 12/06/24 2201 12/07/24  0308   CREATININE 1.5*  --  1.6*   BUN 22  --  24*   WBC  --  6.9 7.3       Pertinent Cultures:  Culture Date Source Results   12/3 Blood x2 No growth   12/4 MRSA swab Ordered      Assessment:  Date/Time Current Dose Concentration Timing of Concentration (h)   12/6 0300 Dosing per levels 10.8 ~14 h after last dose   12/6 2200 Dosing per levels 15.1 ~14 h after last dose   Note: Serum concentrations collected for AUC dosing may appear elevated if collected in close proximity to the dose administered, this is not necessarily an indication of toxicity    Plan:  Continues on CVVHD (2 L/h) - no planned changes to rate, urine output ~0.1-0.2 ml/kg/hr  Based on levels and dose requirements will order Vancomycin 1250 mg IV q18h starting at 1800  Repeat level planned for tomorrow   Pharmacy will continue to monitor patient and adjust therapy as indicated    Thank you for the consult,  Lyubov Guillen, PharmD, 12/7/2024 9:24 AM   
[facility]   Pharmacy Pharmacokinetic Monitoring Service - Vancomycin    Consulting Provider: Miriam Jones    Indication: sepsis  Target Concentration: Goal AUC/TETE 400-600 mg*hr/L  Day of Therapy: 4  Additional Antimicrobials: Zosyn    Pertinent Laboratory Values:   Wt Readings from Last 1 Encounters:   12/06/24 (!) 147.7 kg (325 lb 9.9 oz)     Temp Readings from Last 1 Encounters:   12/06/24 98.6 °F (37 °C)     Estimated Creatinine Clearance: 63 mL/min (A) (based on SCr of 1.5 mg/dL (H)).  Recent Labs     12/06/24  1512 12/06/24  2200 12/06/24  2201   CREATININE 1.3* 1.5*  --    BUN 18 22  --    WBC 6.8  --  6.9         Recent vancomycin administrations                     vancomycin (VANCOCIN) 1,500 mg in sodium chloride 0.9 % 250 mL IVPB ([id number]) (mg) 1,500 mg New Bag 12/06/24 0759    vancomycin (VANCOCIN) 1,500 mg in sodium chloride 0.9 % 250 mL IVPB ([id number]) (mg) 1,500 mg New Bag 12/05/24 1232    vancomycin (VANCOCIN) 1,250 mg in sodium chloride 0.9 % 250 mL IVPB ([id number]) (mg) 1,250 mg New Bag 12/04/24 2200                    Assessment:  Date/Time Current Dose Concentration Timing of Concentration (h) AUC   12/6/24 1500 mg 15.1 14 h    Note: Serum concentrations collected for AUC dosing may appear elevated if collected in close proximity to the dose administered, this is not necessarily an indication of toxicity    Plan:  Will give 1500 mg for one dose.  Pharmacy will continue to monitor patient and adjust therapy as indicated    Thank you for the consult,  Lavon St RPH  12/6/2024 10:55 PM    
[facility]   Pharmacy Pharmacokinetic Monitoring Service - Vancomycin    Consulting Provider: Dr. Miriam Jones   Indication: sepsis  Target Concentration: Dosing based on anticipated concentration <15 mg/L due to renal impairment/insufficiency  Day of Therapy: 4  Additional Antimicrobials: zosyn     Pertinent Laboratory Values:   Wt Readings from Last 1 Encounters:   12/06/24 (!) 147.7 kg (325 lb 9.9 oz)     Temp Readings from Last 1 Encounters:   12/06/24 98.4 °F (36.9 °C) (Bladder)     Estimated Creatinine Clearance: 78 mL/min (based on SCr of 1.2 mg/dL).  Recent Labs     12/05/24 2024 12/05/24 2026 12/06/24  0237   CREATININE 1.2  --  1.2   BUN 15  --  16   WBC  --  4.3 4.6       Pertinent Cultures:  Culture Date Source Results   12/3 Blood x2 No growth   12/4 MRSA swab Ordered      Assessment:  Date/Time Current Dose Concentration Timing of Concentration (h)   12/6 0300 Dosing per levels 10.8 ~14 h after last dose   Note: Serum concentrations collected for AUC dosing may appear elevated if collected in close proximity to the dose administered, this is not necessarily an indication of toxicity    Plan:  Continues on CRRT   Vancomycin level 10.8 mcg/ml, collected ~14 h after last dose  Based on level assessment will order Vancomycin 1500 mg IV x1, and continue to dose per levels for now     Pharmacy will continue to monitor patient and adjust therapy as indicated    Thank you for the consult,  Lyubov Guillen, PharmD, 12/6/2024 7:16 AM       
Spoke to to Dr Lay and Dr Stone while on the unit about increased INR and PAPs. Continue to monitor for now.     Electronically signed by Manisha Meyer RN on 12/7/2024 at 4:55 AM    
Spoke with Dr. Bee regarding low PL count.     Orders:  1 unit PL to be given now.  
[facility] Pt[facility] was transported to IR and back. Assisting with the transport was RN,RRT.  Appropriate devices were applied to monitor the patient's condition during transport. Patient transported  via 40% O2 via ventilator. Patient tolerated well.        RADHA CARRILLO RCP  2:31 PM  
The tube was secured with an endotracheal tube martins.   The endotracheal tube was moved and the skin assessed. Skin assessment revealed no problems. Endotracheal tube was taped at the  25 cm duke.  Oral gastric tube was retaped to the endotracheal tube.  Endotracheal tube martins was applied on December 17. Action steps for any skin breakdown: 0    LV ULLOA RCP  3:54 PM  
Wanted cardiology to be aware patients lactic acid has slightly trended up from 2.2 to 2.5. Also informed Dr. Grijalva patients SVO2 on cardiohelp is reading 90.8, however swan box SVO2 is reading 60. Writer received no new orders.   
Writer called Dr. Hubbard to inform him of narrowed pulse pressure, increasing presser requirements, dropped PAP, and decreased CI.  Dr. Hubbard at bedside to evaluate.  See new orders.  STAT ECHO performed.  Dr. Hubbard places R radial art line.  Advises to continue to monitor, and if nursing staff is having increasing presser support, to call him for an IABP placement.      Electronically signed by Boby Gallardo RN on 12/5/2024 at 3:13 AM   
12/06/24  0445   WBC 5.3  --  4.3 4.6  --    HGB 7.9*   < > 8.3* 7.9* 8.5*   PLT See Reflexed IPF Result  --  See Reflexed IPF Result See Reflexed IPF Result  --     < > = values in this interval not displayed.     BMP:    Recent Labs     12/05/24  1604 12/05/24 2024 12/06/24  0237    141 140   K 3.7 3.6* 3.9   * 108* 107   CO2 21 22 23   BUN 15 15 16   CREATININE 1.2 1.2 1.2   GLUCOSE 142* 165* 167*     Hepatic:   Recent Labs     12/04/24  2218 12/05/24  0902 12/05/24 2024   * 128* 84*   * 116* 75*   BILITOT 0.7 1.0 2.1*   ALKPHOS 49 55 46          Lab Results   Component Value Date    HDL 38 (L) 02/14/2024    LDL 70 02/14/2024    CHOLHDLRATIO 3.5 02/14/2024      INR:   Recent Labs     12/05/24  1604 12/05/24 2026 12/06/24  0237   INR 2.5 2.3 1.9       Objective:   Vitals: BP (!) 142/42   Pulse 71   Temp 98.4 °F (36.9 °C)   Resp (!) 7   Ht 1.74 m (5' 8.5\")   Wt (!) 147.7 kg (325 lb 9.9 oz)   SpO2 95%   BMI 48.78 kg/m²       General appearance: Sedated, intubated  HEENT: Normocephalic, atraumatic   Neck: no carotid bruit, no JVD, trachea midline and thyroid not enlarged  Lungs: clear to auscultation bilaterally  Heart: iregular rate and rhythm,   Abdomen: soft, bowel sounds normal  Extremities: no edema or swelling       EKG:   Results for orders placed or performed during the hospital encounter of 12/03/24   EKG 12 Lead   Result Value Ref Range    Ventricular Rate 100 BPM    Atrial Rate 117 BPM    QRS Duration 92 ms    Q-T Interval 394 ms    QTc Calculation (Bazett) 508 ms    R Axis -21 degrees    T Axis 59 degrees    Narrative    Atrial fibrillation  Inferior-posterior infarct (cited on or before 05-MAR-2020)  Prolonged QT  Abnormal ECG  When compared with ECG of 05-DEC-2024 15:03,  Atrial fibrillation has replaced Sinus rhythm       Echo:    No results found for this or any previous visit.    12/03/24    ECHO (TTE) LIMITED (PRN CONTRAST/BUBBLE/STRAIN/3D) 12/05/2024  8:22 AM 
Balloon pump was placed 12/5/2024.  He was taken off ECMO, balloon pump and CRRT on 12/9.  Transitioned to hemodialysis with first dialysis treatment on 12/10, done for 3 hours with around 1.5 L removed via right femoral temp cath.  Baseline creatinine 0.8-0.9  Outpatient Medications:     Medications Prior to Admission: [DISCONTINUED] phentermine (ADIPEX-P) 37.5 MG tablet, Take 1 tablet by mouth every morning (before breakfast) for 30 days. Max Daily Amount: 37.5 mg  simvastatin (ZOCOR) 40 MG tablet, Take 1 tablet by mouth nightly  aspirin 81 MG EC tablet, Take 1 tablet by mouth 2 times daily  meloxicam (MOBIC) 15 MG tablet, Take 1 tablet by mouth every morning  NONFORMULARY, Take 1 caplet by mouth daily Takes over the counter medication daily for weight loss.  Incontinence Supply Disposable (DISPOSABLE BRIEF LARGE) MISC, 10 briefs per day  calcium carbonate 600 MG TABS tablet, Take 1 tablet by mouth daily as needed  traZODone (DESYREL) 150 MG tablet, 1 tablet nightly  clonazePAM (KLONOPIN) 2 MG tablet, TAKE 1 TABLET BY MOUTH TWICE DAILY  buPROPion (WELLBUTRIN SR) 200 MG extended release tablet, Take 1 tablet by mouth 2 times daily  Multiple Vitamins-Minerals (MULTIVITAMIN ADULT PO), Take 1 capsule by mouth    Current Medications:     Scheduled Meds:    insulin glargine  10 Units SubCUTAneous Daily    amiodarone  200 mg Oral BID    metoclopramide  5 mg IntraVENous Q6H    ampicillin-sulbactam  1,500 mg IntraVENous Q12H    [START ON 12/15/2024] levofloxacin  500 mg IntraVENous Q48H    midodrine  10 mg Oral TID WC    furosemide  80 mg IntraVENous BID    sodium chloride flush  5-40 mL IntraVENous 2 times per day    insulin lispro  0-8 Units SubCUTAneous Q6H    sodium chloride flush  10 mL IntraVENous 2 times per day    aspirin  81 mg Oral Daily    ticagrelor  90 mg Oral BID    atorvastatin  40 mg Oral Nightly    sodium chloride flush  5-40 mL IntraVENous 2 times per day    midazolam  2 mg IntraVENous Once    
Clubbing,Lower extremity edema  Neurological:no def  MEDICATIONS     Scheduled Meds:    levETIRAcetam  500 mg IntraVENous Q12H    insulin glargine  20 Units SubCUTAneous BID    lansoprazole  30 mg Per NG tube QAM AC    insulin lispro  0-16 Units SubCUTAneous 4x Daily AC & HS    QUEtiapine  25 mg Oral BID    amiodarone  200 mg Oral BID    midodrine  10 mg Oral TID WC    furosemide  80 mg IntraVENous BID    sodium chloride flush  5-40 mL IntraVENous 2 times per day    aspirin  81 mg Oral Daily    atorvastatin  40 mg Oral Nightly     Continuous Infusions:    cangrelor (KENGREAL) 50 mg in sodium chloride 0.9 % 250 mL infusion 0.75 mcg/kg/min (12/20/24 0718)    dexmedeTOMIDine Stopped (12/19/24 0438)    fentaNYL 175 mcg/hr (12/20/24 0718)    dextrose      sodium chloride 10 mL/hr at 12/20/24 0718     PRN Meds:  sodium chloride, heparin (porcine), heparin (porcine), fentanNYL **OR** fentanNYL, fluticasone, heparin (porcine), heparin (porcine), acetaminophen, dextrose bolus **OR** dextrose bolus, glucagon (rDNA), dextrose, sodium chloride, promethazine **OR** ondansetron, polyethylene glycol, sodium chloride flush, dextrose  Home Meds:                Medications Prior to Admission: [DISCONTINUED] phentermine (ADIPEX-P) 37.5 MG tablet, Take 1 tablet by mouth every morning (before breakfast) for 30 days. Max Daily Amount: 37.5 mg  simvastatin (ZOCOR) 40 MG tablet, Take 1 tablet by mouth nightly  aspirin 81 MG EC tablet, Take 1 tablet by mouth 2 times daily  meloxicam (MOBIC) 15 MG tablet, Take 1 tablet by mouth every morning  NONFORMULARY, Take 1 caplet by mouth daily Takes over the counter medication daily for weight loss.  Incontinence Supply Disposable (DISPOSABLE BRIEF LARGE) MISC, 10 briefs per day  calcium carbonate 600 MG TABS tablet, Take 1 tablet by mouth daily as needed  traZODone (DESYREL) 150 MG tablet, 1 tablet nightly  clonazePAM (KLONOPIN) 2 MG tablet, TAKE 1 TABLET BY MOUTH TWICE DAILY  buPROPion (WELLBUTRIN 
Intubated, responsive  HEENT: Normocephalic, atraumatic   Neck: no carotid bruit, no JVD, trachea midline and thyroid not enlarged  Lungs: clear to auscultation bilaterally  Heart: regular rate and rhythm, S1, S2 normal, no murmur  Abdomen: soft, bowel sounds normal  Extremities: no edema or swelling       EKG:   Results for orders placed or performed during the hospital encounter of 12/03/24   EKG 12 Lead   Result Value Ref Range    Ventricular Rate 48 BPM    Atrial Rate 48 BPM    P-R Interval 184 ms    QRS Duration 98 ms    Q-T Interval 470 ms    QTc Calculation (Bazett) 419 ms    P Axis 23 degrees    R Axis 4 degrees    T Axis -11 degrees    Narrative    Sinus bradycardia  Inferior-posterior infarct (cited on or before 05-MAR-2020)  Abnormal ECG  When compared with ECG of 12-DEC-2024 09:40,  Nonspecific T wave abnormality no longer evident in Anterolateral leads       12/03/24    ECHO (TTE) LIMITED (PRN CONTRAST/BUBBLE/STRAIN/3D) 12/09/2024  6:37 PM (Final)    Interpretation Summary    Left Ventricle: Definity administered to optimize images.  Normal left ventricular systolic function. EF by visual approximation is 55%.    Right Ventricle: Right ventricle is dilated. Right ventricular systolic function appears normal.    Pericardium: Small anterior pericardial effusion vs. fat pad.    Image quality is technically difficult. Contrast used: Definity.    Signed by: Brennan Lay DO on 12/9/2024  6:37 PM        12/03/24    CARDIAC PROCEDURE 12/09/2024  8:09 PM (Final)  INVASIVE VASCULAR PROCEDURE 12/09/2024  8:09 PM (Final)    Conclusion    Successful removal of peripheral VA ECMO cannulas from the right femoral artery and the left femoral vein, hemostasis achieved on the arterial side with Perclose x 2 as well as balloon tamponade in the iliac artery.  While in the venous system it was achieved using mattress suture x 2.    Successful removal of a 6 [place] reperfusion sheath from the right superficial femoral artery, 
Meds:    insulin lispro  0-16 Units SubCUTAneous 4x Daily AC & HS    insulin glargine  10 Units SubCUTAneous BID    QUEtiapine  25 mg Oral BID    amiodarone  200 mg Oral BID    metoclopramide  5 mg IntraVENous Q6H    ampicillin-sulbactam  1,500 mg IntraVENous Q12H    levofloxacin  500 mg IntraVENous Q48H    midodrine  10 mg Oral TID WC    furosemide  80 mg IntraVENous BID    sodium chloride flush  5-40 mL IntraVENous 2 times per day    aspirin  81 mg Oral Daily    ticagrelor  90 mg Oral BID    atorvastatin  40 mg Oral Nightly    midazolam  2 mg IntraVENous Once    pantoprazole (PROTONIX) 40 mg in sodium chloride (PF) 0.9 % 10 mL injection  40 mg IntraVENous Q12H     Continuous Infusions:    dexmedeTOMIDine 0.5 mcg/kg/hr (12/17/24 0856)    fentaNYL 50 mcg/hr (12/17/24 0856)    heparin (PORCINE) Infusion 12 Units/kg/hr (12/17/24 0856)    norepinephrine Stopped (12/15/24 0750)    sodium chloride 10 mL/hr at 12/17/24 0856    dextrose      sodium chloride Stopped (12/13/24 1258)     PRN Meds:  fluticasone, heparin (porcine), heparin (porcine), heparin (porcine), heparin (porcine), sodium chloride flush, sodium chloride, fentanNYL **OR** fentanNYL, acetaminophen, dextrose bolus **OR** dextrose bolus, glucagon (rDNA), dextrose, sodium chloride, promethazine **OR** ondansetron, polyethylene glycol, sodium chloride flush, dextrose  Home Meds:                Medications Prior to Admission: [DISCONTINUED] phentermine (ADIPEX-P) 37.5 MG tablet, Take 1 tablet by mouth every morning (before breakfast) for 30 days. Max Daily Amount: 37.5 mg  simvastatin (ZOCOR) 40 MG tablet, Take 1 tablet by mouth nightly  aspirin 81 MG EC tablet, Take 1 tablet by mouth 2 times daily  meloxicam (MOBIC) 15 MG tablet, Take 1 tablet by mouth every morning  NONFORMULARY, Take 1 caplet by mouth daily Takes over the counter medication daily for weight loss.  Incontinence Supply Disposable (DISPOSABLE BRIEF LARGE) MISC, 10 briefs per day  calcium 
bradycardic lost pulse, received atropine and epi then again lost pulse.  Had an emergent cardiac cath with rescue PCI with thrombectomy of the proximal to mid right CAD using 2 drug-eluting stents    After the cardiac cath CT revealed bilateral pulmonary emboli with right heart strain    EF 65 to 70% on echo, sclerosis of the aortic valve cusp and hyperdynamic left ventricle    Requiring very high oxygen, saturation 82%, started on ECMO 12/4  Levophed pulmonary ongoing  Insulin drip  Argatroban infusion with platelets of 66    Finally white count on the rise 13, chest x-ray showing bilateral pneumonia right more than the left, infectious disease consulted, procalcitonin elevated 6.37  Creatinine 2.2, patient on CVVHD    12/9 - off the ECMO  and off the aortic balloon   12/10 no fever -secretions small - nog paralyzed - follows commands- wakes up - no paralytics     Interval changes  12/15/2024   Patient Vitals for the past 8 hrs:   BP Temp Temp src Pulse Resp SpO2   12/15/24 1930 -- 99.2 °F (37.3 °C) -- 54 20 95 %   12/15/24 1915 -- 99.2 °F (37.3 °C) -- 54 21 95 %   12/15/24 1900 -- 99.3 °F (37.4 °C) -- 55 21 95 %   12/15/24 1845 -- 99.4 °F (37.4 °C) -- 58 21 96 %   12/15/24 1841 -- 99.4 °F (37.4 °C) -- 58 25 97 %   12/15/24 1830 -- 99.5 °F (37.5 °C) -- 58 27 97 %   12/15/24 1815 -- 99.3 °F (37.4 °C) -- 59 19 95 %   12/15/24 1800 -- 99.3 °F (37.4 °C) -- 63 16 96 %   12/15/24 1745 -- 99.3 °F (37.4 °C) -- 53 22 95 %   12/15/24 1730 -- 99.4 °F (37.4 °C) -- 53 21 95 %   12/15/24 1715 -- 99.5 °F (37.5 °C) -- 57 21 96 %   12/15/24 1700 -- 99.6 °F (37.6 °C) -- 54 18 95 %   12/15/24 1645 -- 99.6 °F (37.6 °C) -- 59 19 96 %   12/15/24 1630 -- 99.6 °F (37.6 °C) -- 55 11 95 %   12/15/24 1615 -- 99.5 °F (37.5 °C) -- 53 20 92 %   12/15/24 1600 (!) 101/43 99.5 °F (37.5 °C) Bladder 58 22 (!) 89 %   12/15/24 1549 -- 99.5 °F (37.5 °C) -- 54 23 92 %   12/15/24 1545 -- 99.5 °F (37.5 °C) -- 60 27 91 %   12/15/24 1537 -- 99.5 °F (37.5 
pulmonary ongoing  Insulin drip  Argatroban infusion with platelets of 66    Finally white count on the rise 13, chest x-ray showing bilateral pneumonia right more than the left, infectious disease consulted, procalcitonin elevated 6.37  Creatinine 2.2, patient on CVVHD    12/9 - off the ECMO  and off the aortic balloon   12/10 no fever -secretions small - nog paralyzed - follows commands- wakes up - no paralytics     Interval changes  12/11/2024   Patient Vitals for the past 8 hrs:   BP Temp Temp src Pulse Resp SpO2 Weight   12/11/24 1118 -- -- -- 65 21 95 % --   12/11/24 1000 (!) 106/49 (!) 100.8 °F (38.2 °C) Bladder 57 20 99 % --   12/11/24 0923 -- -- -- 57 20 99 % --   12/11/24 0900 (!) 95/43 -- -- 56 20 98 % --   12/11/24 0819 -- -- -- 69 19 98 % --   12/11/24 0807 -- -- -- 65 (!) 32 96 % --   12/11/24 0800 (!) 139/50 (!) 100.6 °F (38.1 °C) Bladder 64 (!) 32 97 % --   12/11/24 0645 -- -- -- 61 -- 96 % --   12/11/24 0630 (!) 118/43 -- -- 61 -- 96 % --   12/11/24 0615 (!) 115/45 -- -- 61 -- 95 % --   12/11/24 0600 (!) 127/48 100.1 °F (37.8 °C) Axillary 64 -- 96 % --   12/11/24 0545 (!) 112/45 -- -- 62 -- 96 % --   12/11/24 0530 (!) 136/53 -- -- 67 -- 96 % --   12/11/24 0515 (!) 137/47 -- -- 66 -- 96 % --   12/11/24 0511 -- -- -- -- -- 96 % --   12/11/24 0500 (!) 132/48 -- -- 68 -- 96 % --   12/11/24 0445 (!) 118/52 -- -- 61 -- 98 % --   12/11/24 0443 -- -- -- -- -- -- (!) 141.2 kg (311 lb 4.6 oz)   12/9 - off the ECMO  and off the aortic balloon     12/10 no fever -secretions small - nog paralyzed - follows commands- wakes up - no paralytics   Getting full HD - Fio2 70 and  N2O2 off  disc w wife and RN on bedside      12/11  100.8 fever  W 13  BC still neg 12/11  CXR p congestion same to improved  Sp cx many GNR pend ID   On the vent      Summary of relevant labs:  Labs:  Procalcitonin 6.37  WBC 13  Platelets 66  Lactic acid 1.4  Creatinine 2.2  Micro:  Sputum culture 12/8 normal cayetano  Blood culture 12/3 
restriction from antiplatelet and anticoagulation standpoint  General Surgery team will sign out at this time. Please contact with any questions or concerns            Chief Complaint: \"none\"    SUBJECTIVE    Seen and examined. VSS. History limited as orally intubated but denies any pain. Nods and shakes head. POD#1 after trach and PEG tube placement.   PEG tube at 9 cm duke       OBJECTIVE  VITALS: Temp: Temp: 100 °F (37.8 °C)Temp  Av.9 °F (37.2 °C)  Min: 98.3 °F (36.8 °C)  Max: 100 °F (37.8 °C) BP Systolic (24hrs), Av , Min:113 , Max:238   Diastolic (24hrs), Av, Min:51, Max:106   Pulse Pulse  Av.1  Min: 71  Max: 129 Resp Resp  Av.8  Min: 14  Max: 26 Pulse ox SpO2  Av.4 %  Min: 90 %  Max: 99 %  CONSTITUTIONAL: Alert, comfortable  HEENT: Head is normocephalic, atraumatic. EOMI, PERRLA  NECK: Soft, trachea midline and straight  LUNGS: mechanically vented 40/8  CARDIOVASCULAR: regular rate and rhythm  ABDOMEN: soft, nontender, nondistended, midline surgical scar . PEG tube at 9 cm duke at skin.  NEUROLOGIC: awake, follows commands, moves all extremities, nods appropriately   EXTREMITIES: no cyanosis, clubbing    I/O last 3 completed shifts:  In: 2157.6 [I.V.:460.9; NG/GT:511; IV Piggyback:185.7]  Out: 5930 [Urine:3180; Stool:150]    Drain/tube output:  In: 672.3 [I.V.:436.6; NG/GT:50]  Out: 2825 [Urine:2825]    LAB:  CBC:   Recent Labs     24  0653 24  1158 24  0947   WBC 10.6 12.8* 10.2   HGB 8.2* 8.8* 8.4*   HCT 26.6* 28.7* 27.3*   MCV 98.2 100.7 99.6    378 346     BMP:   Recent Labs     24  0451 24  1158 24  0947   * 134* 138   K 3.7 3.8 3.8   CL 96* 94* 97*   CO2 25 25 25   BUN 52* 58* 77*   CREATININE 3.3* 3.3* 4.6*   GLUCOSE 180* 165* 177*     COAGS: No results for input(s): \"APTT\", \"INR\" in the last 72 hours.    Invalid input(s): \"PROT\"    RADIOLOGY:  No new imaging reviewed      Yani Richards MD  2024 , 10:40 AM       
        12/03/24    ECHO (TTE) LIMITED (PRN CONTRAST/BUBBLE/STRAIN/3D) 12/05/2024  8:22 AM (Final)    Interpretation Summary    Left Ventricle: Severely reduced left ventricular systolic function. EF by visual approximation is 20%. Left ventricle size is normal. Unable to assess wall motion.    Pericardium: Trivial-small anterior pericardial effusion.    Image quality is technically difficult, walls were not well visualized, RA and RV also not well visualized, LV not well visualized.    Signed by: Brennan Lay DO on 12/5/2024  8:22 AM        12/03/24    CARDIAC PROCEDURE 12/05/2024 11:49 AM (Final)    Conclusion  Images from the original result were not included.  [facility]  Intra-aortic Balloon Pump Insertion Note          Today's Date:              12/5/2024  Patient name:              Lukasz Keller  MRN:                           [id number]  YOB: 1950  PCP:                            Christal Jang MD      Procedure:  Intra-aortic Balloon Pump Insertion    Operators:  Primary: Dr. Lenz (Attending Physician  Assistant: Jackelin Stone MD (CV Fellow)    Indications: Hemodynamic support, cardiogenic shock      Procedure:  Consent was obtained from spouse over the phone. Patient was brought to the cath lab. The access area was prepped and draped in sterile fashion. The left femoral artery sheath was removed over the wise and dilator was passed over the guidewire prior to the insertion of a 8 Fr sheath. The port was flushed with heparinized saline. The IABP catheter was advanced over guide wire using fluoroscopy guidance. The sheath was sutured into place after being in a satisfactory position. The catheter was secured in place. Proper augmentation was established with IABP synchronized to 1:1.      Complications: None    Bleeding: minimal      Conclusions:  Successful placement of intra-aortic balloon pump.      Electronically signed by Jamal Lenz MD on 12/5/2024 
(Final)    Conclusion    Successful removal of peripheral VA ECMO cannulas from the right femoral artery and the left femoral vein, hemostasis achieved on the arterial side with Perclose x 2 as well as balloon tamponade in the iliac artery.  While in the venous system it was achieved using mattress suture x 2.    Successful removal of a 6 [place] reperfusion sheath from the right superficial femoral artery, hemostasis achieved using Angio-Seal.    Successful removal of left femoral artery intra-aortic balloon pump, hemostasis achieved using Perclose x 1    Peripheral angiogram showed no evidence of aortic or femoral extravasation and at least two-vessel runoff below-knee on the right side.    Signed by: Christiano Momin MD on 12/9/2024  8:09 PM       Hospital Problems             Last Modified POA    * (Principal) Cardiac arrest 12/3/2024 Yes    Cardiogenic shock 12/4/2024 Yes    Admitted to intensive care unit 12/4/2024 Yes    ST elevation myocardial infarction (STEMI) ([facility]) 12/4/2024 Yes    Acute saddle pulmonary embolism with acute cor pulmonale ([facility]) 12/10/2024 Yes    Overview Signed 4/25/2022  5:53 AM by Nina Jimenez, APRN - [facility]     6-28-21- [facility]         Acute hypoxemic respiratory failure 12/10/2024 Yes    Acute coronary syndrome (HCC) 12/3/2024 Yes    ST elevation myocardial infarction involving right coronary artery ([facility]) 12/3/2024 Yes    Encounter for palliative care 12/4/2024 Yes    Goals of care, counseling/discussion 12/4/2024 Yes    Metabolic acidemia 12/4/2024 Yes    Respiratory acidosis 12/4/2024 Yes    RUBEN (acute kidney injury) ([facility]) 12/5/2024 Yes    Hyperkalemia 12/5/2024 Yes    Patient receiving ECMO 12/6/2024 Yes    Aspiration pneumonia of both lower lobes ([facility]) 12/10/2024 Yes    Bandemia 12/10/2024 Yes    Elevated procalcitonin 12/10/2024 Yes    Diabetes mellitus ([facility]) 12/10/2024 Yes    Stented coronary artery 12/14/2024 Yes       Assessment:   Inferior STEMI with VT arrest s/p 
43.33 kg/m².       PHYSICAL EXAM:   Physical Exam  Constitutional:       Comments: Intubated. Patient opens eye spontaneously and following commands. More alert today.   Cardiovascular:      Rate and Rhythm: Normal rate and regular rhythm.      Pulses: Normal pulses.      Heart sounds: Normal heart sounds.   Pulmonary:      Effort: Pulmonary effort is normal.      Breath sounds: No wheezing or rhonchi.      Comments: On PRVC   Abdominal:      Palpations: Abdomen is soft.   Musculoskeletal:      Right lower leg: No edema.      Left lower leg: No edema.        MEDICATIONS:  Scheduled Meds:   levETIRAcetam  500 mg IntraVENous Q12H    insulin glargine  20 Units SubCUTAneous BID    lansoprazole  30 mg Per NG tube QAM AC    insulin lispro  0-16 Units SubCUTAneous 4x Daily AC & HS    QUEtiapine  25 mg Oral BID    amiodarone  200 mg Oral BID    ampicillin-sulbactam  1,500 mg IntraVENous Q12H    levofloxacin  500 mg IntraVENous Q48H    midodrine  10 mg Oral TID WC    furosemide  80 mg IntraVENous BID    sodium chloride flush  5-40 mL IntraVENous 2 times per day    aspirin  81 mg Oral Daily    atorvastatin  40 mg Oral Nightly     Continuous Infusions:   cangrelor (KENGREAL) 50 mg in sodium chloride 0.9 % 250 mL infusion 0.75 mcg/kg/min (12/19/24 0900)    dexmedeTOMIDine Stopped (12/19/24 0438)    fentaNYL 75 mcg/hr (12/19/24 0653)    heparin (PORCINE) Infusion Stopped (12/19/24 0015)    dextrose      sodium chloride Stopped (12/13/24 1258)     PRN Meds:   fentanNYL, 50 mcg, Q2H PRN   Or  fentanNYL, 100 mcg, Q2H PRN  fluticasone, 2 spray, Daily PRN  heparin (porcine), 1,900 Units, PRN  heparin (porcine), 1,600 Units, PRN  heparin (porcine), 4,000 Units, PRN  heparin (porcine), 2,000 Units, PRN  acetaminophen, 650 mg, Q4H PRN  dextrose bolus, 125 mL, PRN   Or  dextrose bolus, 250 mL, PRN  glucagon (rDNA), 1 mg, PRN  dextrose, , Continuous PRN  sodium chloride, , PRN  promethazine, 12.5 mg, Q6H PRN   Or  ondansetron, 4 mg, Q6H 
PROCEDURE 12/09/2024  8:09 PM (Final)  INVASIVE VASCULAR PROCEDURE 12/09/2024  8:09 PM (Final)    Conclusion    Successful removal of peripheral VA ECMO cannulas from the right femoral artery and the left femoral vein, hemostasis achieved on the arterial side with Perclose x 2 as well as balloon tamponade in the iliac artery.  While in the venous system it was achieved using mattress suture x 2.    Successful removal of a 6 [place] reperfusion sheath from the right superficial femoral artery, hemostasis achieved using Angio-Seal.    Successful removal of left femoral artery intra-aortic balloon pump, hemostasis achieved using Perclose x 1    Peripheral angiogram showed no evidence of aortic or femoral extravasation and at least two-vessel runoff below-knee on the right side.    Signed by: Christiano Momin MD on 12/9/2024  8:09 PM       Hospital Problems             Last Modified POA    * (Principal) Cardiac arrest 12/3/2024 Yes    Cardiogenic shock 12/4/2024 Yes    Admitted to intensive care unit 12/4/2024 Yes    ST elevation myocardial infarction (STEMI) (Former[facility]) 12/4/2024 Yes    Acute saddle pulmonary embolism with acute cor pulmonale (HCC) 12/10/2024 Yes    Overview Signed 4/25/2022  5:53 AM by Nina Jimenez, APRN - CNP     6-28-21- [facility]         Acute hypoxemic respiratory failure 12/10/2024 Yes    Acute coronary syndrome (HCC) 12/3/2024 Yes    ST elevation myocardial infarction involving right coronary artery ([facility]) 12/3/2024 Yes    Encounter for palliative care 12/4/2024 Yes    Goals of care, counseling/discussion 12/4/2024 Yes    Metabolic acidemia 12/4/2024 Yes    Respiratory acidosis 12/4/2024 Yes    RUBEN (acute kidney injury) (Former[facility]) 12/5/2024 Yes    Hyperkalemia 12/5/2024 Yes    Patient receiving ECMO 12/6/2024 Yes    Aspiration pneumonia of both lower lobes ([facility]) 12/10/2024 Yes    Bandemia 12/10/2024 Yes    Elevated procalcitonin 12/10/2024 Yes    Diabetes mellitus ([facility]) 12/10/2024 Yes 
on-call clinician. Sending messages via individual names will not reach a clinician.              Electronically signed by Jennifer Jordan RN, CWON on 12/18/2024 at 1:28 PM     
ventricular systolic function. EF by visual approximation is 20%. Left ventricle size is normal. Unable to assess wall motion.    Pericardium: Trivial-small anterior pericardial effusion.    Image quality is technically difficult, walls were not well visualized, RA and RV also not well visualized, LV not well visualized.    Signed by: Brennan Lay DO on 12/5/2024  8:22 AM      12/03/24    CARDIAC PROCEDURE 12/05/2024 11:49 AM (Final)    Conclusion  Images from the original result were not included.  [facility]  Intra-aortic Balloon Pump Insertion Note          Today's Date:              12/5/2024  Patient name:              Lukasz Keller  MRN:                           [id number]  YOB: 1950  PCP:                            Christal Jang MD      Procedure:  Intra-aortic Balloon Pump Insertion    Operators:  Primary: Dr. Lenz (Attending Physician  Assistant: Jackelin Stone MD (CV Fellow)    Indications: Hemodynamic support, cardiogenic shock      Procedure:  Consent was obtained from spouse over the phone. Patient was brought to the cath lab. The access area was prepped and draped in sterile fashion. The left femoral artery sheath was removed over the wise and dilator was passed over the guidewire prior to the insertion of a 8 Fr sheath. The port was flushed with heparinized saline. The IABP catheter was advanced over guide wire using fluoroscopy guidance. The sheath was sutured into place after being in a satisfactory position. The catheter was secured in place. Proper augmentation was established with IABP synchronized to 1:1.      Complications: None    Bleeding: minimal      Conclusions:  Successful placement of intra-aortic balloon pump.    Hospital Problems             Last Modified POA    * (Principal) Cardiac arrest 12/3/2024 Yes    Cardiogenic shock 12/4/2024 Yes    Admitted to intensive care unit 12/4/2024 Yes    ST elevation myocardial infarction (STEMI) 
12/10/2024 Yes    Bandemia 12/10/2024 Yes    Elevated procalcitonin 12/10/2024 Yes    Diabetes mellitus (HCC) 12/10/2024 Yes       Assessment:   Inferior STEMI with VT arrest s/p emergent cardiac cath with PCI/ERNST to RCA 12/3/2024  Sepsis  S/p VA ECMO 12/4 and IABP placement on 12/5 and removal on 12/9.  Afib with RVR -flipped to NSR with IV amiodarone  Saddle PE s/p mechanical thrombectomy on 12/4  Respiratory failure in setting of cardiac arrest, intubated and on mechanical ventilation  Ischemic ATN  Thrombocytopenia-improving  Blood loss Anemia  Transaminitis-Improved        Plan:   On meropenem since 12/11 per ID with GNR in sputum (concerns for ESBL), improving WBC and fever.  PO amiodarone and heparin for Afib.  Downtrending Hb, no overt signs of bleeding. Transfuse to keep hemoglobin >7  Continue DAPT and statin  Continue with IV sedation while intubated.  Currently on CRRT per nephrology, pulling 1.8L out  Appreciate the recommendations from pulmonology, nephrology and internal medicine teams.  Continue with serial labs    Electronically signed by Sejal Paz MD on 12/12/2024 at 7:24 AM      Attending Physician Statement  I have discussed the case of Lukasz Keller including pertinent history and exam findings with the student/resident/fellow. I have seen and examined the patient and the key elements of the encounter have been performed by me. I agree with the assessment, plan and orders as documented by the resident With changes made to the note.     Electronically signed by Hussein Garcia MD on 12/12/2024 at 4:28 PM.    [facility]      [phone]          
12/20/24  0653 12/20/24  1158   WBC 12.7* 10.6 12.8*   HGB 7.8* 8.2* 8.8*    344 378     BMP:    Recent Labs     12/19/24  0307 12/20/24  0451 12/20/24  1158   * 135* 134*   K 3.7 3.7 3.8   CL 94* 96* 94*   CO2 24 25 25   BUN 91* 52* 58*   CREATININE 4.6* 3.3* 3.3*   GLUCOSE 230* 180* 165*         Lab Results   Component Value Date    HDL 38 (L) 02/14/2024    ALT 22 12/15/2024    AST 34 12/15/2024    TSH 1.87 11/26/2024    INR 1.3 12/15/2024    LABA1C 8.1 (H) 12/04/2024    MG 1.9 12/19/2024    PHOS 3.6 12/19/2024         Imaging/Diagnostics:      EEG:     Day 1 - 12/20/24, starting at 1730     Interictal EEG Samples: The background activity consisted of 6-7 Hz of polymorphic theta activity of 30 to 35 µV.  There was poor anterior-posterior amplitude gradient.  Background showed state change and reactivity.  The background was continuous.  During behavioral sleep, rudimentary sleep spindles were seen over both hemispheres.  Occasional left parieto occipital sharp waves were seen. The EKG channel revealed no abnormalities.     Ictal EEG Recording / Patient Events: During this period the patient had no events or seizures.     Summary: During this day of recording no events were recorded. The interictal EEG was abnormal due to diffuse polymorphic theta slowing suggesting mild encephalopathy. Occasional left parieto occipital sharp waves conferred an increased risk for focal onset seizures.  Monitoring was continued in order to record the patient's typical events. The EKG channel revealed no abnormalities.     Day 2 - 12/21/24, reviewed through 7:30 am     Interictal EEG Samples: Interictal EEG was unchanged from yesterday.     Ictal EEG Recording / Patient Events: During this period the patient had no events or seizures.     Summary: During this day of recording no events were recorded. The interictal EEG was abnormal due to diffuse polymorphic theta slowing suggesting mild encephalopathy. Occasional left 
aspirin, Brilinta, Eliquis and Lipitor.  Will discontinue aspirin and continue Brilinta and Eliquis.  PO amiodarone   Stable Hb, no overt signs of bleeding.  Blood transfusions to keep Hb above 8.  HD per nephrology, awaiting tunneled catheter when afebrile, ID on board.  Will continue to follow    Please follow the rounding attending's attestation for final recommendations.     Jackelin Stone MD.  Cardiovascular Fellow,   [facility], [place], [place].      Attending Physician Statement:     I have discussed the care of  Lukasz Keller , including pertinent history and exam findings, with the Cardiology fellow/resident.      I have seen and examined the patient and the key elements of all parts of the encounter have been performed by me. I agree with the assessment, plan and orders as documented by the fellow/resident, after I modified exam findings and plan of treatments, and the final version is my approved version of the assessment.      Additional Comments:   S/p Trach and Peg placement. In NSR. Continue current medications. On DAPT for recent PCI and Eliquis for PE/Atrial fibrillation. Continue current medications. Volume control via dialysis.  Will follow.    
right side.  Patient will need ID consult.  OBJECTIVE     Vital Signs:  BP (!) 130/58   Pulse 69   Temp 98.8 °F (37.1 °C) (Bladder)   Resp 20   Ht 1.74 m (5' 8.5\")   Wt (!) 148.4 kg (327 lb 2.6 oz)   SpO2 (!) 89%   BMI 49.02 kg/m²     Temp (24hrs), Av.5 °F (36.9 °C), Min:98.4 °F (36.9 °C), Max:98.8 °F (37.1 °C)    In: 2833   Out: 6839 [Urine:478]    Physical Exam  Constitutional:       General: He is not in acute distress.     Appearance: He is ill-appearing.      Comments: Currently on VA ecmo, intubated, crrt.    Eyes:      General:         Right eye: No discharge.         Left eye: No discharge.      Comments: Corneal and pupillary reflex present    Cardiovascular:      Rate and Rhythm: Normal rate.      Heart sounds: Murmur (murmur heard in all valvular areas) heard.   Pulmonary:      Breath sounds: Rales present. No wheezing.      Comments: Diminished breath sounds     Intubated and on ventilator   Abdominal:      General: There is no distension.      Palpations: There is no mass.      Tenderness: There is no abdominal tenderness.   Musculoskeletal:      Right lower leg: Edema (3+) present.      Left lower leg: Edema (3+) present.   Neurological:      Comments: Not following commands. Gag and cough diminished. Corneal and pupil reflex present           Medications:  Scheduled Medications:    insulin lispro  0-8 Units SubCUTAneous Q6H    heparin (PF)  3 mL IntraVENous Daily    [Held by provider] heparin (PF)  3 mL IntraVENous Daily    [Held by provider] heparin (PF)  3 mL IntraVENous Daily    sodium chloride flush  10 mL IntraVENous 2 times per day    aspirin  81 mg Oral Daily    ticagrelor  90 mg Oral BID    atorvastatin  40 mg Oral Nightly    sodium chloride flush  5-40 mL IntraVENous 2 times per day    midazolam  2 mg IntraVENous Once    pantoprazole (PROTONIX) 40 mg in sodium chloride (PF) 0.9 % 10 mL injection  40 mg IntraVENous Q12H    piperacillin-tazobactam  3,375 mg IntraVENous Q8H 
status      Please follow the rounding attending's attestation for final recommendations.       Electronically signed by Sejal Paz MD on 12/4/2024 at 1:49 PM    Attending Cardiologist Addendum: I have reviewed and performed the history, physical, subjective, objective, assessment, and plan with the student/resident/fellow/APN and agree with the note. I performed the history and physical personally. I have done the MDM. I have made changes to the note above as needed.     Seen and examined   Inferior STEMI status post PCI   Massive PE status post thrombectomy   Hypotensive    Discuss with family, next options would be CRRT, possible VA ECMO   Will have our VA ECMO specialist Dr. Romero  discuss with family the risks benefits and alternatives of this approach   Of note the patient was DNR, this has been rescinded by the wife  Consult palliative    Discussed with family in detail. All questions answered. Agrees with plan as outlined above.     Thank you for allowing me to participate in the care of this patient, please do not hesitate to call if you have any questions.    Brennan Lay DO, FACC, FACOI, RPVI, FASE, FASNC  [facility]  [email or web address]  [phone]          
12/04/2024 10:57 AM     Hep BsAg:         Lab Results   Component Value Date/Time    HEPBSAG NONREACTIVE 12/04/2024 10:57 AM     Hep C AB:          Lab Results   Component Value Date/Time    HEPCAB NONREACTIVE 12/04/2024 10:57 AM       Urinalysis/Chemistries:      Lab Results   Component Value Date/Time    NITRU NEGATIVE 12/04/2024 11:04 AM    COLORU Powers 12/04/2024 11:04 AM    PHUR 5.0 12/04/2024 11:04 AM    PHUR 5.5 07/28/2021 12:20 PM    WBCUA 10 TO 20 12/04/2024 11:04 AM    RBCUA TOO NUMEROUS TO COUNT 12/04/2024 11:04 AM    MUCUS NOT REPORTED 03/05/2020 02:14 PM    TRICHOMONAS NOT REPORTED 03/05/2020 02:14 PM    YEAST NOT REPORTED 03/05/2020 02:14 PM    BACTERIA None 12/04/2024 11:04 AM    LEUKOCYTESUR TRACE 12/04/2024 11:04 AM    UROBILINOGEN Normal 12/04/2024 11:04 AM    BILIRUBINUR NEGATIVE 12/04/2024 11:04 AM    GLUCOSEU 1+ 12/04/2024 11:04 AM    KETUA SMALL 12/04/2024 11:04 AM    AMORPHOUS NOT REPORTED 03/05/2020 02:14 PM       Urine Chloride:    Lab Results   Component Value Date/Time    CLUR 44 12/04/2024 11:04 AM       Radiology:     ONE XRAY VIEW OF THE CHEST     12/6/2024 5:58 am     COMPARISON:  12/5/2024     HISTORY:  ORDERING SYSTEM PROVIDED HISTORY: ECMO  TECHNOLOGIST PROVIDED HISTORY:  ECMO  Reason for Exam: port. supine     FINDINGS:  Endotracheal tube tip is 2.7 cm above the chirag.  The enteric tube courses  below the diaphragm.  [place]-Deb catheter tip projects over the right  pulmonary artery.  Right internal jugular central venous catheter stable in  positioning.  There is an intra-aortic balloon pump tip projecting over the  proximal descending thoracic aorta.  Stable cardiomediastinal silhouette.  Improving pulmonary edema.  Probable small bilateral pleural effusions, left  greater than right.  No obvious pneumothorax.  Osseous structures otherwise  stable.  Large-bore catheter is partially visualized in the right upper  quadrant, stable.     IMPRESSION:  Improving pulmonary 
LIPOMAS  LEFT performed by Morgan Diaz MD at [facility] OR    CARDIAC PROCEDURE N/A 12/3/2024    Left heart cath / coronary angiography performed by Christiano Smith MD at [facility] CARDIAC CATH LAB    CARDIAC PROCEDURE N/A 12/3/2024    Percutaneous coronary intervention performed by Christiano Smith MD at [facility] CARDIAC CATH LAB    CARDIAC PROCEDURE N/A 12/3/2024    Intravascular ultrasound performed by Christiano Smith MD at [facility] CARDIAC CATH LAB    CARDIAC PROCEDURE Bilateral 12/4/2024    Ecmo cath lab performed by Christiano Smith MD at [facility] CARDIAC CATH LAB    CARDIAC PROCEDURE N/A 12/4/2024    [place] joey  insertion performed by Christiano Smith MD at [facility] CARDIAC CATH LAB    CARDIAC PROCEDURE N/A 12/5/2024    joanna / Intra-aortic balloon pump insertion / rm 1014 performed by Jamal Lenz MD at [facility] CARDIAC CATH LAB    CARDIAC PROCEDURE N/A 12/9/2024    al reba / Ecmo cath lab / rm 1014 performed by Christiano Smith MD at [facility] CARDIAC CATH LAB    CARDIAC PROCEDURE N/A 12/9/2024    Peripheral angiography performed by Christiano Smith MD at [facility] CARDIAC CATH LAB    CARDIAC PROCEDURE N/A 12/9/2024    Intra-aortic balloon removal performed by Christiano Smith MD at [facility] CARDIAC CATH LAB    CERVICAL SPINE SURGERY      x5    COLONOSCOPY      COLONOSCOPY N/A 04/25/2022    COLONOSCOPY DIAGNOSTIC w/ CLIP APPLICATION IN DISTAL TRANSVERSE COLON performed by Morgan Diaz MD at [facility] ENDO    FOOT TENDON SURGERY Right     ENDOSCOPIC FASCIOTOMY PLANTAR FOOT;    HEMICOLECTOMY  05/13/2021    Procedure: HEMICOLECTOMY RIGHT EXTENDED, PARTIAL OMENTECTOMY; Surgeon: Morgan Diaz MD; Location: [facility]; Service: General; Laterality: Right    INVASIVE VASCULAR N/A 12/9/2024    Tunnel dialysis catheter insertion performed by Christiano Smith MD at [facility] CARDIAC CATH LAB    IR BIOPSY THYROID PERC CORE NEEDLE  8/26/2024    IR BIOPSY THYROID PERC CORE NEEDLE 8/26/2024 [facility] SPECIAL PROCEDURES    JOINT REPLACEMENT Right 
Normal 12/04/2024 11:04 AM    BILIRUBINUR NEGATIVE 12/04/2024 11:04 AM    GLUCOSEU 1+ 12/04/2024 11:04 AM    KETUA SMALL 12/04/2024 11:04 AM    AMORPHOUS NOT REPORTED 03/05/2020 02:14 PM       Urine Chloride:    Lab Results   Component Value Date/Time    CLUR 44 12/04/2024 11:04 AM       Radiology:     ONE XRAY VIEW OF THE CHEST     12/11/2024 5:23 am     COMPARISON:  12/10/2024     HISTORY:  ORDERING SYSTEM PROVIDED HISTORY: ECMO  TECHNOLOGIST PROVIDED HISTORY:  ECMO     FINDINGS:  ETT, NG tube and right IJ central line remain in place.  Normal  cardiomediastinal silhouette.  There is pulmonary vascular congestion.  Small  bilateral pleural effusions evident.  Less pronounced on the left.  No  pneumothorax.  No acute bony abnormality.  Anterior cervical spine fusion  noted.     IMPRESSION:  Stable support tubes and line.     Pulmonary vascular congestion unchanged.     Small bilateral pleural effusions which appears slightly less on the left  compared to prior.    Assessment:     Acute Kidney Injury, secondary to ischemic ATN from hypoperfusion, complicated by contrast nephropathy in setting of cardiac cath and CTA.  Baseline creatinine appears to run in the 0.7-1.0 range, peaked up to 4.7, urine output declined, CVVHD started 12/4/2024, discontinued on 12/9.  Urine output has improved although clearance is still lagging behind.  Did respond to IV Lasix.  Now dependent on daily dialysis for ultrafiltration and for clearances.  Last ultrafiltration yesterday 12/14/2024, due for dialysis again tomorrow  V. tach into PEA arrest -status post cardiac cath with thrombectomy and drug-eluting stent placement x 2.  Inferior STEMI -balloon pump and inotropic support discontinued 12/9.  Saddle pulmonary embolus -now status post mechanical thrombectomy.  Cardiogenic shock post acute MI.   Acute hypoxic respiratory failure - remains intubated and sedated on 70% FiO2 and PEEP of 10. Was on VA ECMO 12/4 - 12/9.  Chest x-ray 
SURGERY; Service: General; Laterality: Right    INVASIVE VASCULAR N/A 12/9/2024    Tunnel dialysis catheter insertion performed by Christiano Smith MD at [facility] CARDIAC CATH LAB    IR BIOPSY THYROID PERC CORE NEEDLE  8/26/2024    IR BIOPSY THYROID PERC CORE NEEDLE 8/26/2024 [facility] SPECIAL PROCEDURES    JOINT REPLACEMENT Right 05/25/2022    knee Dr Edmonds    JOINT REPLACEMENT Left 02/22/2023    knee Dr Edmonds    KNEE SURGERY Right     LIPOMA RESECTION      Multiple    OTHER SURGICAL HISTORY  06/2021    pulmonary emboli [facility]    PROSTATE BIOPSY      PROSTATECTOMY  03/13/2020    LAPAROSCOPIC ROBOTIC PROSTATECTOMY, PELVIC LYMPHNODE DISSECTION     PROSTATECTOMY N/A 03/13/2020    XI LAPAROSCOPIC ROBOTIC PROSTATECTOMY, PELVIC LYMPHNODE DISSECTION performed by Severino Fonseca MD at [facility] OR    TONSILLECTOMY      AS 21 YR. OLD    VASCULAR SURGERY N/A 12/4/2024    E2 PERCUTANEOUS BILATERAL PULMONARY THROMBECTOMY MECHANICAL / INARI performed by Delos Reyes, Arthur, MD at [facility] CVOR    WRIST SURGERY Left        Medications:      insulin glargine  5 Units SubCUTAneous Daily    amiodarone  200 mg Oral BID    metoclopramide  5 mg IntraVENous Q6H    meropenem  500 mg IntraVENous Q24H    midodrine  10 mg Oral TID WC    furosemide  80 mg IntraVENous BID    sodium chloride flush  5-40 mL IntraVENous 2 times per day    insulin lispro  0-8 Units SubCUTAneous Q6H    sodium chloride flush  10 mL IntraVENous 2 times per day    aspirin  81 mg Oral Daily    ticagrelor  90 mg Oral BID    atorvastatin  40 mg Oral Nightly    sodium chloride flush  5-40 mL IntraVENous 2 times per day    midazolam  2 mg IntraVENous Once    pantoprazole (PROTONIX) 40 mg in sodium chloride (PF) 0.9 % 10 mL injection  40 mg IntraVENous Q12H       Social History:     Social History     Socioeconomic History    Marital status:      Spouse name: Not on file    Number of children: Not on file    Years of education: Not on file    Highest education level: Not 
upper & lower    Wellness examination     Dr. Hoffman- PCP       Past Surgical  History:     Past Surgical History:   Procedure Laterality Date    ARM SURGERY Right 10/16/2023    ARM LESION BIOPSY EXCISION  - RIGHT ARM LATERALLY performed by Morgan Diaz MD at [facility] OR    BACK SURGERY      x2, hx of hardware exchange- patient states no longer has hardware    BREAST SURGERY Left 10/16/2023    MEDIAL BREAST X 3  LIPOMAS  LEFT performed by Morgan Diaz MD at [facility] OR    CARDIAC PROCEDURE N/A 12/3/2024    Left heart cath / coronary angiography performed by Christiano Smith MD at [facility] CARDIAC CATH LAB    CARDIAC PROCEDURE N/A 12/3/2024    Percutaneous coronary intervention performed by Christiano Smith MD at [facility] CARDIAC CATH LAB    CARDIAC PROCEDURE N/A 12/3/2024    Intravascular ultrasound performed by Christiano Smith MD at [facility] CARDIAC CATH LAB    CARDIAC PROCEDURE Bilateral 12/4/2024    Ecmo cath lab performed by Christiano Smith MD at [facility] CARDIAC CATH LAB    CARDIAC PROCEDURE N/A 12/4/2024    [place] joey  insertion performed by Christiano Smith MD at [facility] CARDIAC CATH LAB    CARDIAC PROCEDURE N/A 12/5/2024    joanna / Intra-aortic balloon pump insertion / rm 1014 performed by Jamal Lenz MD at [facility] CARDIAC CATH LAB    CARDIAC PROCEDURE N/A 12/9/2024    ifeoma smith / Ecmo cath lab / rm 1014 performed by Christiano Smith MD at [facility] CARDIAC CATH LAB    CARDIAC PROCEDURE N/A 12/9/2024    Peripheral angiography performed by Christiano Smith MD at [facility] CARDIAC CATH LAB    CARDIAC PROCEDURE N/A 12/9/2024    Intra-aortic balloon removal performed by Christiano Smith MD at [facility] CARDIAC CATH LAB    CERVICAL SPINE SURGERY      x5    COLONOSCOPY      COLONOSCOPY N/A 04/25/2022    COLONOSCOPY DIAGNOSTIC w/ CLIP APPLICATION IN DISTAL TRANSVERSE COLON performed by Morgan Diaz MD at [facility] ENDO    FOOT TENDON SURGERY Right     ENDOSCOPIC FASCIOTOMY PLANTAR FOOT;    HEMICOLECTOMY  05/13/2021    Procedure: HEMICOLECTOMY 
   VENANCIO SMALL 12/04/2024 11:04 AM    AMORPHOUS NOT REPORTED 03/05/2020 02:14 PM       HgBA1c:    Lab Results   Component Value Date/Time    LABA1C 8.1 12/04/2024 03:45 AM     TSH:    Lab Results   Component Value Date/Time    TSH 1.87 11/26/2024 12:00 PM     Lactic Acid: No results found for: \"LACTA\"   Troponin: No results for input(s): \"TROPONINI\" in the last 72 hours.          ASSESSMENT:     Patient Active Problem List    Diagnosis Date Noted    Cardiogenic shock 12/04/2024    Admitted to intensive care unit 12/04/2024    ST elevation myocardial infarction (STEMI) ([facility]) 12/04/2024    Cardiac arrest 12/03/2024    Acute saddle pulmonary embolism with acute cor pulmonale ([facility]) 06/28/2021    Polyp of sigmoid colon 05/13/2021    Sigmoid diverticulosis 05/05/2021    Urinary incontinence without sensory awareness 06/09/2020    Anxiety disorder, unspecified 03/19/2020    SOBOE (shortness of breath on exertion) 03/10/2020    Localized osteoarthrosis of right hip 02/21/2019    Primary localized osteoarthritis of right knee 02/21/2019    Obesity, Class III, BMI 40-49.9 (morbid obesity) 08/17/2018    Atopic rhinitis 02/13/2017    Benign prostatic hyperplasia 02/13/2017    Depression 02/13/2017    Hypercholesteremia 02/13/2017    Low back pain 02/13/2017    Neck pain 02/13/2017    Stented coronary artery 12/14/2024    Aspiration pneumonia of both lower lobes ([facility]) 12/10/2024    Bandemia 12/10/2024    Elevated procalcitonin 12/10/2024    Diabetes mellitus ([facility]) 12/10/2024    Patient receiving ECMO 12/06/2024    RUBEN (acute kidney injury) ([facility]) 12/05/2024    Hyperkalemia 12/05/2024    Encounter for palliative care 12/04/2024    Goals of care, counseling/discussion 12/04/2024    Metabolic acidemia 12/04/2024    Respiratory acidosis 12/04/2024    Acute coronary syndrome (HCC) 12/03/2024    ST elevation myocardial infarction involving right coronary artery ([facility]) 12/03/2024    Chronic heart failure with preserved ejection fraction 
  Monitor renal recovery.  Leave Jones in.   Will follow.  Avoid nephrotoxic agents including combination of vancomycin and Zosyn    Nutrition   Please ensure that patient is on a renal diet/TF. Avoid nephrotoxic drugs/contrast exposure.    ANGELO Maher-CNP  [facility]    Patient seen with nurse practitioner on dialysis  CVVHD discontinued yesterday.  Off all pressors.  ECMO decannulated yesterday currently on ventilator support FiO2 70%.  Requiring nitric oxide in between.  Continues on sedation with fentanyl Versed and Precedex.  Continues on amiodarone infusion.  Tube feeds on hold as patient had aspirated yesterday.  Antibiotic switched to Zosyn Vanco discontinued by ID.  Urine output did improve with IV Lasix although clearance is still lagging behind.  Right groin temporary catheter is intermittently somewhat temperamental.  Will need a new line placement if ongoing dialysis is needed.  Clinical exam shows 1+ edema morbid obesity noted  Impression  1.  Acute kidney injury secondary to ischemic ATN from hypoperfusion post cardiorespiratory arrest, cardiac catheter and CTA.  Baseline 0.8-0.9 now up to 2.7 oliguric responding to Lasix, on dialysis.  Intermittent hemodialysis started today.  CVVHD from 12/4 to 12/9/2024  2.  Inferior ST elevation MI status post PCI to RCA this admission  3.  Saddle embolization status post mechanical thrombectomy  4.  Ischemic cardiomyopathy ejection fraction improved from 20% to 50% after reperfusion  5.  Respiratory failure requiring ECMO now on was ventilator  6.  Expected aspiration  Pneumonia  7.  Morbid obesity  8.  History of tobacco abuse  Plan  1.  Hemodialysis today aim for 2 L of fluid removal  2.  Start Lasix 80 mg IV twice a day  3.  Will reassess need for dialysis tomorrow  4.  Patient will need a new line placed if ongoing dialysis needs to continue  5.  Anticipate renal recovery  6.  Restart tube feeds per primary service  7.  Will 
-- 52 18 -- --   12/17/24 0949 (!) 100.5 °F (38.1 °C) -- 52 21 -- --   12/17/24 0948 (!) 100.5 °F (38.1 °C) -- 51 20 -- --   12/17/24 0947 (!) 100.5 °F (38.1 °C) -- 51 20 -- --   12/17/24 0946 (!) 100.5 °F (38.1 °C) -- 51 20 -- --   12/17/24 0945 (!) 100.5 °F (38.1 °C) -- 51 21 -- --   12/17/24 0944 (!) 100.5 °F (38.1 °C) -- 51 20 -- --   12/17/24 0943 (!) 100.5 °F (38.1 °C) -- 52 20 -- --   12/17/24 0942 (!) 100.5 °F (38.1 °C) -- 51 20 -- --   12/17/24 0941 (!) 100.5 °F (38.1 °C) -- 51 20 -- --   12/17/24 0940 (!) 100.5 °F (38.1 °C) -- 51 20 -- --   12/17/24 0939 (!) 100.5 °F (38.1 °C) -- 52 19 -- --   12/17/24 0938 (!) 100.5 °F (38.1 °C) -- 52 20 -- --   12/17/24 0937 (!) 100.5 °F (38.1 °C) -- 52 20 -- --   12/17/24 0936 (!) 100.5 °F (38.1 °C) -- 52 22 -- --   12/17/24 0935 (!) 100.5 °F (38.1 °C) -- 52 21 -- --   12/17/24 0934 (!) 100.5 °F (38.1 °C) -- 53 20 -- --   12/17/24 0933 (!) 100.5 °F (38.1 °C) -- 53 20 -- --   12/17/24 0932 (!) 100.5 °F (38.1 °C) -- 54 20 -- --   12/17/24 0931 (!) 100.5 °F (38.1 °C) -- 53 24 -- --   12/17/24 0930 (!) 100.5 °F (38.1 °C) -- 54 21 -- --   12/17/24 0929 (!) 100.5 °F (38.1 °C) -- 53 22 -- --   12/17/24 0928 (!) 100.5 °F (38.1 °C) -- 54 19 -- --   12/17/24 0927 (!) 100.5 °F (38.1 °C) -- 54 17 -- --   12/17/24 0926 (!) 100.5 °F (38.1 °C) -- 53 25 -- --   12/17/24 0925 (!) 100.5 °F (38.1 °C) -- 53 21 -- --   12/17/24 0924 (!) 100.5 °F (38.1 °C) -- 53 20 -- --   12/17/24 0923 (!) 100.6 °F (38.1 °C) -- 53 21 -- --   12/17/24 0922 (!) 100.5 °F (38.1 °C) -- 53 21 -- --   12/17/24 0921 (!) 100.5 °F (38.1 °C) -- 53 20 -- --   12/17/24 0920 (!) 100.6 °F (38.1 °C) -- 53 20 -- --   12/17/24 0919 (!) 100.6 °F (38.1 °C) -- 54 22 -- --   12/17/24 0918 (!) 100.6 °F (38.1 °C) -- 54 20 -- --   12/17/24 0917 (!) 100.6 °F (38.1 °C) -- 54 22 -- --   12/17/24 0916 (!) 100.6 °F (38.1 °C) -- 55 18 -- --   12/17/24 0915 (!) 100.6 °F (38.1 °C) -- 54 24 -- --   12/17/24 0914 (!) 100.6 °F (38.1 °C) -- 55 
12/03/2024    ST elevation myocardial infarction involving right coronary artery (HCC) 12/03/2024    Chronic heart failure with preserved ejection fraction ([facility]) 11/26/2024    Abdominal wall hernia 10/15/2024    Severe obstructive sleep apnea 10/15/2024    Chronic fatigue 06/21/2024    Hypoxia 03/17/2020    Acute hypoxemic respiratory failure 03/17/2020    Atelectasis 03/17/2020        PLAN:     Additional Assessment:  Principal Problem:    Cardiac arrest  Active Problems:    Cardiogenic shock    Admitted to intensive care unit    ST elevation myocardial infarction (STEMI) ([facility])    Acute saddle pulmonary embolism with acute cor pulmonale ([facility])    Acute hypoxemic respiratory failure    Acute coronary syndrome ([facility])    ST elevation myocardial infarction involving right coronary artery ([facility])    Encounter for palliative care    Goals of care, counseling/discussion    Metabolic acidemia    Respiratory acidosis    RUBEN (acute kidney injury) ([facility])    Hyperkalemia    Patient receiving ECMO    Aspiration pneumonia of both lower lobes ([facility])    Bandemia    Elevated procalcitonin    Diabetes mellitus ([facility])    Stented coronary artery    Dysphagia    Seizure-like activity ([facility])  Resolved Problems:    * No resolved hospital problems. *     Acute hypoxic respiratory failure postcardiac arrest with possible aspiration pneumonia/Ventilator associated pneumonia with sepsis  Postcardiac arrest S/P V. tach to PEA arrest  Inferior wall MI, SP cardiac cath with 2 stents to RCA  Cardiogenic shock postacute MI, post IABP and post VA ECMO  RUBEN possibly from ATN along with contrast nephropathy, currently on hemodialysis  Acute blood loss anemia  Diabetes mellitus type 2  Morbid obesity  Seizure like activity       Plan:  Neuro:  Neurochecks per protocol  Continue Keppra 500 BID   LTME ordered by neuro. follow-up results  MRI needed per neuro. Non-urgent.   Pain control : Fentanyl infusion  Sedation : Precedex and fentanyl      Resp:  Acute 
care 12/04/2024    Goals of care, counseling/discussion 12/04/2024    Metabolic acidemia 12/04/2024    Respiratory acidosis 12/04/2024    Acute coronary syndrome (HCC) 12/03/2024    ST elevation myocardial infarction involving right coronary artery ([facility]) 12/03/2024    Chronic heart failure with preserved ejection fraction ([facility]) 11/26/2024    Abdominal wall hernia 10/15/2024    Severe obstructive sleep apnea 10/15/2024    Chronic fatigue 06/21/2024    Hypoxia 03/17/2020    Acute hypoxemic respiratory failure 03/17/2020    Atelectasis 03/17/2020        PLAN:     Additional Assessment:  Principal Problem:    Cardiac arrest  Active Problems:    Cardiogenic shock    Admitted to intensive care unit    ST elevation myocardial infarction (STEMI) ([facility])    Acute saddle pulmonary embolism with acute cor pulmonale ([facility])    Acute hypoxemic respiratory failure    Acute coronary syndrome ([facility])    ST elevation myocardial infarction involving right coronary artery ([facility])    Encounter for palliative care    Goals of care, counseling/discussion    Metabolic acidemia    Respiratory acidosis    RUBEN (acute kidney injury) ([facility])    Hyperkalemia    Patient receiving ECMO    Aspiration pneumonia of both lower lobes ([facility])    Bandemia    Elevated procalcitonin    Diabetes mellitus ([facility])    Stented coronary artery    Dysphagia    Seizure-like activity ([facility])    Metabolic encephalopathy  Resolved Problems:    * No resolved hospital problems. *     Acute hypoxic respiratory failure postcardiac arrest with possible aspiration pneumonia/Ventilator associated pneumonia with sepsis  Postcardiac arrest S/P V. tach to PEA arrest  Inferior wall MI, SP cardiac cath with 2 stents to RCA  Cardiogenic shock postacute MI, post IABP and post VA ECMO  RUBEN possibly from ATN along with contrast nephropathy, currently on hemodialysis  Acute blood loss anemia  Diabetes mellitus type 2  Morbid obesity  Seizure like activity       Plan:  Neuro:  Following 
wife.  Metabolic and respiratory alkalosis  Morbid obesity BMI of 49    Plan:   CathFlo HD lumens today.   Plan for next HD tomorrow.   Continue 80 mg IV lasix BID.   Continue BMP daily.   Monitor renal recovery.  Leave Jones in.   Will follow.  Avoid nephrotoxic agents including combination of vancomycin and Zosyn    Nutrition   Please ensure that patient is on a renal diet/TF. Avoid nephrotoxic drugs/contrast exposure.    Lauren Ramirez APRN-CNP  [facility]    Attending Physician Statement  I have discussed the care of Lukasz Keller, including pertinent history and exam findings with the resident/midlevel practitioner I have reviewed the key elements of all parts of the encounter with the resident/fellow. I have seen and examined the patient with the resident/fellow.  I agree with the assessment, plan and orders as documented by the resident/midlevel practitioner.    Annette Cardenas MD     
(HCC)    Bandemia    Elevated procalcitonin    Diabetes mellitus (HCC)    Stented coronary artery  Resolved Problems:    * No resolved hospital problems. *       Acute hypoxic respiratory failure postcardiac arrest with possible aspiration pneumonia/Ventilator associated pneumonia with sepsis  Postcardiac arrest S/P V. tach to PEA arrest  Inferior wall MI, SP cardiac cath with 2 stents to RCA  Cardiogenic shock postacute MI, post IABP and post VA ECMO  RUBEN possibly from ATN along with contrast nephropathy, currently on hemodialysis  Acute blood loss anemia  Diabetes mellitus type 2  Morbid obesity     Plan:  Neuro:  Neurochecks per protocol  Pain control : Fentanyl infusion  Sedation : Precedex and fentanyl      Resp:  Acute hypoxic respiratory failure postcardiac arrest, s/p VA ECMO 12/4 discontinued 12/9  Concern for aspiration pneumonia, respiratory cultures positive for Acinetobacter, Enterobacter and Proteus   Patient is intubated and mechanically ventilated postcardiac arrest   Pulmonary toilet  Plan for daily SBT's  Continue Unasyn and levofloxacin for aspiration pneumonia, ID on board  PRVC 570/ 20/ 60/ 8   Wean O2 when able        CV:  Inferior STEMI with VT arrest s/p emergent cardiac cath with PCI/ERNST to RCA 12/3/2024 , S/p VA ECMO 12/4 and IABP placement on 12/5 and removal on 12/9.   Afib with RVR -to NSR with IV amiodarone   Cardiology on board  Continue amiodarone 200 Mg twice daily and heparin infusion for A-fib  Continue Lipitor 40 Mg  Continue Brilinta 90 Mg twice daily  Continue midodrine 10 Mg 3 times daily    GI/Nutrition:  Diet: Diet NPO  ADULT TUBE FEEDING; Orogastric; Renal Formula; Cyclic; 35; 9:00 AM; 1:00 PM; 30; Q 6 hours; Protein; 1 Dose; TID  Daily documentation of bowel movement  Pantoprazole 40 Mg IV twice daily  Continue Reglan 5 Mg as needed every 6     /Fluids/Electrolytes:  RUBEN secondary to ischemic ATN due to reduced perfusion and possible contrast nephropathy  CVVH D from 
200 mg Oral BID    levofloxacin  500 mg IntraVENous Q48H    midodrine  10 mg Oral TID WC    furosemide  80 mg IntraVENous BID    sodium chloride flush  5-40 mL IntraVENous 2 times per day    aspirin  81 mg Oral Daily    atorvastatin  40 mg Oral Nightly       Social History:     Social History     Socioeconomic History    Marital status:      Spouse name: Not on file    Number of children: Not on file    Years of education: Not on file    Highest education level: Not on file   Occupational History    Not on file   Tobacco Use    Smoking status: Former     Current packs/day: 0.00     Average packs/day: 0.5 packs/day for 58.4 years (29.2 ttl pk-yrs)     Types: Cigarettes     Start date: 1/1/1963     Quit date: 5/11/2021     Years since quitting: 3.6    Smokeless tobacco: Never   Vaping Use    Vaping status: Never Used   Substance and Sexual Activity    Alcohol use: Not Currently    Drug use: Never    Sexual activity: Yes     Partners: Female   Other Topics Concern    Not on file   Social History Narrative    Not on file     Social Determinants of Health     Financial Resource Strain: Low Risk  (2/14/2024)    Overall Financial Resource Strain (CARDIA)     Difficulty of Paying Living Expenses: Not hard at all   Food Insecurity: No Food Insecurity (2/14/2024)    Hunger Vital Sign     Worried About Running Out of Food in the Last Year: Never true     Ran Out of Food in the Last Year: Never true   Transportation Needs: Unknown (2/14/2024)    PRAPARE - Transportation     Lack of Transportation (Medical): Not on file     Lack of Transportation (Non-Medical): No   Physical Activity: Unknown (6/11/2024)    Exercise Vital Sign     Days of Exercise per Week: Patient declined     Minutes of Exercise per Session: Not on file   Stress: Not on file   Social Connections: Not on file   Intimate Partner Violence: Not on file   Housing Stability: Unknown (2/14/2024)    Housing Stability Vital Sign     Unable to Pay for Housing in 
History Narrative    Not on file     Social Determinants of Health     Financial Resource Strain: Low Risk  (2/14/2024)    Overall Financial Resource Strain (CARDIA)     Difficulty of Paying Living Expenses: Not hard at all   Food Insecurity: No Food Insecurity (2/14/2024)    Hunger Vital Sign     Worried About Running Out of Food in the Last Year: Never true     Ran Out of Food in the Last Year: Never true   Transportation Needs: Unknown (2/14/2024)    PRAPARE - Transportation     Lack of Transportation (Medical): Not on file     Lack of Transportation (Non-Medical): No   Physical Activity: Unknown (6/11/2024)    Exercise Vital Sign     Days of Exercise per Week: Patient declined     Minutes of Exercise per Session: Not on file   Stress: Not on file   Social Connections: Not on file   Intimate Partner Violence: Not on file   Housing Stability: Unknown (2/14/2024)    Housing Stability Vital Sign     Unable to Pay for Housing in the Last Year: Not on file     Number of Places Lived in the Last Year: Not on file     Unstable Housing in the Last Year: No       Family History:     Family History   Problem Relation Age of Onset    Cancer Father     Diabetes Paternal Grandmother     Other Paternal Uncle         Blood clot post op      Medical Decision Making:   I have independently reviewed/ordered the following labs:    CBC with Differential:   Recent Labs     12/12/24  0546 12/13/24  0545   WBC 9.7 11.7*   HGB 7.3* 7.4*   HCT 22.4* 23.2*   * 171     BMP:  Recent Labs     12/12/24  0546 12/13/24  0521 12/13/24  0545     --  139   K 3.1*  --  3.1*     --  101   CO2 25  --  26   BUN 81*  --  73*   CREATININE 4.7* 3.8* 4.1*   MG 2.2  --  1.8     Hepatic Function Panel:   Recent Labs     12/12/24  0546 12/13/24  0545   BILIDIR 0.7* 0.6*   IBILI 0.3 0.4   BILITOT 1.0 1.0   ALKPHOS 215* 174*   ALT 34 28   AST 57* 45     No results for input(s): \"RPR\" in the last 72 hours.  No results for input(s): \"HIV\" in 
Spigelian fat containing hernia. Additional findings noted above. RECOMMENDATIONS: Subcentimeter incidental right thyroid nodule. No follow-up imaging is recommended. Reference: J Am Chikis Radiol. 2015 Feb;12(2): 143-50 Abdominal aortic aneurysm suspected measuring 2.7 cm. Recommend follow-up every 5 years. Reference: Journal of Vascular Surgery 67.1 (2018): 2-77. J Am Chikis Radiol 2013;10:789-794. Left adrenal mass measuring 1.3 cm, probable benign adenoma. Recommend follow-up adrenal washout CT in 1 year. If stable for = 1 year, no further follow-up imaging. JACR 2017 Aug; 14(8):1038-44, JCAT 2016 Mar-Apr; 40(2):194-200, Urol J 2006 Spring; 3(2):71-4. Left Bosniak I benign renal cyst. No follow-up imaging is recommended. JACR 2018 Feb; 264-273, Management of the Incidental Renal Mass on CT, [employer] 2021; 814-848, Bosniak Classification of Cystic Renal Masses, Version 2019.     XR CHEST PORTABLE    Result Date: 12/3/2024  Support tubes as described above. Atelectasis or scarring in the region of the left costophrenic angle and left upper lobe.     XR ABDOMEN FOR NG/OG/NE TUBE PLACEMENT    Result Date: 12/3/2024  Enteric tube is appropriately positioned.       ASSESSMENT & PLAN     ASSESSMENT / PLAN:      Thank you for allowing us to see Lukasz Keller in consultation for diabetes, hyperkalemia, acidosis and medical management.      Cardiac arrest/  ST elevation myocardial infarction involving right coronary artery (HCC)/acute coronary syndrome  Patient underwent left heart cath with mechanical thrombectomy and 2 drug-eluting stent placement.  - Cardiology primary  - switching cangrelor to aspirin/plavix   - currently on amiodarone     DM  -Switch insulin infusion to subcutaneous.  - may be able to transition to lantus/SSI later today   - continue to monitor sugars  -Patient on trickle tube feeds.     Acute hypoxic respiratory failure  Cardiogenic shock   - secondary to saddle PE and acute CHF exacerbation 
hypoxic respiratory failure postcardiac arrest, s/p VA ECMO 12/4 discontinued 12/9  Concern for aspiration pneumonia, respiratory cultures positive for Acinetobacter, Enterobacter and Proteus   Patient was  intubated and mechanically ventilated postcardiac arrest   Pulmonary toilet  Saddle PE s/p mechanical thrombectomy on 12/4   Plan for daily SBT's  completed Unasyn and levofloxacin for aspiration pneumonia, ID on board  PRVC   Wean O2 when able   S/p Trach       CV:  Inferior STEMI with VT arrest s/p emergent cardiac cath with PCI/ERNST to RCA 12/3/2024 , S/p VA ECMO 12/4 and IABP placement on 12/5 and removal on 12/9.   Afib with RVR -to NSR  amiodarone - started on eliquis  Cardiology on board  Continue amiodarone 200 Mg twice daily and heparin infusion for A-fib  Continue Lipitor 40 Mg  Continue aspirin, Brilinta 90 Mg twice daily after trach/PEG and tunneled cath placement.   Continue midodrine 10 Mg 3 times daily    GI/Nutrition:  Diet: Diet NPO  ADULT TUBE FEEDING; Orogastric; Renal Formula; Cyclic; 35; 9:00 AM; 1:00 PM; 30; Q 6 hours; Protein; 1 Dose; TID  Daily documentation of bowel movement  Fiber added for loose stool   Pantoprazole 40 Mg IV   Stop Reglan due to loose stools on 12/17   Hold tube feeds until gen surg recommends restarting   S/p PEG     /Fluids/Electrolytes:  RUBEN secondary to ischemic ATN due to reduced perfusion and possible contrast nephropathy  CVVH D from 12/4/2024 to 12/9/2024  Transition to hemodialysis.  Continue hemodialysis schedule per nephrology.  Continue Lasix 80 Mg IV twice daily  Monitor input output  Avoid nephrotoxic drugs and hypotension         Recent Labs     12/11/24  0320 12/12/24  0546 12/13/24  0521 12/13/24  0545   BUN 56* 81*  --  73*   CREATININE 3.6* 4.7* 3.8* 4.1*         Heme:  Hemoglobin stable  Acute blood loss anemia  Monitor H&H and transfuse if hemoglobin less than 7  Patient received multiple PRBC in this admission  There was a concern for hip 
  - management of vent per pulm   - management of VA ECMO per ecmo team   - continued on dobutamin, levophed, and vasopressin     Transaminitis - improving  - initial LFT elevation improving  - transaminitis likely secondary to shock liver     Leukocytosis   - currently blood cultures are negative and urinalysis does not support UTI  - leukocytosis possibly secondary to stress from disease processes  - continue zosyn and vanc     Acute HF exacerbation  - patient did present with STEMI. Also had saddle PE  - currently running on CRRT   - TTE initially showed EF of 65%. Repeat TTE showed EF of 20%  - intra-aortic balloon pump placed 12/5  - cardiology is primary     Hypocalcemia   - likely secondary to CRRT and numerous pRBC infusions   - q6h ionized calcium.   - calcium replacement PRN     RUBEN secondary to hypotension complicated by KRISTYN   - currently on CRRT   - management of CRRT per nephrology     Respiratory acidosis with metabolic acidosis and additional non anion gap metabolic acidosis.  -Nephrology on board.  Appreciate recommendations.  -Patient on ECMO and mechanical ventilation.     Pulmonary embolism:  CT imaging done after cardiac cath showed pulmonary as well as him with right heart strain.  S/p thrombectomy  Currently intubated and on ECMO     Concern for HIT  - platelets and hgb low   - currently currently running on heparin for ECMO  - stopped heparin and started argatroban   - HIT panel sent      Psychiatric disorder:  Patient takes clonazepam, trazodone, Wellbutrin at home.  Hold psychiatric meds while intubated      Lipidemia:  Patient takes Zocor 40 at home.      Obesity:  Patient was started on Adipex by his primary care physician recently.      DVT ppx : argatroban   GI ppx: Protonix 40 twice daily  PT/OT: Will consult once patient improves.    Isaac Willingham MD  Internal Medicine Resident, PGY-1  [facility]; [place], [place]  12/7/2024, 8:15 AM    Please note that part of this 
admission as platelets were low, but stopped with heparin and started on argatroban but later HIT panel was negative.  Patient was restarted on heparin 12/11        Recent Labs     12/11/24  0320 12/12/24  0546 12/13/24  0545   HGB 7.7* 7.3* 7.4*   HCT 24.0* 22.4* 23.2*         ID:  New fever on 12/17  Blood, urine, and CVC cath cultures obtained - no growth to date   CVC caths removed and PIV access obtained    Concern for possible aspiration pneumonia/ventilator associated pneumonia  Patient was on Zosyn, vancomycin and meropenem in this admission  Elevated procalcitonin 6- improved to 1.1   Continue patient on Unasyn and levofloxacin  Infectious diseases on board     Endo:  History of diabetes mellitus, recent HbA1c 8.1.  Elevated blood sugars. Increase Lantus to 15 unitis subcutaneous BID and medium dose sliding scale insulin    Monitor blood glucose     Prophylaxis:  DVT: heparin infusion  GI:  Protonix IV 40     Dispo:  Remain in ICU    F.A.S.T. M. H.U.G.S. B.I.D.    Feeding Diet: ADULT TUBE FEEDING; Orogastric; Renal Formula; Continuous; 10; Yes; 10; Q 4 hours; 35; 30; Q 6 hours; Protein, Fiber; 1 Dose; TID; 1 Dose; Daily  Fluids: No extra fluids- IV Piggybacks running   Family: not at bedside   Analgesic: fentanyl   Sedation: precedex    Thrombo-prophylaxis: heparin drip   Mobility: PT/OT ordered. Still intubated.   Heads up: head of bed elevated   Ulcer prophylaxis: [x] PPI Agent, [] W4Apmiw, [] Sucralfate, [] Other:  Glycemic control: increased lantus to 15 units BID w/ sliding scale   Spontaneous breathing trial: pending    Bowel regimen/urine output: fiber, prn PEG. UOP 1490ml   Indwelling catheter/lines:  OG tube, rectal tube, arcos, ET tube (size 8, 27cm at lip), art line R radial, PIV x2   De-escalation: Wean O2 when able       Melissa Olguin MD  EM Resident, Intern   Intensive Care Unit  12/18/2024 12:57 PM   
on ECMO and mechanical ventilation.     Pulmonary embolism:  CT imaging done after cardiac cath showed pulmonary as well as him with right heart strain.  S/p thrombectomy  Currently intubated and on ECMO     Concern for HIT  - platelets and hgb low   - currently currently running on heparin for ECMO  - stopped heparin and started argatroban   - HIT panel sent      Psychiatric disorder:  Patient takes clonazepam, trazodone, Wellbutrin at home.  Hold psychiatric meds while intubated      Lipidemia:  Patient takes Zocor 40 at home.      Obesity:  Patient was started on Adipex by his primary care physician recently.      DVT ppx : argatroban   GI ppx: Protonix 40 twice daily  PT/OT: Will consult once patient improves.    Gurdeep Ames MD  Internal Medicine Resident, PGY-2  [facility]; [place], [place]  12/6/2024, 11:39 AM    Please note that part of this chart was generated using voice recognition dictation software. Although every effort was made to ensure the accuracy of this automated transcription, some errors in transcription may have occurred.   
respiratory failure  Cardiogenic shock   - secondary to saddle PE and acute CHF exacerbation from STEMI  - currently on ECMO and ventilated   - management of vent per pulm   - management of [facility] ECMO per ecmo team   - continued on dobutamin, levophed, and vasopressin  - 12/9: ECMO decannulated.    Transaminitis - improving  - initial LFT elevation improving  - transaminitis likely secondary to shock liver     Leukocytosis   - currently blood cultures are negative and urinalysis does not support UTI  - leukocytosis possibly secondary to stress from disease processes  - continue zosyn and vanc  - 12/8: Chest x-ray showed concerns of pneumonia.  Will suggest for ID referral.  - ID on board.  Appreciate recommendations.    Acute HF exacerbation  - patient did present with STEMI. Also had saddle PE  - currently running on CRRT   - TTE initially showed EF of 65%. Repeat TTE showed EF of 20%  - intra-aortic balloon pump placed 12/5  - cardiology is primary     Hypocalcemia   - likely secondary to CRRT and numerous pRBC infusions   - q6h ionized calcium.   - calcium replacement PRN     RUBEN secondary to hypotension complicated by KRISTYN   - currently on CRRT   - management of CRRT per nephrology     Respiratory acidosis with metabolic acidosis and additional non anion gap metabolic acidosis.  -Nephrology on board.  Appreciate recommendations.  - Patient intubated.  FiO2 70%.  Improving.     Pulmonary embolism:  CT imaging done after cardiac cath showed pulmonary as well as him with right heart strain.  S/p thrombectomy    Concern for HIT  - platelets and hgb low   - currently currently running on heparin for ECMO  - stopped heparin and started argatroban   - HIT panel sent.  HIT panel negative.     Psychiatric disorder:  Patient takes clonazepam, trazodone, Wellbutrin at home.  Hold psychiatric meds while intubated      Lipidemia:  Patient takes Zocor 40 at home.      Obesity:  Patient was started on Adipex by his primary care 
scale.  - continue to monitor sugars  -Patient on tube feeds.    Acute hypoxic respiratory failure  Cardiogenic shock   - secondary to saddle PE and acute CHF exacerbation from STEMI  - currently on ECMO and ventilated   - management of vent per pulm   - management of VA ECMO per ecmo team   - continued on dobutamin, levophed, and vasopressin  - 12/9: ECMO decannulated.  - 12/11: Coming down on FiO2.  Recommend weaning of nitroglycerin.    Transaminitis - improving  - initial LFT elevation improving  - transaminitis likely secondary to shock liver     Leukocytosis   - currently blood cultures are negative and urinalysis does not support UTI  - leukocytosis possibly secondary to stress from disease processes  - continue zosyn and vanc  - 12/8: Chest x-ray showed concerns of pneumonia.  Will suggest for ID referral.  - ID on board.  Appreciate recommendations.  - 12/12: WBC downtrending.    Acute HF exacerbation  - patient did present with STEMI. Also had saddle PE  - currently running on CRRT   - TTE initially showed EF of 65%. Repeat TTE showed EF of 20%  - intra-aortic balloon pump placed 12/5, stopped on 12/10.  - cardiology is primary     Hypocalcemia   - likely secondary to CRRT and numerous pRBC infusions   - q6h ionized calcium.   - calcium replacement PRN     RUBEN secondary to hypotension complicated by KRISTYN   -Nephrology on board.  Appreciate recommendations.  - 12/10:CRRT discontinued by nephrology  - 12/11: Patient to undergo intermittent hemodialysis with 2 L of fluid removal tomorrow.  - 12/11: Patient to undergo intermittent hemodialysis today.    Respiratory acidosis with metabolic acidosis and additional non anion gap metabolic acidosis.  -Nephrology on board.  Appreciate recommendations.  - Patient intubated.  FiO2 70%.  Improving.     Pulmonary embolism:  CT imaging done after cardiac cath showed pulmonary as well as him with right heart strain.  S/p thrombectomy    Concern for HIT  - platelets and 
wakes up - no paralytics   Getting full HD - Fio2 70 and  N2O2 off  disc w wife and RN on bedside      12/11  100.8 fever  W 13  BC still neg 12/11  CXR p congestion same to improved  Sp cx many GNR pend ID   On the vent    12/12  On the vent awake lethargic - sp tannish yellow - sp cx GNR pend  Fever better  No ischemic digits  One pressor    12/13  FIO2  70 - sp small - sedated -t feed- FMS for diarrhea - off pressors    12/14  Temp 100.8 - FIO2 50 - Weaning sedation - follows commands  appropriate  - secretions thin - R fem and R IJ lines sites good -getting HD in the room   Increased rales on lung auscultation on the right than left, soft non tender abdomen  WBC 15  CXR as below congested 12/14 2/2 blood cx 12/11 neg at 2 days  Sputum cx 12/11 neg at 2 days  On Unasyn and Levaquin    12/15   blood cultures are negative,   WBC 15, procalcitonin much improved, 1 -fever improved - CXR  CHF   Still on the vent - abd soft - sp thin - FIo2 70  Follows commands very agitated on the vent  and had to be tied  Disc w RN    12/16  on the vent 60 FIO2 and agitated tied  Sp thin - WBC 14  All cx are neg  CXR CHF      12/17  Fever over night - fem and art line rather old - no better access yet  Sp small -on vent sp thin      Summary of relevant labs:  Labs:  Procalcitonin 6.37  WBC 13 - 14  Platelets 66  Lactic acid 1.4  Creatinine 2.2  Micro:  Sputum culture 12/8 normal cayetano  Blood culture 12/3 pending  Nasal MRSA 12/8 negative  12/4 urine analysis negative    Procedures      Cardiology      Imaging:  CXR 12/14        CXR 12/9 increased R perihilar  infil      I have personally reviewed the past medical history, past surgical history, medications, social history, and family history, and I haveupdated the database accordingly.      Allergies:   Hydrocodone-acetaminophen     Review of Systems:     Review of Systems   Reason unable to perform ROS: intubated.       Physical Examination :       Physical Exam  Constitutional:  
ECMO  - stopped heparin and started argatroban   - HIT panel sent.  HIT panel negative.  - 12/11: Report of 1 switch to heparin.     Psychiatric disorder:  Patient takes clonazepam, trazodone, Wellbutrin at home.  Hold psychiatric meds while intubated      Lipidemia:  Patient takes Zocor 40 at home.      Obesity:  Patient was started on Adipex by his primary care physician recently.    DVT ppx : argatroban   GI ppx: Protonix 40 twice daily  PT/OT: Will consult once patient improves.    Isaac Willingham MD  Internal Medicine Resident, PGY-1  [facility]; [place], [place]  12/11/2024, 7:19 AM    Please note that part of this chart was generated using voice recognition dictation software. Although every effort was made to ensure the accuracy of this automated transcription, some errors in transcription may have occurred.   
pertinent history and exam findings,  with the resident., student or CNP- I have seen and examined the patient and the key elements of all parts of the encounter have been performed by me.  I have decided the assessment, plan and orders as documented by the resident.student or CNP    Chelsie Gonzales, Infectious Diseases     
OF THE CHEST 12/14/2024 4:00 pm COMPARISON: December 14, 2024 at 10:40 a.m.. HISTORY: ORDERING SYSTEM PROVIDED HISTORY: ETT tube position TECHNOLOGIST PROVIDED HISTORY: ETT tube position Reason for Exam: Supine port, ET adjusted FINDINGS: Stable extensive bilateral consolidation and pulmonary congestion. Endotracheal tube, feeding tube and the central line are optimal in position, stable from prior examination.  There is mild bilateral pleural effusions. No acute osseous abnormalities are seen.     1. Stable extensive bilateral consolidation and pulmonary congestion. 2. Mild bilateral pleural effusions. 3. Stable lines and tubes.     XR CHEST (SINGLE VIEW FRONTAL)    Result Date: 12/14/2024  EXAMINATION: ONE XRAY VIEW OF THE CHEST 12/14/2024 10:50 am COMPARISON: 12/13/2024 HISTORY: ORDERING SYSTEM PROVIDED HISTORY: chf TECHNOLOGIST PROVIDED HISTORY: chf FINDINGS: The ETT has migrated distally and is now just about entering the right mainstem bronchus.  Advise pulling back about 4 cm.  Right IJ central line and NG tube remain in place. Normal cardiomediastinal silhouette. Pulmonary vascular congestion worsened.  Probable small right pleural effusion.  No pneumothorax. No acute bony abnormality.  Anterior fusion cervical spine noted unchanged.     1. The ETT has migrated distally and is now just about entering the right mainstem bronchus. Advise pulling back about 4 cm. 2. Pulmonary vascular congestion worsened. Probable small right pleural effusion. The findings were sent to the Radiology Results Communication Center at 12:41 pm on 12/14/2024 to be communicated to a licensed caregiver.       Medical Decision [id number]:     Results       Procedure Component Value Units Date/Time    Culture, Tip [[id number]] Collected: 12/17/24 1902    Order Status: Completed Specimen: Other from Catheter Tip Updated: 12/19/24 0704     Specimen Description .CATHETER TIP FROM RT FEM LINE     Special Requests Site: Other     
to a licensed caregiver.       Medical Decision [id number]:     Results       Procedure Component Value Units Date/Time    Culture, Tip [[id number]] Collected: 12/17/24 1902    Order Status: Completed Specimen: Other from Catheter Tip Updated: 12/19/24 0704     Specimen Description .CATHETER TIP FROM RT FEM LINE     Special Requests Site: Other     Culture NO GROWTH    Culture, Urine [[id number]] Collected: 12/17/24 1420    Order Status: Completed Specimen: Urine, clean catch Updated: 12/18/24 1016     Specimen Description .CLEAN CATCH URINE     Special Requests Site: Urine     Culture NO GROWTH    Culture, Tip [[id number]] Collected: 12/17/24 1354    Order Status: Completed Specimen: Other from Catheter Tip Updated: 12/19/24 0703     Specimen Description .CATHETER TIP     Special Requests Site: Other     Culture NO GROWTH    Culture, Blood 1 [[id number]] Collected: 12/17/24 0954    Order Status: Completed Specimen: Blood Updated: 12/20/24 1019     Specimen Description .BLOOD     Special Requests LEFT FA 5 ML     Culture NO GROWTH 3 DAYS    Culture, Blood 2 [[id number]] Collected: 12/15/24 1342    Order Status: Completed Specimen: Blood Updated: 12/20/24 1356     Specimen Description .BLOOD     Special Requests RIGHT HAND 1ML     Culture NO GROWTH 5 DAYS    Culture, Blood 1 [[id number]] Collected: 12/15/24 1222    Order Status: Completed Specimen: Blood Updated: 12/20/24 1318     Specimen Description .BLOOD     Special Requests RIGHT HAND 6ML     Culture NO GROWTH 5 DAYS    Culture, Blood 1 [[id number]] Collected: 12/11/24 1405    Order Status: Completed Specimen: Blood Updated: 12/16/24 1509     Specimen Description .BLOOD     Special Requests LH 3ML     Culture NO GROWTH 5 DAYS    Culture, Blood 1 [[id number]] Collected: 12/11/24 1400    Order Status: Completed Specimen: Blood Updated: 12/16/24 1509     Specimen Description .BLOOD     Special Requests L AC 2ML     Culture NO GROWTH 5 DAYS    Culture,

## 2024-12-23 NOTE — PLAN OF CARE
Problem: Cardiovascular - Adult  Goal: Maintains optimal cardiac output and hemodynamic stability  12/4/2024 2020 by Collette Benitez, RN  Outcome: Not Progressing  12/4/2024 0733 by Gurdeep Frederick, RN  Outcome: Progressing     
  Problem: Discharge Planning  Goal: Discharge to home or other facility with appropriate resources  12/11/2024 0156 by Toshia Russell RN  Outcome: Progressing  12/10/2024 1825 by Michael Trent RN  Outcome: Progressing     Problem: Pain  Goal: Verbalizes/displays adequate comfort level or baseline comfort level  12/11/2024 0156 by Toshia Russell RN  Outcome: Progressing  12/10/2024 1825 by Michael Trent RN  Outcome: Progressing     Problem: Skin/Tissue Integrity  Goal: Absence of new skin breakdown  Description: 1.  Monitor for areas of redness and/or skin breakdown  2.  Assess vascular access sites hourly  3.  Every 4-6 hours minimum:  Change oxygen saturation probe site  4.  Every 4-6 hours:  If on nasal continuous positive airway pressure, respiratory therapy assess nares and determine need for appliance change or resting period.  12/11/2024 0156 by Toshia Russell RN  Outcome: Progressing  12/10/2024 1825 by Michael Trent RN  Outcome: Progressing     Problem: Safety - Adult  Goal: Free from fall injury  12/11/2024 0156 by Toshia Russell RN  Outcome: Progressing  12/10/2024 1825 by Michael Trent RN  Outcome: Progressing     Problem: Neurosensory - Adult  Goal: Achieves stable or improved neurological status  12/11/2024 0156 by Toshia Russell RN  Outcome: Progressing  12/10/2024 1825 by Michael Trent RN  Outcome: Progressing  Goal: Absence of seizures  12/11/2024 0156 by Toshia Russell RN  Outcome: Progressing  12/10/2024 1825 by Michael Trent RN  Outcome: Progressing  Goal: Remains free of injury related to seizures activity  12/11/2024 0156 by Toshia Russell RN  Outcome: Progressing  12/10/2024 1825 by Michael Trent RN  Outcome: Progressing  Goal: Achieves maximal functionality and self care  12/11/2024 0156 by Toshia Russell RN  Outcome: Progressing  12/10/2024 1825 by Michael Trent RN  Outcome: Progressing     Problem: Respiratory - Adult  Goal: Achieves optimal ventilation and 
  Problem: Discharge Planning  Goal: Discharge to home or other facility with appropriate resources  12/13/2024 1803 by Kaylyn Etienne RN  Outcome: Progressing     Problem: Pain  Goal: Verbalizes/displays adequate comfort level or baseline comfort level  12/13/2024 1803 by Kaylyn Etienne RN  Outcome: Progressing     Problem: Skin/Tissue Integrity  Goal: Absence of new skin breakdown  Description: 1.  Monitor for areas of redness and/or skin breakdown  2.  Assess vascular access sites hourly  3.  Every 4-6 hours minimum:  Change oxygen saturation probe site  4.  Every 4-6 hours:  If on nasal continuous positive airway pressure, respiratory therapy assess nares and determine need for appliance change or resting period.  12/13/2024 1803 by Kaylyn Etienne RN  Outcome: Progressing     Problem: Safety - Adult  Goal: Free from fall injury  12/13/2024 1803 by Kaylyn Etienne RN  Outcome: Progressing     Problem: Neurosensory - Adult  Goal: Achieves stable or improved neurological status  12/13/2024 1803 by Kaylyn Etienne RN  Outcome: Progressing  Flowsheets (Taken 12/13/2024 0800)  Achieves stable or improved neurological status: Assess for and report changes in neurological status     Problem: Neurosensory - Adult  Goal: Absence of seizures  12/13/2024 1803 by Kaylyn Etienne RN  Outcome: Progressing  Flowsheets (Taken 12/13/2024 0800)  Absence of seizures: Monitor for seizure activity.  If seizure occurs, document type and location of movements and any associated apnea     Problem: Neurosensory - Adult  Goal: Remains free of injury related to seizures activity  12/13/2024 1803 by Kaylyn Etienne RN  Outcome: Progressing     Problem: Neurosensory - Adult  Goal: Achieves maximal functionality and self care  Outcome: Progressing     Problem: Respiratory - Adult  Goal: Achieves optimal ventilation and oxygenation  12/13/2024 1803 by Kaylyn Etienne RN  Outcome: Progressing  Flowsheets 
  Problem: Discharge Planning  Goal: Discharge to home or other facility with appropriate resources  12/13/2024 2325 by Dione Vaca RN  Outcome: Progressing  12/13/2024 1803 by Kaylyn Etienne RN  Outcome: Progressing     Problem: Pain  Goal: Verbalizes/displays adequate comfort level or baseline comfort level  12/13/2024 2325 by Dione Vaca RN  Outcome: Progressing  12/13/2024 1803 by Kaylyn Etienne RN  Outcome: Progressing     Problem: Skin/Tissue Integrity  Goal: Absence of new skin breakdown  Description: 1.  Monitor for areas of redness and/or skin breakdown  2.  Assess vascular access sites hourly  3.  Every 4-6 hours minimum:  Change oxygen saturation probe site  4.  Every 4-6 hours:  If on nasal continuous positive airway pressure, respiratory therapy assess nares and determine need for appliance change or resting period.  12/13/2024 2325 by Dione Vaca RN  Outcome: Progressing  12/13/2024 1803 by Kaylyn Etienne RN  Outcome: Progressing     Problem: Safety - Adult  Goal: Free from fall injury  12/13/2024 2325 by Dione Vaca RN  Outcome: Progressing  Flowsheets (Taken 12/13/2024 2311)  Free From Fall Injury: Based on caregiver fall risk screen, instruct family/caregiver to ask for assistance with transferring infant if caregiver noted to have fall risk factors  12/13/2024 1803 by Kaylyn Etienne RN  Outcome: Progressing     Problem: Neurosensory - Adult  Goal: Achieves stable or improved neurological status  12/13/2024 2325 by Dione Vaca RN  Outcome: Progressing  12/13/2024 1803 by Kaylyn Etienne RN  Outcome: Progressing  Flowsheets (Taken 12/13/2024 0800)  Achieves stable or improved neurological status: Assess for and report changes in neurological status     Problem: Respiratory - Adult  Goal: Achieves optimal ventilation and oxygenation  12/13/2024 2325 by Dione Vaca RN  Outcome: Progressing  12/13/2024 2010 by Ezio 
  Problem: Discharge Planning  Goal: Discharge to home or other facility with appropriate resources  12/14/2024 2144 by Dione Vaca RN  Outcome: Progressing  12/14/2024 1808 by Lazara Meza RN  Outcome: Progressing     Problem: Safety - Medical Restraint  Goal: Remains free of injury from restraints (Restraint for Interference with Medical Device)  Description: INTERVENTIONS:  1. Determine that other, less restrictive measures have been tried or would not be effective before applying the restraint  2. Evaluate the patient's condition at the time of restraint application  3. Inform patient/family regarding the reason for restraint  4. Q2H: Monitor safety, psychosocial status, comfort, nutrition and hydration  12/14/2024 2144 by Dione Vaca RN  Outcome: Progressing  Flowsheets (Taken 12/14/2024 2000)  Remains free of injury from restraints (restraint for interference with medical device): Every 2 hours: Monitor safety, psychosocial status, comfort, nutrition and hydration  12/14/2024 1808 by Lazara Meza RN  Outcome: Progressing  Problem: Pain  Goal: Verbalizes/displays adequate comfort level or baseline comfort level  12/14/2024 2144 by Dione Vaca RN  Outcome: Progressing  12/14/2024 1808 by Lazara Meza RN  Outcome: Progressing     Problem: Skin/Tissue Integrity  Goal: Absence of new skin breakdown  Description: 1.  Monitor for areas of redness and/or skin breakdown  2.  Assess vascular access sites hourly  3.  Every 4-6 hours minimum:  Change oxygen saturation probe site  4.  Every 4-6 hours:  If on nasal continuous positive airway pressure, respiratory therapy assess nares and determine need for appliance change or resting period.  12/14/2024 2144 by Dione Vaca RN  Outcome: Progressing  12/14/2024 1808 by Lazara Meza RN  Outcome: Progressing     Problem: Safety - Adult  Goal: Free from fall injury  12/14/2024 2144 by Dione Vaca RN  Outcome: 
  Problem: Discharge Planning  Goal: Discharge to home or other facility with appropriate resources  12/15/2024 2047 by Dione Vaca RN  Outcome: Progressing    Problem: Safety - Medical Restraint  Goal: Remains free of injury from restraints (Restraint for Interference with Medical Device)  Description: INTERVENTIONS:  1. Determine that other, less restrictive measures have been tried or would not be effective before applying the restraint  2. Evaluate the patient's condition at the time of restraint application  3. Inform patient/family regarding the reason for restraint  4. Q2H: Monitor safety, psychosocial status, comfort, nutrition and hydration  12/15/2024 2047 by Dione Vaca RN  Outcome: Progressing    Problem: Pain  Goal: Verbalizes/displays adequate comfort level or baseline comfort level  12/15/2024 2047 by Dione Vaca RN  Outcome: Progressing  12/15/2024 2047 by Dione Vaca RN  Outcome: Progressing  12/15/2024 1827 by Lazara Meza RN  Outcome: Progressing     Problem: Skin/Tissue Integrity  Goal: Absence of new skin breakdown  Description: 1.  Monitor for areas of redness and/or skin breakdown  2.  Assess vascular access sites hourly  3.  Every 4-6 hours minimum:  Change oxygen saturation probe site  4.  Every 4-6 hours:  If on nasal continuous positive airway pressure, respiratory therapy assess nares and determine need for appliance change or resting period.  12/15/2024 2047 by Dione Vaca RN  Outcome: Progressing  12/15/2024 2047 by Dione Vaca RN  Outcome: Progressing  12/15/2024 1827 by Lazara Meza RN  Outcome: Progressing     Problem: Safety - Adult  Goal: Free from fall injury  12/15/2024 2047 by Dione Vaca RN  Outcome: Progressing     Problem: Respiratory - Adult  Goal: Achieves optimal ventilation and oxygenation  12/15/2024 2047 by Dione Vaca RN  Outcome: Progressing  12/15/2024 2047 by Tarun Stevens 
  Problem: Discharge Planning  Goal: Discharge to home or other facility with appropriate resources  12/18/2024 1154 by Pratibha Fernandes RN  Outcome: Progressing     Problem: Pain  Goal: Verbalizes/displays adequate comfort level or baseline comfort level  12/18/2024 1154 by Pratibha Fernandes RN  Outcome: Progressing     Problem: Skin/Tissue Integrity  Goal: Absence of new skin breakdown  Description: 1.  Monitor for areas of redness and/or skin breakdown  2.  Assess vascular access sites hourly  3.  Every 4-6 hours minimum:  Change oxygen saturation probe site  4.  Every 4-6 hours:  If on nasal continuous positive airway pressure, respiratory therapy assess nares and determine need for appliance change or resting period.  12/18/2024 1154 by Pratibha Fernandes RN  Outcome: Progressing     Problem: Safety - Adult  Goal: Free from fall injury  12/18/2024 1154 by Pratibha Fernandes RN  Outcome: Progressing     Problem: Neurosensory - Adult  Goal: Achieves stable or improved neurological status  12/18/2024 1154 by Pratibha Fernandes RN  Outcome: Progressing     Problem: Neurosensory - Adult  Goal: Absence of seizures  12/18/2024 1154 by Pratibha Fernandes RN  Outcome: Progressing     Problem: Neurosensory - Adult  Goal: Remains free of injury related to seizures activity  12/18/2024 1154 by Pratibha Fernandes RN  Outcome: Progressing     Problem: Neurosensory - Adult  Goal: Achieves maximal functionality and self care  12/18/2024 1154 by Pratibha Fernandes RN  Outcome: Progressing     Problem: Respiratory - Adult  Goal: Achieves optimal ventilation and oxygenation  12/18/2024 1154 by Pratibha Fernandes RN  Outcome: Progressing     Problem: Cardiovascular - Adult  Goal: Maintains optimal cardiac output and hemodynamic stability  12/18/2024 1154 by Pratibha Fernandes RN  Outcome: Progressing       Problem: Cardiovascular - Adult  Goal: Absence of cardiac dysrhythmias or at baseline  12/18/2024 1154 by Pratibha Fernandes 
  Problem: Discharge Planning  Goal: Discharge to home or other facility with appropriate resources  12/21/2024 1000 by Dalia Hi RN  Outcome: Progressing     Problem: Pain  Goal: Verbalizes/displays adequate comfort level or baseline comfort level  12/21/2024 1000 by Dalia Hi RN  Outcome: Progressing  Flowsheets (Taken 12/21/2024 0800)  Verbalizes/displays adequate comfort level or baseline comfort level: Assess pain using appropriate pain scale     Problem: Skin/Tissue Integrity  Goal: Absence of new skin breakdown  Description: 1.  Monitor for areas of redness and/or skin breakdown  2.  Assess vascular access sites hourly  3.  Every 4-6 hours minimum:  Change oxygen saturation probe site  4.  Every 4-6 hours:  If on nasal continuous positive airway pressure, respiratory therapy assess nares and determine need for appliance change or resting period.  12/21/2024 1000 by Dalia Hi RN  Outcome: Progressing     Problem: Safety - Adult  Goal: Free from fall injury  12/21/2024 1000 by Dalia Hi RN  Outcome: Progressing  Flowsheets (Taken 12/21/2024 0800)  Free From Fall Injury: Instruct family/caregiver on patient safety     Problem: Neurosensory - Adult  Goal: Achieves stable or improved neurological status  12/21/2024 1000 by Dalia Hi RN  Outcome: Progressing     Problem: Neurosensory - Adult  Goal: Absence of seizures  12/21/2024 1000 by Dalia Hi RN  Outcome: Progressing     Problem: Neurosensory - Adult  Goal: Remains free of injury related to seizures activity  12/21/2024 1000 by Dalia Hi RN  Outcome: Progressing     Problem: Neurosensory - Adult  Goal: Achieves maximal functionality and self care  12/21/2024 1000 by Dalia Hi RN  Outcome: Progressing     Problem: Respiratory - Adult  Goal: Achieves optimal ventilation and oxygenation  12/21/2024 1000 by Dalia Hi RN  Outcome: Progressing     Problem: Cardiovascular - Adult  Goal: Maintains optimal cardiac 
  Problem: Discharge Planning  Goal: Discharge to home or other facility with appropriate resources  12/23/2024 1047 by Nash Finney RN  Outcome: Completed  12/23/2024 0606 by Myesha López RN  Outcome: Progressing     Problem: Pain  Goal: Verbalizes/displays adequate comfort level or baseline comfort level  12/23/2024 1047 by Nash Finney RN  Outcome: Completed  12/23/2024 0606 by Myesha López RN  Outcome: Progressing  Flowsheets (Taken 12/23/2024 0349)  Verbalizes/displays adequate comfort level or baseline comfort level: Assess pain using appropriate pain scale     Problem: Skin/Tissue Integrity  Goal: Absence of new skin breakdown  Description: 1.  Monitor for areas of redness and/or skin breakdown  2.  Assess vascular access sites hourly  3.  Every 4-6 hours minimum:  Change oxygen saturation probe site  4.  Every 4-6 hours:  If on nasal continuous positive airway pressure, respiratory therapy assess nares and determine need for appliance change or resting period.  12/23/2024 1047 by Nash Finney RN  Outcome: Completed  12/23/2024 0606 by Myesha López RN  Outcome: Progressing     Problem: Safety - Adult  Goal: Free from fall injury  12/23/2024 1047 by Nash Finney RN  Outcome: Completed  12/23/2024 0606 by Myesha López RN  Outcome: Progressing     Problem: Neurosensory - Adult  Goal: Achieves stable or improved neurological status  12/23/2024 1047 by Nash Finney RN  Outcome: Completed  12/23/2024 0606 by Myesha López RN  Outcome: Progressing  Flowsheets (Taken 12/22/2024 2000)  Achieves stable or improved neurological status: Maintain blood pressure and fluid volume within ordered parameters to optimize cerebral perfusion and minimize risk of hemorrhage  Goal: Absence of seizures  12/23/2024 1047 by Nash Finney RN  Outcome: Completed  12/23/2024 0606 by Myesha López RN  Outcome: Progressing  Flowsheets (Taken 12/22/2024 2000)  Absence of 
  Problem: Discharge Planning  Goal: Discharge to home or other facility with appropriate resources  12/4/2024 2020 by Collette Benitez RN  Outcome: Progressing     Problem: Safety - Medical Restraint  Goal: Remains free of injury from restraints (Restraint for Interference with Medical Device)  Description: INTERVENTIONS:  1. Determine that other, less restrictive measures have been tried or would not be effective before applying the restraint  2. Evaluate the patient's condition at the time of restraint application  3. Inform patient/family regarding the reason for restraint  4. Q2H: Monitor safety, psychosocial status, comfort, nutrition and hydration  12/4/2024 2020 by Collette Benitez RN  Outcome: Completed  Flowsheets (Taken 12/4/2024 0830)  Remains free of injury from restraints (restraint for interference with medical device):   Determine that other, less restrictive measures have been tried or would not be effective before applying the restraint   Evaluate the patient's condition at the time of restraint application   Inform patient/family regarding the reason for restraint   Every 2 hours: Monitor safety, psychosocial status, comfort, nutrition and hydration     Problem: Pain  Goal: Verbalizes/displays adequate comfort level or baseline comfort level  12/4/2024 2020 by Collette Benitez RN  Outcome: Progressing     Problem: Skin/Tissue Integrity  Goal: Absence of new skin breakdown  Description: 1.  Monitor for areas of redness and/or skin breakdown  2.  Assess vascular access sites hourly  3.  Every 4-6 hours minimum:  Change oxygen saturation probe site  4.  Every 4-6 hours:  If on nasal continuous positive airway pressure, respiratory therapy assess nares and determine need for appliance change or resting period.  12/4/2024 2020 by Collette Benitez RN  Outcome: Progressing     Problem: Safety - Adult  Goal: Free from fall injury  12/4/2024 2020 by Collette Benitez, RN  Outcome: Progressing   
  Problem: Discharge Planning  Goal: Discharge to home or other facility with appropriate resources  12/5/2024 0622 by Boby Gallardo RN  Outcome: Progressing  12/4/2024 2020 by Collette Benitez RN  Outcome: Progressing     Problem: Pain  Goal: Verbalizes/displays adequate comfort level or baseline comfort level  12/5/2024 0622 by Boby Gallardo RN  Outcome: Progressing  12/4/2024 2020 by Collette Benitez RN  Outcome: Progressing     Problem: Skin/Tissue Integrity  Goal: Absence of new skin breakdown  Description: 1.  Monitor for areas of redness and/or skin breakdown  2.  Assess vascular access sites hourly  3.  Every 4-6 hours minimum:  Change oxygen saturation probe site  4.  Every 4-6 hours:  If on nasal continuous positive airway pressure, respiratory therapy assess nares and determine need for appliance change or resting period.  12/5/2024 0622 by Boby Gallardo RN  Outcome: Progressing  12/4/2024 2020 by Collette Benitez RN  Outcome: Progressing     Problem: Safety - Adult  Goal: Free from fall injury  12/5/2024 0622 by Boby Gallardo RN  Outcome: Progressing  12/4/2024 2020 by Collette Benitez RN  Outcome: Progressing     Problem: Neurosensory - Adult  Goal: Achieves stable or improved neurological status  Outcome: Progressing  Goal: Absence of seizures  Outcome: Progressing  Goal: Remains free of injury related to seizures activity  Outcome: Progressing  Goal: Achieves maximal functionality and self care  Outcome: Progressing     Problem: Respiratory - Adult  Goal: Achieves optimal ventilation and oxygenation  12/5/2024 0622 by Boby Gallardo RN  Outcome: Progressing  12/4/2024 2020 by Collette Benitez RN  Outcome: Progressing     Problem: Cardiovascular - Adult  Goal: Maintains optimal cardiac output and hemodynamic stability  12/5/2024 0622 by oBby Gallardo RN  Outcome: Progressing  12/4/2024 2020 by Collette Benitez RN  Outcome: Not Progressing  Goal: Absence of cardiac dysrhythmias or at 
  Problem: Discharge Planning  Goal: Discharge to home or other facility with appropriate resources  12/5/2024 1709 by Viri Ramos RN  Outcome: Progressing  12/5/2024 0626 by Toshia Russell RN  Outcome: Progressing  12/5/2024 0622 by Boby Gallardo RN  Outcome: Progressing     Problem: Pain  Goal: Verbalizes/displays adequate comfort level or baseline comfort level  12/5/2024 1709 by Viri Ramos RN  Outcome: Progressing  12/5/2024 0626 by Toshia Russell RN  Outcome: Progressing  12/5/2024 0622 by Boby Gallardo RN  Outcome: Progressing     Problem: Skin/Tissue Integrity  Goal: Absence of new skin breakdown  Description: 1.  Monitor for areas of redness and/or skin breakdown  2.  Assess vascular access sites hourly  3.  Every 4-6 hours minimum:  Change oxygen saturation probe site  4.  Every 4-6 hours:  If on nasal continuous positive airway pressure, respiratory therapy assess nares and determine need for appliance change or resting period.  12/5/2024 1709 by Viri Ramos RN  Outcome: Progressing  12/5/2024 0626 by Toshia Russell RN  Outcome: Progressing  12/5/2024 0622 by Boby Gallardo RN  Outcome: Progressing     Problem: Safety - Adult  Goal: Free from fall injury  12/5/2024 1709 by Viri Ramos RN  Outcome: Progressing  12/5/2024 0626 by Toshia Russell RN  Outcome: Progressing  12/5/2024 0622 by Boby Gallardo RN  Outcome: Progressing     Problem: Neurosensory - Adult  Goal: Achieves stable or improved neurological status  12/5/2024 1709 by Viri Ramos RN  Outcome: Progressing  12/5/2024 0626 by Toshia Russell RN  Outcome: Progressing  12/5/2024 0622 by Boby Gallardo RN  Outcome: Progressing  Goal: Absence of seizures  12/5/2024 1709 by Viri Ramos RN  Outcome: Progressing  12/5/2024 0626 by Toshia Russell RN  Outcome: Progressing  12/5/2024 0622 by Boby Gallardo RN  Outcome: Progressing  Goal: Remains free of injury related to seizures activity  12/5/2024 1709 by Viri Ramos RN  Outcome: 
  Problem: Discharge Planning  Goal: Discharge to home or other facility with appropriate resources  12/6/2024 0139 by Viri Araujo RN  Outcome: Progressing  Flowsheets (Taken 12/5/2024 2000 by Manisha Meyer RN)  Discharge to home or other facility with appropriate resources: Identify barriers to discharge with patient and caregiver  12/5/2024 1709 by Viri Ramos RN  Outcome: Progressing     Problem: Pain  Goal: Verbalizes/displays adequate comfort level or baseline comfort level  12/6/2024 0139 by Viri Araujo RN  Outcome: Progressing  12/5/2024 1709 by Viri Ramos RN  Outcome: Progressing     Problem: Skin/Tissue Integrity  Goal: Absence of new skin breakdown  Description: 1.  Monitor for areas of redness and/or skin breakdown  2.  Assess vascular access sites hourly  3.  Every 4-6 hours minimum:  Change oxygen saturation probe site  4.  Every 4-6 hours:  If on nasal continuous positive airway pressure, respiratory therapy assess nares and determine need for appliance change or resting period.  12/6/2024 0139 by Viri Araujo RN  Outcome: Progressing  12/5/2024 1709 by Viri Ramos RN  Outcome: Progressing     Problem: Safety - Adult  Goal: Free from fall injury  12/6/2024 0139 by Viri Araujo RN  Outcome: Progressing  Flowsheets (Taken 12/5/2024 2320 by Manisha Meyer, RN)  Free From Fall Injury: Instruct family/caregiver on patient safety  12/5/2024 1709 by Viri Ramos RN  Outcome: Progressing     Problem: Neurosensory - Adult  Goal: Achieves stable or improved neurological status  12/6/2024 0139 by Viri Araujo RN  Outcome: Progressing  Flowsheets (Taken 12/5/2024 2000 by Manisha Meyer, CEDRIC)  Achieves stable or improved neurological status:   Assess for and report changes in neurological status   Monitor temperature, glucose, and sodium. Initiate appropriate interventions as ordered  12/5/2024 1709 by Viri Ramos RN  Outcome: Progressing  Goal: Absence of seizures  12/6/2024 
  Problem: Discharge Planning  Goal: Discharge to home or other facility with appropriate resources  12/7/2024 0219 by Manisha Meyer RN  Outcome: Not Progressing     Problem: Pain  Goal: Verbalizes/displays adequate comfort level or baseline comfort level  12/7/2024 0219 by Manisha Meyer RN  Outcome: Progressing       Problem: Skin/Tissue Integrity  Goal: Absence of new skin breakdown  Description: 1.  Monitor for areas of redness and/or skin breakdown  2.  Assess vascular access sites hourly  3.  Every 4-6 hours minimum:  Change oxygen saturation probe site  4.  Every 4-6 hours:  If on nasal continuous positive airway pressure, respiratory therapy assess nares and determine need for appliance change or resting period.  12/7/2024 0219 by Manisha Meyer RN  Outcome: Progressing     Problem: Pain  Goal: Verbalizes/displays adequate comfort level or baseline comfort level  12/7/2024 0219 by Manisha Meyer RN  Outcome: Progressing     Problem: Safety - Adult  Goal: Free from fall injury  12/7/2024 0219 by Manisha Meyer RN  Outcome: Progressing  Flowsheets (Taken 12/6/2024 2134)  Free From Fall Injury: Instruct family/caregiver on patient safety     Problem: Neurosensory - Adult  Goal: Achieves stable or improved neurological status  12/7/2024 0219 by Manisha Meyer RN  Outcome: Progressing  Flowsheets (Taken 12/6/2024 2000)  Achieves stable or improved neurological status: Assess for and report changes in neurological status     Problem: Neurosensory - Adult  Goal: Absence of seizures  12/7/2024 0219 by Manisha Meyer RN  Outcome: Progressing     Problem: Neurosensory - Adult  Goal: Remains free of injury related to seizures activity  12/7/2024 0219 by Manisha Meyer RN  Outcome: Progressing     Problem: Neurosensory - Adult  Goal: Achieves maximal functionality and self care  12/7/2024 0219 by Manisha Meyer RN  Outcome: Progressing     Problem: Respiratory - Adult  Goal: Achieves optimal ventilation and 
  Problem: Discharge Planning  Goal: Discharge to home or other facility with appropriate resources  12/7/2024 1519 by Denisa Francis RN  Outcome: Progressing  12/7/2024 0219 by Manisha Meyer RN  Outcome: Not Progressing     Problem: Pain  Goal: Verbalizes/displays adequate comfort level or baseline comfort level  12/7/2024 1519 by Denisa Francis RN  Outcome: Progressing  Flowsheets (Taken 12/7/2024 0800)  Verbalizes/displays adequate comfort level or baseline comfort level:   Assess pain using appropriate pain scale   Administer analgesics based on type and severity of pain and evaluate response  12/7/2024 0219 by Manisha Meyer RN  Outcome: Progressing     Problem: Skin/Tissue Integrity  Goal: Absence of new skin breakdown  Description: 1.  Monitor for areas of redness and/or skin breakdown  2.  Assess vascular access sites hourly  3.  Every 4-6 hours minimum:  Change oxygen saturation probe site  4.  Every 4-6 hours:  If on nasal continuous positive airway pressure, respiratory therapy assess nares and determine need for appliance change or resting period.  12/7/2024 1519 by Denisa Francis RN  Outcome: Progressing  12/7/2024 0219 by Manisha Meyer RN  Outcome: Progressing     Problem: Safety - Adult  Goal: Free from fall injury  12/7/2024 1519 by Denisa Francis RN  Outcome: Progressing  Flowsheets (Taken 12/7/2024 0800)  Free From Fall Injury: Instruct family/caregiver on patient safety  12/7/2024 0219 by Manisha Meyer RN  Outcome: Progressing  Flowsheets (Taken 12/6/2024 2134)  Free From Fall Injury: Instruct family/caregiver on patient safety     Problem: Neurosensory - Adult  Goal: Achieves stable or improved neurological status  12/7/2024 1519 by Denisa Francis RN  Outcome: Progressing  Flowsheets (Taken 12/7/2024 0745)  Achieves stable or improved neurological status:   Assess for and report changes in neurological status   Initiate measures to prevent increased intracranial 
  Problem: Discharge Planning  Goal: Discharge to home or other facility with appropriate resources  Outcome: Not Progressing     Problem: Safety - Medical Restraint  Goal: Remains free of injury from restraints (Restraint for Interference with Medical Device)  Description: INTERVENTIONS:  1. Determine that other, less restrictive measures have been tried or would not be effective before applying the restraint  2. Evaluate the patient's condition at the time of restraint application  3. Inform patient/family regarding the reason for restraint  4. Q2H: Monitor safety, psychosocial status, comfort, nutrition and hydration  Outcome: Progressing  Flowsheets (Taken 12/3/2024 1611 by Darcie Barakat RN)  Remains free of injury from restraints (restraint for interference with medical device):   Determine that other, less restrictive measures have been tried or would not be effective before applying the restraint   Evaluate the patient's condition at the time of restraint application   Inform patient/family regarding the reason for restraint   Every 2 hours: Monitor safety, psychosocial status, comfort, nutrition and hydration     Problem: Pain  Goal: Verbalizes/displays adequate comfort level or baseline comfort level  Outcome: Progressing     Problem: Skin/Tissue Integrity  Goal: Absence of new skin breakdown  Description: 1.  Monitor for areas of redness and/or skin breakdown  2.  Assess vascular access sites hourly  3.  Every 4-6 hours minimum:  Change oxygen saturation probe site  4.  Every 4-6 hours:  If on nasal continuous positive airway pressure, respiratory therapy assess nares and determine need for appliance change or resting period.  Outcome: Progressing     Problem: Safety - Adult  Goal: Free from fall injury  Outcome: Progressing     Problem: Neurosensory - Adult  Goal: Achieves stable or improved neurological status  Outcome: Progressing     Problem: Respiratory - Adult  Goal: Achieves optimal ventilation 
  Problem: Discharge Planning  Goal: Discharge to home or other facility with appropriate resources  Outcome: Progressing     Problem: Pain  Goal: Verbalizes/displays adequate comfort level or baseline comfort level  Outcome: Progressing     Problem: Skin/Tissue Integrity  Goal: Absence of new skin breakdown  Description: 1.  Monitor for areas of redness and/or skin breakdown  2.  Assess vascular access sites hourly  3.  Every 4-6 hours minimum:  Change oxygen saturation probe site  4.  Every 4-6 hours:  If on nasal continuous positive airway pressure, respiratory therapy assess nares and determine need for appliance change or resting period.  12/8/2024 1619 by Denisa Francis RN  Outcome: Progressing  12/8/2024 0753 by Akosua Solo RCP  Outcome: Progressing     Problem: Safety - Adult  Goal: Free from fall injury  Outcome: Progressing  Flowsheets (Taken 12/8/2024 0800)  Free From Fall Injury: Instruct family/caregiver on patient safety     Problem: Neurosensory - Adult  Goal: Achieves stable or improved neurological status  Outcome: Progressing  Flowsheets (Taken 12/8/2024 0800)  Achieves stable or improved neurological status:   Assess for and report changes in neurological status   Initiate measures to prevent increased intracranial pressure   Maintain blood pressure and fluid volume within ordered parameters to optimize cerebral perfusion and minimize risk of hemorrhage  Goal: Absence of seizures  Outcome: Progressing  Flowsheets (Taken 12/8/2024 0800)  Absence of seizures:   Monitor for seizure activity.  If seizure occurs, document type and location of movements and any associated apnea   If seizure occurs, turn head to side and suction secretions as needed  Goal: Remains free of injury related to seizures activity  Outcome: Progressing  Flowsheets (Taken 12/8/2024 0800)  Remains free of injury related to seizure activity:   Maintain airway, patient safety  and administer oxygen as ordered   If 
  Problem: Discharge Planning  Goal: Discharge to home or other facility with appropriate resources  Outcome: Progressing     Problem: Pain  Goal: Verbalizes/displays adequate comfort level or baseline comfort level  Outcome: Progressing     Problem: Skin/Tissue Integrity  Goal: Absence of new skin breakdown  Description: 1.  Monitor for areas of redness and/or skin breakdown  2.  Assess vascular access sites hourly  3.  Every 4-6 hours minimum:  Change oxygen saturation probe site  4.  Every 4-6 hours:  If on nasal continuous positive airway pressure, respiratory therapy assess nares and determine need for appliance change or resting period.  Outcome: Progressing     Problem: Safety - Adult  Goal: Free from fall injury  Outcome: Progressing     Problem: Neurosensory - Adult  Goal: Achieves stable or improved neurological status  Outcome: Progressing     Problem: Neurosensory - Adult  Goal: Absence of seizures  Outcome: Progressing     Problem: Neurosensory - Adult  Goal: Remains free of injury related to seizures activity  Outcome: Progressing     Problem: Neurosensory - Adult  Goal: Achieves maximal functionality and self care  Outcome: Progressing     Problem: Respiratory - Adult  Goal: Achieves optimal ventilation and oxygenation  Outcome: Progressing     Problem: Cardiovascular - Adult  Goal: Maintains optimal cardiac output and hemodynamic stability  Outcome: Progressing     Problem: Cardiovascular - Adult  Goal: Absence of cardiac dysrhythmias or at baseline  Outcome: Progressing     Problem: Skin/Tissue Integrity - Adult  Goal: Skin integrity remains intact  Outcome: Progressing     Problem: Skin/Tissue Integrity - Adult  Goal: Incisions, wounds, or drain sites healing without S/S of infection  Outcome: Progressing     Problem: Skin/Tissue Integrity - Adult  Goal: Oral mucous membranes remain intact  Outcome: Progressing     Problem: Nutrition Deficit:  Goal: Optimize nutritional status  Outcome: 
  Problem: Discharge Planning  Goal: Discharge to home or other facility with appropriate resources  Outcome: Progressing     Problem: Pain  Goal: Verbalizes/displays adequate comfort level or baseline comfort level  Outcome: Progressing     Problem: Skin/Tissue Integrity  Goal: Absence of new skin breakdown  Description: 1.  Monitor for areas of redness and/or skin breakdown  2.  Assess vascular access sites hourly  3.  Every 4-6 hours minimum:  Change oxygen saturation probe site  4.  Every 4-6 hours:  If on nasal continuous positive airway pressure, respiratory therapy assess nares and determine need for appliance change or resting period.  Outcome: Progressing     Problem: Safety - Adult  Goal: Free from fall injury  Outcome: Progressing     Problem: Neurosensory - Adult  Goal: Achieves stable or improved neurological status  Outcome: Progressing  Goal: Absence of seizures  Outcome: Progressing  Goal: Remains free of injury related to seizures activity  Outcome: Progressing  Goal: Achieves maximal functionality and self care  Outcome: Progressing     Problem: Neurosensory - Adult  Goal: Absence of seizures  Outcome: Progressing     Problem: Respiratory - Adult  Goal: Achieves optimal ventilation and oxygenation  12/10/2024 1825 by Michael Trent RN  Outcome: Progressing  12/10/2024 0835 by Ernst Mcarthur RCP  Outcome: Progressing     Problem: Cardiovascular - Adult  Goal: Maintains optimal cardiac output and hemodynamic stability  Outcome: Progressing  Goal: Absence of cardiac dysrhythmias or at baseline  Outcome: Progressing     Problem: Skin/Tissue Integrity - Adult  Goal: Skin integrity remains intact  Outcome: Progressing  Goal: Incisions, wounds, or drain sites healing without S/S of infection  Outcome: Progressing  Goal: Oral mucous membranes remain intact  Outcome: Progressing     Problem: Nutrition Deficit:  Goal: Optimize nutritional status  Outcome: Progressing     Problem: ABCDS Injury 
  Problem: Discharge Planning  Goal: Discharge to home or other facility with appropriate resources  Outcome: Progressing     Problem: Pain  Goal: Verbalizes/displays adequate comfort level or baseline comfort level  Outcome: Progressing     Problem: Skin/Tissue Integrity  Goal: Absence of new skin breakdown  Description: 1.  Monitor for areas of redness and/or skin breakdown  2.  Assess vascular access sites hourly  3.  Every 4-6 hours minimum:  Change oxygen saturation probe site  4.  Every 4-6 hours:  If on nasal continuous positive airway pressure, respiratory therapy assess nares and determine need for appliance change or resting period.  Outcome: Progressing     Problem: Safety - Adult  Goal: Free from fall injury  Outcome: Progressing     Problem: Neurosensory - Adult  Goal: Achieves stable or improved neurological status  Outcome: Progressing  Goal: Absence of seizures  Outcome: Progressing  Goal: Remains free of injury related to seizures activity  Outcome: Progressing  Goal: Achieves maximal functionality and self care  Outcome: Progressing     Problem: Respiratory - Adult  Goal: Achieves optimal ventilation and oxygenation  12/10/2024 0420 by Toshia Russell RN  Outcome: Progressing  12/9/2024 2042 by Meghana Mortensen RCP  Outcome: Progressing     Problem: Cardiovascular - Adult  Goal: Maintains optimal cardiac output and hemodynamic stability  Outcome: Progressing  Goal: Absence of cardiac dysrhythmias or at baseline  Outcome: Progressing     Problem: Skin/Tissue Integrity - Adult  Goal: Skin integrity remains intact  Outcome: Progressing  Goal: Incisions, wounds, or drain sites healing without S/S of infection  Outcome: Progressing  Goal: Oral mucous membranes remain intact  Outcome: Progressing     Problem: Nutrition Deficit:  Goal: Optimize nutritional status  Outcome: Progressing     Problem: ABCDS Injury Assessment  Goal: Absence of physical injury  Outcome: Progressing     
  Problem: Discharge Planning  Goal: Discharge to home or other facility with appropriate resources  Outcome: Progressing     Problem: Pain  Goal: Verbalizes/displays adequate comfort level or baseline comfort level  Outcome: Progressing     Problem: Skin/Tissue Integrity  Goal: Absence of new skin breakdown  Description: 1.  Monitor for areas of redness and/or skin breakdown  2.  Assess vascular access sites hourly  3.  Every 4-6 hours minimum:  Change oxygen saturation probe site  4.  Every 4-6 hours:  If on nasal continuous positive airway pressure, respiratory therapy assess nares and determine need for appliance change or resting period.  Outcome: Progressing     Problem: Safety - Adult  Goal: Free from fall injury  Outcome: Progressing     Problem: Neurosensory - Adult  Goal: Achieves stable or improved neurological status  Outcome: Progressing  Goal: Absence of seizures  Outcome: Progressing  Goal: Remains free of injury related to seizures activity  Outcome: Progressing  Goal: Achieves maximal functionality and self care  Outcome: Progressing     Problem: Respiratory - Adult  Goal: Achieves optimal ventilation and oxygenation  12/11/2024 1831 by Louann Macdonald RN  Outcome: Progressing  12/11/2024 0820 by Kandi Otero BRYCE  Outcome: Progressing     Problem: Cardiovascular - Adult  Goal: Maintains optimal cardiac output and hemodynamic stability  Outcome: Progressing  Goal: Absence of cardiac dysrhythmias or at baseline  Outcome: Progressing     Problem: Skin/Tissue Integrity - Adult  Goal: Skin integrity remains intact  Outcome: Progressing  Goal: Incisions, wounds, or drain sites healing without S/S of infection  Outcome: Progressing  Goal: Oral mucous membranes remain intact  Outcome: Progressing     Problem: Nutrition Deficit:  Goal: Optimize nutritional status  Outcome: Progressing     Problem: ABCDS Injury Assessment  Goal: Absence of physical injury  Outcome: Progressing     Problem: Chronic 
  Problem: Discharge Planning  Goal: Discharge to home or other facility with appropriate resources  Outcome: Progressing     Problem: Pain  Goal: Verbalizes/displays adequate comfort level or baseline comfort level  Outcome: Progressing     Problem: Skin/Tissue Integrity  Goal: Absence of new skin breakdown  Description: 1.  Monitor for areas of redness and/or skin breakdown  2.  Assess vascular access sites hourly  3.  Every 4-6 hours minimum:  Change oxygen saturation probe site  4.  Every 4-6 hours:  If on nasal continuous positive airway pressure, respiratory therapy assess nares and determine need for appliance change or resting period.  Outcome: Progressing     Problem: Safety - Adult  Goal: Free from fall injury  Outcome: Progressing     Problem: Neurosensory - Adult  Goal: Achieves stable or improved neurological status  Outcome: Progressing  Goal: Absence of seizures  Outcome: Progressing  Goal: Remains free of injury related to seizures activity  Outcome: Progressing  Goal: Achieves maximal functionality and self care  Outcome: Progressing     Problem: Respiratory - Adult  Goal: Achieves optimal ventilation and oxygenation  12/12/2024 1852 by Louann Macdonald RN  Outcome: Progressing  12/12/2024 0758 by Kandi Otero BRYCE  Outcome: Progressing     Problem: Cardiovascular - Adult  Goal: Maintains optimal cardiac output and hemodynamic stability  Outcome: Progressing  Goal: Absence of cardiac dysrhythmias or at baseline  Outcome: Progressing     Problem: Skin/Tissue Integrity - Adult  Goal: Skin integrity remains intact  Outcome: Progressing  Goal: Incisions, wounds, or drain sites healing without S/S of infection  Outcome: Progressing  Goal: Oral mucous membranes remain intact  Outcome: Progressing     Problem: Nutrition Deficit:  Goal: Optimize nutritional status  Outcome: Progressing  Flowsheets (Taken 12/12/2024 1305 by Viri Gomez RD)  Nutrient intake appropriate for improving, restoring, or 
  Problem: Discharge Planning  Goal: Discharge to home or other facility with appropriate resources  Outcome: Progressing     Problem: Pain  Goal: Verbalizes/displays adequate comfort level or baseline comfort level  Outcome: Progressing     Problem: Skin/Tissue Integrity  Goal: Absence of new skin breakdown  Description: 1.  Monitor for areas of redness and/or skin breakdown  2.  Assess vascular access sites hourly  3.  Every 4-6 hours minimum:  Change oxygen saturation probe site  4.  Every 4-6 hours:  If on nasal continuous positive airway pressure, respiratory therapy assess nares and determine need for appliance change or resting period.  Outcome: Progressing     Problem: Safety - Adult  Goal: Free from fall injury  Outcome: Progressing     Problem: Neurosensory - Adult  Goal: Achieves stable or improved neurological status  Outcome: Progressing  Goal: Absence of seizures  Outcome: Progressing  Goal: Remains free of injury related to seizures activity  Outcome: Progressing  Goal: Achieves maximal functionality and self care  Outcome: Progressing     Problem: Respiratory - Adult  Goal: Achieves optimal ventilation and oxygenation  12/6/2024 1822 by Law Leigh RN  Outcome: Progressing  12/6/2024 0925 by Amanda Burgos RCP  Outcome: Progressing     Problem: Cardiovascular - Adult  Goal: Maintains optimal cardiac output and hemodynamic stability  Outcome: Progressing  Goal: Absence of cardiac dysrhythmias or at baseline  Outcome: Progressing     Problem: Skin/Tissue Integrity - Adult  Goal: Skin integrity remains intact  Outcome: Progressing  Goal: Incisions, wounds, or drain sites healing without S/S of infection  Outcome: Progressing  Goal: Oral mucous membranes remain intact  Outcome: Progressing     Problem: Nutrition Deficit:  Goal: Optimize nutritional status  12/6/2024 1822 by Law Leigh RN  Outcome: Progressing  12/6/2024 1128 by Viri Gomez RD  Outcome: 
  Problem: Discharge Planning  Goal: Discharge to home or other facility with appropriate resources  Outcome: Progressing     Problem: Pain  Goal: Verbalizes/displays adequate comfort level or baseline comfort level  Outcome: Progressing     Problem: Skin/Tissue Integrity  Goal: Absence of new skin breakdown  Description: 1.  Monitor for areas of redness and/or skin breakdown  2.  Assess vascular access sites hourly  3.  Every 4-6 hours minimum:  Change oxygen saturation probe site  4.  Every 4-6 hours:  If on nasal continuous positive airway pressure, respiratory therapy assess nares and determine need for appliance change or resting period.  Outcome: Progressing     Problem: Safety - Adult  Goal: Free from fall injury  Outcome: Progressing     Problem: Neurosensory - Adult  Goal: Achieves stable or improved neurological status  Outcome: Progressing  Goal: Absence of seizures  Outcome: Progressing  Goal: Remains free of injury related to seizures activity  Outcome: Progressing  Goal: Achieves maximal functionality and self care  Outcome: Progressing     Problem: Respiratory - Adult  Goal: Achieves optimal ventilation and oxygenation  Outcome: Progressing     Problem: Cardiovascular - Adult  Goal: Maintains optimal cardiac output and hemodynamic stability  Outcome: Progressing  Goal: Absence of cardiac dysrhythmias or at baseline  Outcome: Progressing     Problem: Skin/Tissue Integrity - Adult  Goal: Skin integrity remains intact  Outcome: Progressing  Goal: Incisions, wounds, or drain sites healing without S/S of infection  Outcome: Progressing  Goal: Oral mucous membranes remain intact  Outcome: Progressing     Problem: Nutrition Deficit:  Goal: Optimize nutritional status  Outcome: Progressing     Problem: ABCDS Injury Assessment  Goal: Absence of physical injury  Outcome: Progressing     Problem: Chronic Conditions and Co-morbidities  Goal: Patient's chronic conditions and co-morbidity symptoms are monitored and 
  Problem: Discharge Planning  Goal: Discharge to home or other facility with appropriate resources  Outcome: Progressing     Problem: Pain  Goal: Verbalizes/displays adequate comfort level or baseline comfort level  Outcome: Progressing  Flowsheets (Taken 12/22/2024 0000)  Verbalizes/displays adequate comfort level or baseline comfort level: Assess pain using appropriate pain scale     Problem: Skin/Tissue Integrity  Goal: Absence of new skin breakdown  Description: 1.  Monitor for areas of redness and/or skin breakdown  2.  Assess vascular access sites hourly  3.  Every 4-6 hours minimum:  Change oxygen saturation probe site  4.  Every 4-6 hours:  If on nasal continuous positive airway pressure, respiratory therapy assess nares and determine need for appliance change or resting period.  Outcome: Progressing     Problem: Safety - Adult  Goal: Free from fall injury  Outcome: Progressing     Problem: Neurosensory - Adult  Goal: Achieves stable or improved neurological status  Outcome: Progressing  Flowsheets (Taken 12/21/2024 2000)  Achieves stable or improved neurological status: Maintain blood pressure and fluid volume within ordered parameters to optimize cerebral perfusion and minimize risk of hemorrhage  Goal: Absence of seizures  Outcome: Progressing  Flowsheets (Taken 12/21/2024 2000)  Absence of seizures: Monitor for seizure activity.  If seizure occurs, document type and location of movements and any associated apnea  Goal: Remains free of injury related to seizures activity  Outcome: Progressing  Flowsheets (Taken 12/21/2024 2000)  Remains free of injury related to seizure activity: Maintain airway, patient safety  and administer oxygen as ordered  Goal: Achieves maximal functionality and self care  Outcome: Progressing  Flowsheets (Taken 12/21/2024 2000)  Achieves maximal functionality and self care: Monitor swallowing and airway patency with patient fatigue and changes in neurological status     Problem: 
  Problem: Discharge Planning  Goal: Discharge to home or other facility with appropriate resources  Outcome: Progressing     Problem: Pain  Goal: Verbalizes/displays adequate comfort level or baseline comfort level  Outcome: Progressing  Flowsheets (Taken 12/23/2024 0349)  Verbalizes/displays adequate comfort level or baseline comfort level: Assess pain using appropriate pain scale     Problem: Skin/Tissue Integrity  Goal: Absence of new skin breakdown  Description: 1.  Monitor for areas of redness and/or skin breakdown  2.  Assess vascular access sites hourly  3.  Every 4-6 hours minimum:  Change oxygen saturation probe site  4.  Every 4-6 hours:  If on nasal continuous positive airway pressure, respiratory therapy assess nares and determine need for appliance change or resting period.  Outcome: Progressing     Problem: Safety - Adult  Goal: Free from fall injury  Outcome: Progressing     Problem: Neurosensory - Adult  Goal: Achieves stable or improved neurological status  Outcome: Progressing  Flowsheets (Taken 12/22/2024 2000)  Achieves stable or improved neurological status: Maintain blood pressure and fluid volume within ordered parameters to optimize cerebral perfusion and minimize risk of hemorrhage  Goal: Absence of seizures  Outcome: Progressing  Flowsheets (Taken 12/22/2024 2000)  Absence of seizures: Monitor for seizure activity.  If seizure occurs, document type and location of movements and any associated apnea  Goal: Remains free of injury related to seizures activity  Outcome: Progressing  Flowsheets (Taken 12/22/2024 2000)  Remains free of injury related to seizure activity: Maintain airway, patient safety  and administer oxygen as ordered  Goal: Achieves maximal functionality and self care  Outcome: Progressing  Flowsheets (Taken 12/22/2024 2000)  Achieves maximal functionality and self care: Monitor swallowing and airway patency with patient fatigue and changes in neurological status     Problem: 
  Problem: Discharge Planning  Goal: Discharge to home or other facility with appropriate resources  Outcome: Progressing     Problem: Safety - Medical Restraint  Goal: Remains free of injury from restraints (Restraint for Interference with Medical Device)  Description: INTERVENTIONS:  1. Determine that other, less restrictive measures have been tried or would not be effective before applying the restraint  2. Evaluate the patient's condition at the time of restraint application  3. Inform patient/family regarding the reason for restraint  4. Q2H: Monitor safety, psychosocial status, comfort, nutrition and hydration  Outcome: Progressing  Flowsheets  Taken 12/4/2024 0600  Remains free of injury from restraints (restraint for interference with medical device):   Determine that other, less restrictive measures have been tried or would not be effective before applying the restraint   Evaluate the patient's condition at the time of restraint application   Inform patient/family regarding the reason for restraint   Every 2 hours: Monitor safety, psychosocial status, comfort, nutrition and hydration  Taken 12/4/2024 0400  Remains free of injury from restraints (restraint for interference with medical device):   Determine that other, less restrictive measures have been tried or would not be effective before applying the restraint   Evaluate the patient's condition at the time of restraint application   Every 2 hours: Monitor safety, psychosocial status, comfort, nutrition and hydration   Inform patient/family regarding the reason for restraint  Taken 12/4/2024 0200  Remains free of injury from restraints (restraint for interference with medical device):   Determine that other, less restrictive measures have been tried or would not be effective before applying the restraint   Evaluate the patient's condition at the time of restraint application   Inform patient/family regarding the reason for restraint   Every 2 hours: 
  Problem: Discharge Planning  Goal: Discharge to home or other facility with appropriate resources  Outcome: Progressing     Problem: Safety - Medical Restraint  Goal: Remains free of injury from restraints (Restraint for Interference with Medical Device)  Description: INTERVENTIONS:  1. Determine that other, less restrictive measures have been tried or would not be effective before applying the restraint  2. Evaluate the patient's condition at the time of restraint application  3. Inform patient/family regarding the reason for restraint  4. Q2H: Monitor safety, psychosocial status, comfort, nutrition and hydration  Recent Flowsheet Documentation  Taken 12/14/2024 1800 by Lazara Meza RN  Remains free of injury from restraints (restraint for interference with medical device): Every 2 hours: Monitor safety, psychosocial status, comfort, nutrition and hydration  Taken 12/14/2024 1600 by Lazara Meza RN  Remains free of injury from restraints (restraint for interference with medical device): Every 2 hours: Monitor safety, psychosocial status, comfort, nutrition and hydration  Taken 12/14/2024 1400 by Lazara Meza RN  Remains free of injury from restraints (restraint for interference with medical device): Every 2 hours: Monitor safety, psychosocial status, comfort, nutrition and hydration  Taken 12/14/2024 1200 by Lazara Meza RN  Remains free of injury from restraints (restraint for interference with medical device): Every 2 hours: Monitor safety, psychosocial status, comfort, nutrition and hydration  Taken 12/14/2024 1000 by Lazara Meza RN  Remains free of injury from restraints (restraint for interference with medical device): Every 2 hours: Monitor safety, psychosocial status, comfort, nutrition and hydration  Taken 12/14/2024 0800 by Lazara Meza RN  Remains free of injury from restraints (restraint for interference with medical device): Every 2 hours: Monitor safety, psychosocial 
  Problem: Discharge Planning  Goal: Discharge to home or other facility with appropriate resources  Outcome: Progressing     Problem: Safety - Medical Restraint  Goal: Remains free of injury from restraints (Restraint for Interference with Medical Device)  Description: INTERVENTIONS:  1. Determine that other, less restrictive measures have been tried or would not be effective before applying the restraint  2. Evaluate the patient's condition at the time of restraint application  3. Inform patient/family regarding the reason for restraint  4. Q2H: Monitor safety, psychosocial status, comfort, nutrition and hydration  Recent Flowsheet Documentation  Taken 12/15/2024 1800 by Lazara Meza RN  Remains free of injury from restraints (restraint for interference with medical device): Every 2 hours: Monitor safety, psychosocial status, comfort, nutrition and hydration  Taken 12/15/2024 1600 by Lazara Meza RN  Remains free of injury from restraints (restraint for interference with medical device): Every 2 hours: Monitor safety, psychosocial status, comfort, nutrition and hydration  Taken 12/15/2024 1400 by Lazara Meza RN  Remains free of injury from restraints (restraint for interference with medical device): Every 2 hours: Monitor safety, psychosocial status, comfort, nutrition and hydration  Taken 12/15/2024 1200 by Lazara Meza RN  Remains free of injury from restraints (restraint for interference with medical device): Every 2 hours: Monitor safety, psychosocial status, comfort, nutrition and hydration  Taken 12/15/2024 1000 by Lazara Meza RN  Remains free of injury from restraints (restraint for interference with medical device): Every 2 hours: Monitor safety, psychosocial status, comfort, nutrition and hydration  Taken 12/15/2024 0800 by Lazara Meza RN  Remains free of injury from restraints (restraint for interference with medical device): Every 2 hours: Monitor safety, psychosocial 
  Problem: Discharge Planning  Goal: Discharge to home or other facility with appropriate resources  Outcome: Progressing  Flowsheets (Taken 12/17/2024 2000)  Discharge to home or other facility with appropriate resources:   Identify barriers to discharge with patient and caregiver   Arrange for needed discharge resources and transportation as appropriate   Refer to discharge planning if patient needs post-hospital services based on physician order or complex needs related to functional status, cognitive ability or social support system     Problem: Pain  Goal: Verbalizes/displays adequate comfort level or baseline comfort level  Outcome: Progressing  Flowsheets (Taken 12/17/2024 2000)  Verbalizes/displays adequate comfort level or baseline comfort level:   Assess pain using appropriate pain scale   Administer analgesics based on type and severity of pain and evaluate response     Problem: Skin/Tissue Integrity  Goal: Absence of new skin breakdown  Description: 1.  Monitor for areas of redness and/or skin breakdown  2.  Assess vascular access sites hourly  3.  Every 4-6 hours minimum:  Change oxygen saturation probe site  4.  Every 4-6 hours:  If on nasal continuous positive airway pressure, respiratory therapy assess nares and determine need for appliance change or resting period.  Outcome: Progressing     Problem: Safety - Adult  Goal: Free from fall injury  Outcome: Progressing  Flowsheets (Taken 12/17/2024 2000)  Free From Fall Injury: Instruct family/caregiver on patient safety     Problem: Neurosensory - Adult  Goal: Achieves stable or improved neurological status  Outcome: Progressing  Flowsheets (Taken 12/17/2024 2000)  Achieves stable or improved neurological status: Maintain blood pressure and fluid volume within ordered parameters to optimize cerebral perfusion and minimize risk of hemorrhage  Goal: Absence of seizures  Outcome: Progressing  Goal: Remains free of injury related to seizures 
  Problem: Discharge Planning  Goal: Discharge to home or other facility with appropriate resources  Outcome: Progressing  Flowsheets (Taken 12/19/2024 2000)  Discharge to home or other facility with appropriate resources:   Identify barriers to discharge with patient and caregiver   Refer to discharge planning if patient needs post-hospital services based on physician order or complex needs related to functional status, cognitive ability or social support system     Problem: Pain  Goal: Verbalizes/displays adequate comfort level or baseline comfort level  Outcome: Progressing  Flowsheets (Taken 12/19/2024 2000)  Verbalizes/displays adequate comfort level or baseline comfort level:   Assess pain using appropriate pain scale   Administer analgesics based on type and severity of pain and evaluate response     Problem: Skin/Tissue Integrity  Goal: Absence of new skin breakdown  Description: 1.  Monitor for areas of redness and/or skin breakdown  2.  Assess vascular access sites hourly  3.  Every 4-6 hours minimum:  Change oxygen saturation probe site  4.  Every 4-6 hours:  If on nasal continuous positive airway pressure, respiratory therapy assess nares and determine need for appliance change or resting period.  Outcome: Progressing     Problem: Safety - Adult  Goal: Free from fall injury  Outcome: Progressing  Flowsheets (Taken 12/19/2024 2000)  Free From Fall Injury: Instruct family/caregiver on patient safety     Problem: Neurosensory - Adult  Goal: Achieves stable or improved neurological status  Outcome: Progressing  Flowsheets (Taken 12/19/2024 2000)  Achieves stable or improved neurological status: Maintain blood pressure and fluid volume within ordered parameters to optimize cerebral perfusion and minimize risk of hemorrhage  Goal: Absence of seizures  Outcome: Progressing  Flowsheets (Taken 12/19/2024 2000)  Absence of seizures: Monitor for seizure activity.  If seizure occurs, document type and location of 
  Problem: Respiratory - Adult  Goal: Achieves optimal ventilation and oxygenation  12/10/2024 0835 by Ernst Mcarthur RCP  Outcome: Progressing     Problem: OXYGENATION/RESPIRATORY FUNCTION  Goal: Patient will maintain patent airway  Outcome: Ongoing  Goal: Patient will achieve/maintain normal respiratory rate/effort  Respiratory rate and effort will be within normal limits for the patient  Outcome: Ongoing    Problem: MECHANICAL VENTILATION  Goal: Patient will maintain patent airway  Outcome: Ongoing  Goal: Oral health is maintained or improved  Outcome: Ongoing  Goal: ET tube will be managed safely  Outcome: Ongoing  Goal: Ability to express needs and understand communication  Outcome: Ongoing  Goal: Mobility/activity is maintained at optimum level for patient  Outcome: Ongoing    Problem: ASPIRATION PRECAUTIONS  Goal: Patient’s risk of aspiration is minimized  Outcome: Ongoing    Problem: SKIN INTEGRITY  Goal: Skin integrity is maintained or improved  Outcome: Ongoing                    
  Problem: Respiratory - Adult  Goal: Achieves optimal ventilation and oxygenation  12/10/2024 2041 by Meghana Mortensen RCP  Outcome: Progressing    
  Problem: Respiratory - Adult  Goal: Achieves optimal ventilation and oxygenation  12/11/2024 0820 by Kandi Otero RCP  Outcome: Progressing  12/11/2024 0156 by Toshia Russell RN  Outcome: Progressing  12/10/2024 2041 by Meghana Mortensen RCP  Outcome: Progressing  12/10/2024 1825 by Michael Trent, RN  Outcome: Progressing     
  Problem: Respiratory - Adult  Goal: Achieves optimal ventilation and oxygenation  12/11/2024 1952 by Jolene Anne RCP  Outcome: Progressing  Problem: OXYGENATION/RESPIRATORY FUNCTION  Goal: Patient will maintain patent airway  Outcome: Ongoing  Goal: Patient will achieve/maintain normal respiratory rate/effort  Respiratory rate and effort will be within normal limits for the patient  Outcome: Ongoing    Problem: MECHANICAL VENTILATION  Goal: Patient will maintain patent airway  Outcome: Ongoing  Goal: Oral health is maintained or improved  Outcome: Ongoing  Goal: ET tube will be managed safely  Outcome: Ongoing  Goal: Ability to express needs and understand communication  Outcome: Ongoing  Goal: Mobility/activity is maintained at optimum level for patient  Outcome: Ongoing    Problem: ASPIRATION PRECAUTIONS  Goal: Patient’s risk of aspiration is minimized  Outcome: Ongoing    Problem: SKIN INTEGRITY  Goal: Skin integrity is maintained or improved  Outcome: Ongoing                    
  Problem: Respiratory - Adult  Goal: Achieves optimal ventilation and oxygenation  12/12/2024 0758 by Kandi Otero RCP  Outcome: Progressing  12/12/2024 0059 by Josefina Horton, RN  Outcome: Progressing  Flowsheets (Taken 12/11/2024 2000)  Achieves optimal ventilation and oxygenation:   Assess for changes in respiratory status   Assess for changes in mentation and behavior   Position to facilitate oxygenation and minimize respiratory effort   Oxygen supplementation based on oxygen saturation or arterial blood gases   Respiratory therapy support as indicated   Encourage broncho-pulmonary hygiene including cough, deep breathe, incentive spirometry   Assess the need for suctioning and aspirate as needed  12/11/2024 1952 by Jolene Anne RCP  Outcome: Progressing  12/11/2024 1831 by Louann Macdonald, RN  Outcome: Progressing     
  Problem: Respiratory - Adult  Goal: Achieves optimal ventilation and oxygenation  12/13/2024 2010 by Viri Holguin RCP  Outcome: Progressing     Problem: OXYGENATION/RESPIRATORY FUNCTION  Goal: Patient will maintain patent airway  Outcome: Ongoing  Goal: Patient will achieve/maintain normal respiratory rate/effort  Respiratory rate and effort will be within normal limits for the patient  Outcome: Ongoing    Problem: MECHANICAL VENTILATION  Goal: Patient will maintain patent airway  Outcome: Ongoing  Goal: Oral health is maintained or improved  Outcome: Ongoing  Goal: ET tube will be managed safely  Outcome: Ongoing  Goal: Ability to express needs and understand communication  Outcome: Ongoing  Goal: Mobility/activity is maintained at optimum level for patient  Outcome: Ongoing    Problem: ASPIRATION PRECAUTIONS  Goal: Patient’s risk of aspiration is minimized  Outcome: Ongoing    Problem: SKIN INTEGRITY  Goal: Skin integrity is maintained or improved  Outcome: Ongoing                    
  Problem: Respiratory - Adult  Goal: Achieves optimal ventilation and oxygenation  12/14/2024 0908 by Maribell Mendoza RCP  Outcome: Progressing     
  Problem: Respiratory - Adult  Goal: Achieves optimal ventilation and oxygenation  12/14/2024 2106 by Meghana Mortensen, SCOTT  Outcome: Progressing    
  Problem: Respiratory - Adult  Goal: Achieves optimal ventilation and oxygenation  12/15/2024 2008 by Francisco Jeffers RCP  Outcome: Progressing  Flowsheets  Taken 12/15/2024 2008  Achieves optimal ventilation and oxygenation:   Assess for changes in respiratory status   Assess the need for suctioning and aspirate as needed   Respiratory therapy support as indicated   Oxygen supplementation based on oxygen saturation or arterial blood gases  Taken 12/15/2024 2002  Achieves optimal ventilation and oxygenation:   Assess for changes in respiratory status   Position to facilitate oxygenation and minimize respiratory effort   Assess the need for suctioning and aspirate as needed   Respiratory therapy support as indicated  12/15/2024 1827 by Lazara Meza, RN  Outcome: Progressing     
  Problem: Respiratory - Adult  Goal: Achieves optimal ventilation and oxygenation  12/17/2024 1144 by Kristina Tomlinson, RCP  Outcome: Progressing  Flowsheets (Taken 12/17/2024 1144)  Achieves optimal ventilation and oxygenation:   Assess for changes in respiratory status   Position to facilitate oxygenation and minimize respiratory effort   Assess for changes in mentation and behavior   Oxygen supplementation based on oxygen saturation or arterial blood gases  
  Problem: Respiratory - Adult  Goal: Achieves optimal ventilation and oxygenation  12/18/2024 2025 by Kimberlee Hagen RCP  Outcome: Progressing   PROVIDE ADEQUATE OXYGENATION WITH ACCEPTABLE SP02/ABG'S    [x]  IDENTIFY APPROPRIATE OXYGEN THERAPY  [x]   MONITOR SP02/ABG'S AS NEEDED   [x]   PATIENT EDUCATION AS NEEDED  MECHANICAL VENTILATION     [x]  PROVIDE OPTIMAL VENTILATION  [x]   ASSESS FOR EXTUBATION READINESS  [x]   ASSESS FOR WEANING READINESS  [x]  EXTUBATE AS TOLERATED  [x]  IMPLEMENT ADULT MECHANICAL VENTILATION PROTOCOL  [x]  MAINTAIN ADEQUATE OXYGENATION  [x]  PERFORM SPONTANEOUS WEANING TRIAL AS TOLERATED    
  Problem: Respiratory - Adult  Goal: Achieves optimal ventilation and oxygenation  12/19/2024 2045 by Kimberlee Hagen RCP  Outcome: Progressing    PROVIDE ADEQUATE OXYGENATION WITH ACCEPTABLE SP02/ABG'S    [x]  IDENTIFY APPROPRIATE OXYGEN THERAPY  [x]   MONITOR SP02/ABG'S AS NEEDED   [x]   PATIENT EDUCATION AS NEEDED  MECHANICAL VENTILATION     [x]  PROVIDE OPTIMAL VENTILATION  [x]   ASSESS FOR EXTUBATION READINESS  [x]   ASSESS FOR WEANING READINESS  [x]  EXTUBATE AS TOLERATED  [x]  IMPLEMENT ADULT MECHANICAL VENTILATION PROTOCOL  [x]  MAINTAIN ADEQUATE OXYGENATION  [x]  PERFORM SPONTANEOUS WEANING TRIAL AS TOLERATED    
  Problem: Respiratory - Adult  Goal: Achieves optimal ventilation and oxygenation  12/20/2024 2026 by Fito Meyer RCP  Outcome: Progressing  12/20/2024 1048 by Dalia Hi, RN  Outcome: Progressing  Flowsheets (Taken 12/20/2024 0824 by Marixa Laguerre P)  Achieves optimal ventilation and oxygenation:   Assess for changes in respiratory status   Assess for changes in mentation and behavior   Position to facilitate oxygenation and minimize respiratory effort   Oxygen supplementation based on oxygen saturation or arterial blood gases   Encourage broncho-pulmonary hygiene including cough, deep breathe, incentive spirometry   Assess the need for suctioning and aspirate as needed   Assess and instruct to report shortness of breath or any respiratory difficulty   Respiratory therapy support as indicated     
  Problem: Respiratory - Adult  Goal: Achieves optimal ventilation and oxygenation  12/21/2024 2016 by Francisco Jeffers RCP  Outcome: Progressing  Flowsheets  Taken 12/21/2024 2016  Achieves optimal ventilation and oxygenation:   Assess for changes in respiratory status   Assess the need for suctioning and aspirate as needed   Respiratory therapy support as indicated   Oxygen supplementation based on oxygen saturation or arterial blood gases  Taken 12/21/2024 2009  Achieves optimal ventilation and oxygenation:   Assess for changes in respiratory status   Oxygen supplementation based on oxygen saturation or arterial blood gases   Assess the need for suctioning and aspirate as needed   Respiratory therapy support as indicated  12/21/2024 1000 by Dalia Hi, RN  Outcome: Progressing  12/21/2024 0644 by Myesha López, RN  Outcome: Progressing     
  Problem: Respiratory - Adult  Goal: Achieves optimal ventilation and oxygenation  12/22/2024 2019 by Francisco Jeffers RCP  Outcome: Progressing  Flowsheets (Taken 12/21/2024 2016)  Achieves optimal ventilation and oxygenation:   Assess for changes in respiratory status   Assess the need for suctioning and aspirate as needed   Respiratory therapy support as indicated   Oxygen supplementation based on oxygen saturation or arterial blood gases  12/22/2024 0928 by Dalia Hi, RN  Outcome: Progressing  12/22/2024 0639 by Myesha López, RN  Outcome: Progressing     
  Problem: Respiratory - Adult  Goal: Achieves optimal ventilation and oxygenation  12/3/2024 2025 by Jolene Anne RCP  Outcome: Progressing     Problem: OXYGENATION/RESPIRATORY FUNCTION  Goal: Patient will maintain patent airway  Outcome: Ongoing  Goal: Patient will achieve/maintain normal respiratory rate/effort  Respiratory rate and effort will be within normal limits for the patient  Outcome: Ongoing    Problem: MECHANICAL VENTILATION  Goal: Patient will maintain patent airway  Outcome: Ongoing  Goal: Oral health is maintained or improved  Outcome: Ongoing  Goal: ET tube will be managed safely  Outcome: Ongoing  Goal: Ability to express needs and understand communication  Outcome: Ongoing  Goal: Mobility/activity is maintained at optimum level for patient  Outcome: Ongoing    Problem: ASPIRATION PRECAUTIONS  Goal: Patient’s risk of aspiration is minimized  Outcome: Ongoing    Problem: SKIN INTEGRITY  Goal: Skin integrity is maintained or improved  Outcome: Ongoing                    
  Problem: Respiratory - Adult  Goal: Achieves optimal ventilation and oxygenation  12/5/2024 0826 by Kandi Otero RCP  Outcome: Progressing  12/5/2024 0626 by Toshia Russell, RN  Outcome: Progressing  12/5/2024 0622 by Boby Gallardo, RN  Outcome: Progressing  12/4/2024 2020 by Collette Benitez, RN  Outcome: Progressing     
  Problem: Respiratory - Adult  Goal: Achieves optimal ventilation and oxygenation  12/5/2024 1955 by Jolene Anne RCP  Outcome: Progressing     Problem: OXYGENATION/RESPIRATORY FUNCTION  Goal: Patient will maintain patent airway  Outcome: Ongoing  Goal: Patient will achieve/maintain normal respiratory rate/effort  Respiratory rate and effort will be within normal limits for the patient  Outcome: Ongoing    Problem: MECHANICAL VENTILATION  Goal: Patient will maintain patent airway  Outcome: Ongoing  Goal: Oral health is maintained or improved  Outcome: Ongoing  Goal: ET tube will be managed safely  Outcome: Ongoing  Goal: Ability to express needs and understand communication  Outcome: Ongoing  Goal: Mobility/activity is maintained at optimum level for patient  Outcome: Ongoing    Problem: ASPIRATION PRECAUTIONS  Goal: Patient’s risk of aspiration is minimized  Outcome: Ongoing    Problem: SKIN INTEGRITY  Goal: Skin integrity is maintained or improved  Outcome: Ongoing                    
  Problem: Respiratory - Adult  Goal: Achieves optimal ventilation and oxygenation  12/6/2024 0925 by Amanda Burgos RCP  Outcome: Progressing   PROVIDE ADEQUATE OXYGENATION WITH ACCEPTABLE SP02/ABG'S    [x]  IDENTIFY APPROPRIATE OXYGEN THERAPY  [x]   MONITOR SP02/ABG'S AS NEEDED   [x]   PATIENT EDUCATION AS NEEDED    
  Problem: Respiratory - Adult  Goal: Achieves optimal ventilation and oxygenation  12/6/2024 2117 by Meghana Mortensen RCP  Outcome: Progressing    
  Problem: Respiratory - Adult  Goal: Achieves optimal ventilation and oxygenation  12/7/2024 2150 by Meghana Mortensen, SCOTT  Outcome: Progressing    
  Problem: Respiratory - Adult  Goal: Achieves optimal ventilation and oxygenation  12/8/2024 2011 by Meghana Mortensen RCP  Outcome: Progressing    
  Problem: Respiratory - Adult  Goal: Achieves optimal ventilation and oxygenation  12/9/2024 0759 by Kandi Otero RCP  Outcome: Progressing  12/8/2024 2319 by Lonnie Zazueta RN  Outcome: Progressing  12/8/2024 2011 by Meghana Mortensen RCP  Outcome: Progressing     
  Problem: Respiratory - Adult  Goal: Achieves optimal ventilation and oxygenation  12/9/2024 2042 by Meghana Mortensen RCP  Outcome: Progressing    
  Problem: Respiratory - Adult  Goal: Achieves optimal ventilation and oxygenation  Outcome: Progressing     
  Problem: Safety - Medical Restraint  Goal: Remains free of injury from restraints (Restraint for Interference with Medical Device)  Description: INTERVENTIONS:  1. Determine that other, less restrictive measures have been tried or would not be effective before applying the restraint  2. Evaluate the patient's condition at the time of restraint application  3. Inform patient/family regarding the reason for restraint  4. Q2H: Monitor safety, psychosocial status, comfort, nutrition and hydration  Recent Flowsheet Documentation  Taken 12/14/2024 0017 by Dione Vaca RN  Remains free of injury from restraints (restraint for interference with medical device): Every 2 hours: Monitor safety, psychosocial status, comfort, nutrition and hydration     
  Problem: Safety - Medical Restraint  Goal: Remains free of injury from restraints (Restraint for Interference with Medical Device)  Description: INTERVENTIONS:  1. Determine that other, less restrictive measures have been tried or would not be effective before applying the restraint  2. Evaluate the patient's condition at the time of restraint application  3. Inform patient/family regarding the reason for restraint  4. Q2H: Monitor safety, psychosocial status, comfort, nutrition and hydration  Recent Flowsheet Documentation  Taken 12/21/2024 0800 by Dalia Hi RN  Remains free of injury from restraints (restraint for interference with medical device): Every 2 hours: Monitor safety, psychosocial status, comfort, nutrition and hydration  Taken 12/21/2024 0600 by Myesha López RN  Remains free of injury from restraints (restraint for interference with medical device):   Every 2 hours: Monitor safety, psychosocial status, comfort, nutrition and hydration   Determine that other, less restrictive measures have been tried or would not be effective before applying the restraint     
  Problem: Skin/Tissue Integrity  Goal: Absence of new skin breakdown  Description: 1.  Monitor for areas of redness and/or skin breakdown  2.  Assess vascular access sites hourly  3.  Every 4-6 hours minimum:  Change oxygen saturation probe site  4.  Every 4-6 hours:  If on nasal continuous positive airway pressure, respiratory therapy assess nares and determine need for appliance change or resting period.  12/8/2024 0753 by Akosua Solo RCP  Outcome: Progressing  12/7/2024 2207 by Boby Gallardo, RN  Outcome: Progressing     Problem: Respiratory - Adult  Goal: Achieves optimal ventilation and oxygenation  12/8/2024 0753 by Akosua Solo RCP  Outcome: Progressing  Flowsheets (Taken 12/8/2024 0753)  Achieves optimal ventilation and oxygenation:   Assess for changes in respiratory status   Assess for changes in mentation and behavior   Position to facilitate oxygenation and minimize respiratory effort   Oxygen supplementation based on oxygen saturation or arterial blood gases   Encourage broncho-pulmonary hygiene including cough, deep breathe, incentive spirometry   Assess the need for suctioning and aspirate as needed   Assess and instruct to report shortness of breath or any respiratory difficulty   Respiratory therapy support as indicated  12/7/2024 2207 by Boby Gallardo, RN  Outcome: Progressing  12/7/2024 2150 by Meghana Mortensen RCP  Outcome: Progressing     
CT scan results were reviewed - evidence of large bilateral pulmonary emboli with evidence of right heart strain. Last INR was 5.8, will repeat again.   Plan to start high dose heparin once INR normalizes. In the meantime, continue I.v. fluids and broad spectrum antibiotics. We will continue to follow very closely.   Discussed with Dr. Christiano Mabry.     Yohana Bee MD  Fellow, Cardiovascular Diseases    [facility]        
PEG Post-op Instructions    - Please keep PEG tube to gravity drainage via Jones bag.  - Ok to use PEG tube for medications immediately (administer meds, clamp tube for 1 hour, then place back to Jones bag gravity drainage).  - Ok to use PEG tube for tube feeds tomorrow morning 12/21/24 at 0600.  Ok to restart at prior tube feed rate.  - PEG secured at 8cm at the skin.    - KEEP ABDOMINAL BINDER IN PLACE AT ALL TIMES.      MALIHA Hooper MD, FACS  Acute Care Surgery Attending  [facility]         
Received a call from cardiology fellow on-call that patient's right femoral line was bleeding.  Went to assess the patient.  Patient had a right central line placed which had been bleeding for a few hours.  Patient has also been continued on cangrelor as well as IV heparin.  Per nurse, they saw that the dressing was soaked and tried to change it but noticed that there was bleeding around the central line.  On examination, patient actively bleeding from around the right femoral central line.  Sutures were removed with the help of suture removal kit and the central line was hubbed in to help control the bleeding. New 3-0 silk sutures were placed.    Patient still continued to bleed around the central line although much less than before.  Plan to dress the central line via 2x2 and central line kit.    Continue to assess the bleeding from the site.      Dragan Rothman MD  Internal Medicine Resident, PGY-3  [facility], [place], [place]  12/4/2024, 1:13 AM        
Spoke with patient's wife, Anaya Keller, and gave her an update about his progress and improved ventilator settings.   Discussed that our recommendation would be to convert his ETT and OG feeding tube to a tracheostomy and a PEG tube. She believes he \"would be thrilled\" to have the ETT removed and agrees with this plan.   Will consult trauma surgery team for evaluation.     Melissa Olguin MD   Emergency Medicine, Intern   
Clifford, Josefina, RN  Outcome: Progressing  12/11/2024 1831 by Louann Macdonald RN  Outcome: Progressing     Problem: Safety - Adult  Goal: Free from fall injury  12/12/2024 0059 by Josefina Horton RN  Outcome: Progressing  Flowsheets (Taken 12/11/2024 2247)  Free From Fall Injury:   Instruct family/caregiver on patient safety   Based on caregiver fall risk screen, instruct family/caregiver to ask for assistance with transferring infant if caregiver noted to have fall risk factors  12/11/2024 1831 by Louann Macdonald RN  Outcome: Progressing     Problem: Neurosensory - Adult  Goal: Achieves stable or improved neurological status  12/12/2024 0059 by Joesfina Horton RN  Outcome: Progressing  Flowsheets (Taken 12/11/2024 2000)  Achieves stable or improved neurological status: Assess for and report changes in neurological status  12/11/2024 1831 by Louann Macdonald RN  Outcome: Progressing  Goal: Absence of seizures  12/12/2024 0059 by Josefina Horton RN  Outcome: Progressing  Flowsheets (Taken 12/11/2024 2000)  Absence of seizures: Monitor for seizure activity.  If seizure occurs, document type and location of movements and any associated apnea  12/11/2024 1831 by Louann Macdonald RN  Outcome: Progressing  Goal: Remains free of injury related to seizures activity  12/12/2024 0059 by Josefina Horton RN  Outcome: Progressing  Flowsheets (Taken 12/11/2024 2000)  Remains free of injury related to seizure activity: Maintain airway, patient safety  and administer oxygen as ordered  12/11/2024 1831 by Louann Macdonald RN  Outcome: Progressing  Goal: Achieves maximal functionality and self care  12/12/2024 0059 by Josefina Horton RN  Outcome: Progressing  Flowsheets (Taken 12/11/2024 2000)  Achieves maximal functionality and self care: Monitor swallowing and airway patency with patient fatigue and changes in neurological status  12/11/2024 1831 by Louann Macdonald RN  Outcome: Progressing     Problem: Respiratory - Adult  Goal: Achieves 
Integrity - Adult  Goal: Incisions, wounds, or drain sites healing without S/S of infection  12/13/2024 0421 by Viri Muir RN  Outcome: Progressing     Problem: Skin/Tissue Integrity - Adult  Goal: Oral mucous membranes remain intact  12/13/2024 0421 by Viri Muir RN  Outcome: Progressing     Problem: Nutrition Deficit:  Goal: Optimize nutritional status  12/13/2024 0421 by Viri Muir RN  Outcome: Progressing     Problem: ABCDS Injury Assessment  Goal: Absence of physical injury  12/13/2024 0421 by Viri Muir RN  Outcome: Progressing     Problem: Chronic Conditions and Co-morbidities  Goal: Patient's chronic conditions and co-morbidity symptoms are monitored and maintained or improved  12/13/2024 0421 by Viri Muir RN  Outcome: Progressing     
Progressing  Flowsheets (Taken 12/8/2024 0800)  Absence of seizures:   Monitor for seizure activity.  If seizure occurs, document type and location of movements and any associated apnea   If seizure occurs, turn head to side and suction secretions as needed  Goal: Remains free of injury related to seizures activity  12/8/2024 2319 by Lonnie Zazueta RN  Outcome: Progressing  12/8/2024 1619 by Denisa Francis RN  Outcome: Progressing  Flowsheets (Taken 12/8/2024 0800)  Remains free of injury related to seizure activity:   Maintain airway, patient safety  and administer oxygen as ordered   If seizure occurs, turn patient to side and suction secretions as needed  Goal: Achieves maximal functionality and self care  12/8/2024 2319 by Lonnie Zazueta RN  Outcome: Progressing  12/8/2024 1619 by Denisa Francis RN  Outcome: Progressing  Flowsheets (Taken 12/8/2024 0800)  Achieves maximal functionality and self care: Monitor swallowing and airway patency with patient fatigue and changes in neurological status     Problem: Respiratory - Adult  Goal: Achieves optimal ventilation and oxygenation  12/8/2024 2319 by Lonnie Zazueta RN  Outcome: Progressing  12/8/2024 2011 by Meghana Mortensen RCP  Outcome: Progressing  12/8/2024 1619 by Denisa Francis RN  Outcome: Progressing  Flowsheets (Taken 12/8/2024 0800 by Akosua Solo RCP)  Achieves optimal ventilation and oxygenation:   Assess for changes in respiratory status   Position to facilitate oxygenation and minimize respiratory effort   Assess the need for suctioning and aspirate as needed   Respiratory therapy support as indicated     Problem: Cardiovascular - Adult  Goal: Maintains optimal cardiac output and hemodynamic stability  12/8/2024 2319 by Lonnie Zazueta RN  Outcome: Progressing  12/8/2024 1619 by Denisa Francis RN  Outcome: Progressing  Flowsheets (Taken 12/8/2024 0800)  Maintains optimal cardiac output and hemodynamic stability:   
RCP)  Achieves optimal ventilation and oxygenation:   Assess for changes in respiratory status   Assess for changes in mentation and behavior   Position to facilitate oxygenation and minimize respiratory effort   Oxygen supplementation based on oxygen saturation or arterial blood gases   Encourage broncho-pulmonary hygiene including cough, deep breathe, incentive spirometry   Assess the need for suctioning and aspirate as needed   Assess and instruct to report shortness of breath or any respiratory difficulty   Respiratory therapy support as indicated     Problem: Cardiovascular - Adult  Goal: Maintains optimal cardiac output and hemodynamic stability  12/20/2024 1048 by Dalia Hi RN  Outcome: Progressing     Problem: Cardiovascular - Adult  Goal: Absence of cardiac dysrhythmias or at baseline  12/20/2024 1048 by Dalia Hi RN  Outcome: Progressing     Problem: Skin/Tissue Integrity - Adult  Goal: Skin integrity remains intact  12/20/2024 1048 by Dalia Hi RN  Outcome: Progressing  Flowsheets (Taken 12/20/2024 0800)  Skin Integrity Remains Intact: Monitor for areas of redness and/or skin breakdown     Problem: Skin/Tissue Integrity - Adult  Goal: Incisions, wounds, or drain sites healing without S/S of infection  12/20/2024 1048 by Dalia Hi RN  Outcome: Progressing     Problem: Skin/Tissue Integrity - Adult  Goal: Oral mucous membranes remain intact  12/20/2024 1048 by Dalia Hi RN  Outcome: Progressing     Problem: Nutrition Deficit:  Goal: Optimize nutritional status  12/20/2024 1048 by Dalia Hi RN  Outcome: Progressing     Problem: ABCDS Injury Assessment  Goal: Absence of physical injury  12/20/2024 1048 by Dalia Hi RN  Outcome: Progressing  Flowsheets (Taken 12/20/2024 0800)  Absence of Physical Injury: Implement safety measures based on patient assessment     Problem: Chronic Conditions and Co-morbidities  Goal: Patient's chronic conditions and co-morbidity symptoms 
and self care  12/5/2024 0626 by Toshia Russell RN  Outcome: Progressing  12/5/2024 0622 by Boby Gallardo RN  Outcome: Progressing     Problem: Respiratory - Adult  Goal: Achieves optimal ventilation and oxygenation  12/5/2024 0626 by Toshia Russell RN  Outcome: Progressing  12/5/2024 0622 by Boby Gallardo RN  Outcome: Progressing  12/4/2024 2020 by Collette Benitez RN  Outcome: Progressing     Problem: Cardiovascular - Adult  Goal: Maintains optimal cardiac output and hemodynamic stability  12/5/2024 0626 by Toshia Russell RN  Outcome: Progressing  12/5/2024 0622 by Boby Gallardo RN  Outcome: Progressing  12/4/2024 2020 by Collette Benitez RN  Outcome: Not Progressing  Goal: Absence of cardiac dysrhythmias or at baseline  12/5/2024 0626 by Toshia Russell RN  Outcome: Progressing  12/5/2024 0622 by Boby Gallardo RN  Outcome: Progressing     Problem: Skin/Tissue Integrity - Adult  Goal: Skin integrity remains intact  12/5/2024 0626 by Toshia Russell RN  Outcome: Progressing  12/5/2024 0622 by Boby Gallardo RN  Outcome: Progressing  12/4/2024 2020 by Collette Benitez RN  Outcome: Progressing  Flowsheets  Taken 12/4/2024 0800 by Collette Benitez RN  Skin Integrity Remains Intact:   Monitor for areas of redness and/or skin breakdown   Assess vascular access sites hourly   Every 4-6 hours minimum: Change oxygen saturation probe site   Every 4-6 hours: If on nasal continuous positive airway pressure, respiratory therapy assesses nares and determine need for appliance change or resting period  Taken 12/4/2024 0734 by Gurdeep Frederick RN  Skin Integrity Remains Intact:   Monitor for areas of redness and/or skin breakdown   Assess vascular access sites hourly   Every 4-6 hours minimum: Change oxygen saturation probe site   Every 4-6 hours: If on nasal continuous positive airway pressure, respiratory therapy assesses nares and determine need for appliance change or resting period  Goal: Incisions, wounds, or drain 
broncho-pulmonary hygiene including cough, deep breathe, incentive spirometry   Initiate smoking cessation protocol as indicated   Oxygen supplementation based on oxygen saturation or arterial blood gases   Position to facilitate oxygenation and minimize respiratory effort   Assess for changes in mentation and behavior   Assess for changes in respiratory status     Problem: Cardiovascular - Adult  Goal: Maintains optimal cardiac output and hemodynamic stability  12/19/2024 1523 by Pratibha Fernandes RN  Outcome: Progressing     Problem: Cardiovascular - Adult  Goal: Absence of cardiac dysrhythmias or at baseline  12/19/2024 1523 by Pratibha Fernandes RN  Outcome: Progressing     Problem: Skin/Tissue Integrity - Adult  Goal: Skin integrity remains intact  12/19/2024 1523 by Pratibha Fernandes RN  Outcome: Progressing     Problem: Skin/Tissue Integrity - Adult  Goal: Incisions, wounds, or drain sites healing without S/S of infection  12/19/2024 1523 by Pratibha Fernandes RN  Outcome: Progressing     Problem: Skin/Tissue Integrity - Adult  Goal: Oral mucous membranes remain intact  12/19/2024 1523 by Pratibha Fernandes RN  Outcome: Progressing     Problem: Nutrition Deficit:  Goal: Optimize nutritional status  12/19/2024 1523 by Pratibha Fernandes RN  Outcome: Progressing     Problem: ABCDS Injury Assessment  Goal: Absence of physical injury  12/19/2024 1523 by Pratibha Fernandes RN  Outcome: Progressing     Problem: Chronic Conditions and Co-morbidities  Goal: Patient's chronic conditions and co-morbidity symptoms are monitored and maintained or improved  12/19/2024 1523 by Pratibha Fernandes RN  Outcome: Progressing     Problem: Confusion  Goal: Confusion, delirium, dementia, or psychosis is improved or at baseline  Description: INTERVENTIONS:  1. Assess for possible contributors to thought disturbance, including medications, impaired vision or hearing, underlying metabolic abnormalities, dehydration, psychiatric diagnoses, 
output and hemodynamic stability  12/22/2024 0928 by Dalia Hi RN  Outcome: Progressing     Problem: Cardiovascular - Adult  Goal: Absence of cardiac dysrhythmias or at baseline  12/22/2024 0928 by Dalia Hi RN  Outcome: Progressing     Problem: Skin/Tissue Integrity - Adult  Goal: Skin integrity remains intact  12/22/2024 0928 by Dalia Hi RN  Outcome: Progressing     Problem: Skin/Tissue Integrity - Adult  Goal: Incisions, wounds, or drain sites healing without S/S of infection  12/22/2024 0928 by Dalia Hi RN  Outcome: Progressing     Problem: Skin/Tissue Integrity - Adult  Goal: Oral mucous membranes remain intact  12/22/2024 0928 by Dalia Hi RN  Outcome: Progressing     Problem: Nutrition Deficit:  Goal: Optimize nutritional status  12/22/2024 0928 by Dalia Hi RN  Outcome: Progressing     Problem: ABCDS Injury Assessment  Goal: Absence of physical injury  12/22/2024 0928 by Dalia Hi RN  Outcome: Progressing  Flowsheets (Taken 12/22/2024 0800)  Absence of Physical Injury: Implement safety measures based on patient assessment     Problem: Chronic Conditions and Co-morbidities  Goal: Patient's chronic conditions and co-morbidity symptoms are monitored and maintained or improved  12/22/2024 0928 by Dalia Hi RN  Outcome: Progressing     Problem: Confusion  Goal: Confusion, delirium, dementia, or psychosis is improved or at baseline  Description: INTERVENTIONS:  1. Assess for possible contributors to thought disturbance, including medications, impaired vision or hearing, underlying metabolic abnormalities, dehydration, psychiatric diagnoses, and notify attending LIP  2. [place] high risk fall precautions, as indicated  3. Provide frequent short contacts to provide reality reorientation, refocusing and direction  4. Decrease environmental stimuli, including noise as appropriate  5. Monitor and intervene to maintain adequate nutrition, hydration, elimination, sleep 
12/21/2024 0600  Remains free of injury from restraints (restraint for interference with medical device):   Every 2 hours: Monitor safety, psychosocial status, comfort, nutrition and hydration   Determine that other, less restrictive measures have been tried or would not be effective before applying the restraint  Taken 12/21/2024 0400  Remains free of injury from restraints (restraint for interference with medical device):   Every 2 hours: Monitor safety, psychosocial status, comfort, nutrition and hydration   Determine that other, less restrictive measures have been tried or would not be effective before applying the restraint  Taken 12/21/2024 0200  Remains free of injury from restraints (restraint for interference with medical device):   Every 2 hours: Monitor safety, psychosocial status, comfort, nutrition and hydration   Determine that other, less restrictive measures have been tried or would not be effective before applying the restraint  Taken 12/21/2024 0000  Remains free of injury from restraints (restraint for interference with medical device):   Every 2 hours: Monitor safety, psychosocial status, comfort, nutrition and hydration   Determine that other, less restrictive measures have been tried or would not be effective before applying the restraint  Taken 12/20/2024 2200  Remains free of injury from restraints (restraint for interference with medical device):   Every 2 hours: Monitor safety, psychosocial status, comfort, nutrition and hydration   Determine that other, less restrictive measures have been tried or would not be effective before applying the restraint  Taken 12/20/2024 2000  Remains free of injury from restraints (restraint for interference with medical device):   Every 2 hours: Monitor safety, psychosocial status, comfort, nutrition and hydration   Determine that other, less restrictive measures have been tried or would not be effective before applying the restraint     
Progressing  12/7/2024 1519 by Denisa Francis, RN  Outcome: Progressing  Flowsheets (Taken 12/7/2024 0800)  Absence of Physical Injury: Implement safety measures based on patient assessment     
as indicated  Outcome: Progressing  Flowsheets (Taken 12/18/2024 2000)  Effect of thought disturbance (confusion, delirium, dementia, or psychosis) are managed with adequate functional status: Assess for contributors to thought disturbance, including medications, impaired vision or hearing, underlying metabolic abnormalities, dehydration, psychiatric diagnoses, notify LIP     Problem: Safety - Medical Restraint  Goal: Remains free of injury from restraints (Restraint for Interference with Medical Device)  Description: INTERVENTIONS:  1. Determine that other, less restrictive measures have been tried or would not be effective before applying the restraint  2. Evaluate the patient's condition at the time of restraint application  3. Inform patient/family regarding the reason for restraint  4. Q2H: Monitor safety, psychosocial status, comfort, nutrition and hydration  Outcome: Progressing  Flowsheets (Taken 12/18/2024 2000 by Alena Altamirano RN)  Remains free of injury from restraints (restraint for interference with medical device):   Determine that other, less restrictive measures have been tried or would not be effective before applying the restraint   Evaluate the patient's condition at the time of restraint application   Inform patient/family regarding the reason for restraint   Every 2 hours: Monitor safety, psychosocial status, comfort, nutrition and hydration

## 2024-12-23 NOTE — CARE COORDINATION
Transitional Plan    0924 [facility] HD  made aware of plan for pt to have HD at [facility] today. Facility rep on unit and confirmed with nursing staff that they will be able to have dialysis today at facility.      0926 Patient to have HD done at [facility] today, discharge today p/u at 10am. Discharge pkt given to RN

## 2024-12-23 NOTE — CARE COORDINATION
Pt scheduled for discharge/transfer to [facility] today, 12/23 at 10am. Unknown if pt scheduled for dialysis at [facility] 12/23; [facility] only dialyzes an MWF schedule. ERROL spoke to charge nurse at [facility] who reports they are aware pt is a dialysis pt but unsure what the schedule is for him to run at [facility].     ERROL contacted [address] to confirm if pt is running at [facility] today, per CM she spoke with the [facility] representative for [facility] who confirmed pt is running today at discharge can remain 10am.

## 2025-01-27 ENCOUNTER — HOSPITAL ENCOUNTER (OUTPATIENT)
Age: 75
Setting detail: SPECIMEN
Discharge: HOME | End: 2025-01-27

## 2025-01-27 LAB
ANION GAP SERPL CALCULATED.3IONS-SCNC: 8 MMOL/L (ref 9–16)
BASOPHILS # BLD: 0.04 K/UL (ref 0–0.2)
BASOPHILS NFR BLD: 1 % (ref 0–2)
BUN SERPL-MCNC: 6 MG/DL (ref 8–23)
CALCIUM SERPL-MCNC: 8.7 MG/DL (ref 8.6–10.4)
CHLORIDE SERPL-SCNC: 103 MMOL/L (ref 98–107)
CO2 SERPL-SCNC: 27 MMOL/L (ref 20–31)
CREAT SERPL-MCNC: 0.7 MG/DL (ref 0.7–1.2)
EOSINOPHIL # BLD: 0.11 K/UL (ref 0–0.44)
EOSINOPHILS RELATIVE PERCENT: 2 % (ref 1–4)
ERYTHROCYTE [DISTWIDTH] IN BLOOD BY AUTOMATED COUNT: 16.2 % (ref 11.8–14.4)
GFR, ESTIMATED: >90 ML/MIN/1.73M2
GLUCOSE SERPL-MCNC: 97 MG/DL (ref 74–99)
HCT VFR BLD AUTO: 25.9 % (ref 40.7–50.3)
HGB BLD-MCNC: 7.8 G/DL (ref 13–17)
IMM GRANULOCYTES # BLD AUTO: <0.03 K/UL (ref 0–0.3)
IMM GRANULOCYTES NFR BLD: 0 %
LYMPHOCYTES NFR BLD: 0.73 K/UL (ref 1.1–3.7)
LYMPHOCYTES RELATIVE PERCENT: 11 % (ref 24–43)
MAGNESIUM SERPL-MCNC: 2.2 MG/DL (ref 1.6–2.4)
MCH RBC QN AUTO: 30.4 PG (ref 25.2–33.5)
MCHC RBC AUTO-ENTMCNC: 30.1 G/DL (ref 28.4–34.8)
MCV RBC AUTO: 100.8 FL (ref 82.6–102.9)
MONOCYTES NFR BLD: 0.59 K/UL (ref 0.1–1.2)
MONOCYTES NFR BLD: 9 % (ref 3–12)
NEUTROPHILS NFR BLD: 77 % (ref 36–65)
NEUTS SEG NFR BLD: 5.28 K/UL (ref 1.5–8.1)
NRBC BLD-RTO: 0 PER 100 WBC
PLATELET # BLD AUTO: 437 K/UL (ref 138–453)
PMV BLD AUTO: 9.1 FL (ref 8.1–13.5)
POTASSIUM SERPL-SCNC: 3.9 MMOL/L (ref 3.7–5.3)
RBC # BLD AUTO: 2.57 M/UL (ref 4.21–5.77)
RBC # BLD: ABNORMAL 10*6/UL
SODIUM SERPL-SCNC: 138 MMOL/L (ref 136–145)
WBC OTHER # BLD: 6.8 K/UL (ref 3.5–11.3)

## 2025-01-27 PROCEDURE — 83735 ASSAY OF MAGNESIUM: CPT

## 2025-01-27 PROCEDURE — 36415 COLL VENOUS BLD VENIPUNCTURE: CPT

## 2025-01-27 PROCEDURE — 85025 COMPLETE CBC W/AUTO DIFF WBC: CPT

## 2025-01-27 PROCEDURE — 80048 BASIC METABOLIC PNL TOTAL CA: CPT

## 2025-01-27 PROCEDURE — P9603 ONE-WAY ALLOW PRORATED MILES: HCPCS

## 2025-01-29 ENCOUNTER — HOSPITAL ENCOUNTER (OUTPATIENT)
Age: 75
Setting detail: SPECIMEN
Discharge: HOME | End: 2025-01-29

## 2025-01-30 ENCOUNTER — HOSPITAL ENCOUNTER (OUTPATIENT)
Age: 75
Setting detail: SPECIMEN
Discharge: HOME | End: 2025-01-30

## 2025-01-30 PROCEDURE — 87077 CULTURE AEROBIC IDENTIFY: CPT

## 2025-01-30 PROCEDURE — 87086 URINE CULTURE/COLONY COUNT: CPT

## 2025-01-30 PROCEDURE — 81001 URINALYSIS AUTO W/SCOPE: CPT

## 2025-01-31 ENCOUNTER — HOSPITAL ENCOUNTER (OUTPATIENT)
Age: 75
Setting detail: SPECIMEN
Discharge: HOME | End: 2025-01-31

## 2025-01-31 LAB
AMORPH SED URNS QL MICRO: ABNORMAL
BILIRUB UR QL STRIP: NEGATIVE
CASTS #/AREA URNS LPF: ABNORMAL /LPF (ref 0–2)
CASTS #/AREA URNS LPF: ABNORMAL /LPF (ref 0–2)
CLARITY UR: ABNORMAL
COLOR UR: ABNORMAL
CRYSTALS URNS MICRO: ABNORMAL /HPF
CRYSTALS URNS MICRO: ABNORMAL /HPF
EPI CELLS #/AREA URNS HPF: ABNORMAL /HPF (ref 0–5)
ERYTHROCYTE [DISTWIDTH] IN BLOOD BY AUTOMATED COUNT: 16.2 % (ref 11.8–14.4)
GLUCOSE UR STRIP-MCNC: NEGATIVE MG/DL
HCT VFR BLD AUTO: 28.5 % (ref 40.7–50.3)
HGB BLD-MCNC: 8.5 G/DL (ref 13–17)
HGB UR QL STRIP.AUTO: NEGATIVE
KETONES UR STRIP-MCNC: NEGATIVE MG/DL
LEUKOCYTE ESTERASE UR QL STRIP: ABNORMAL
MCH RBC QN AUTO: 30.4 PG (ref 25.2–33.5)
MCHC RBC AUTO-ENTMCNC: 29.8 G/DL (ref 28.4–34.8)
MCV RBC AUTO: 101.8 FL (ref 82.6–102.9)
NITRITE UR QL STRIP: NEGATIVE
NRBC BLD-RTO: 0 PER 100 WBC
PH UR STRIP: 6 [PH] (ref 5–8)
PLATELET # BLD AUTO: 378 K/UL (ref 138–453)
PMV BLD AUTO: 9.3 FL (ref 8.1–13.5)
PROT UR STRIP-MCNC: ABNORMAL MG/DL
RBC # BLD AUTO: 2.8 M/UL (ref 4.21–5.77)
RBC #/AREA URNS HPF: ABNORMAL /HPF (ref 0–2)
SP GR UR STRIP: 1.02 (ref 1–1.03)
UROBILINOGEN UR STRIP-ACNC: NORMAL EU/DL (ref 0–1)
WBC #/AREA URNS HPF: ABNORMAL /HPF (ref 0–5)
WBC OTHER # BLD: 5.8 K/UL (ref 3.5–11.3)
YEAST URNS QL MICRO: ABNORMAL

## 2025-01-31 PROCEDURE — 36415 COLL VENOUS BLD VENIPUNCTURE: CPT

## 2025-01-31 PROCEDURE — 85027 COMPLETE CBC AUTOMATED: CPT

## 2025-01-31 PROCEDURE — P9603 ONE-WAY ALLOW PRORATED MILES: HCPCS

## 2025-02-02 LAB
MICROORGANISM SPEC CULT: NORMAL
SERVICE CMNT-IMP: NORMAL
SPECIMEN DESCRIPTION: NORMAL

## 2025-02-07 ENCOUNTER — CARE COORDINATION (OUTPATIENT)
Dept: CARE COORDINATION | Age: 75
End: 2025-02-07

## 2025-02-07 ENCOUNTER — HOSPITAL ENCOUNTER (OUTPATIENT)
Age: 75
Setting detail: SPECIMEN
Discharge: HOME | End: 2025-02-07

## 2025-02-07 LAB
ANION GAP SERPL CALCULATED.3IONS-SCNC: 10 MMOL/L (ref 9–16)
BUN SERPL-MCNC: 10 MG/DL (ref 8–23)
CALCIUM SERPL-MCNC: 8.8 MG/DL (ref 8.6–10.4)
CHLORIDE SERPL-SCNC: 107 MMOL/L (ref 98–107)
CO2 SERPL-SCNC: 26 MMOL/L (ref 20–31)
CREAT SERPL-MCNC: 0.8 MG/DL (ref 0.7–1.2)
ERYTHROCYTE [DISTWIDTH] IN BLOOD BY AUTOMATED COUNT: 15.8 % (ref 11.8–14.4)
GFR, ESTIMATED: >90 ML/MIN/1.73M2
GLUCOSE SERPL-MCNC: 104 MG/DL (ref 74–99)
HCT VFR BLD AUTO: 32.7 % (ref 40.7–50.3)
HGB BLD-MCNC: 9.7 G/DL (ref 13–17)
MAGNESIUM SERPL-MCNC: 2.1 MG/DL (ref 1.6–2.4)
MCH RBC QN AUTO: 29.9 PG (ref 25.2–33.5)
MCHC RBC AUTO-ENTMCNC: 29.7 G/DL (ref 28.4–34.8)
MCV RBC AUTO: 100.9 FL (ref 82.6–102.9)
NRBC BLD-RTO: 0 PER 100 WBC
PLATELET # BLD AUTO: 326 K/UL (ref 138–453)
PMV BLD AUTO: 9.7 FL (ref 8.1–13.5)
POTASSIUM SERPL-SCNC: 4 MMOL/L (ref 3.7–5.3)
RBC # BLD AUTO: 3.24 M/UL (ref 4.21–5.77)
SODIUM SERPL-SCNC: 143 MMOL/L (ref 136–145)
WBC OTHER # BLD: 7 K/UL (ref 3.5–11.3)

## 2025-02-07 PROCEDURE — 85027 COMPLETE CBC AUTOMATED: CPT

## 2025-02-07 PROCEDURE — 83735 ASSAY OF MAGNESIUM: CPT

## 2025-02-07 PROCEDURE — 80048 BASIC METABOLIC PNL TOTAL CA: CPT

## 2025-02-07 PROCEDURE — 36415 COLL VENOUS BLD VENIPUNCTURE: CPT

## 2025-02-07 NOTE — CARE COORDINATION
Ambulatory Care Coordination Note     2025 10:44 AM     Patient Current Location:  [place]     This patient was received as a referral from Ambulatory Care Manager .    Spouse/Partner contacted the spouse/partner by telephone. Verified name and  with spouse/partner as identifiers. Provided introduction to self, and explanation of the ACM role.   Spouse/Partner accepted care management services at this time.          ACM: ELLA DURHAM RN     Challenges to be reviewed by the provider   Additional needs identified to be addressed with provider No  none               Method of communication with provider: none.    Utilization: Initial Call - N/A    Care Summary Note: PCP requested assistance in getting a discharge plan and update from [facility]. He would like to verify that patient will have home health once he is home.  Spoke with wife who stated Lukasz is very upset about the care he is receiving at [facility]. He would like to come home but she needs him to stay until at least 2.12.25 due to her own health appointments. She does not want him transferred to another SNF she would like him home. She discussed that in 2024 Lukasz had a massive heart attack and she understands it will be a long recovery. Wife prefers [facility] home health at time of DC.  Left a VM for ROSENDO Sims at [facility] and requested a return call to discuss discharge plans and home health referral.     Offered patient enrollment in the Remote Patient Monitoring (RPM) program for in-home monitoring: Deferred at this time because patient is admitted to a SNF; will discuss at next outreach.        PCP/Specialist follow up:   Future Appointments         Provider Specialty Dept Phone    3/4/2025 2:20 PM Lalito Palomino MD Neurology [phone]    2025 9:00 AM Severino Fonseca MD Urology [phone]    2025 11:15 AM Christiano Smith MD Cardiology [phone]            Follow Up:   Plan for next ACM outreach in approximately  3 days

## 2025-02-13 ENCOUNTER — CARE COORDINATION (OUTPATIENT)
Dept: CARE COORDINATION | Age: 75
End: 2025-02-13

## 2025-02-13 NOTE — CARE COORDINATION
Ambulatory Care Coordination Note     2025 11:57 AM     Patient Current Location:  Home: [address] [place] [id number]     ACM contacted the patient by telephone. Verified name and  with patient as identifiers.         ACM: ELLA ANDRADE RN     Challenges to be reviewed by the provider   Additional needs identified to be addressed with provider No  none               Method of communication with provider: none.    Utilization: Patient has not had any utilization since our last call.     Care Summary Note: Spoke with wife who stated that Lukasz will be home from SNF tomorrow, 25.  She was unsure if [facility] is sending the referral to [facility].   I spoke with ROSENDO Sims from [facility] who verified  that she has sent the referral and will call PCP office to schedule a hospital follow up with PCP.      Offered patient enrollment in the Remote Patient Monitoring (RPM) program for in-home monitoring: Yes, but did not enroll at this time: continue to address once home .    Medications Reviewed:   Not completed during this call: will complete once home.    Advance Care Planning:   Reviewed and current     Care Planning:   Education Documentation  General medication information, taught by Ella Andrade RN at 2025 11:55 AM.  Learner: Patient  Readiness: Acceptance  Method: Explanation  Response: Verbalizes Understanding    Home Health Care Services, taught by Ella Andrade RN at 2025 11:55 AM.  Learner: Patient  Readiness: Acceptance  Method: Explanation  Response: Verbalizes Understanding    Educate reporting changes in condition, taught by Ella Andrade RN at 2025 11:55 AM.  Learner: Patient  Readiness: Acceptance  Method: Explanation  Response: Verbalizes Understanding    Educate Patient on When to Call for Symptoms, taught by Ella Andrade RN at 2025 11:55 AM.  Learner: Patient  Readiness: Acceptance  Method: Explanation  Response: Verbalizes Understanding    Educate limitations

## 2025-02-17 ENCOUNTER — CARE COORDINATION (OUTPATIENT)
Dept: CARE COORDINATION | Age: 75
End: 2025-02-17

## 2025-02-17 DIAGNOSIS — I46.9 CARDIAC ARREST (HCC): Primary | ICD-10-CM

## 2025-02-17 NOTE — CARE COORDINATION
Ambulatory Care Coordination Note     2025 1:10 PM     Patient Current Location:  Home: [address] [place] [id number]     Spouse/Partner contacted the ACM by telephone. Verified name and  with spouse/partner as identifiers.         ACM: GRACE ANDRADE RN     Challenges to be reviewed by the provider   Additional needs identified to be addressed with provider No  none               Method of communication with provider: none.    Utilization: Has the patient been discharged from the hospital since your last call? no, DC from SNF    Care Summary Note: Patient was DC from SNF on 25. He is home doing well per wife and has home care that has started. Next visit is scheduled for 25.  Wife denied any needs, requested PCP follow up be a VV, she will have a hard time getting him out of the house at this time.     Offered patient enrollment in the Remote Patient Monitoring (RPM) program for in-home monitoring: Yes, but did not enroll at this time: continue to address .     Assessments Completed:   General Assessment    Do you have any symptoms that are causing concern?: No      *[facility] Follow-up    Discharge date: 25    Discharge hospital: [facility] followed by rehab. Home on 25.  Diagnosis: cardiac arrest    Initial appointment date offered: none available  Reason patient wasn't scheduled: unavailable    Patient needs: follow up with PCP from hospital, would like a VV.     *Please schedule within 7 days of discharge and contact the patient.      Medications Reviewed:   1111F entered: yes    Advance Care Planning:   Reviewed and current     Care Planning:   Education Documentation  General medication information, taught by Grace Andrade RN at 2025  1:09 PM.  Learner: Caregiver, Significant Other  Readiness: Acceptance  Method: Explanation  Response: Verbalizes Understanding    Home Health Care Services, taught by Grace Andrade, RN at 2025  1:09 PM.  Learner: Caregiver,

## 2025-02-19 PROBLEM — R57.0 CARDIOGENIC SHOCK (HCC): Status: RESOLVED | Noted: 2024-12-04 | Resolved: 2025-02-19

## 2025-02-19 PROBLEM — E11.69 TYPE 2 DIABETES MELLITUS WITH OTHER SPECIFIED COMPLICATION, WITH LONG-TERM CURRENT USE OF INSULIN (HCC): Status: ACTIVE | Noted: 2025-02-19

## 2025-02-19 PROBLEM — Z79.4 TYPE 2 DIABETES MELLITUS WITH OTHER SPECIFIED COMPLICATION, WITH LONG-TERM CURRENT USE OF INSULIN (HCC): Status: ACTIVE | Noted: 2025-02-19

## 2025-02-19 PROBLEM — J96.01 ACUTE HYPOXEMIC RESPIRATORY FAILURE (HCC): Status: RESOLVED | Noted: 2020-03-17 | Resolved: 2025-02-19

## 2025-02-19 PROBLEM — I46.9 CARDIAC ARREST (HCC): Status: RESOLVED | Noted: 2024-12-03 | Resolved: 2025-02-19

## 2025-02-24 ENCOUNTER — CARE COORDINATION (OUTPATIENT)
Dept: CARE COORDINATION | Age: 75
End: 2025-02-24

## 2025-02-24 DIAGNOSIS — R07.9 RIGHT-SIDED CHEST PAIN: Primary | ICD-10-CM

## 2025-02-24 NOTE — CARE COORDINATION
Ambulatory Care Coordination Note     2025 10:15 AM     Patient Current Location:  Home: [address] [place] [id number]     ACM contacted the spouse/partner by telephone. Verified name and  with spouse/partner as identifiers.         ACM: GRACE ANDRADE RN     Challenges to be reviewed by the provider   Additional needs identified to be addressed with provider Yes  medications-Wife reports patient has rib pain stemming from a fall at the SNF. He has a visible bump in the area that hurts. The 10 mg of Oxycodone is nit helping enough. Per wife an Xray was never done after this fall.                 Method of communication with provider: chart routing.    Utilization: Patient has not had any utilization since our last call.     Care Summary Note: Pain in rib area, concern routed to PCP.   DM, per wife patient will not allow her to poke his finger. She is waiting on the CGM to arrive.   Home health- [facility]  CHF- some swelling in his feet per wife, no increased SOB.   PCP placed order for wheel chair, nebulizer and CGM.          Offered patient enrollment in the Remote Patient Monitoring (RPM) program for in-home monitoring: Yes, but did not enroll at this time: continue to address .     Assessments Completed:   Diabetes Assessment                ,   Congestive Heart Failure Assessment           Symptoms:           ,   General Assessment    Do you have any symptoms that are causing concern?: Yes  Progression since Onset: Unchanged  Reported Symptoms: Pain (Comment: rib pain)          Medications Reviewed:   Patient denies any changes with medications and reports taking all medications as prescribed.    Advance Care Planning:   Reviewed and current     Care Planning:   Education Documentation  General medication information, taught by Grace Andrade RN at 2025 10:14 AM.  Learner: Patient  Readiness: Acceptance  Method: Explanation  Response: Verbalizes Understanding    Home Health Care Services, taught by

## 2025-02-24 NOTE — CARE COORDINATION
Wife notified of XRAY, will see if family can assist in getting him there.   Check back tomorrow and assist with finding transport if needed.

## 2025-02-25 ENCOUNTER — CARE COORDINATION (OUTPATIENT)
Dept: CARE COORDINATION | Age: 75
End: 2025-02-25

## 2025-02-25 NOTE — CARE COORDINATION
Per ACM request, call to patients wife to see if patient was able to get transportation to [facility].   First # no answer and unable to leave msg due to mailbox full.   Attempted to reach patient @ [phone]# for continued Care Coordination follow up   Patient was unavailable at the time of my call, and a generic voicemail message was left asking patient to return my call at [phone].  Will advise ACM     Incoming return call from patients spouse (Anaya))   Transportation for [facility] is arranged for Friday 2.28.25. Spouse states they are going to [place] [facility] / Dr. Wadsworth office.  Will advise ACM.   CC f/u one week

## 2025-02-28 ENCOUNTER — HOSPITAL ENCOUNTER (INPATIENT)
Age: 75
LOS: 7 days | Discharge: NURSING, REHAB OR LONG-TERM CARE | DRG: 871 | End: 2025-03-07
Attending: EMERGENCY MEDICINE | Admitting: STUDENT IN AN ORGANIZED HEALTH CARE EDUCATION/TRAINING PROGRAM
Payer: MEDICARE

## 2025-02-28 ENCOUNTER — APPOINTMENT (OUTPATIENT)
Dept: CT IMAGING | Age: 75
DRG: 871 | End: 2025-02-28
Payer: MEDICARE

## 2025-02-28 ENCOUNTER — APPOINTMENT (OUTPATIENT)
Dept: GENERAL RADIOLOGY | Age: 75
DRG: 871 | End: 2025-02-28
Attending: EMERGENCY MEDICINE
Payer: MEDICARE

## 2025-02-28 ENCOUNTER — APPOINTMENT (OUTPATIENT)
Dept: GENERAL RADIOLOGY | Age: 75
DRG: 871 | End: 2025-02-28
Payer: MEDICARE

## 2025-02-28 DIAGNOSIS — F41.1 GENERALIZED ANXIETY DISORDER: ICD-10-CM

## 2025-02-28 DIAGNOSIS — S22.43XS CLOSED FRACTURE OF MULTIPLE RIBS OF BOTH SIDES, SEQUELA: ICD-10-CM

## 2025-02-28 DIAGNOSIS — I26.99 ACUTE PULMONARY EMBOLISM, UNSPECIFIED PULMONARY EMBOLISM TYPE, UNSPECIFIED WHETHER ACUTE COR PULMONALE PRESENT (HCC): Primary | ICD-10-CM

## 2025-02-28 DIAGNOSIS — R06.02 SHORTNESS OF BREATH: ICD-10-CM

## 2025-02-28 DIAGNOSIS — I26.99 PULMONARY EMBOLISM ON RIGHT (HCC): ICD-10-CM

## 2025-02-28 LAB
ABSOLUTE BANDS: 1.2 K/UL (ref 0–1)
ALBUMIN SERPL-MCNC: 2.6 G/DL (ref 3.5–5.2)
ALP SERPL-CCNC: 164 U/L (ref 40–129)
ALT SERPL-CCNC: 11 U/L (ref 10–50)
ANION GAP SERPL CALCULATED.3IONS-SCNC: 12 MMOL/L (ref 9–16)
AST SERPL-CCNC: 20 U/L (ref 10–50)
BANDS: 5 % (ref 0–10)
BASOPHILS # BLD: 0 K/UL (ref 0–0.2)
BASOPHILS NFR BLD: 0 % (ref 0–2)
BILIRUB SERPL-MCNC: 0.5 MG/DL (ref 0–1.2)
BNP SERPL-MCNC: 3457 PG/ML (ref 0–300)
BUN SERPL-MCNC: 12 MG/DL (ref 8–23)
CALCIUM SERPL-MCNC: 8.2 MG/DL (ref 8.6–10.4)
CHLORIDE SERPL-SCNC: 102 MMOL/L (ref 98–107)
CO2 SERPL-SCNC: 23 MMOL/L (ref 20–31)
CREAT SERPL-MCNC: 1.1 MG/DL (ref 0.7–1.2)
EOSINOPHIL # BLD: 0 K/UL (ref 0–0.4)
EOSINOPHILS RELATIVE PERCENT: 0 % (ref 0–4)
ERYTHROCYTE [DISTWIDTH] IN BLOOD BY AUTOMATED COUNT: 17.4 % (ref 11.5–14.9)
FLUAV RNA RESP QL NAA+PROBE: NOT DETECTED
FLUBV RNA RESP QL NAA+PROBE: NOT DETECTED
GFR, ESTIMATED: 71 ML/MIN/1.73M2
GLUCOSE BLD-MCNC: 183 MG/DL (ref 75–110)
GLUCOSE SERPL-MCNC: 182 MG/DL (ref 74–99)
HCT VFR BLD AUTO: 31.5 % (ref 41–53)
HGB BLD-MCNC: 9.8 G/DL (ref 13.5–17.5)
INR PPP: 2.2
LACTATE BLDV-SCNC: 1.2 MMOL/L (ref 0.5–1.9)
LIPASE SERPL-CCNC: 8 U/L (ref 13–60)
LYMPHOCYTES NFR BLD: 0.24 K/UL (ref 1–4.8)
LYMPHOCYTES RELATIVE PERCENT: 1 % (ref 24–44)
MAGNESIUM SERPL-MCNC: 1.7 MG/DL (ref 1.6–2.4)
MCH RBC QN AUTO: 28.7 PG (ref 26–34)
MCHC RBC AUTO-ENTMCNC: 31.1 G/DL (ref 31–37)
MCV RBC AUTO: 92 FL (ref 80–100)
MONOCYTES NFR BLD: 0.24 K/UL (ref 0.1–1.3)
MONOCYTES NFR BLD: 1 % (ref 1–7)
MORPHOLOGY: ABNORMAL
NEUTROPHILS NFR BLD: 93 % (ref 36–66)
NEUTS SEG NFR BLD: 22.32 K/UL (ref 1.3–9.1)
PLATELET # BLD AUTO: 398 K/UL (ref 150–450)
PMV BLD AUTO: 7.7 FL (ref 6–12)
POTASSIUM SERPL-SCNC: 4 MMOL/L (ref 3.7–5.3)
PROCALCITONIN SERPL-MCNC: 1.19 NG/ML (ref 0–0.09)
PROT SERPL-MCNC: 6.6 G/DL (ref 6.6–8.7)
PROTHROMBIN TIME: 26.6 SEC (ref 11.8–14.6)
RBC # BLD AUTO: 3.42 M/UL (ref 4.5–5.9)
SARS-COV-2 RNA RESP QL NAA+PROBE: NOT DETECTED
SODIUM SERPL-SCNC: 137 MMOL/L (ref 136–145)
SOURCE: NORMAL
SPECIMEN DESCRIPTION: NORMAL
TROPONIN I SERPL HS-MCNC: 25 NG/L (ref 0–22)
TROPONIN I SERPL HS-MCNC: 29 NG/L (ref 0–22)
WBC OTHER # BLD: 24 K/UL (ref 3.5–11)

## 2025-02-28 PROCEDURE — 87040 BLOOD CULTURE FOR BACTERIA: CPT

## 2025-02-28 PROCEDURE — 93005 ELECTROCARDIOGRAM TRACING: CPT | Performed by: EMERGENCY MEDICINE

## 2025-02-28 PROCEDURE — 84484 ASSAY OF TROPONIN QUANT: CPT

## 2025-02-28 PROCEDURE — 87077 CULTURE AEROBIC IDENTIFY: CPT

## 2025-02-28 PROCEDURE — 71046 X-RAY EXAM CHEST 2 VIEWS: CPT

## 2025-02-28 PROCEDURE — 96361 HYDRATE IV INFUSION ADD-ON: CPT

## 2025-02-28 PROCEDURE — 2580000003 HC RX 258: Performed by: EMERGENCY MEDICINE

## 2025-02-28 PROCEDURE — 87154 CUL TYP ID BLD PTHGN 6+ TRGT: CPT

## 2025-02-28 PROCEDURE — 71260 CT THORAX DX C+: CPT

## 2025-02-28 PROCEDURE — 84145 PROCALCITONIN (PCT): CPT

## 2025-02-28 PROCEDURE — 2500000003 HC RX 250 WO HCPCS: Performed by: STUDENT IN AN ORGANIZED HEALTH CARE EDUCATION/TRAINING PROGRAM

## 2025-02-28 PROCEDURE — 83735 ASSAY OF MAGNESIUM: CPT

## 2025-02-28 PROCEDURE — 85610 PROTHROMBIN TIME: CPT

## 2025-02-28 PROCEDURE — 6360000004 HC RX CONTRAST MEDICATION: Performed by: EMERGENCY MEDICINE

## 2025-02-28 PROCEDURE — 6360000002 HC RX W HCPCS: Performed by: EMERGENCY MEDICINE

## 2025-02-28 PROCEDURE — 87636 SARSCOV2 & INF A&B AMP PRB: CPT

## 2025-02-28 PROCEDURE — 85025 COMPLETE CBC W/AUTO DIFF WBC: CPT

## 2025-02-28 PROCEDURE — 87205 SMEAR GRAM STAIN: CPT

## 2025-02-28 PROCEDURE — 36415 COLL VENOUS BLD VENIPUNCTURE: CPT

## 2025-02-28 PROCEDURE — 2060000000 HC ICU INTERMEDIATE R&B

## 2025-02-28 PROCEDURE — 80053 COMPREHEN METABOLIC PANEL: CPT

## 2025-02-28 PROCEDURE — 96360 HYDRATION IV INFUSION INIT: CPT

## 2025-02-28 PROCEDURE — 2500000003 HC RX 250 WO HCPCS: Performed by: EMERGENCY MEDICINE

## 2025-02-28 PROCEDURE — 6370000000 HC RX 637 (ALT 250 FOR IP): Performed by: STUDENT IN AN ORGANIZED HEALTH CARE EDUCATION/TRAINING PROGRAM

## 2025-02-28 PROCEDURE — 83880 ASSAY OF NATRIURETIC PEPTIDE: CPT

## 2025-02-28 PROCEDURE — 83690 ASSAY OF LIPASE: CPT

## 2025-02-28 PROCEDURE — 82947 ASSAY GLUCOSE BLOOD QUANT: CPT

## 2025-02-28 PROCEDURE — 99285 EMERGENCY DEPT VISIT HI MDM: CPT

## 2025-02-28 PROCEDURE — 83605 ASSAY OF LACTIC ACID: CPT

## 2025-02-28 PROCEDURE — 87186 SC STD MICRODIL/AGAR DIL: CPT

## 2025-02-28 RX ORDER — FAMOTIDINE 20 MG/1
20 TABLET, FILM COATED ORAL 2 TIMES DAILY PRN
Status: DISCONTINUED | OUTPATIENT
Start: 2025-02-28 | End: 2025-03-07 | Stop reason: HOSPADM

## 2025-02-28 RX ORDER — IOPAMIDOL 755 MG/ML
75 INJECTION, SOLUTION INTRAVASCULAR
Status: COMPLETED | OUTPATIENT
Start: 2025-02-28 | End: 2025-02-28

## 2025-02-28 RX ORDER — CLONAZEPAM 0.5 MG/1
0.5 TABLET ORAL 2 TIMES DAILY PRN
Status: DISCONTINUED | OUTPATIENT
Start: 2025-02-28 | End: 2025-03-02

## 2025-02-28 RX ORDER — SODIUM CHLORIDE 9 MG/ML
INJECTION, SOLUTION INTRAVENOUS PRN
Status: DISCONTINUED | OUTPATIENT
Start: 2025-02-28 | End: 2025-03-07 | Stop reason: HOSPADM

## 2025-02-28 RX ORDER — 0.9 % SODIUM CHLORIDE 0.9 %
100 INTRAVENOUS SOLUTION INTRAVENOUS ONCE
Status: COMPLETED | OUTPATIENT
Start: 2025-02-28 | End: 2025-02-28

## 2025-02-28 RX ORDER — BISACODYL 5 MG/1
5 TABLET, DELAYED RELEASE ORAL DAILY PRN
Status: DISCONTINUED | OUTPATIENT
Start: 2025-02-28 | End: 2025-03-07 | Stop reason: HOSPADM

## 2025-02-28 RX ORDER — ONDANSETRON 2 MG/ML
4 INJECTION INTRAMUSCULAR; INTRAVENOUS EVERY 6 HOURS PRN
Status: DISCONTINUED | OUTPATIENT
Start: 2025-02-28 | End: 2025-03-06

## 2025-02-28 RX ORDER — INSULIN GLARGINE 100 [IU]/ML
15 INJECTION, SOLUTION SUBCUTANEOUS 2 TIMES DAILY
Status: DISCONTINUED | OUTPATIENT
Start: 2025-02-28 | End: 2025-03-07 | Stop reason: HOSPADM

## 2025-02-28 RX ORDER — TAMSULOSIN HYDROCHLORIDE 0.4 MG/1
0.4 CAPSULE ORAL DAILY
Status: DISCONTINUED | OUTPATIENT
Start: 2025-02-28 | End: 2025-03-07 | Stop reason: HOSPADM

## 2025-02-28 RX ORDER — ATORVASTATIN CALCIUM 40 MG/1
40 TABLET, FILM COATED ORAL NIGHTLY
Status: DISCONTINUED | OUTPATIENT
Start: 2025-02-28 | End: 2025-03-07 | Stop reason: HOSPADM

## 2025-02-28 RX ORDER — SODIUM CHLORIDE 0.9 % (FLUSH) 0.9 %
5-40 SYRINGE (ML) INJECTION EVERY 12 HOURS SCHEDULED
Status: DISCONTINUED | OUTPATIENT
Start: 2025-02-28 | End: 2025-03-07 | Stop reason: HOSPADM

## 2025-02-28 RX ORDER — OXYCODONE HYDROCHLORIDE 10 MG/1
10 TABLET ORAL EVERY 8 HOURS PRN
Status: ON HOLD | COMMUNITY
End: 2025-03-07

## 2025-02-28 RX ORDER — IPRATROPIUM BROMIDE AND ALBUTEROL SULFATE 2.5; .5 MG/3ML; MG/3ML
1 SOLUTION RESPIRATORY (INHALATION) EVERY 6 HOURS PRN
Status: DISCONTINUED | OUTPATIENT
Start: 2025-02-28 | End: 2025-03-07 | Stop reason: HOSPADM

## 2025-02-28 RX ORDER — SODIUM CHLORIDE 0.9 % (FLUSH) 0.9 %
5-40 SYRINGE (ML) INJECTION PRN
Status: DISCONTINUED | OUTPATIENT
Start: 2025-02-28 | End: 2025-03-07 | Stop reason: HOSPADM

## 2025-02-28 RX ORDER — ENOXAPARIN SODIUM 150 MG/ML
1 INJECTION SUBCUTANEOUS ONCE
Status: COMPLETED | OUTPATIENT
Start: 2025-02-28 | End: 2025-02-28

## 2025-02-28 RX ORDER — ACETAMINOPHEN 650 MG/1
650 SUPPOSITORY RECTAL EVERY 6 HOURS PRN
Status: DISCONTINUED | OUTPATIENT
Start: 2025-02-28 | End: 2025-03-07 | Stop reason: HOSPADM

## 2025-02-28 RX ORDER — OXYCODONE HYDROCHLORIDE 10 MG/1
10 TABLET ORAL EVERY 8 HOURS PRN
Status: DISCONTINUED | OUTPATIENT
Start: 2025-02-28 | End: 2025-03-01

## 2025-02-28 RX ORDER — ONDANSETRON 4 MG/1
4 TABLET, ORALLY DISINTEGRATING ORAL EVERY 8 HOURS PRN
Status: DISCONTINUED | OUTPATIENT
Start: 2025-02-28 | End: 2025-03-06

## 2025-02-28 RX ORDER — ENOXAPARIN SODIUM 150 MG/ML
1 INJECTION SUBCUTANEOUS 2 TIMES DAILY
Status: DISCONTINUED | OUTPATIENT
Start: 2025-03-01 | End: 2025-03-04

## 2025-02-28 RX ORDER — POTASSIUM CHLORIDE 7.45 MG/ML
10 INJECTION INTRAVENOUS PRN
Status: DISCONTINUED | OUTPATIENT
Start: 2025-02-28 | End: 2025-03-07 | Stop reason: HOSPADM

## 2025-02-28 RX ORDER — POTASSIUM CHLORIDE 1500 MG/1
40 TABLET, EXTENDED RELEASE ORAL PRN
Status: DISCONTINUED | OUTPATIENT
Start: 2025-02-28 | End: 2025-03-07 | Stop reason: HOSPADM

## 2025-02-28 RX ORDER — SODIUM CHLORIDE 0.9 % (FLUSH) 0.9 %
10 SYRINGE (ML) INJECTION PRN
Status: DISCONTINUED | OUTPATIENT
Start: 2025-02-28 | End: 2025-03-07 | Stop reason: HOSPADM

## 2025-02-28 RX ORDER — INSULIN LISPRO 100 [IU]/ML
0-8 INJECTION, SOLUTION INTRAVENOUS; SUBCUTANEOUS
Status: DISCONTINUED | OUTPATIENT
Start: 2025-02-28 | End: 2025-03-07 | Stop reason: HOSPADM

## 2025-02-28 RX ORDER — MIDODRINE HYDROCHLORIDE 10 MG/1
10 TABLET ORAL
Status: DISCONTINUED | OUTPATIENT
Start: 2025-03-01 | End: 2025-03-07 | Stop reason: HOSPADM

## 2025-02-28 RX ORDER — MAGNESIUM SULFATE HEPTAHYDRATE 40 MG/ML
2000 INJECTION, SOLUTION INTRAVENOUS PRN
Status: DISCONTINUED | OUTPATIENT
Start: 2025-02-28 | End: 2025-03-07 | Stop reason: HOSPADM

## 2025-02-28 RX ORDER — PANTOPRAZOLE SODIUM 40 MG/1
40 TABLET, DELAYED RELEASE ORAL
Status: DISCONTINUED | OUTPATIENT
Start: 2025-03-01 | End: 2025-03-07 | Stop reason: HOSPADM

## 2025-02-28 RX ORDER — DEXTROSE MONOHYDRATE 100 MG/ML
INJECTION, SOLUTION INTRAVENOUS CONTINUOUS PRN
Status: DISCONTINUED | OUTPATIENT
Start: 2025-02-28 | End: 2025-03-07 | Stop reason: HOSPADM

## 2025-02-28 RX ORDER — 0.9 % SODIUM CHLORIDE 0.9 %
1000 INTRAVENOUS SOLUTION INTRAVENOUS ONCE
Status: COMPLETED | OUTPATIENT
Start: 2025-02-28 | End: 2025-02-28

## 2025-02-28 RX ORDER — ACETAMINOPHEN 325 MG/1
650 TABLET ORAL EVERY 6 HOURS PRN
Status: DISCONTINUED | OUTPATIENT
Start: 2025-02-28 | End: 2025-03-07 | Stop reason: HOSPADM

## 2025-02-28 RX ADMIN — TAMSULOSIN HYDROCHLORIDE 0.4 MG: 0.4 CAPSULE ORAL at 20:56

## 2025-02-28 RX ADMIN — CLONAZEPAM 0.5 MG: 0.5 TABLET ORAL at 20:51

## 2025-02-28 RX ADMIN — SODIUM CHLORIDE, PRESERVATIVE FREE 10 ML: 5 INJECTION INTRAVENOUS at 15:44

## 2025-02-28 RX ADMIN — Medication 3 MG: at 20:51

## 2025-02-28 RX ADMIN — INSULIN LISPRO 2 UNITS: 100 INJECTION, SOLUTION INTRAVENOUS; SUBCUTANEOUS at 20:50

## 2025-02-28 RX ADMIN — TICAGRELOR 90 MG: 90 TABLET ORAL at 20:50

## 2025-02-28 RX ADMIN — ENOXAPARIN SODIUM 105 MG: 150 INJECTION SUBCUTANEOUS at 19:02

## 2025-02-28 RX ADMIN — SODIUM CHLORIDE 1000 ML: 0.9 INJECTION, SOLUTION INTRAVENOUS at 17:31

## 2025-02-28 RX ADMIN — SODIUM CHLORIDE, PRESERVATIVE FREE 10 ML: 5 INJECTION INTRAVENOUS at 20:51

## 2025-02-28 RX ADMIN — ATORVASTATIN CALCIUM 40 MG: 80 TABLET, FILM COATED ORAL at 20:50

## 2025-02-28 RX ADMIN — WATER 1000 MG: 1 INJECTION INTRAMUSCULAR; INTRAVENOUS; SUBCUTANEOUS at 19:32

## 2025-02-28 RX ADMIN — OXYCODONE HYDROCHLORIDE 10 MG: 10 TABLET ORAL at 21:00

## 2025-02-28 RX ADMIN — SODIUM CHLORIDE 100 ML: 9 INJECTION, SOLUTION INTRAVENOUS at 15:44

## 2025-02-28 RX ADMIN — IOPAMIDOL 75 ML: 755 INJECTION, SOLUTION INTRAVENOUS at 15:38

## 2025-02-28 ASSESSMENT — PAIN DESCRIPTION - LOCATION
LOCATION: CHEST
LOCATION: CHEST

## 2025-02-28 ASSESSMENT — PAIN - FUNCTIONAL ASSESSMENT: PAIN_FUNCTIONAL_ASSESSMENT: 0-10

## 2025-02-28 ASSESSMENT — PAIN SCALES - GENERAL
PAINLEVEL_OUTOF10: 5
PAINLEVEL_OUTOF10: 9

## 2025-02-28 ASSESSMENT — PAIN DESCRIPTION - ORIENTATION: ORIENTATION: MID

## 2025-02-28 ASSESSMENT — LIFESTYLE VARIABLES
HOW MANY STANDARD DRINKS CONTAINING ALCOHOL DO YOU HAVE ON A TYPICAL DAY: PATIENT DOES NOT DRINK
HOW OFTEN DO YOU HAVE A DRINK CONTAINING ALCOHOL: NEVER

## 2025-02-28 ASSESSMENT — PAIN DESCRIPTION - DESCRIPTORS: DESCRIPTORS: ACHING

## 2025-02-28 NOTE — ED PROVIDER NOTES
EMERGENCY DEPARTMENT ENCOUNTER    Pt Name: Lukasz Keller  MRN: [id number]  Birthdate 1950  Date of evaluation: 2/28/25  CHIEF COMPLAINT       Chief Complaint   Patient presents with    Shortness of Breath    Fatigue     HISTORY OF PRESENT ILLNESS   74-year-old male presents with complaints of shortness of breath, fatigue, and chest pain.  He states for about the last 3 days he has been having increased generalized fatigue.  He states he also been having chest pain.  He states it is when he presses on his chest.  He also states he has been having increased shortness of breath mostly with exertion.  He denies abdominal pain nausea or vomiting denies any significant increase in swelling in his lower extremities.    The history is provided by the patient.           REVIEW OF SYSTEMS     Review of Systems   Constitutional:  Positive for fatigue. Negative for chills and fever.   HENT:  Negative for congestion and ear pain.    Eyes:  Negative for pain.   Respiratory:  Positive for shortness of breath.    Cardiovascular:  Positive for chest pain. Negative for palpitations and leg swelling.   Gastrointestinal:  Negative for abdominal pain.   Genitourinary:  Negative for dysuria and flank pain.   Musculoskeletal:  Negative for back pain.   Skin:  Negative for color change.   Neurological:  Negative for numbness and headaches.   Psychiatric/Behavioral:  Negative for confusion.    All other systems reviewed and are negative.    PASTMEDICAL HISTORY     Past Medical History:   Diagnosis Date    Abnormal EKG     Acute respiratory failure (HCC) 03/17/2020    Anesthesia complication 2005    woke up during neck surgery    Anxiety     Dr. Marlow    Cancer (HCC)     Prostate    Colon polyps     Hx of blood clots 06/28/2021    [facility]    Hyperlipidemia     Dr. Hoffman    Prostate CA (HCC)     Pulmonary emboli (HCC) 06/28/2021    Per CTA chest, 6-28-21-\" Large saddle pulmonary embolism with extensive thrombus load

## 2025-03-01 LAB
ABSOLUTE BANDS: 0.55 K/UL (ref 0–1)
ANION GAP SERPL CALCULATED.3IONS-SCNC: 7 MMOL/L (ref 9–16)
BACTERIA URNS QL MICRO: ABNORMAL
BANDS: 3 % (ref 0–10)
BASOPHILS # BLD: 0 K/UL (ref 0–0.2)
BASOPHILS NFR BLD: 0 % (ref 0–2)
BILIRUB UR QL STRIP: NEGATIVE
BUN SERPL-MCNC: 15 MG/DL (ref 8–23)
CALCIUM SERPL-MCNC: 7.5 MG/DL (ref 8.6–10.4)
CASTS #/AREA URNS LPF: ABNORMAL /LPF
CHLORIDE SERPL-SCNC: 104 MMOL/L (ref 98–107)
CLARITY UR: ABNORMAL
CO2 SERPL-SCNC: 27 MMOL/L (ref 20–31)
COLOR UR: YELLOW
CREAT SERPL-MCNC: 1.1 MG/DL (ref 0.7–1.2)
EOSINOPHIL # BLD: 0 K/UL (ref 0–0.4)
EOSINOPHILS RELATIVE PERCENT: 0 % (ref 0–4)
EPI CELLS #/AREA URNS HPF: ABNORMAL /HPF
ERYTHROCYTE [DISTWIDTH] IN BLOOD BY AUTOMATED COUNT: 16.7 % (ref 11.5–14.9)
GFR, ESTIMATED: 71 ML/MIN/1.73M2
GLUCOSE BLD-MCNC: 130 MG/DL (ref 75–110)
GLUCOSE BLD-MCNC: 133 MG/DL (ref 75–110)
GLUCOSE BLD-MCNC: 150 MG/DL (ref 75–110)
GLUCOSE BLD-MCNC: 154 MG/DL (ref 75–110)
GLUCOSE SERPL-MCNC: 148 MG/DL (ref 74–99)
GLUCOSE UR STRIP-MCNC: NEGATIVE MG/DL
HCT VFR BLD AUTO: 25.9 % (ref 41–53)
HGB BLD-MCNC: 8.2 G/DL (ref 13.5–17.5)
HGB UR QL STRIP.AUTO: ABNORMAL
INR PPP: 2.1
KETONES UR STRIP-MCNC: NEGATIVE MG/DL
LEUKOCYTE ESTERASE UR QL STRIP: ABNORMAL
LYMPHOCYTES NFR BLD: 0.36 K/UL (ref 1–4.8)
LYMPHOCYTES RELATIVE PERCENT: 2 % (ref 24–44)
MCH RBC QN AUTO: 28.9 PG (ref 26–34)
MCHC RBC AUTO-ENTMCNC: 31.7 G/DL (ref 31–37)
MCV RBC AUTO: 91.1 FL (ref 80–100)
MONOCYTES NFR BLD: 0.36 K/UL (ref 0.1–1.3)
MONOCYTES NFR BLD: 2 % (ref 1–7)
MORPHOLOGY: ABNORMAL
NEUTROPHILS NFR BLD: 93 % (ref 36–66)
NEUTS SEG NFR BLD: 16.93 K/UL (ref 1.3–9.1)
NITRITE UR QL STRIP: NEGATIVE
PH UR STRIP: 5.5 [PH] (ref 5–8)
PLATELET # BLD AUTO: 293 K/UL (ref 150–450)
PMV BLD AUTO: 7.6 FL (ref 6–12)
POTASSIUM SERPL-SCNC: 4.2 MMOL/L (ref 3.7–5.3)
PROT UR STRIP-MCNC: ABNORMAL MG/DL
PROTHROMBIN TIME: 25.9 SEC (ref 11.8–14.6)
RBC # BLD AUTO: 2.84 M/UL (ref 4.5–5.9)
RBC #/AREA URNS HPF: ABNORMAL /HPF
SODIUM SERPL-SCNC: 138 MMOL/L (ref 136–145)
SP GR UR STRIP: 1.05 (ref 1–1.03)
UROBILINOGEN UR STRIP-ACNC: NORMAL EU/DL (ref 0–1)
WBC #/AREA URNS HPF: ABNORMAL /HPF
WBC OTHER # BLD: 18.2 K/UL (ref 3.5–11)

## 2025-03-01 PROCEDURE — 6370000000 HC RX 637 (ALT 250 FOR IP): Performed by: INTERNAL MEDICINE

## 2025-03-01 PROCEDURE — 2580000003 HC RX 258: Performed by: STUDENT IN AN ORGANIZED HEALTH CARE EDUCATION/TRAINING PROGRAM

## 2025-03-01 PROCEDURE — 80048 BASIC METABOLIC PNL TOTAL CA: CPT

## 2025-03-01 PROCEDURE — 82947 ASSAY GLUCOSE BLOOD QUANT: CPT

## 2025-03-01 PROCEDURE — 87186 SC STD MICRODIL/AGAR DIL: CPT

## 2025-03-01 PROCEDURE — 85025 COMPLETE CBC W/AUTO DIFF WBC: CPT

## 2025-03-01 PROCEDURE — 87086 URINE CULTURE/COLONY COUNT: CPT

## 2025-03-01 PROCEDURE — 2500000003 HC RX 250 WO HCPCS: Performed by: STUDENT IN AN ORGANIZED HEALTH CARE EDUCATION/TRAINING PROGRAM

## 2025-03-01 PROCEDURE — 2060000000 HC ICU INTERMEDIATE R&B

## 2025-03-01 PROCEDURE — 94761 N-INVAS EAR/PLS OXIMETRY MLT: CPT

## 2025-03-01 PROCEDURE — 6360000002 HC RX W HCPCS: Performed by: STUDENT IN AN ORGANIZED HEALTH CARE EDUCATION/TRAINING PROGRAM

## 2025-03-01 PROCEDURE — 2700000000 HC OXYGEN THERAPY PER DAY

## 2025-03-01 PROCEDURE — 87088 URINE BACTERIA CULTURE: CPT

## 2025-03-01 PROCEDURE — 85610 PROTHROMBIN TIME: CPT

## 2025-03-01 PROCEDURE — 99223 1ST HOSP IP/OBS HIGH 75: CPT | Performed by: INTERNAL MEDICINE

## 2025-03-01 PROCEDURE — 6370000000 HC RX 637 (ALT 250 FOR IP): Performed by: STUDENT IN AN ORGANIZED HEALTH CARE EDUCATION/TRAINING PROGRAM

## 2025-03-01 PROCEDURE — G0545 PR INHERENT VISIT TO INPT: HCPCS | Performed by: INTERNAL MEDICINE

## 2025-03-01 PROCEDURE — 36415 COLL VENOUS BLD VENIPUNCTURE: CPT

## 2025-03-01 PROCEDURE — 99223 1ST HOSP IP/OBS HIGH 75: CPT | Performed by: STUDENT IN AN ORGANIZED HEALTH CARE EDUCATION/TRAINING PROGRAM

## 2025-03-01 PROCEDURE — 81001 URINALYSIS AUTO W/SCOPE: CPT

## 2025-03-01 RX ORDER — OXYCODONE HYDROCHLORIDE 10 MG/1
10 TABLET ORAL EVERY 6 HOURS PRN
Status: DISCONTINUED | OUTPATIENT
Start: 2025-03-01 | End: 2025-03-05

## 2025-03-01 RX ORDER — WARFARIN SODIUM 2.5 MG/1
2.5 TABLET ORAL
Status: COMPLETED | OUTPATIENT
Start: 2025-03-01 | End: 2025-03-01

## 2025-03-01 RX ORDER — FLUOXETINE 10 MG/1
10 CAPSULE ORAL DAILY
Status: DISCONTINUED | OUTPATIENT
Start: 2025-03-01 | End: 2025-03-02

## 2025-03-01 RX ADMIN — MIDODRINE HYDROCHLORIDE 10 MG: 10 TABLET ORAL at 16:34

## 2025-03-01 RX ADMIN — PANTOPRAZOLE SODIUM 40 MG: 40 TABLET, DELAYED RELEASE ORAL at 06:01

## 2025-03-01 RX ADMIN — TICAGRELOR 90 MG: 90 TABLET ORAL at 10:10

## 2025-03-01 RX ADMIN — INSULIN GLARGINE 15 UNITS: 100 INJECTION, SOLUTION SUBCUTANEOUS at 21:39

## 2025-03-01 RX ADMIN — ACETAMINOPHEN 650 MG: 325 TABLET ORAL at 21:37

## 2025-03-01 RX ADMIN — WARFARIN SODIUM 2.5 MG: 2.5 TABLET ORAL at 16:34

## 2025-03-01 RX ADMIN — ONDANSETRON 4 MG: 2 INJECTION, SOLUTION INTRAMUSCULAR; INTRAVENOUS at 00:55

## 2025-03-01 RX ADMIN — MIDODRINE HYDROCHLORIDE 10 MG: 10 TABLET ORAL at 10:10

## 2025-03-01 RX ADMIN — ENOXAPARIN SODIUM 105 MG: 150 INJECTION SUBCUTANEOUS at 06:02

## 2025-03-01 RX ADMIN — MEROPENEM 1000 MG: 1 INJECTION INTRAVENOUS at 09:26

## 2025-03-01 RX ADMIN — SODIUM CHLORIDE, PRESERVATIVE FREE 10 ML: 5 INJECTION INTRAVENOUS at 21:38

## 2025-03-01 RX ADMIN — ONDANSETRON 4 MG: 4 TABLET, ORALLY DISINTEGRATING ORAL at 16:41

## 2025-03-01 RX ADMIN — MIDODRINE HYDROCHLORIDE 10 MG: 10 TABLET ORAL at 12:32

## 2025-03-01 RX ADMIN — FLUOXETINE HYDROCHLORIDE 10 MG: 10 CAPSULE ORAL at 16:34

## 2025-03-01 RX ADMIN — MEROPENEM 1000 MG: 1 INJECTION INTRAVENOUS at 16:37

## 2025-03-01 RX ADMIN — TICAGRELOR 90 MG: 90 TABLET ORAL at 21:38

## 2025-03-01 RX ADMIN — ATORVASTATIN CALCIUM 40 MG: 80 TABLET, FILM COATED ORAL at 21:37

## 2025-03-01 RX ADMIN — SODIUM CHLORIDE, PRESERVATIVE FREE 10 ML: 5 INJECTION INTRAVENOUS at 09:27

## 2025-03-01 RX ADMIN — ENOXAPARIN SODIUM 105 MG: 150 INJECTION SUBCUTANEOUS at 21:38

## 2025-03-01 RX ADMIN — INSULIN GLARGINE 15 UNITS: 100 INJECTION, SOLUTION SUBCUTANEOUS at 10:09

## 2025-03-01 RX ADMIN — TAMSULOSIN HYDROCHLORIDE 0.4 MG: 0.4 CAPSULE ORAL at 10:10

## 2025-03-01 ASSESSMENT — PAIN DESCRIPTION - DESCRIPTORS: DESCRIPTORS: ACHING;DISCOMFORT

## 2025-03-01 ASSESSMENT — PAIN SCALES - GENERAL
PAINLEVEL_OUTOF10: 5
PAINLEVEL_OUTOF10: 5

## 2025-03-01 ASSESSMENT — PAIN SCALES - WONG BAKER: WONGBAKER_NUMERICALRESPONSE: HURTS A LITTLE BIT

## 2025-03-01 ASSESSMENT — PAIN DESCRIPTION - LOCATION
LOCATION: RIB CAGE;STERNUM
LOCATION: CHEST

## 2025-03-01 NOTE — ED NOTES
Report given to CEDRIC Vega from [facility].   Report method by phone   The following was reviewed with receiving RN:   Current vital signs:  /63   Pulse 88   Temp 97.9 °F (36.6 °C) (Oral)   Resp 22   Ht 1.727 m (5' 8\")   Wt 110.2 kg (243 lb)   SpO2 94%   BMI 36.95 kg/m²                      Any medication or safety alerts were reviewed. Any pending diagnostics and notifications were also reviewed, as well as any safety concerns or issues, abnormal labs, abnormal imaging, and abnormal assessment findings. Questions were answered.        Never

## 2025-03-01 NOTE — H&P
[facility]  Family Medicine      History & Physical              Date:   3/2/2025  Patient name:  Lukasz Keller  Date of admission:  2/28/2025  2:13 PM  MRN:   [id number]  YOB: 1950    CHIEF COMPLAINT:       Chief Complaint   Patient presents with    Shortness of Breath    Fatigue         ASSESSMENT/PLAN       Hospital Problems             Last Modified POA    * (Principal) Acute pulmonary embolism, unspecified pulmonary embolism type, unspecified whether acute cor pulmonale present ([facility]) 2/28/2025 Yes    Anxiety disorder, unspecified 3/2/2025 Yes    Chronic heart failure with preserved ejection fraction ([facility]) 3/2/2025 Yes    Sepsis secondary to UTI ([facility]) 3/2/2025 Yes    UTI due to extended-spectrum beta lactamase (ESBL) producing Escherichia coli 3/2/2025 Yes         ESBL bacteremia, UTI-start meropenem consult infectious disease  Treatment failure with Eliquis, patient has an acute pulmonary embolism.  Patient is on therapeutic dose Lovenox and will need Coumadin per pulm recommendations  Oxycodone 10 mg every 6 hours  Midodrine 10 mg 3 times daily  Reduce insulin to 15 twice daily with sliding scale  Continue Brilinta  Start Prozac 10 mg, continue clonazepam twice daily        Full Code   ADULT ORAL NUTRITION SUPPLEMENT; Lunch; Frozen Oral Supplement  ADULT ORAL NUTRITION SUPPLEMENT; Dinner; Other Oral Supplement; 2 ICE CREAM MIXED WITH 2 ounce ORANGE JUICE  ADULT DIET; Regular   DVT:Lovenox therapeutic dose       The severity of this patient's signs and symptoms (specify acute PE, sepsis) indicate the need for an inpatient admission.      Above plan discussed with the patient    Consultations:   Consults: IP CONSULT TO PULMONOLOGY  IP CONSULT TO FAMILY MEDICINE  IP CONSULT TO SOCIAL WORK  IP CONSULT TO INFECTIOUS DISEASES  PHARMACY TO DOSE WARFARIN      HPI:       History Obtained From:  Patient and chart review.    The patient is a 74 y.o.  male who presented with weakness,

## 2025-03-01 NOTE — ACP (ADVANCE CARE PLANNING)
Conversation Outcomes:  ACP discussion completed    Follow-up plan:    [] Schedule follow-up conversation to continue planning  [x] Referred individual to Provider for additional questions/concerns   [] Advised patient/agent/surrogate to review completed ACP document and update if needed with changes in condition, patient preferences or care setting    [] This note routed to one or more involved healthcare providers

## 2025-03-01 NOTE — DISCHARGE INSTR - COC
Diet:  General    Routes of Feeding: Oral  Liquids: Thin Liquids  Daily Fluid Restriction: no  Last Modified Barium Swallow with Video (Video Swallowing Test): not done    Treatments at the Time of Hospital Discharge:   Respiratory Treatments:     Oxygen Therapy:  is not on home oxygen therapy.  Ventilator:    - No ventilator support    Rehab Therapies: Physical Therapy and Occupational Therapy  Weight Bearing Status/Restrictions: No weight bearing restrictions  Other Medical Equipment (for information only, NOT a DME order):  walker  Other Treatments: Skilled Nursing assessment and monitoring. Medication education and monitoring per protocol.     Patient's personal belongings (please select all that are sent with patient):  Dentures upper    RN SIGNATURE:  Electronically signed by Josefina Hopkins RN on 3/4/25 at 1:39 PM EST    CASE MANAGEMENT/SOCIAL WORK SECTION    Inpatient Status Date: 2/28/25    Readmission Risk Assessment Score:  [facility] RISK OF UNPLANNED READMISSION 2.0             25.9 Total Score        Discharging to Facility/ Agency   [facility]   [address] [place] [address]  P: [phone]  F: [phone]       Dialysis Facility (if applicable)   Name:  Address:  Dialysis Schedule:  Phone:  Fax:    / signature: Electronically signed by DINA Thomas, NADEENW on 3/5/25 at 10:36 AM EST    PHYSICIAN SECTION    Prognosis: Fair    Condition at Discharge: Stable    Rehab Potential (if transferring to Rehab): Fair    Recommended Labs or Other Treatments After Discharge: Meropenem    Physician Certification: I certify the above information and transfer of Lukasz Keller  is necessary for the continuing treatment of the diagnosis listed and that he requires Acute Rehab for less 30 days.     Update Admission H&P: No change in H&P    PHYSICIAN SIGNATURE:  Electronically signed by Christal Jang MD on 3/7/25 at 10:38 AM EST

## 2025-03-02 PROBLEM — N17.9 AKI (ACUTE KIDNEY INJURY): Status: RESOLVED | Noted: 2024-12-05 | Resolved: 2025-03-02

## 2025-03-02 PROBLEM — A41.9 SEPSIS SECONDARY TO UTI (HCC): Status: ACTIVE | Noted: 2025-03-02

## 2025-03-02 PROBLEM — N39.0 UTI DUE TO EXTENDED-SPECTRUM BETA LACTAMASE (ESBL) PRODUCING ESCHERICHIA COLI: Status: ACTIVE | Noted: 2025-03-02

## 2025-03-02 PROBLEM — I24.9 ACUTE CORONARY SYNDROME (HCC): Status: RESOLVED | Noted: 2024-12-03 | Resolved: 2025-03-02

## 2025-03-02 PROBLEM — N39.0 SEPSIS SECONDARY TO UTI (HCC): Status: ACTIVE | Noted: 2025-03-02

## 2025-03-02 PROBLEM — A49.8 BACTERIAL INFECTION DUE TO PROTEUS MIRABILIS: Status: RESOLVED | Noted: 2024-12-22 | Resolved: 2025-03-02

## 2025-03-02 PROBLEM — I26.02 ACUTE SADDLE PULMONARY EMBOLISM WITH ACUTE COR PULMONALE (HCC): Status: RESOLVED | Noted: 2021-06-28 | Resolved: 2025-03-02

## 2025-03-02 PROBLEM — B96.29 UTI DUE TO EXTENDED-SPECTRUM BETA LACTAMASE (ESBL) PRODUCING ESCHERICHIA COLI: Status: ACTIVE | Noted: 2025-03-02

## 2025-03-02 PROBLEM — Z79.4 TYPE 2 DIABETES MELLITUS, WITH LONG-TERM CURRENT USE OF INSULIN (HCC): Status: ACTIVE | Noted: 2024-12-10

## 2025-03-02 PROBLEM — J69.0 ASPIRATION PNEUMONIA OF BOTH LOWER LOBES (HCC): Status: RESOLVED | Noted: 2024-12-10 | Resolved: 2025-03-02

## 2025-03-02 PROBLEM — Z16.12 UTI DUE TO EXTENDED-SPECTRUM BETA LACTAMASE (ESBL) PRODUCING ESCHERICHIA COLI: Status: ACTIVE | Noted: 2025-03-02

## 2025-03-02 LAB
ANION GAP SERPL CALCULATED.3IONS-SCNC: 6 MMOL/L (ref 9–16)
BASOPHILS # BLD: 0 K/UL (ref 0–0.2)
BASOPHILS NFR BLD: 0 % (ref 0–2)
BUN SERPL-MCNC: 17 MG/DL (ref 8–23)
CALCIUM SERPL-MCNC: 7.5 MG/DL (ref 8.6–10.4)
CHLORIDE SERPL-SCNC: 102 MMOL/L (ref 98–107)
CO2 SERPL-SCNC: 27 MMOL/L (ref 20–31)
CREAT SERPL-MCNC: 0.9 MG/DL (ref 0.7–1.2)
EKG ATRIAL RATE: 99 BPM
EKG P AXIS: 42 DEGREES
EKG P-R INTERVAL: 192 MS
EKG Q-T INTERVAL: 394 MS
EKG QRS DURATION: 100 MS
EKG QTC CALCULATION (BAZETT): 505 MS
EKG R AXIS: -16 DEGREES
EKG T AXIS: 9 DEGREES
EKG VENTRICULAR RATE: 99 BPM
EOSINOPHIL # BLD: 0 K/UL (ref 0–0.4)
EOSINOPHILS RELATIVE PERCENT: 0 % (ref 0–4)
ERYTHROCYTE [DISTWIDTH] IN BLOOD BY AUTOMATED COUNT: 17.3 % (ref 11.5–14.9)
GFR, ESTIMATED: >90 ML/MIN/1.73M2
GLUCOSE BLD-MCNC: 113 MG/DL (ref 75–110)
GLUCOSE BLD-MCNC: 88 MG/DL (ref 75–110)
GLUCOSE BLD-MCNC: 91 MG/DL (ref 75–110)
GLUCOSE BLD-MCNC: 95 MG/DL (ref 75–110)
GLUCOSE SERPL-MCNC: 91 MG/DL (ref 74–99)
HCT VFR BLD AUTO: 26.3 % (ref 41–53)
HGB BLD-MCNC: 8.2 G/DL (ref 13.5–17.5)
INR PPP: 1.8
LYMPHOCYTES NFR BLD: 0.53 K/UL (ref 1–4.8)
LYMPHOCYTES RELATIVE PERCENT: 4 % (ref 24–44)
MCH RBC QN AUTO: 29 PG (ref 26–34)
MCHC RBC AUTO-ENTMCNC: 31.4 G/DL (ref 31–37)
MCV RBC AUTO: 92.4 FL (ref 80–100)
MONOCYTES NFR BLD: 1.06 K/UL (ref 0.1–1.3)
MONOCYTES NFR BLD: 8 % (ref 1–7)
MORPHOLOGY: ABNORMAL
NEUTROPHILS NFR BLD: 88 % (ref 36–66)
NEUTS SEG NFR BLD: 11.71 K/UL (ref 1.3–9.1)
PLATELET # BLD AUTO: 234 K/UL (ref 150–450)
PMV BLD AUTO: 7.4 FL (ref 6–12)
POTASSIUM SERPL-SCNC: 3.9 MMOL/L (ref 3.7–5.3)
PROTHROMBIN TIME: 22.1 SEC (ref 11.8–14.6)
RBC # BLD AUTO: 2.84 M/UL (ref 4.5–5.9)
SODIUM SERPL-SCNC: 135 MMOL/L (ref 136–145)
WBC OTHER # BLD: 13.3 K/UL (ref 3.5–11)

## 2025-03-02 PROCEDURE — 80048 BASIC METABOLIC PNL TOTAL CA: CPT

## 2025-03-02 PROCEDURE — 2060000000 HC ICU INTERMEDIATE R&B

## 2025-03-02 PROCEDURE — 93010 ELECTROCARDIOGRAM REPORT: CPT | Performed by: INTERNAL MEDICINE

## 2025-03-02 PROCEDURE — 2500000003 HC RX 250 WO HCPCS: Performed by: STUDENT IN AN ORGANIZED HEALTH CARE EDUCATION/TRAINING PROGRAM

## 2025-03-02 PROCEDURE — 85025 COMPLETE CBC W/AUTO DIFF WBC: CPT

## 2025-03-02 PROCEDURE — 97530 THERAPEUTIC ACTIVITIES: CPT

## 2025-03-02 PROCEDURE — 2580000003 HC RX 258: Performed by: STUDENT IN AN ORGANIZED HEALTH CARE EDUCATION/TRAINING PROGRAM

## 2025-03-02 PROCEDURE — 97162 PT EVAL MOD COMPLEX 30 MIN: CPT

## 2025-03-02 PROCEDURE — G0545 PR INHERENT VISIT TO INPT: HCPCS | Performed by: INTERNAL MEDICINE

## 2025-03-02 PROCEDURE — 82947 ASSAY GLUCOSE BLOOD QUANT: CPT

## 2025-03-02 PROCEDURE — 36415 COLL VENOUS BLD VENIPUNCTURE: CPT

## 2025-03-02 PROCEDURE — 6370000000 HC RX 637 (ALT 250 FOR IP): Performed by: STUDENT IN AN ORGANIZED HEALTH CARE EDUCATION/TRAINING PROGRAM

## 2025-03-02 PROCEDURE — 6370000000 HC RX 637 (ALT 250 FOR IP): Performed by: INTERNAL MEDICINE

## 2025-03-02 PROCEDURE — 85610 PROTHROMBIN TIME: CPT

## 2025-03-02 PROCEDURE — 99233 SBSQ HOSP IP/OBS HIGH 50: CPT | Performed by: INTERNAL MEDICINE

## 2025-03-02 PROCEDURE — 6360000002 HC RX W HCPCS: Performed by: STUDENT IN AN ORGANIZED HEALTH CARE EDUCATION/TRAINING PROGRAM

## 2025-03-02 RX ORDER — CLONAZEPAM 0.5 MG/1
0.5 TABLET ORAL 2 TIMES DAILY
Status: DISCONTINUED | OUTPATIENT
Start: 2025-03-02 | End: 2025-03-07 | Stop reason: HOSPADM

## 2025-03-02 RX ORDER — WARFARIN SODIUM 5 MG/1
5 TABLET ORAL
Status: COMPLETED | OUTPATIENT
Start: 2025-03-02 | End: 2025-03-02

## 2025-03-02 RX ADMIN — MIDODRINE HYDROCHLORIDE 10 MG: 10 TABLET ORAL at 08:32

## 2025-03-02 RX ADMIN — INSULIN GLARGINE 15 UNITS: 100 INJECTION, SOLUTION SUBCUTANEOUS at 08:32

## 2025-03-02 RX ADMIN — ENOXAPARIN SODIUM 105 MG: 150 INJECTION SUBCUTANEOUS at 21:44

## 2025-03-02 RX ADMIN — TICAGRELOR 90 MG: 90 TABLET ORAL at 21:41

## 2025-03-02 RX ADMIN — OXYCODONE HYDROCHLORIDE 10 MG: 10 TABLET ORAL at 14:40

## 2025-03-02 RX ADMIN — MIDODRINE HYDROCHLORIDE 10 MG: 10 TABLET ORAL at 16:45

## 2025-03-02 RX ADMIN — TAMSULOSIN HYDROCHLORIDE 0.4 MG: 0.4 CAPSULE ORAL at 08:32

## 2025-03-02 RX ADMIN — WARFARIN SODIUM 5 MG: 5 TABLET ORAL at 16:45

## 2025-03-02 RX ADMIN — OXYCODONE HYDROCHLORIDE 10 MG: 10 TABLET ORAL at 08:38

## 2025-03-02 RX ADMIN — TICAGRELOR 90 MG: 90 TABLET ORAL at 08:32

## 2025-03-02 RX ADMIN — PANTOPRAZOLE SODIUM 40 MG: 40 TABLET, DELAYED RELEASE ORAL at 05:48

## 2025-03-02 RX ADMIN — SODIUM CHLORIDE, PRESERVATIVE FREE 10 ML: 5 INJECTION INTRAVENOUS at 08:32

## 2025-03-02 RX ADMIN — Medication 3 MG: at 21:41

## 2025-03-02 RX ADMIN — ONDANSETRON 4 MG: 2 INJECTION, SOLUTION INTRAMUSCULAR; INTRAVENOUS at 08:45

## 2025-03-02 RX ADMIN — MEROPENEM 1000 MG: 1 INJECTION INTRAVENOUS at 00:43

## 2025-03-02 RX ADMIN — MEROPENEM 1000 MG: 1 INJECTION INTRAVENOUS at 08:32

## 2025-03-02 RX ADMIN — ATORVASTATIN CALCIUM 40 MG: 80 TABLET, FILM COATED ORAL at 21:41

## 2025-03-02 RX ADMIN — ENOXAPARIN SODIUM 105 MG: 150 INJECTION SUBCUTANEOUS at 08:33

## 2025-03-02 RX ADMIN — MEROPENEM 1000 MG: 1 INJECTION INTRAVENOUS at 16:47

## 2025-03-02 RX ADMIN — CLONAZEPAM 0.5 MG: 0.5 TABLET ORAL at 21:41

## 2025-03-02 RX ADMIN — FLUOXETINE HYDROCHLORIDE 10 MG: 10 CAPSULE ORAL at 08:38

## 2025-03-02 RX ADMIN — OXYCODONE HYDROCHLORIDE 10 MG: 10 TABLET ORAL at 21:40

## 2025-03-02 RX ADMIN — SODIUM CHLORIDE, PRESERVATIVE FREE 10 ML: 5 INJECTION INTRAVENOUS at 21:44

## 2025-03-02 RX ADMIN — ONDANSETRON 4 MG: 2 INJECTION, SOLUTION INTRAMUSCULAR; INTRAVENOUS at 00:38

## 2025-03-02 RX ADMIN — MIDODRINE HYDROCHLORIDE 10 MG: 10 TABLET ORAL at 12:22

## 2025-03-02 ASSESSMENT — PAIN DESCRIPTION - PAIN TYPE
TYPE: ACUTE PAIN

## 2025-03-02 ASSESSMENT — PAIN DESCRIPTION - DIRECTION: RADIATING_TOWARDS: STERNUM

## 2025-03-02 ASSESSMENT — PAIN SCALES - GENERAL
PAINLEVEL_OUTOF10: 5
PAINLEVEL_OUTOF10: 5
PAINLEVEL_OUTOF10: 7
PAINLEVEL_OUTOF10: 3
PAINLEVEL_OUTOF10: 5
PAINLEVEL_OUTOF10: 5

## 2025-03-02 ASSESSMENT — PAIN DESCRIPTION - DESCRIPTORS
DESCRIPTORS: ACHING;POUNDING
DESCRIPTORS: POUNDING
DESCRIPTORS: ACHING;POUNDING
DESCRIPTORS: ACHING;POUNDING

## 2025-03-02 ASSESSMENT — PAIN SCALES - WONG BAKER: WONGBAKER_NUMERICALRESPONSE: HURTS A LITTLE BIT

## 2025-03-02 ASSESSMENT — PAIN DESCRIPTION - LOCATION
LOCATION: RIB CAGE;STERNUM
LOCATION: STERNUM;RIB CAGE
LOCATION: CHEST
LOCATION: STERNUM;RIB CAGE

## 2025-03-02 ASSESSMENT — PAIN DESCRIPTION - ORIENTATION
ORIENTATION: MID
ORIENTATION: LEFT
ORIENTATION: MID
ORIENTATION: MID

## 2025-03-02 ASSESSMENT — PAIN - FUNCTIONAL ASSESSMENT
PAIN_FUNCTIONAL_ASSESSMENT: PREVENTS OR INTERFERES SOME ACTIVE ACTIVITIES AND ADLS
PAIN_FUNCTIONAL_ASSESSMENT: ACTIVITIES ARE NOT PREVENTED
PAIN_FUNCTIONAL_ASSESSMENT: PREVENTS OR INTERFERES SOME ACTIVE ACTIVITIES AND ADLS

## 2025-03-02 ASSESSMENT — PAIN DESCRIPTION - ONSET
ONSET: ON-GOING
ONSET: GRADUAL

## 2025-03-02 ASSESSMENT — PAIN DESCRIPTION - FREQUENCY
FREQUENCY: INTERMITTENT
FREQUENCY: INTERMITTENT

## 2025-03-03 ENCOUNTER — APPOINTMENT (OUTPATIENT)
Dept: VASCULAR LAB | Age: 75
DRG: 871 | End: 2025-03-03
Attending: STUDENT IN AN ORGANIZED HEALTH CARE EDUCATION/TRAINING PROGRAM
Payer: MEDICARE

## 2025-03-03 ENCOUNTER — APPOINTMENT (OUTPATIENT)
Age: 75
DRG: 871 | End: 2025-03-03
Attending: INTERNAL MEDICINE
Payer: MEDICARE

## 2025-03-03 LAB
ABSOLUTE BANDS: 0.84 K/UL (ref 0–1)
ANION GAP SERPL CALCULATED.3IONS-SCNC: 6 MMOL/L (ref 9–16)
BANDS: 9 % (ref 0–10)
BASOPHILS # BLD: 0 K/UL (ref 0–0.2)
BASOPHILS NFR BLD: 0 % (ref 0–2)
BUN SERPL-MCNC: 13 MG/DL (ref 8–23)
CALCIUM SERPL-MCNC: 7.4 MG/DL (ref 8.6–10.4)
CHLORIDE SERPL-SCNC: 103 MMOL/L (ref 98–107)
CO2 SERPL-SCNC: 28 MMOL/L (ref 20–31)
CREAT SERPL-MCNC: 0.9 MG/DL (ref 0.7–1.2)
ECHO BSA: 2.3 M2
EOSINOPHIL # BLD: 0.19 K/UL (ref 0–0.4)
EOSINOPHILS RELATIVE PERCENT: 2 % (ref 0–4)
ERYTHROCYTE [DISTWIDTH] IN BLOOD BY AUTOMATED COUNT: 17.2 % (ref 11.5–14.9)
FERRITIN SERPL-MCNC: 521 NG/ML
FOLATE SERPL-MCNC: 6.2 NG/ML (ref 4.8–24.2)
GFR, ESTIMATED: >90 ML/MIN/1.73M2
GLUCOSE BLD-MCNC: 105 MG/DL (ref 75–110)
GLUCOSE BLD-MCNC: 108 MG/DL (ref 75–110)
GLUCOSE BLD-MCNC: 118 MG/DL (ref 75–110)
GLUCOSE BLD-MCNC: 75 MG/DL (ref 75–110)
GLUCOSE BLD-MCNC: 76 MG/DL (ref 75–110)
GLUCOSE BLD-MCNC: 86 MG/DL (ref 75–110)
GLUCOSE BLD-MCNC: 92 MG/DL (ref 75–110)
GLUCOSE SERPL-MCNC: 72 MG/DL (ref 74–99)
HCT VFR BLD AUTO: 26.5 % (ref 41–53)
HGB BLD-MCNC: 8.7 G/DL (ref 13.5–17.5)
INR PPP: 1.7
IRON SATN MFR SERPL: 8 % (ref 20–55)
IRON SERPL-MCNC: 10 UG/DL (ref 61–157)
LYMPHOCYTES NFR BLD: 0.28 K/UL (ref 1–4.8)
LYMPHOCYTES RELATIVE PERCENT: 3 % (ref 24–44)
MCH RBC QN AUTO: 29.4 PG (ref 26–34)
MCHC RBC AUTO-ENTMCNC: 32.8 G/DL (ref 31–37)
MCV RBC AUTO: 89.7 FL (ref 80–100)
MICROORGANISM SPEC CULT: ABNORMAL
MONOCYTES NFR BLD: 0.09 K/UL (ref 0.1–1.3)
MONOCYTES NFR BLD: 1 % (ref 1–7)
MORPHOLOGY: ABNORMAL
NEUTROPHILS NFR BLD: 85 % (ref 36–66)
NEUTS SEG NFR BLD: 7.9 K/UL (ref 1.3–9.1)
PARTIAL THROMBOPLASTIN TIME: 48.1 SEC (ref 24–36)
PLATELET # BLD AUTO: 266 K/UL (ref 150–450)
PMV BLD AUTO: 7.2 FL (ref 6–12)
POTASSIUM SERPL-SCNC: 3.9 MMOL/L (ref 3.7–5.3)
PROTHROMBIN TIME: 21.5 SEC (ref 11.8–14.6)
RBC # BLD AUTO: 2.95 M/UL (ref 4.5–5.9)
RETICS # AUTO: 0.02 M/UL (ref 0.02–0.1)
RETICS/RBC NFR AUTO: 0.7 % (ref 0.5–2)
SERVICE CMNT-IMP: ABNORMAL
SERVICE CMNT-IMP: ABNORMAL
SODIUM SERPL-SCNC: 137 MMOL/L (ref 136–145)
SPECIMEN DESCRIPTION: ABNORMAL
SPECIMEN DESCRIPTION: ABNORMAL
TIBC SERPL-MCNC: 120 UG/DL (ref 250–450)
UNSATURATED IRON BINDING CAPACITY: 110 UG/DL (ref 112–347)
VIT B12 SERPL-MCNC: 706 PG/ML (ref 232–1245)
WBC OTHER # BLD: 9.3 K/UL (ref 3.5–11)

## 2025-03-03 PROCEDURE — 2580000003 HC RX 258: Performed by: STUDENT IN AN ORGANIZED HEALTH CARE EDUCATION/TRAINING PROGRAM

## 2025-03-03 PROCEDURE — 82607 VITAMIN B-12: CPT

## 2025-03-03 PROCEDURE — 82728 ASSAY OF FERRITIN: CPT

## 2025-03-03 PROCEDURE — C1751 CATH, INF, PER/CENT/MIDLINE: HCPCS

## 2025-03-03 PROCEDURE — 05HB33Z INSERTION OF INFUSION DEVICE INTO RIGHT BASILIC VEIN, PERCUTANEOUS APPROACH: ICD-10-PCS | Performed by: STUDENT IN AN ORGANIZED HEALTH CARE EDUCATION/TRAINING PROGRAM

## 2025-03-03 PROCEDURE — 85610 PROTHROMBIN TIME: CPT

## 2025-03-03 PROCEDURE — 82746 ASSAY OF FOLIC ACID SERUM: CPT

## 2025-03-03 PROCEDURE — 82947 ASSAY GLUCOSE BLOOD QUANT: CPT

## 2025-03-03 PROCEDURE — 99232 SBSQ HOSP IP/OBS MODERATE 35: CPT | Performed by: STUDENT IN AN ORGANIZED HEALTH CARE EDUCATION/TRAINING PROGRAM

## 2025-03-03 PROCEDURE — 97530 THERAPEUTIC ACTIVITIES: CPT

## 2025-03-03 PROCEDURE — 2060000000 HC ICU INTERMEDIATE R&B

## 2025-03-03 PROCEDURE — 83550 IRON BINDING TEST: CPT

## 2025-03-03 PROCEDURE — 85613 RUSSELL VIPER VENOM DILUTED: CPT

## 2025-03-03 PROCEDURE — 97116 GAIT TRAINING THERAPY: CPT

## 2025-03-03 PROCEDURE — 86146 BETA-2 GLYCOPROTEIN ANTIBODY: CPT

## 2025-03-03 PROCEDURE — 86147 CARDIOLIPIN ANTIBODY EA IG: CPT

## 2025-03-03 PROCEDURE — 93970 EXTREMITY STUDY: CPT | Performed by: SURGERY

## 2025-03-03 PROCEDURE — 6370000000 HC RX 637 (ALT 250 FOR IP): Performed by: INTERNAL MEDICINE

## 2025-03-03 PROCEDURE — 99232 SBSQ HOSP IP/OBS MODERATE 35: CPT | Performed by: INTERNAL MEDICINE

## 2025-03-03 PROCEDURE — 80048 BASIC METABOLIC PNL TOTAL CA: CPT

## 2025-03-03 PROCEDURE — 85730 THROMBOPLASTIN TIME PARTIAL: CPT

## 2025-03-03 PROCEDURE — 36410 VNPNXR 3YR/> PHY/QHP DX/THER: CPT

## 2025-03-03 PROCEDURE — 83540 ASSAY OF IRON: CPT

## 2025-03-03 PROCEDURE — 5A1D70Z PERFORMANCE OF URINARY FILTRATION, INTERMITTENT, LESS THAN 6 HOURS PER DAY: ICD-10-PCS | Performed by: STUDENT IN AN ORGANIZED HEALTH CARE EDUCATION/TRAINING PROGRAM

## 2025-03-03 PROCEDURE — 85045 AUTOMATED RETICULOCYTE COUNT: CPT

## 2025-03-03 PROCEDURE — 76937 US GUIDE VASCULAR ACCESS: CPT

## 2025-03-03 PROCEDURE — 6360000002 HC RX W HCPCS: Performed by: STUDENT IN AN ORGANIZED HEALTH CARE EDUCATION/TRAINING PROGRAM

## 2025-03-03 PROCEDURE — 6360000004 HC RX CONTRAST MEDICATION: Performed by: STUDENT IN AN ORGANIZED HEALTH CARE EDUCATION/TRAINING PROGRAM

## 2025-03-03 PROCEDURE — 99223 1ST HOSP IP/OBS HIGH 75: CPT | Performed by: INTERNAL MEDICINE

## 2025-03-03 PROCEDURE — 97535 SELF CARE MNGMENT TRAINING: CPT

## 2025-03-03 PROCEDURE — 93970 EXTREMITY STUDY: CPT

## 2025-03-03 PROCEDURE — 36415 COLL VENOUS BLD VENIPUNCTURE: CPT

## 2025-03-03 PROCEDURE — 2500000003 HC RX 250 WO HCPCS: Performed by: STUDENT IN AN ORGANIZED HEALTH CARE EDUCATION/TRAINING PROGRAM

## 2025-03-03 PROCEDURE — 2500000003 HC RX 250 WO HCPCS: Performed by: INTERNAL MEDICINE

## 2025-03-03 PROCEDURE — 97110 THERAPEUTIC EXERCISES: CPT

## 2025-03-03 PROCEDURE — 97166 OT EVAL MOD COMPLEX 45 MIN: CPT

## 2025-03-03 PROCEDURE — G0545 PR INHERENT VISIT TO INPT: HCPCS | Performed by: INTERNAL MEDICINE

## 2025-03-03 PROCEDURE — 99223 1ST HOSP IP/OBS HIGH 75: CPT | Performed by: PHYSICAL MEDICINE & REHABILITATION

## 2025-03-03 PROCEDURE — 85025 COMPLETE CBC W/AUTO DIFF WBC: CPT

## 2025-03-03 PROCEDURE — C8929 TTE W OR WO FOL WCON,DOPPLER: HCPCS

## 2025-03-03 PROCEDURE — 6370000000 HC RX 637 (ALT 250 FOR IP): Performed by: STUDENT IN AN ORGANIZED HEALTH CARE EDUCATION/TRAINING PROGRAM

## 2025-03-03 RX ORDER — SODIUM CHLORIDE 0.9 % (FLUSH) 0.9 %
5-40 SYRINGE (ML) INJECTION EVERY 12 HOURS SCHEDULED
Status: DISCONTINUED | OUTPATIENT
Start: 2025-03-03 | End: 2025-03-07 | Stop reason: HOSPADM

## 2025-03-03 RX ORDER — LIDOCAINE HYDROCHLORIDE 10 MG/ML
50 INJECTION, SOLUTION EPIDURAL; INFILTRATION; INTRACAUDAL; PERINEURAL ONCE
Status: DISCONTINUED | OUTPATIENT
Start: 2025-03-03 | End: 2025-03-07 | Stop reason: HOSPADM

## 2025-03-03 RX ORDER — SODIUM CHLORIDE 0.9 % (FLUSH) 0.9 %
5-40 SYRINGE (ML) INJECTION PRN
Status: DISCONTINUED | OUTPATIENT
Start: 2025-03-03 | End: 2025-03-07 | Stop reason: HOSPADM

## 2025-03-03 RX ORDER — SODIUM CHLORIDE 9 MG/ML
INJECTION, SOLUTION INTRAVENOUS PRN
Status: DISCONTINUED | OUTPATIENT
Start: 2025-03-03 | End: 2025-03-07 | Stop reason: HOSPADM

## 2025-03-03 RX ORDER — WARFARIN SODIUM 5 MG/1
5 TABLET ORAL
Status: COMPLETED | OUTPATIENT
Start: 2025-03-03 | End: 2025-03-03

## 2025-03-03 RX ADMIN — WARFARIN SODIUM 5 MG: 5 TABLET ORAL at 17:32

## 2025-03-03 RX ADMIN — TICAGRELOR 90 MG: 90 TABLET ORAL at 21:45

## 2025-03-03 RX ADMIN — ENOXAPARIN SODIUM 105 MG: 150 INJECTION SUBCUTANEOUS at 21:45

## 2025-03-03 RX ADMIN — ENOXAPARIN SODIUM 105 MG: 150 INJECTION SUBCUTANEOUS at 09:44

## 2025-03-03 RX ADMIN — MEROPENEM 1000 MG: 1 INJECTION INTRAVENOUS at 00:31

## 2025-03-03 RX ADMIN — ACETAMINOPHEN 650 MG: 325 TABLET ORAL at 17:31

## 2025-03-03 RX ADMIN — SODIUM CHLORIDE, PRESERVATIVE FREE 10 ML: 5 INJECTION INTRAVENOUS at 09:44

## 2025-03-03 RX ADMIN — FLUOXETINE HYDROCHLORIDE 20 MG: 20 CAPSULE ORAL at 08:33

## 2025-03-03 RX ADMIN — MEROPENEM 1000 MG: 1 INJECTION INTRAVENOUS at 09:52

## 2025-03-03 RX ADMIN — CLONAZEPAM 0.5 MG: 0.5 TABLET ORAL at 21:45

## 2025-03-03 RX ADMIN — CLONAZEPAM 0.5 MG: 0.5 TABLET ORAL at 08:33

## 2025-03-03 RX ADMIN — SODIUM CHLORIDE, PRESERVATIVE FREE 10 ML: 5 INJECTION INTRAVENOUS at 21:45

## 2025-03-03 RX ADMIN — MIDODRINE HYDROCHLORIDE 10 MG: 10 TABLET ORAL at 17:31

## 2025-03-03 RX ADMIN — ACETAMINOPHEN 650 MG: 325 TABLET ORAL at 08:46

## 2025-03-03 RX ADMIN — SULFUR HEXAFLUORIDE 5 ML: KIT at 14:50

## 2025-03-03 RX ADMIN — TICAGRELOR 90 MG: 90 TABLET ORAL at 08:33

## 2025-03-03 RX ADMIN — MIDODRINE HYDROCHLORIDE 10 MG: 10 TABLET ORAL at 11:58

## 2025-03-03 RX ADMIN — ATORVASTATIN CALCIUM 40 MG: 80 TABLET, FILM COATED ORAL at 21:45

## 2025-03-03 RX ADMIN — PANTOPRAZOLE SODIUM 40 MG: 40 TABLET, DELAYED RELEASE ORAL at 06:19

## 2025-03-03 RX ADMIN — MIDODRINE HYDROCHLORIDE 10 MG: 10 TABLET ORAL at 08:33

## 2025-03-03 RX ADMIN — MEROPENEM 1000 MG: 1 INJECTION INTRAVENOUS at 17:04

## 2025-03-03 RX ADMIN — TAMSULOSIN HYDROCHLORIDE 0.4 MG: 0.4 CAPSULE ORAL at 08:33

## 2025-03-03 RX ADMIN — SODIUM CHLORIDE 200 MG: 9 INJECTION, SOLUTION INTRAVENOUS at 21:52

## 2025-03-03 ASSESSMENT — PAIN DESCRIPTION - LOCATION
LOCATION: STERNUM
LOCATION: STERNUM;GENERALIZED

## 2025-03-03 ASSESSMENT — ENCOUNTER SYMPTOMS
SHORTNESS OF BREATH: 0
ABDOMINAL PAIN: 0

## 2025-03-03 ASSESSMENT — PAIN DESCRIPTION - DESCRIPTORS
DESCRIPTORS: ACHING
DESCRIPTORS: ACHING

## 2025-03-03 ASSESSMENT — PAIN SCALES - GENERAL
PAINLEVEL_OUTOF10: 5
PAINLEVEL_OUTOF10: 5

## 2025-03-03 NOTE — FLOWSHEET NOTE
03/03/25 0539   Treatment Team Notification   Reason for Communication Evaluate  (new pt consult: ARU assessement)   Name of Team Member Notified Dr. Owens   Treatment Team Role Consulting Provider   Method of Communication Secure Message   Response No new orders

## 2025-03-03 NOTE — FLOWSHEET NOTE
03/03/25 0537   Treatment Team Notification   Reason for Communication Evaluate  (new pt consult:  Recurrent PE, eliquis treatment failure)   Name of Team Member Notified Dr. Mcdonough   Treatment Team Role Consulting Provider   Method of Communication Secure Message   Response No new orders

## 2025-03-03 NOTE — PROCEDURES
PROCEDURE NOTE  Date: 3/3/2025   Name: Lukasz Keller  YOB: 1950    Procedures        Midline insertion note:     Prescribed therapy: meropenem through 3/7/2025  Product type: 4.5fr single lumen Antimicrobial/Antithrombogenic Arrow Midline  History/Labs/Allergies Reviewed  Placed By:   MERRY Van - RN IV Team  Time out Performed using Two Identifiers  Insertion site: Right basilic vein - measures 4.5mm with an area CVR of 11%.(Preffered area based CVR would be less than 20%).  Total length: 15cm (blue flex tip intact).   External catheter length: 0cm  Extremity circumference at insertion site: 36cm  Number of attempts: 1  Estimated blood loss: 2 ml  Placement verified by: positive blood return & flushes easily  Special equipment used: ultrasound & micro-introducer technique   Catheter secured with adhesive securement device  Catheter adhesive (SecureportIV) utilized at insertion site  Dressing applied: Tegaderm CHG  Lidocaine administered intradermally conc.1%: approx 1 mL    Midline education:     [ X] Post care line insertion was discussed with patient prior to procedure.  Risks, benefits, alternatives, and reason for procedure were discussed and teaching was reinforced. An educational handout on post insertion line care and maintenance was left at bedside with patient or in chart. Patient acknowledged understanding of information relayed.      [  ] Post care of line insertion was not discussed with patient/family or POA due to pts medical status at time of procedure. Patient's family or POA not available to discuss line education. An educational handout on post insertion line care and maintenance was left at bedside or in chart.

## 2025-03-04 ENCOUNTER — APPOINTMENT (OUTPATIENT)
Dept: GENERAL RADIOLOGY | Age: 75
DRG: 871 | End: 2025-03-04
Payer: MEDICARE

## 2025-03-04 PROBLEM — I26.99 ACUTE PULMONARY EMBOLISM (HCC): Status: ACTIVE | Noted: 2025-03-04

## 2025-03-04 LAB
ANION GAP SERPL CALCULATED.3IONS-SCNC: 6 MMOL/L (ref 9–16)
BASOPHILS # BLD: 0 K/UL (ref 0–0.2)
BASOPHILS NFR BLD: 0 % (ref 0–2)
BUN SERPL-MCNC: 9 MG/DL (ref 8–23)
CALCIUM SERPL-MCNC: 7.5 MG/DL (ref 8.6–10.4)
CHLORIDE SERPL-SCNC: 105 MMOL/L (ref 98–107)
CO2 SERPL-SCNC: 29 MMOL/L (ref 20–31)
CREAT SERPL-MCNC: 0.8 MG/DL (ref 0.7–1.2)
ECHO AO ROOT DIAM: 3.7 CM
ECHO AO ROOT INDEX: 1.67 CM/M2
ECHO AV AREA PEAK VELOCITY: 3.2 CM2
ECHO AV AREA VTI: 3.3 CM2
ECHO AV AREA/BSA PEAK VELOCITY: 1.4 CM2/M2
ECHO AV AREA/BSA VTI: 1.5 CM2/M2
ECHO AV MEAN GRADIENT: 3 MMHG
ECHO AV MEAN VELOCITY: 0.9 M/S
ECHO AV PEAK GRADIENT: 6 MMHG
ECHO AV PEAK VELOCITY: 1.3 M/S
ECHO AV VELOCITY RATIO: 0.77
ECHO AV VTI: 24.7 CM
ECHO BSA: 2.3 M2
ECHO EST RA PRESSURE: 3 MMHG
ECHO LA AREA 2C: 15.9 CM2
ECHO LA AREA 4C: 23.5 CM2
ECHO LA DIAMETER INDEX: 1.8 CM/M2
ECHO LA DIAMETER: 4 CM
ECHO LA MAJOR AXIS: 6.5 CM
ECHO LA MINOR AXIS: 5.6 CM
ECHO LA TO AORTIC ROOT RATIO: 1.08
ECHO LA VOL BP: 53 ML (ref 18–58)
ECHO LA VOL MOD A2C: 37 ML (ref 18–58)
ECHO LA VOL MOD A4C: 67 ML (ref 18–58)
ECHO LA VOL/BSA BIPLANE: 24 ML/M2 (ref 16–34)
ECHO LA VOLUME INDEX MOD A2C: 17 ML/M2 (ref 16–34)
ECHO LA VOLUME INDEX MOD A4C: 30 ML/M2 (ref 16–34)
ECHO LV E' LATERAL VELOCITY: 17.7 CM/S
ECHO LV E' SEPTAL VELOCITY: 10.7 CM/S
ECHO LV EF PHYSICIAN: 55 %
ECHO LV FRACTIONAL SHORTENING: 17 % (ref 28–44)
ECHO LV INTERNAL DIMENSION DIASTOLE INDEX: 2.16 CM/M2
ECHO LV INTERNAL DIMENSION DIASTOLIC: 4.8 CM (ref 4.2–5.9)
ECHO LV INTERNAL DIMENSION SYSTOLIC INDEX: 1.8 CM/M2
ECHO LV INTERNAL DIMENSION SYSTOLIC: 4 CM
ECHO LV IVSD: 0.9 CM (ref 0.6–1)
ECHO LV MASS 2D: 126.7 G (ref 88–224)
ECHO LV MASS INDEX 2D: 57.1 G/M2 (ref 49–115)
ECHO LV POSTERIOR WALL DIASTOLIC: 0.7 CM (ref 0.6–1)
ECHO LV RELATIVE WALL THICKNESS RATIO: 0.29
ECHO LVOT AREA: 4.2 CM2
ECHO LVOT AV VTI INDEX: 0.79
ECHO LVOT DIAM: 2.3 CM
ECHO LVOT MEAN GRADIENT: 2 MMHG
ECHO LVOT PEAK GRADIENT: 4 MMHG
ECHO LVOT PEAK VELOCITY: 1 M/S
ECHO LVOT STROKE VOLUME INDEX: 36.5 ML/M2
ECHO LVOT SV: 81 ML
ECHO LVOT VTI: 19.5 CM
ECHO MV A VELOCITY: 0.81 M/S
ECHO MV E DECELERATION TIME (DT): 131 MS
ECHO MV E VELOCITY: 0.86 M/S
ECHO MV E/A RATIO: 1.06
ECHO MV E/E' LATERAL: 4.86
ECHO MV E/E' RATIO (AVERAGED): 6.45
ECHO MV E/E' SEPTAL: 8.04
ECHO PV MAX VELOCITY: 0.9 M/S
ECHO PV PEAK GRADIENT: 3 MMHG
ECHO RA AREA 4C: 18.6 CM2
ECHO RA END SYSTOLIC VOLUME APICAL 4 CHAMBER INDEX BSA: 19 ML/M2
ECHO RA VOLUME: 42 ML
ECHO RIGHT VENTRICULAR SYSTOLIC PRESSURE (RVSP): 25 MMHG
ECHO RV FREE WALL PEAK S': 15.1 CM/S
ECHO RV INTERNAL DIMENSION: 3.7 CM
ECHO RV TAPSE: 2.2 CM (ref 1.7–?)
ECHO TV REGURGITANT MAX VELOCITY: 2.35 M/S
ECHO TV REGURGITANT PEAK GRADIENT: 22 MMHG
EOSINOPHIL # BLD: 0.2 K/UL (ref 0–0.4)
EOSINOPHILS RELATIVE PERCENT: 3 % (ref 0–4)
ERYTHROCYTE [DISTWIDTH] IN BLOOD BY AUTOMATED COUNT: 17.4 % (ref 11.5–14.9)
GFR, ESTIMATED: >90 ML/MIN/1.73M2
GLUCOSE BLD-MCNC: 115 MG/DL (ref 75–110)
GLUCOSE BLD-MCNC: 90 MG/DL (ref 75–110)
GLUCOSE BLD-MCNC: 90 MG/DL (ref 75–110)
GLUCOSE BLD-MCNC: 95 MG/DL (ref 75–110)
GLUCOSE SERPL-MCNC: 81 MG/DL (ref 74–99)
HCT VFR BLD AUTO: 26 % (ref 41–53)
HGB BLD-MCNC: 8.7 G/DL (ref 13.5–17.5)
INR PPP: 2.9
LYMPHOCYTES NFR BLD: 0.5 K/UL (ref 1–4.8)
LYMPHOCYTES RELATIVE PERCENT: 7 % (ref 24–44)
MCH RBC QN AUTO: 30.2 PG (ref 26–34)
MCHC RBC AUTO-ENTMCNC: 33.2 G/DL (ref 31–37)
MCV RBC AUTO: 90.8 FL (ref 80–100)
MICROORGANISM SPEC CULT: ABNORMAL
MONOCYTES NFR BLD: 1 K/UL (ref 0.1–1.3)
MONOCYTES NFR BLD: 13 % (ref 1–7)
NEUTROPHILS NFR BLD: 77 % (ref 36–66)
NEUTS SEG NFR BLD: 6.3 K/UL (ref 1.3–9.1)
PLATELET # BLD AUTO: 292 K/UL (ref 150–450)
PMV BLD AUTO: 7.5 FL (ref 6–12)
POTASSIUM SERPL-SCNC: 3.9 MMOL/L (ref 3.7–5.3)
PROTHROMBIN TIME: 33.4 SEC (ref 11.8–14.6)
RBC # BLD AUTO: 2.87 M/UL (ref 4.5–5.9)
SODIUM SERPL-SCNC: 140 MMOL/L (ref 136–145)
SPECIMEN DESCRIPTION: ABNORMAL
SURGICAL PATHOLOGY REPORT: NORMAL
WBC OTHER # BLD: 8.1 K/UL (ref 3.5–11)

## 2025-03-04 PROCEDURE — 36415 COLL VENOUS BLD VENIPUNCTURE: CPT

## 2025-03-04 PROCEDURE — 85610 PROTHROMBIN TIME: CPT

## 2025-03-04 PROCEDURE — 99223 1ST HOSP IP/OBS HIGH 75: CPT | Performed by: INTERNAL MEDICINE

## 2025-03-04 PROCEDURE — 71045 X-RAY EXAM CHEST 1 VIEW: CPT

## 2025-03-04 PROCEDURE — 82947 ASSAY GLUCOSE BLOOD QUANT: CPT

## 2025-03-04 PROCEDURE — 2500000003 HC RX 250 WO HCPCS: Performed by: INTERNAL MEDICINE

## 2025-03-04 PROCEDURE — G0545 PR INHERENT VISIT TO INPT: HCPCS | Performed by: INTERNAL MEDICINE

## 2025-03-04 PROCEDURE — 2060000000 HC ICU INTERMEDIATE R&B

## 2025-03-04 PROCEDURE — 99232 SBSQ HOSP IP/OBS MODERATE 35: CPT | Performed by: INTERNAL MEDICINE

## 2025-03-04 PROCEDURE — 2580000003 HC RX 258: Performed by: STUDENT IN AN ORGANIZED HEALTH CARE EDUCATION/TRAINING PROGRAM

## 2025-03-04 PROCEDURE — 6360000002 HC RX W HCPCS: Performed by: STUDENT IN AN ORGANIZED HEALTH CARE EDUCATION/TRAINING PROGRAM

## 2025-03-04 PROCEDURE — 85025 COMPLETE CBC W/AUTO DIFF WBC: CPT

## 2025-03-04 PROCEDURE — 80048 BASIC METABOLIC PNL TOTAL CA: CPT

## 2025-03-04 PROCEDURE — 6370000000 HC RX 637 (ALT 250 FOR IP): Performed by: STUDENT IN AN ORGANIZED HEALTH CARE EDUCATION/TRAINING PROGRAM

## 2025-03-04 PROCEDURE — 99232 SBSQ HOSP IP/OBS MODERATE 35: CPT | Performed by: STUDENT IN AN ORGANIZED HEALTH CARE EDUCATION/TRAINING PROGRAM

## 2025-03-04 RX ORDER — WARFARIN SODIUM 1 MG/1
1 TABLET ORAL
Status: DISCONTINUED | OUTPATIENT
Start: 2025-03-04 | End: 2025-03-04

## 2025-03-04 RX ORDER — FERROUS SULFATE 325(65) MG
325 TABLET ORAL
Status: DISCONTINUED | OUTPATIENT
Start: 2025-03-05 | End: 2025-03-07 | Stop reason: HOSPADM

## 2025-03-04 RX ADMIN — SODIUM CHLORIDE, PRESERVATIVE FREE 10 ML: 5 INJECTION INTRAVENOUS at 09:07

## 2025-03-04 RX ADMIN — TICAGRELOR 90 MG: 90 TABLET ORAL at 21:20

## 2025-03-04 RX ADMIN — MEROPENEM 1000 MG: 1 INJECTION INTRAVENOUS at 09:15

## 2025-03-04 RX ADMIN — FLUOXETINE HYDROCHLORIDE 20 MG: 20 CAPSULE ORAL at 09:06

## 2025-03-04 RX ADMIN — ATORVASTATIN CALCIUM 40 MG: 80 TABLET, FILM COATED ORAL at 21:20

## 2025-03-04 RX ADMIN — MIDODRINE HYDROCHLORIDE 10 MG: 10 TABLET ORAL at 09:05

## 2025-03-04 RX ADMIN — ENOXAPARIN SODIUM 105 MG: 150 INJECTION SUBCUTANEOUS at 11:45

## 2025-03-04 RX ADMIN — TICAGRELOR 90 MG: 90 TABLET ORAL at 09:06

## 2025-03-04 RX ADMIN — CLONAZEPAM 0.5 MG: 0.5 TABLET ORAL at 09:06

## 2025-03-04 RX ADMIN — OXYCODONE HYDROCHLORIDE 10 MG: 10 TABLET ORAL at 21:20

## 2025-03-04 RX ADMIN — MEROPENEM 1000 MG: 1 INJECTION INTRAVENOUS at 16:57

## 2025-03-04 RX ADMIN — SODIUM CHLORIDE 200 MG: 9 INJECTION, SOLUTION INTRAVENOUS at 20:31

## 2025-03-04 RX ADMIN — CLONAZEPAM 0.5 MG: 0.5 TABLET ORAL at 21:20

## 2025-03-04 RX ADMIN — PANTOPRAZOLE SODIUM 40 MG: 40 TABLET, DELAYED RELEASE ORAL at 09:06

## 2025-03-04 RX ADMIN — TAMSULOSIN HYDROCHLORIDE 0.4 MG: 0.4 CAPSULE ORAL at 09:06

## 2025-03-04 RX ADMIN — OXYCODONE HYDROCHLORIDE 10 MG: 10 TABLET ORAL at 13:47

## 2025-03-04 RX ADMIN — ENOXAPARIN SODIUM 105 MG: 150 INJECTION SUBCUTANEOUS at 21:23

## 2025-03-04 RX ADMIN — MEROPENEM 1000 MG: 1 INJECTION INTRAVENOUS at 00:34

## 2025-03-04 RX ADMIN — SODIUM CHLORIDE, PRESERVATIVE FREE 10 ML: 5 INJECTION INTRAVENOUS at 21:23

## 2025-03-04 ASSESSMENT — PAIN DESCRIPTION - FREQUENCY
FREQUENCY: INTERMITTENT
FREQUENCY: INTERMITTENT

## 2025-03-04 ASSESSMENT — PAIN DESCRIPTION - LOCATION
LOCATION: GENERALIZED
LOCATION: GENERALIZED
LOCATION: CHEST

## 2025-03-04 ASSESSMENT — ENCOUNTER SYMPTOMS
SHORTNESS OF BREATH: 0
ABDOMINAL PAIN: 0

## 2025-03-04 ASSESSMENT — PAIN SCALES - GENERAL
PAINLEVEL_OUTOF10: 7
PAINLEVEL_OUTOF10: 6
PAINLEVEL_OUTOF10: 4

## 2025-03-04 ASSESSMENT — PAIN DESCRIPTION - ORIENTATION
ORIENTATION: MID
ORIENTATION: MID

## 2025-03-04 ASSESSMENT — PAIN DESCRIPTION - PAIN TYPE
TYPE: ACUTE PAIN
TYPE: ACUTE PAIN

## 2025-03-04 ASSESSMENT — PAIN DESCRIPTION - ONSET
ONSET: GRADUAL
ONSET: GRADUAL

## 2025-03-04 ASSESSMENT — PAIN SCALES - WONG BAKER: WONGBAKER_NUMERICALRESPONSE: NO HURT

## 2025-03-04 ASSESSMENT — PAIN DESCRIPTION - DESCRIPTORS
DESCRIPTORS: SHARP;STABBING
DESCRIPTORS: ACHING
DESCRIPTORS: ACHING

## 2025-03-05 LAB
B2 GLYCOPROT1 IGA SERPL IA-ACNC: 11 ELISA U/ML (ref 0–7)
B2 GLYCOPROT1 IGG SERPL IA-ACNC: 9.4 ELISA U/ML (ref 0–7)
B2 GLYCOPROT1 IGM SERPL IA-ACNC: <0.9 ELISA U/ML (ref 0–7)
CARDIOLIPIN IGA SER IA-ACNC: 6.3 APL (ref 0–14)
CARDIOLIPIN IGG SER IA-ACNC: 1.4 GPL (ref 0–10)
CARDIOLIPIN IGM SER IA-ACNC: <0.8 MPL (ref 0–10)
DILUTE RUSSELL VIPER VENOM TIME: NORMAL
GLUCOSE BLD-MCNC: 103 MG/DL (ref 75–110)
GLUCOSE BLD-MCNC: 106 MG/DL (ref 75–110)
GLUCOSE BLD-MCNC: 153 MG/DL (ref 75–110)
GLUCOSE BLD-MCNC: 99 MG/DL (ref 75–110)
INR PPP: 2.9
LUPUS ANTICOAG: NORMAL
PATH REV BLD -IMP: NORMAL
PROTHROMBIN TIME: 33 SEC (ref 11.8–14.6)

## 2025-03-05 PROCEDURE — G0545 PR INHERENT VISIT TO INPT: HCPCS | Performed by: INTERNAL MEDICINE

## 2025-03-05 PROCEDURE — 6360000002 HC RX W HCPCS: Performed by: STUDENT IN AN ORGANIZED HEALTH CARE EDUCATION/TRAINING PROGRAM

## 2025-03-05 PROCEDURE — 2580000003 HC RX 258: Performed by: STUDENT IN AN ORGANIZED HEALTH CARE EDUCATION/TRAINING PROGRAM

## 2025-03-05 PROCEDURE — 99232 SBSQ HOSP IP/OBS MODERATE 35: CPT | Performed by: PHYSICAL MEDICINE & REHABILITATION

## 2025-03-05 PROCEDURE — 99232 SBSQ HOSP IP/OBS MODERATE 35: CPT | Performed by: INTERNAL MEDICINE

## 2025-03-05 PROCEDURE — 97116 GAIT TRAINING THERAPY: CPT

## 2025-03-05 PROCEDURE — 2500000003 HC RX 250 WO HCPCS: Performed by: INTERNAL MEDICINE

## 2025-03-05 PROCEDURE — 6370000000 HC RX 637 (ALT 250 FOR IP): Performed by: STUDENT IN AN ORGANIZED HEALTH CARE EDUCATION/TRAINING PROGRAM

## 2025-03-05 PROCEDURE — 82947 ASSAY GLUCOSE BLOOD QUANT: CPT

## 2025-03-05 PROCEDURE — 97535 SELF CARE MNGMENT TRAINING: CPT

## 2025-03-05 PROCEDURE — 6370000000 HC RX 637 (ALT 250 FOR IP): Performed by: INTERNAL MEDICINE

## 2025-03-05 PROCEDURE — 99232 SBSQ HOSP IP/OBS MODERATE 35: CPT | Performed by: STUDENT IN AN ORGANIZED HEALTH CARE EDUCATION/TRAINING PROGRAM

## 2025-03-05 PROCEDURE — 97110 THERAPEUTIC EXERCISES: CPT

## 2025-03-05 PROCEDURE — 36415 COLL VENOUS BLD VENIPUNCTURE: CPT

## 2025-03-05 PROCEDURE — 99233 SBSQ HOSP IP/OBS HIGH 50: CPT | Performed by: NURSE PRACTITIONER

## 2025-03-05 PROCEDURE — 85610 PROTHROMBIN TIME: CPT

## 2025-03-05 PROCEDURE — 2060000000 HC ICU INTERMEDIATE R&B

## 2025-03-05 RX ORDER — LIDOCAINE 4 G/G
1 PATCH TOPICAL DAILY
Status: DISCONTINUED | OUTPATIENT
Start: 2025-03-05 | End: 2025-03-07 | Stop reason: HOSPADM

## 2025-03-05 RX ORDER — OXYCODONE HYDROCHLORIDE 5 MG/1
7.5 TABLET ORAL EVERY 4 HOURS PRN
Status: DISCONTINUED | OUTPATIENT
Start: 2025-03-05 | End: 2025-03-07 | Stop reason: HOSPADM

## 2025-03-05 RX ADMIN — SODIUM CHLORIDE, PRESERVATIVE FREE 10 ML: 5 INJECTION INTRAVENOUS at 10:14

## 2025-03-05 RX ADMIN — ATORVASTATIN CALCIUM 40 MG: 80 TABLET, FILM COATED ORAL at 20:22

## 2025-03-05 RX ADMIN — TAMSULOSIN HYDROCHLORIDE 0.4 MG: 0.4 CAPSULE ORAL at 10:13

## 2025-03-05 RX ADMIN — OXYCODONE HYDROCHLORIDE 10 MG: 10 TABLET ORAL at 10:28

## 2025-03-05 RX ADMIN — TICAGRELOR 90 MG: 90 TABLET ORAL at 10:13

## 2025-03-05 RX ADMIN — MEROPENEM 1000 MG: 1 INJECTION INTRAVENOUS at 00:17

## 2025-03-05 RX ADMIN — PANTOPRAZOLE SODIUM 40 MG: 40 TABLET, DELAYED RELEASE ORAL at 05:48

## 2025-03-05 RX ADMIN — CLONAZEPAM 0.5 MG: 0.5 TABLET ORAL at 10:13

## 2025-03-05 RX ADMIN — CLONAZEPAM 0.5 MG: 0.5 TABLET ORAL at 20:22

## 2025-03-05 RX ADMIN — APIXABAN 5 MG: 5 TABLET, FILM COATED ORAL at 10:13

## 2025-03-05 RX ADMIN — FERROUS SULFATE TAB 325 MG (65 MG ELEMENTAL FE) 325 MG: 325 (65 FE) TAB at 10:13

## 2025-03-05 RX ADMIN — TICAGRELOR 90 MG: 90 TABLET ORAL at 20:22

## 2025-03-05 RX ADMIN — OXYCODONE HYDROCHLORIDE 10 MG: 10 TABLET ORAL at 16:27

## 2025-03-05 RX ADMIN — FLUOXETINE HYDROCHLORIDE 20 MG: 20 CAPSULE ORAL at 10:13

## 2025-03-05 RX ADMIN — OXYCODONE HYDROCHLORIDE 7.5 MG: 5 TABLET ORAL at 20:22

## 2025-03-05 RX ADMIN — MEROPENEM 1000 MG: 1 INJECTION INTRAVENOUS at 16:27

## 2025-03-05 RX ADMIN — SODIUM CHLORIDE 200 MG: 9 INJECTION, SOLUTION INTRAVENOUS at 19:32

## 2025-03-05 RX ADMIN — MEROPENEM 1000 MG: 1 INJECTION INTRAVENOUS at 10:26

## 2025-03-05 ASSESSMENT — PAIN SCALES - GENERAL
PAINLEVEL_OUTOF10: 7
PAINLEVEL_OUTOF10: 6
PAINLEVEL_OUTOF10: 5

## 2025-03-05 ASSESSMENT — ENCOUNTER SYMPTOMS
ABDOMINAL PAIN: 0
SHORTNESS OF BREATH: 0

## 2025-03-05 ASSESSMENT — PAIN DESCRIPTION - PAIN TYPE: TYPE: ACUTE PAIN

## 2025-03-05 ASSESSMENT — PAIN DESCRIPTION - LOCATION
LOCATION: CHEST

## 2025-03-05 ASSESSMENT — PAIN DESCRIPTION - ORIENTATION
ORIENTATION: MID
ORIENTATION: MID

## 2025-03-05 ASSESSMENT — PAIN - FUNCTIONAL ASSESSMENT
PAIN_FUNCTIONAL_ASSESSMENT: PREVENTS OR INTERFERES SOME ACTIVE ACTIVITIES AND ADLS
PAIN_FUNCTIONAL_ASSESSMENT: PREVENTS OR INTERFERES SOME ACTIVE ACTIVITIES AND ADLS

## 2025-03-05 ASSESSMENT — PAIN SCALES - WONG BAKER: WONGBAKER_NUMERICALRESPONSE: NO HURT

## 2025-03-05 ASSESSMENT — PAIN DESCRIPTION - DESCRIPTORS
DESCRIPTORS: SHARP
DESCRIPTORS: SHARP;DISCOMFORT
DESCRIPTORS: ACHING;OTHER (COMMENT)

## 2025-03-06 LAB
GLUCOSE BLD-MCNC: 101 MG/DL (ref 75–110)
GLUCOSE BLD-MCNC: 118 MG/DL (ref 75–110)
GLUCOSE BLD-MCNC: 129 MG/DL (ref 75–110)
GLUCOSE BLD-MCNC: 148 MG/DL (ref 75–110)
INR PPP: 2.5
PROTHROMBIN TIME: 29.5 SEC (ref 11.8–14.6)

## 2025-03-06 PROCEDURE — 2060000000 HC ICU INTERMEDIATE R&B

## 2025-03-06 PROCEDURE — 99232 SBSQ HOSP IP/OBS MODERATE 35: CPT | Performed by: INTERNAL MEDICINE

## 2025-03-06 PROCEDURE — 99232 SBSQ HOSP IP/OBS MODERATE 35: CPT | Performed by: STUDENT IN AN ORGANIZED HEALTH CARE EDUCATION/TRAINING PROGRAM

## 2025-03-06 PROCEDURE — 6360000002 HC RX W HCPCS: Performed by: STUDENT IN AN ORGANIZED HEALTH CARE EDUCATION/TRAINING PROGRAM

## 2025-03-06 PROCEDURE — 2580000003 HC RX 258: Performed by: STUDENT IN AN ORGANIZED HEALTH CARE EDUCATION/TRAINING PROGRAM

## 2025-03-06 PROCEDURE — 6370000000 HC RX 637 (ALT 250 FOR IP): Performed by: STUDENT IN AN ORGANIZED HEALTH CARE EDUCATION/TRAINING PROGRAM

## 2025-03-06 PROCEDURE — 36415 COLL VENOUS BLD VENIPUNCTURE: CPT

## 2025-03-06 PROCEDURE — 97116 GAIT TRAINING THERAPY: CPT

## 2025-03-06 PROCEDURE — 97530 THERAPEUTIC ACTIVITIES: CPT

## 2025-03-06 PROCEDURE — 82947 ASSAY GLUCOSE BLOOD QUANT: CPT

## 2025-03-06 PROCEDURE — 85610 PROTHROMBIN TIME: CPT

## 2025-03-06 PROCEDURE — 6370000000 HC RX 637 (ALT 250 FOR IP): Performed by: INTERNAL MEDICINE

## 2025-03-06 PROCEDURE — G0545 PR INHERENT VISIT TO INPT: HCPCS | Performed by: INTERNAL MEDICINE

## 2025-03-06 PROCEDURE — 97110 THERAPEUTIC EXERCISES: CPT

## 2025-03-06 PROCEDURE — 99233 SBSQ HOSP IP/OBS HIGH 50: CPT | Performed by: NURSE PRACTITIONER

## 2025-03-06 RX ADMIN — TICAGRELOR 90 MG: 90 TABLET ORAL at 07:59

## 2025-03-06 RX ADMIN — ATORVASTATIN CALCIUM 40 MG: 80 TABLET, FILM COATED ORAL at 20:57

## 2025-03-06 RX ADMIN — APIXABAN 5 MG: 5 TABLET, FILM COATED ORAL at 07:59

## 2025-03-06 RX ADMIN — OXYCODONE HYDROCHLORIDE 7.5 MG: 5 TABLET ORAL at 00:42

## 2025-03-06 RX ADMIN — APIXABAN 5 MG: 5 TABLET, FILM COATED ORAL at 20:57

## 2025-03-06 RX ADMIN — MIDODRINE HYDROCHLORIDE 10 MG: 10 TABLET ORAL at 16:12

## 2025-03-06 RX ADMIN — MEROPENEM 1000 MG: 1 INJECTION INTRAVENOUS at 16:15

## 2025-03-06 RX ADMIN — OXYCODONE HYDROCHLORIDE 7.5 MG: 5 TABLET ORAL at 08:05

## 2025-03-06 RX ADMIN — OXYCODONE HYDROCHLORIDE 7.5 MG: 5 TABLET ORAL at 12:47

## 2025-03-06 RX ADMIN — PANTOPRAZOLE SODIUM 40 MG: 40 TABLET, DELAYED RELEASE ORAL at 05:26

## 2025-03-06 RX ADMIN — CLONAZEPAM 0.5 MG: 0.5 TABLET ORAL at 08:00

## 2025-03-06 RX ADMIN — FERROUS SULFATE TAB 325 MG (65 MG ELEMENTAL FE) 325 MG: 325 (65 FE) TAB at 08:00

## 2025-03-06 RX ADMIN — FLUOXETINE HYDROCHLORIDE 20 MG: 20 CAPSULE ORAL at 08:00

## 2025-03-06 RX ADMIN — OXYCODONE HYDROCHLORIDE 7.5 MG: 5 TABLET ORAL at 20:58

## 2025-03-06 RX ADMIN — TAMSULOSIN HYDROCHLORIDE 0.4 MG: 0.4 CAPSULE ORAL at 07:59

## 2025-03-06 RX ADMIN — MEROPENEM 1000 MG: 1 INJECTION INTRAVENOUS at 00:31

## 2025-03-06 RX ADMIN — MIDODRINE HYDROCHLORIDE 10 MG: 10 TABLET ORAL at 08:00

## 2025-03-06 RX ADMIN — TICAGRELOR 90 MG: 90 TABLET ORAL at 20:57

## 2025-03-06 RX ADMIN — CLONAZEPAM 0.5 MG: 0.5 TABLET ORAL at 20:57

## 2025-03-06 RX ADMIN — MIDODRINE HYDROCHLORIDE 10 MG: 10 TABLET ORAL at 12:47

## 2025-03-06 RX ADMIN — SODIUM CHLORIDE 200 MG: 9 INJECTION, SOLUTION INTRAVENOUS at 19:45

## 2025-03-06 RX ADMIN — MEROPENEM 1000 MG: 1 INJECTION INTRAVENOUS at 08:09

## 2025-03-06 ASSESSMENT — PAIN DESCRIPTION - LOCATION
LOCATION: CHEST

## 2025-03-06 ASSESSMENT — PAIN DESCRIPTION - ORIENTATION
ORIENTATION: MID
ORIENTATION: MID

## 2025-03-06 ASSESSMENT — PAIN SCALES - WONG BAKER
WONGBAKER_NUMERICALRESPONSE: NO HURT
WONGBAKER_NUMERICALRESPONSE: NO HURT

## 2025-03-06 ASSESSMENT — PAIN DESCRIPTION - DESCRIPTORS
DESCRIPTORS: ACHING
DESCRIPTORS: ACHING
DESCRIPTORS: SHARP
DESCRIPTORS: STABBING

## 2025-03-06 ASSESSMENT — PAIN SCALES - GENERAL
PAINLEVEL_OUTOF10: 10
PAINLEVEL_OUTOF10: 2
PAINLEVEL_OUTOF10: 2
PAINLEVEL_OUTOF10: 5
PAINLEVEL_OUTOF10: 6
PAINLEVEL_OUTOF10: 6

## 2025-03-06 ASSESSMENT — ENCOUNTER SYMPTOMS
ABDOMINAL PAIN: 0
SHORTNESS OF BREATH: 0

## 2025-03-06 NOTE — FLOWSHEET NOTE
03/06/25 0444   Treatment Team Notification   Reason for Communication Evaluate   Name of Team Member Notified Dr. Wright   Treatment Team Role Attending Provider   Method of Communication Secure Message   Response Waiting for response   Notification Time 0445       MD notified of rash under breasts/chest and itching.

## 2025-03-07 ENCOUNTER — CARE COORDINATION (OUTPATIENT)
Dept: CARE COORDINATION | Age: 75
End: 2025-03-07

## 2025-03-07 VITALS
TEMPERATURE: 97.7 F | OXYGEN SATURATION: 90 % | DIASTOLIC BLOOD PRESSURE: 61 MMHG | WEIGHT: 243 LBS | BODY MASS INDEX: 36.83 KG/M2 | HEIGHT: 68 IN | HEART RATE: 75 BPM | RESPIRATION RATE: 17 BRPM | SYSTOLIC BLOOD PRESSURE: 130 MMHG

## 2025-03-07 LAB
ABSOLUTE BANDS: 0.09 K/UL (ref 0–1)
ANION GAP SERPL CALCULATED.3IONS-SCNC: 6 MMOL/L (ref 9–16)
BANDS: 1 % (ref 0–10)
BASOPHILS # BLD: 0 K/UL (ref 0–0.2)
BASOPHILS NFR BLD: 0 % (ref 0–2)
BUN SERPL-MCNC: 7 MG/DL (ref 8–23)
CALCIUM SERPL-MCNC: 7.7 MG/DL (ref 8.6–10.4)
CHLORIDE SERPL-SCNC: 107 MMOL/L (ref 98–107)
CO2 SERPL-SCNC: 29 MMOL/L (ref 20–31)
CREAT SERPL-MCNC: 0.8 MG/DL (ref 0.7–1.2)
EOSINOPHIL # BLD: 0.44 K/UL (ref 0–0.4)
EOSINOPHILS RELATIVE PERCENT: 5 % (ref 0–4)
ERYTHROCYTE [DISTWIDTH] IN BLOOD BY AUTOMATED COUNT: 17.9 % (ref 11.5–14.9)
GFR, ESTIMATED: >90 ML/MIN/1.73M2
GLUCOSE BLD-MCNC: 124 MG/DL (ref 75–110)
GLUCOSE BLD-MCNC: 137 MG/DL (ref 75–110)
GLUCOSE SERPL-MCNC: 122 MG/DL (ref 74–99)
HCT VFR BLD AUTO: 27.8 % (ref 41–53)
HGB BLD-MCNC: 8.4 G/DL (ref 13.5–17.5)
INR PPP: 2.2
LYMPHOCYTES NFR BLD: 1.06 K/UL (ref 1–4.8)
LYMPHOCYTES RELATIVE PERCENT: 12 % (ref 24–44)
MCH RBC QN AUTO: 28.4 PG (ref 26–34)
MCHC RBC AUTO-ENTMCNC: 30.3 G/DL (ref 31–37)
MCV RBC AUTO: 93.4 FL (ref 80–100)
METAMYELOCYTES ABSOLUTE COUNT: 0.26 K/UL
METAMYELOCYTES: 3 %
MONOCYTES NFR BLD: 1.14 K/UL (ref 0.1–1.3)
MONOCYTES NFR BLD: 13 % (ref 1–7)
MORPHOLOGY: ABNORMAL
NEUTROPHILS NFR BLD: 66 % (ref 36–66)
NEUTS SEG NFR BLD: 5.81 K/UL (ref 1.3–9.1)
PLATELET # BLD AUTO: 386 K/UL (ref 150–450)
PMV BLD AUTO: 7.4 FL (ref 6–12)
POTASSIUM SERPL-SCNC: 4 MMOL/L (ref 3.7–5.3)
PROTHROMBIN TIME: 26.7 SEC (ref 11.8–14.6)
RBC # BLD AUTO: 2.98 M/UL (ref 4.5–5.9)
SODIUM SERPL-SCNC: 142 MMOL/L (ref 136–145)
WBC OTHER # BLD: 8.8 K/UL (ref 3.5–11)

## 2025-03-07 PROCEDURE — 85025 COMPLETE CBC W/AUTO DIFF WBC: CPT

## 2025-03-07 PROCEDURE — 97110 THERAPEUTIC EXERCISES: CPT

## 2025-03-07 PROCEDURE — 6360000002 HC RX W HCPCS: Performed by: STUDENT IN AN ORGANIZED HEALTH CARE EDUCATION/TRAINING PROGRAM

## 2025-03-07 PROCEDURE — 80048 BASIC METABOLIC PNL TOTAL CA: CPT

## 2025-03-07 PROCEDURE — 2580000003 HC RX 258: Performed by: STUDENT IN AN ORGANIZED HEALTH CARE EDUCATION/TRAINING PROGRAM

## 2025-03-07 PROCEDURE — 99239 HOSP IP/OBS DSCHRG MGMT >30: CPT | Performed by: STUDENT IN AN ORGANIZED HEALTH CARE EDUCATION/TRAINING PROGRAM

## 2025-03-07 PROCEDURE — 97530 THERAPEUTIC ACTIVITIES: CPT

## 2025-03-07 PROCEDURE — 6370000000 HC RX 637 (ALT 250 FOR IP): Performed by: STUDENT IN AN ORGANIZED HEALTH CARE EDUCATION/TRAINING PROGRAM

## 2025-03-07 PROCEDURE — 85610 PROTHROMBIN TIME: CPT

## 2025-03-07 PROCEDURE — 6370000000 HC RX 637 (ALT 250 FOR IP): Performed by: INTERNAL MEDICINE

## 2025-03-07 PROCEDURE — 82947 ASSAY GLUCOSE BLOOD QUANT: CPT

## 2025-03-07 PROCEDURE — 97116 GAIT TRAINING THERAPY: CPT

## 2025-03-07 PROCEDURE — 36415 COLL VENOUS BLD VENIPUNCTURE: CPT

## 2025-03-07 RX ORDER — OXYCODONE HYDROCHLORIDE 5 MG/1
5 TABLET ORAL EVERY 4 HOURS PRN
Qty: 120 TABLET | Refills: 0 | Status: SHIPPED | OUTPATIENT
Start: 2025-03-07 | End: 2025-04-06

## 2025-03-07 RX ORDER — TRIAMCINOLONE ACETONIDE 1 MG/G
CREAM TOPICAL 2 TIMES DAILY
Status: DISCONTINUED | OUTPATIENT
Start: 2025-03-07 | End: 2025-03-07 | Stop reason: HOSPADM

## 2025-03-07 RX ORDER — LIDOCAINE 4 G/G
1 PATCH TOPICAL DAILY
Qty: 30 PATCH | Refills: 3 | Status: SHIPPED | OUTPATIENT
Start: 2025-03-08

## 2025-03-07 RX ORDER — CLONAZEPAM 0.5 MG/1
0.5 TABLET ORAL 2 TIMES DAILY PRN
Qty: 60 TABLET | Refills: 0 | Status: SHIPPED | OUTPATIENT
Start: 2025-03-07 | End: 2025-04-06

## 2025-03-07 RX ORDER — TRIAMCINOLONE ACETONIDE 1 MG/G
CREAM TOPICAL
Qty: 45 G | Refills: 1 | Status: SHIPPED | OUTPATIENT
Start: 2025-03-07

## 2025-03-07 RX ADMIN — MEROPENEM 1000 MG: 1 INJECTION INTRAVENOUS at 00:17

## 2025-03-07 RX ADMIN — CLONAZEPAM 0.5 MG: 0.5 TABLET ORAL at 08:44

## 2025-03-07 RX ADMIN — TICAGRELOR 90 MG: 90 TABLET ORAL at 08:45

## 2025-03-07 RX ADMIN — PANTOPRAZOLE SODIUM 40 MG: 40 TABLET, DELAYED RELEASE ORAL at 05:31

## 2025-03-07 RX ADMIN — OXYCODONE HYDROCHLORIDE 7.5 MG: 5 TABLET ORAL at 11:09

## 2025-03-07 RX ADMIN — APIXABAN 5 MG: 5 TABLET, FILM COATED ORAL at 08:44

## 2025-03-07 RX ADMIN — MEROPENEM 1000 MG: 1 INJECTION INTRAVENOUS at 10:00

## 2025-03-07 RX ADMIN — FLUOXETINE HYDROCHLORIDE 20 MG: 20 CAPSULE ORAL at 08:44

## 2025-03-07 RX ADMIN — TRIAMCINOLONE ACETONIDE: 1 CREAM TOPICAL at 10:01

## 2025-03-07 RX ADMIN — FERROUS SULFATE TAB 325 MG (65 MG ELEMENTAL FE) 325 MG: 325 (65 FE) TAB at 08:45

## 2025-03-07 RX ADMIN — TAMSULOSIN HYDROCHLORIDE 0.4 MG: 0.4 CAPSULE ORAL at 08:44

## 2025-03-07 ASSESSMENT — ENCOUNTER SYMPTOMS
SHORTNESS OF BREATH: 0
ABDOMINAL PAIN: 0

## 2025-03-07 ASSESSMENT — PAIN DESCRIPTION - LOCATION: LOCATION: RIB CAGE

## 2025-03-07 ASSESSMENT — PAIN SCALES - GENERAL: PAINLEVEL_OUTOF10: 5

## 2025-03-07 NOTE — PLAN OF CARE
Problem: Chronic Conditions and Co-morbidities  Goal: Patient's chronic conditions and co-morbidity symptoms are monitored and maintained or improved  3/1/2025 1710 by Grace Ga RN  Outcome: Progressing     Problem: Pain  Goal: Verbalizes/displays adequate comfort level or baseline comfort level  3/1/2025 1710 by Grace Ga, RN  Outcome: Progressing     
  Problem: Chronic Conditions and Co-morbidities  Goal: Patient's chronic conditions and co-morbidity symptoms are monitored and maintained or improved  3/4/2025 0442 by Diane Herman RN  Outcome: Progressing     Problem: Discharge Planning  Goal: Discharge to home or other facility with appropriate resources  3/4/2025 0442 by Diane Herman RN  Outcome: Progressing     Problem: Pain  Goal: Verbalizes/displays adequate comfort level or baseline comfort level  3/4/2025 0442 by Diane Herman RN  Outcome: Progressing     Problem: Skin/Tissue Integrity  Goal: Skin integrity remains intact  Description: 1.  Monitor for areas of redness and/or skin breakdown  2.  Assess vascular access sites hourly  3.  Every 4-6 hours minimum:  Change oxygen saturation probe site  4.  Every 4-6 hours:  If on nasal continuous positive airway pressure, respiratory therapy assess nares and determine need for appliance change or resting period  3/4/2025 0442 by Diane Herman RN  Outcome: Progressing     Problem: ABCDS Injury Assessment  Goal: Absence of physical injury  3/4/2025 0442 by Diane Herman RN  Outcome: Progressing     Problem: Safety - Adult  Goal: Free from fall injury  3/4/2025 0442 by Diane Herman RN  Outcome: Progressing     Problem: Neurosensory - Adult  Goal: Achieves maximal functionality and self care  3/4/2025 0442 by Diane Herman RN  Outcome: Progressing     Problem: Skin/Tissue Integrity - Adult  Goal: Skin integrity remains intact  Description: 1.  Monitor for areas of redness and/or skin breakdown  2.  Assess vascular access sites hourly  3.  Every 4-6 hours minimum:  Change oxygen saturation probe site  4.  Every 4-6 hours:  If on nasal continuous positive airway pressure, respiratory therapy assess nares and determine need for appliance change or resting period  3/4/2025 0442 by Diane Herman RN  Outcome: Progressing     Problem: Musculoskeletal - Adult  Goal: Return mobility to safest level of function  3/4/2025 
  Problem: Chronic Conditions and Co-morbidities  Goal: Patient's chronic conditions and co-morbidity symptoms are monitored and maintained or improved  3/4/2025 1643 by Josefina Hopkins RN  Outcome: Progressing     Problem: Discharge Planning  Goal: Discharge to home or other facility with appropriate resources  3/4/2025 1643 by Josefina Hopkins RN  Outcome: Progressing     Problem: Pain  Goal: Verbalizes/displays adequate comfort level or baseline comfort level  3/4/2025 1643 by Josefina Hopkins RN  Outcome: Progressing     Problem: Skin/Tissue Integrity  Goal: Skin integrity remains intact  Description: 1.  Monitor for areas of redness and/or skin breakdown  2.  Assess vascular access sites hourly  3.  Every 4-6 hours minimum:  Change oxygen saturation probe site  4.  Every 4-6 hours:  If on nasal continuous positive airway pressure, respiratory therapy assess nares and determine need for appliance change or resting period  3/4/2025 1643 by Josefina Hopkins RN  Outcome: Progressing     Problem: ABCDS Injury Assessment  Goal: Absence of physical injury  3/4/2025 1643 by Josefina Hopkins RN  Outcome: Progressing     Problem: Safety - Adult  Goal: Free from fall injury  3/4/2025 1643 by Josefina Hopkins RN  Outcome: Progressing     Problem: Neurosensory - Adult  Goal: Achieves maximal functionality and self care  3/4/2025 1643 by Josefina Hopkins RN  Outcome: Progressing     Problem: Skin/Tissue Integrity - Adult  Goal: Skin integrity remains intact  Description: 1.  Monitor for areas of redness and/or skin breakdown  2.  Assess vascular access sites hourly  3.  Every 4-6 hours minimum:  Change oxygen saturation probe site  4.  Every 4-6 hours:  If on nasal continuous positive airway pressure, respiratory therapy assess nares and determine need for appliance change or resting period  3/4/2025 1643 by Josefina Hopkins RN  Outcome: Progressing     Problem: Skin/Tissue Integrity - Adult  Goal: Incisions, wounds, or drain sites 
  Problem: Chronic Conditions and Co-morbidities  Goal: Patient's chronic conditions and co-morbidity symptoms are monitored and maintained or improved  3/5/2025 0326 by Reyna Espinosa RN  Outcome: Progressing  Flowsheets (Taken 3/5/2025 0326)  Care Plan - Patient's Chronic Conditions and Co-Morbidity Symptoms are Monitored and Maintained or Improved:   Monitor and assess patient's chronic conditions and comorbid symptoms for stability, deterioration, or improvement   Collaborate with multidisciplinary team to address chronic and comorbid conditions and prevent exacerbation or deterioration     Problem: Discharge Planning  Goal: Discharge to home or other facility with appropriate resources  3/5/2025 0326 by Reyna Espinosa, RN  Outcome: Progressing  Flowsheets (Taken 3/5/2025 0326)  Discharge to home or other facility with appropriate resources:   Identify barriers to discharge with patient and caregiver   Arrange for needed discharge resources and transportation as appropriate   Refer to discharge planning if patient needs post-hospital services based on physician order or complex needs related to functional status, cognitive ability or social support system   Identify discharge learning needs (meds, wound care, etc)     Problem: Pain  Goal: Verbalizes/displays adequate comfort level or baseline comfort level  3/5/2025 0326 by Reyna Espinosa, RN  Outcome: Progressing  Flowsheets (Taken 3/5/2025 0326)  Verbalizes/displays adequate comfort level or baseline comfort level:   Encourage patient to monitor pain and request assistance   Assess pain using appropriate pain scale   Administer analgesics based on type and severity of pain and evaluate response   Implement non-pharmacological measures as appropriate and evaluate response   Consider cultural and social influences on pain and pain management     Problem: Skin/Tissue Integrity  Goal: Skin integrity remains intact  Description: 1.  Monitor for areas 
  Problem: Chronic Conditions and Co-morbidities  Goal: Patient's chronic conditions and co-morbidity symptoms are monitored and maintained or improved  3/6/2025 0320 by Reyna Espinosa RN  Outcome: Progressing  Flowsheets (Taken 3/6/2025 0320)  Care Plan - Patient's Chronic Conditions and Co-Morbidity Symptoms are Monitored and Maintained or Improved:   Monitor and assess patient's chronic conditions and comorbid symptoms for stability, deterioration, or improvement   Collaborate with multidisciplinary team to address chronic and comorbid conditions and prevent exacerbation or deterioration     Problem: Discharge Planning  Goal: Discharge to home or other facility with appropriate resources  3/6/2025 0320 by Reyna Espinosa, RN  Outcome: Progressing  Flowsheets (Taken 3/6/2025 0320)  Discharge to home or other facility with appropriate resources:   Identify barriers to discharge with patient and caregiver   Arrange for needed discharge resources and transportation as appropriate   Identify discharge learning needs (meds, wound care, etc)   Refer to discharge planning if patient needs post-hospital services based on physician order or complex needs related to functional status, cognitive ability or social support system  Note: Patient plans to go to [facility] upon discharge      Problem: Pain  Goal: Verbalizes/displays adequate comfort level or baseline comfort level  3/6/2025 0320 by Reyna Espinosa, RN  Outcome: Progressing  Flowsheets (Taken 3/6/2025 0320)  Verbalizes/displays adequate comfort level or baseline comfort level:   Encourage patient to monitor pain and request assistance   Assess pain using appropriate pain scale   Administer analgesics based on type and severity of pain and evaluate response   Implement non-pharmacological measures as appropriate and evaluate response   Consider cultural and social influences on pain and pain management   Notify Licensed Independent Practitioner if 
  Problem: Chronic Conditions and Co-morbidities  Goal: Patient's chronic conditions and co-morbidity symptoms are monitored and maintained or improved  Outcome: Progressing     Problem: Discharge Planning  Goal: Discharge to home or other facility with appropriate resources  Outcome: Progressing     Problem: Pain  Goal: Verbalizes/displays adequate comfort level or baseline comfort level  Outcome: Progressing     Problem: Skin/Tissue Integrity  Goal: Skin integrity remains intact  Description: 1.  Monitor for areas of redness and/or skin breakdown  2.  Assess vascular access sites hourly  3.  Every 4-6 hours minimum:  Change oxygen saturation probe site  4.  Every 4-6 hours:  If on nasal continuous positive airway pressure, respiratory therapy assess nares and determine need for appliance change or resting period  Outcome: Progressing     Problem: ABCDS Injury Assessment  Goal: Absence of physical injury  Outcome: Progressing     Problem: Safety - Adult  Goal: Free from fall injury  Outcome: Progressing     
  Problem: Discharge Planning  Goal: Discharge to home or other facility with appropriate resources  Outcome: Progressing     Problem: Safety - Adult  Goal: Free from fall injury  3/3/2025 0314 by Emily Esqueda RN  Outcome: Progressing     Problem: Pain  Goal: Verbalizes/displays adequate comfort level or baseline comfort level  3/3/2025 0314 by Emily Esqueda RN  Outcome: Progressing     Problem: Skin/Tissue Integrity - Adult  Goal: Skin integrity remains intact  Description: 1.  Monitor for areas of redness and/or skin breakdown  2.  Assess vascular access sites hourly  3.  Every 4-6 hours minimum:  Change oxygen saturation probe site  4.  Every 4-6 hours:  If on nasal continuous positive airway pressure, respiratory therapy assess nares and determine need for appliance change or resting period  Outcome: Progressing     Problem: Skin/Tissue Integrity - Adult  Goal: Incisions, wounds, or drain sites healing without S/S of infection  3/2/2025 1708 by Emily Esqueda RN  Outcome: Progressing     Problem: Musculoskeletal - Adult  Goal: Return mobility to safest level of function  3/3/2025 0314 by Emily Esqueda RN  Outcome: Progressing     Problem: Gastrointestinal - Adult  Goal: Minimal or absence of nausea and vomiting  Outcome: Progressing     Problem: Gastrointestinal - Adult  Goal: Maintains adequate nutritional intake  Outcome: Progressing     
  Problem: Discharge Planning  Goal: Discharge to home or other facility with appropriate resources  Outcome: Progressing     Problem: Safety - Adult  Goal: Free from fall injury  Outcome: Progressing     Problem: Pain  Goal: Verbalizes/displays adequate comfort level or baseline comfort level  3/2/2025 0336 by Emily Esqueda RN  Outcome: Progressing     Problem: Skin/Tissue Integrity  Goal: Skin integrity remains intact  Description: 1.  Monitor for areas of redness and/or skin breakdown  2.  Assess vascular access sites hourly  3.  Every 4-6 hours minimum:  Change oxygen saturation probe site  4.  Every 4-6 hours:  If on nasal continuous positive airway pressure, respiratory therapy assess nares and determine need for appliance change or resting period  Outcome: Progressing     Problem: Neurosensory - Adult  Goal: Achieves maximal functionality and self care  Outcome: Progressing     Problem: Skin/Tissue Integrity - Adult  Goal: Skin integrity remains intact  Description: 1.  Monitor for areas of redness and/or skin breakdown  2.  Assess vascular access sites hourly  3.  Every 4-6 hours minimum:  Change oxygen saturation probe site  4.  Every 4-6 hours:  If on nasal continuous positive airway pressure, respiratory therapy assess nares and determine need for appliance change or resting period  Outcome: Progressing     Problem: Skin/Tissue Integrity - Adult  Goal: Incisions, wounds, or drain sites healing without S/S of infection  Outcome: Progressing     Problem: Musculoskeletal - Adult  Goal: Return mobility to safest level of function  Outcome: Progressing     Problem: Gastrointestinal - Adult  Goal: Minimal or absence of nausea and vomiting  Outcome: Progressing     Problem: Gastrointestinal - Adult  Goal: Maintains adequate nutritional intake  Outcome: Progressing     
  Problem: Pain  Goal: Verbalizes/displays adequate comfort level or baseline comfort level  3/2/2025 1708 by Grace Ga RN  Outcome: Progressing  Flowsheets (Taken 3/2/2025 1708)  Verbalizes/displays adequate comfort level or baseline comfort level:   Administer analgesics based on type and severity of pain and evaluate response   Assess pain using appropriate pain scale     Problem: Safety - Adult  Goal: Free from fall injury  3/2/2025 1708 by Grace Ga, RN  Outcome: Progressing     Problem: Skin/Tissue Integrity - Adult  Goal: Incisions, wounds, or drain sites healing without S/S of infection  3/2/2025 1708 by Grace Ga, RN  Outcome: Progressing     Problem: Musculoskeletal - Adult  Goal: Return mobility to safest level of function  3/2/2025 1708 by Grace Ga, RN  Outcome: Progressing     
drain sites healing without S/S of infection  3/3/2025 1605 by Joanie Figueroa, RN  Outcome: Progressing     Problem: Musculoskeletal - Adult  Goal: Return mobility to safest level of function  3/3/2025 1605 by Joanie Figueroa, RN  Outcome: Progressing     Problem: Gastrointestinal - Adult  Goal: Minimal or absence of nausea and vomiting  3/3/2025 1605 by Joanie Figueroa, RN  Outcome: Progressing     Problem: Gastrointestinal - Adult  Goal: Maintains adequate nutritional intake  3/3/2025 1605 by Joanie Figueroa, RN  Outcome: Progressing     Problem: Nutrition Deficit:  Goal: Optimize nutritional status  3/3/2025 1605 by Joanie Figueroa, RN  Outcome: Progressing     
drain sites healing without S/S of infection  3/5/2025 1511 by Joanie Figueroa, RN  Outcome: Progressing     Problem: Musculoskeletal - Adult  Goal: Return mobility to safest level of function  3/5/2025 1511 by Joanie Figueroa, RN  Outcome: Progressing     Problem: Gastrointestinal - Adult  Goal: Minimal or absence of nausea and vomiting  3/5/2025 1511 by Joanie Figueroa, RN  Outcome: Progressing     Problem: Gastrointestinal - Adult  Goal: Maintains adequate nutritional intake  3/5/2025 1511 by Joanie Figueroa, RN  Outcome: Progressing     Problem: Nutrition Deficit:  Goal: Optimize nutritional status  3/5/2025 1511 by Joanie Figueroa, RN  Outcome: Progressing     
saturation probe site  4.  Every 4-6 hours:  If on nasal continuous positive airway pressure, respiratory therapy assess nares and determine need for appliance change or resting period  Outcome: Progressing  Flowsheets (Taken 3/7/2025 0437)  Skin Integrity Remains Intact: Monitor for areas of redness and/or skin breakdown     Problem: ABCDS Injury Assessment  Goal: Absence of physical injury  Outcome: Progressing     Problem: Skin/Tissue Integrity - Adult  Goal: Skin integrity remains intact  Description: 1.  Monitor for areas of redness and/or skin breakdown  2.  Assess vascular access sites hourly  3.  Every 4-6 hours minimum:  Change oxygen saturation probe site  4.  Every 4-6 hours:  If on nasal continuous positive airway pressure, respiratory therapy assess nares and determine need for appliance change or resting period  Outcome: Progressing  Flowsheets (Taken 3/7/2025 0437)  Skin Integrity Remains Intact: Monitor for areas of redness and/or skin breakdown     Problem: Musculoskeletal - Adult  Goal: Return mobility to safest level of function  Outcome: Progressing     Problem: Gastrointestinal - Adult  Goal: Minimal or absence of nausea and vomiting  Outcome: Progressing     Problem: Gastrointestinal - Adult  Goal: Maintains adequate nutritional intake  Outcome: Progressing     Problem: Nutrition Deficit:  Goal: Optimize nutritional status  Outcome: Progressing     
<-------- Click here to INCLUDE [facility]19 Discharge Instructions

## 2025-03-07 NOTE — PROGRESS NOTES
[facility] -   Infectious diseases evaluation  admission date 2/28/2025    reason for consultation:   Positive blood culture    Impression :     ESBL producing E. coli bacteremia likely urinary source  UTI  Recurrent pulmonary embolism  Ribs fractures  Recurrent UTIs, urine culture on 01/15/2024 grew ESBL producing E coli and Klebsiella pneumoniae  Recent tracheitis, E coli growth on respiratory culture 12/29/2024 treated  Recent bilateral lower lobe pneumonia  Acinetobacter, Proteus and Enterobacter on sputum culture from 12/8/2024  Cardiac arrest on 12/3/2024  s/p cardiac cath with PCI drug-eluting stent placement  Cardiogenic shock 12/2024  Required ECMO with intra-aortic balloon pump and vasopressors  Saddle PE s/p thrombectomy on 12/3/2024  Acute hypoxic respiratory failureStatus post trach and PEG placement 12/20/2024, trach decannulated 1/10/2025  Prostate cancer  Hyperlipidemia  CHF  Diabetes mellitus  Coronary artery disease  Recent acute renal failure, tunneled hemodialysis catheter was placed 12/19/24 subsequently removed 1/20/2025  History of seizure    HENCE:   IV meropenem course will be completed today  No objection for discharge from infectious disease point of view  Remove midline prior to discharge      Infection Control Recommendations   [place] Precautions  Contact Isolation       Antimicrobial Stewardship Recommendations   Simplification of therapy  Targeted therapy      History of Present Illness:   Initial history:  Lukasz Keller is a 74 y.o.-year-old male presented to hospital with worsening shortness of breath associated with fatigue and chest pain for 3 days.  Pain is substernal, pressure-like, worse with exertion.  The urine has been dark and cloudy with no burning or frequency, denied fever or chills, no other complaints.  Initial WBC 18.2  Urinalysis showed too numerous to count WBC, moderate leukocyte esterase.  CTA showed pulmonary 
          [facility] -   Infectious diseases evaluation  admission date 2/28/2025    reason for consultation:   Positive blood culture    Impression :     ESBL producing E. coli bacteremia likely urinary source  UTI  Recurrent pulmonary embolism  Ribs fractures  Recurrent UTIs, urine culture on 01/15/2024 grew ESBL producing E coli and Klebsiella pneumoniae  Recent tracheitis, E coli growth on respiratory culture 12/29/2024 treated  Recent bilateral lower lobe pneumonia  Acinetobacter, Proteus and Enterobacter on sputum culture from 12/8/2024  Cardiac arrest on 12/3/2024  s/p cardiac cath with PCI drug-eluting stent placement  Cardiogenic shock 12/2024  Required ECMO with intra-aortic balloon pump and vasopressors  Saddle PE s/p thrombectomy on 12/3/2024  Acute hypoxic respiratory failureStatus post trach and PEG placement 12/20/2024, trach decannulated 1/10/2025  Prostate cancer  Hyperlipidemia  CHF  Diabetes mellitus  Coronary artery disease  Recent acute renal failure, tunneled hemodialysis catheter was placed 12/19/24 subsequently removed 1/20/2025  History of seizure    HENCE:   IV meropenem through 3/7/2025  Follow CBC and renal function      Infection Control Recommendations   [place] Precautions  Contact Isolation       Antimicrobial Stewardship Recommendations   Simplification of therapy  Targeted therapy      History of Present Illness:   Initial history:  Lukasz Keller is a 74 y.o.-year-old male presented to hospital with worsening shortness of breath associated with fatigue and chest pain for 3 days.  Pain is substernal, pressure-like, worse with exertion.  The urine has been dark and cloudy with no burning or frequency, denied fever or chills, no other complaints.  Initial WBC 18.2  Urinalysis showed too numerous to count WBC, moderate leukocyte esterase.  CTA showed pulmonary embolism, multiple rib fractures  The patient had recent prolonged hospitalization at 
          [facility] -   Infectious diseases evaluation  admission date 2/28/2025    reason for consultation:   Positive blood culture    Impression :     ESBL producing E. coli bacteremia likely urinary source  UTI  Recurrent pulmonary embolism  Ribs fractures  Recurrent UTIs, urine culture on 01/15/2024 grew ESBL producing E coli and Klebsiella pneumoniae  Recent tracheitis, E coli growth on respiratory culture 12/29/2024 treated  Recent bilateral lower lobe pneumonia  Acinetobacter, Proteus and Enterobacter on sputum culture from 12/8/2024  Cardiac arrest on 12/3/2024  s/p cardiac cath with PCI drug-eluting stent placement  Cardiogenic shock 12/2024  Required ECMO with intra-aortic balloon pump and vasopressors  Saddle PE s/p thrombectomy on 12/3/2024  Acute hypoxic respiratory failureStatus post trach and PEG placement 12/20/2024, trach decannulated 1/10/2025  Prostate cancer  Hyperlipidemia  CHF  Diabetes mellitus  Coronary artery disease  Recent acute renal failure, tunneled hemodialysis catheter was placed 12/19/24 subsequently removed 1/20/2025  History of seizure    HENCE:   IV meropenem through 3/7/2025  Midline  Follow CBC and renal function      Infection Control Recommendations   [place] Precautions  Contact Isolation       Antimicrobial Stewardship Recommendations   Simplification of therapy  Targeted therapy      History of Present Illness:   Initial history:  Lukasz Keller is a 74 y.o.-year-old male presented to hospital with worsening shortness of breath associated with fatigue and chest pain for 3 days.  Pain is substernal, pressure-like, worse with exertion.  The urine has been dark and cloudy with no burning or frequency, denied fever or chills, no other complaints.  Initial WBC 18.2  Urinalysis showed too numerous to count WBC, moderate leukocyte esterase.  CTA showed pulmonary embolism, multiple rib fractures  The patient had recent prolonged 
    [facility] PULMONARY,CRITICAL CARE & SLEEP   Robertchalino Aguilar MD/Marco STEPHENS AGASANTANAP-BC, NP-C      Hodan STEPHENS NP-C    Josue STEPHENS NP-C                                         Pulmonary Progress Note    Patient - Lukasz Keller   Age - 74 y.o.   - 1950  MRN - [id number]  Acct # - [id number]  Date of Admission - 2025  2:13 PM    Consulting Service/Physician:       Primary Care Physician: Christal Jang MD    SUBJECTIVE:     Chief Complaint:   Chief Complaint   Patient presents with    Shortness of Breath    Fatigue     Subjective:    He states he has had some emesis today, states is mostly frothy and clear.  He denies any shortness of breath but is still on low-flow oxygen.  He does have some rib discomfort.  He is agreeable to acute rehab now.  He would like to go to [facility].  He has not had any fevers or chills    VITALS  /75   Pulse 68   Temp 98.2 °F (36.8 °C) (Oral)   Resp 16   Ht 1.727 m (5' 7.99\")   Wt 110.2 kg (243 lb)   SpO2 98%   BMI 36.96 kg/m²   Wt Readings from Last 3 Encounters:   25 110.2 kg (243 lb)   25 111.6 kg (246 lb)   24 125.4 kg (276 lb 7.3 oz)     I/O (24 Hours)    Intake/Output Summary (Last 24 hours) at 3/4/2025 1357  Last data filed at 3/4/2025 1333  Gross per 24 hour   Intake --   Output 1350 ml   Net -1350 ml     Ventilator:      Exam:   Physical Exam   Constitutional:  Oriented to person, place, and time.  Looks like he does not feel well today, but not in distress  HENT: Unremarkable, nasal cannula in place  Head: Normocephalic and atraumatic.   Eyes: EOM are normal. Pupils are equal, round, and reactive to light.   Neck: Neck supple.   Cardiovascular:  Regular rate and rhythm.  Normal heart tones.  No JVD.    Pulmonary/Chest: Lung sounds clear but diminished, no adventitious sounds, respirations easy at rest, currently on 2 L, pulse ox 94 to 95%  Abdominal: Soft. Bowel sounds 
    [facility] PULMONARY,CRITICAL CARE & SLEEP   Robert Aguilar MD/Marco STEPHENS AGACNP-BC, NP-C      Hodan STEPHENS NP-C    Josue STEPHENS NP-C                                         Pulmonary Progress Note    Patient - Lukasz Keller   Age - 74 y.o.   - 1950  MRN - [id number]  Acct # - [id number]  Date of Admission - 2025  2:13 PM    Consulting Service/Physician:       Primary Care Physician: Christal Jang MD    SUBJECTIVE:     Chief Complaint:   Chief Complaint   Patient presents with    Shortness of Breath    Fatigue     Subjective:    He states he feels like he is breathing well, he has no pulmonary complaints.  He states he has an occasional cough bring up some phlegm.  His biggest complaint is his rib discomfort.  I did review prior chest imaging/CT scan which showed healed rib fractures.  He has not had any fevers or chills.    He is awaiting approval to go to [facility].    VITALS  /68   Pulse 66   Temp 97.5 °F (36.4 °C) (Oral)   Resp 18   Ht 1.727 m (5' 7.99\")   Wt 110.2 kg (243 lb)   SpO2 95%   BMI 36.96 kg/m²   Wt Readings from Last 3 Encounters:   25 110.2 kg (243 lb)   25 111.6 kg (246 lb)   24 125.4 kg (276 lb 7.3 oz)     I/O (24 Hours)    Intake/Output Summary (Last 24 hours) at 3/5/2025 1435  Last data filed at 3/5/2025 1018  Gross per 24 hour   Intake 60 ml   Output 850 ml   Net -790 ml     Ventilator:      Exam:   Physical Exam   Constitutional:  Oriented to person, place, and time.  Looks better today, not in any distress  HENT: Unremarkable, nasal cannula in place  Head: Normocephalic and atraumatic.   Eyes: EOM are normal. Pupils are equal, round, and reactive to light.   Neck: Neck supple.   Cardiovascular:  Regular rate and rhythm.  Normal heart tones.  No JVD.    Pulmonary/Chest: Lung sounds clear but diminished, no adventitious sounds, respirations easy at rest, currently on 2 L, 
    [facility] PULMONARY,CRITICAL CARE & SLEEP   Robertchalino Aguilar MD/Marco STEPHENS AGACNP-BC, NP-C      Hodan STEPHENS NP-C    Josue STEPHENS NP-C                                         Pulmonary Progress Note    Patient - Lukasz Keller   Age - 74 y.o.   - 1950  MRN - [id number]  Acct # - [id number]  Date of Admission - 2025  2:13 PM    Consulting Service/Physician:       Primary Care Physician: Christal Jang MD    SUBJECTIVE:     Chief Complaint:   Chief Complaint   Patient presents with    Shortness of Breath    Fatigue     Subjective:    Lukasz was seen while lying in bed, he was having venous Dopplers completed while I was in the room.  He states his breathing is somewhat better.  He still has some coughing with discomfort to his chest when he does cough.  He states at rest he does not have the discomfort.  He has not had any fevers or chills.  He continues on oxygen, however it was off when I was in the room and his pulse ox was 91 to 94%.    VITALS  /68   Pulse 78   Temp 98.4 °F (36.9 °C) (Oral)   Resp 17   Ht 1.727 m (5' 7.99\")   Wt 110.2 kg (243 lb)   SpO2 96%   BMI 36.96 kg/m²   Wt Readings from Last 3 Encounters:   25 110.2 kg (243 lb)   25 111.6 kg (246 lb)   24 125.4 kg (276 lb 7.3 oz)     I/O (24 Hours)    Intake/Output Summary (Last 24 hours) at 3/3/2025 1206  Last data filed at 3/3/2025 0836  Gross per 24 hour   Intake 607.27 ml   Output 885 ml   Net -277.73 ml     Ventilator:      Exam:   Physical Exam   Constitutional:  Oriented to person, place, and time. Appears well-developed and well-nourished.  Looks debilitated, lying in bed, no apparent distress  HENT: Unremarkable, nasal cannula currently off  Head: Normocephalic and atraumatic.   Eyes: EOM are normal. Pupils are equal, round, and reactive to light.   Neck: Neck supple.   Cardiovascular:  Regular rate and rhythm.  Normal heart tones.  No JVD.  
  [facility]  Progress Note                   Date:   3/6/2025  Patient name: Lukasz Keller  Date of admission:  2/28/2025  2:13 PM  MRN:   [id number]  YOB: 1950  PCP: Christal Jang MD    Reason for Admission: Acute pulmonary embolism, unspecified pulmonary embolism type, unspecified whether acute cor pulmonale present (HCC) [I26.99]    Subjective:       Clinical Changes /Abnormalities: Seen & examined in room. No acute CV issues/concerns overnight. Labs, vitals, & tele reviewed. Remains SR 73 currently    Review of Systems    Medications:   Scheduled Meds:   apixaban  5 mg Oral BID    lidocaine  1 patch TransDERmal Daily    ferrous sulfate  325 mg Oral Daily with breakfast    sodium chloride flush  5-40 mL IntraVENous 2 times per day    lidocaine 1 % injection  50 mg IntraDERmal Once    iron sucrose  200 mg IntraVENous Q24H    clonazePAM  0.5 mg Oral BID    FLUoxetine  20 mg Oral Daily    meropenem  1,000 mg IntraVENous Q8H    sodium chloride flush  5-40 mL IntraVENous 2 times per day    atorvastatin  40 mg Oral Nightly    [Held by provider] insulin glargine  15 Units SubCUTAneous BID    insulin lispro  0-8 Units SubCUTAneous 4x Daily AC & HS    midodrine  10 mg Oral TID WC    pantoprazole  40 mg Oral QAM AC    tamsulosin  0.4 mg Oral Daily    ticagrelor  90 mg Oral BID     Continuous Infusions:   sodium chloride      sodium chloride      dextrose       CBC:   Recent Labs     03/04/25  0549   WBC 8.1   HGB 8.7*        BMP:    Recent Labs     03/04/25  0549      K 3.9      CO2 29   BUN 9   CREATININE 0.8   GLUCOSE 81     Hepatic:No results for input(s): \"AST\", \"ALT\", \"BILITOT\", \"ALKPHOS\" in the last 72 hours.    Invalid input(s): \"ALB\"  Troponin: No results for input(s): \"TROPHS\" in the last 72 hours.  BNP: No results for input(s): \"BNP\" in the last 72 hours.  Lipids: No results for input(s): \"CHOL\", \"HDL\" in the last 72 hours.    Invalid input(s): 
[facility]   Occupational Therapy Evaluation  Date: 3/3/25  Patient Name: Lukasz Keller       Room: /4-01  MRN: [id number]  Account: [id number]   : 1950  (74 y.o.) Gender: male     Discharge Recommendations:  Further Occupational Therapy is recommended upon facility discharge.    OT Equipment Recommendations  Other: TBD- has RW, shower chair, manual w/c, grab bars in bathroom    Referring Practitioner: Christal Jang MD  Diagnosis: Acute Pulmonary Embolism   Additional Pertinent Hx: The patient is a 74 y.o.  male who presented with weakness, hypotension, and chest pain with shortness of breath. Pt was recently hospitalized for a long time after cardiac arrest and saddle embolus, he has been on eliquis at home. IN the ER pt had elevated WBC and was hypotensive. CT PE was showed Acute right distal main pulmonary artery embolism with mild burden. No evidence for right heart strain.    Treatment Diagnosis: impaired selfcare status    Past Medical History:  has a past medical history of Abnormal EKG, Acute respiratory failure (HCC), Anesthesia complication, Anxiety, Cancer (HCC), Colon polyps, Hx of blood clots, Hyperlipidemia, Prostate CA (HCC), Pulmonary emboli (HCC), Pulmonary emboli (HCC), SOB (shortness of breath) on exertion, STEMI (ST elevation myocardial infarction) (HCC), Wears partial dentures, and Wellness examination.    Past Surgical History:   has a past surgical history that includes knee surgery (Right); Foot Tendon Surgery (Right); Wrist surgery (Left); Prostate Biopsy; back surgery; Colonoscopy; Prostatectomy (2020); Prostatectomy (N/A, 2020); Tonsillectomy; hemicolectomy (2021); lipoma resection; Cervical spine surgery; Colonoscopy (N/A, 2022); joint replacement (Right, 2022); joint replacement (Left, 2023); other surgical history (2021); Breast surgery (Left, 10/16/2023); Arm Surgery (Right, 10/16/2023); IR BIOPSY 
[facility]   OCCUPATIONAL THERAPY MISSED TREATMENT NOTE   INPATIENT   Date: 3/2/25  Patient Name: Lukasz Keller       Room:   MRN: [id number]   Account #: [id number]    : 1950  (74 y.o.)  Gender: male                 REASON FOR MISSED TREATMENT:  3/2/25    -    unavailable, MD in room speaking with patient and family. OT will check back in PM as time permits.      1118         Electronically signed by AUSTIN Rouse on 3/2/25 at 11:21 AM EST    
[facility]   OCCUPATIONAL THERAPY MISSED TREATMENT NOTE   INPATIENT   Date: 3/1/25  Patient Name: Lukasz Keller       Room:   MRN: [id number]   Account #: [id number]    : 1950  (74 y.o.)  Gender: male                 REASON FOR MISSED TREATMENT:  3/1/25    -    Refusal by Patient - pt states he is not feeling well. Attempted to educate pt on importance of participation with occupational therapy, however pt states he is very fatigued and not feeling well. This writer will check back in PM or tomorrow as time permits.    [id number]         Electronically signed by AUSTIN Rouse on 3/1/25 at 10:49 AM EST    
[facility]  Family Medicine        Progress Note      Date:   3/2/2025  Patient name:  Lukasz Keller  Date of admission:  2/28/2025  2:13 PM  MRN:   [id number]  YOB: 1950        Brief HPI    Pulmonary embolism, treatment failure with eliquis, sepsis 2/2 UTI    ASSESSMENT/PLAN     Hospital Problems             Last Modified POA    * (Principal) Acute pulmonary embolism (HCC) 3/2/2025 Yes    Anxiety disorder, unspecified 3/2/2025 Yes    Shortness of breath 3/2/2025 Yes    Chronic heart failure with preserved ejection fraction (HCC) 3/2/2025 Yes    Sepsis secondary to UTI (HCC) 3/2/2025 Yes    UTI due to extended-spectrum beta lactamase (ESBL) producing Escherichia coli 3/2/2025 Yes         Continue meropenem  Bridge to coumadin with lovenox, consult hematology  Continue prozac, increase to 20mg tomorrow. Clonazepam scheduled  Lantus 15 BID with sliding scale  Echo & DVT scan pending  Consult to PMR for possible ARU      DVT:Lovenox  Code Status: Full Code   ADULT ORAL NUTRITION SUPPLEMENT; Lunch; Frozen Oral Supplement  ADULT ORAL NUTRITION SUPPLEMENT; Dinner; Other Oral Supplement; 2 ICE CREAM MIXED WITH 2 ounce ORANGE JUICE  ADULT DIET; Regular         SUBJECTIVE:     Patient was seen and examined at bedside. Pt is feeling very weak but was able to ambulate to the bathroom. No acute events overnight.  Notes, labs & imaging reviewed. Case was discussed with nursing staff.       Review of Systems   Constitutional:  Negative for fever.   Respiratory:  Negative for shortness of breath.    Cardiovascular:  Negative for chest pain.   Gastrointestinal:  Negative for abdominal pain.   Genitourinary:  Negative for difficulty urinating.   Neurological:  Positive for weakness. Negative for headaches.         OBJECTIVE:     /62   Pulse 71   Temp 97.3 °F (36.3 °C)   Resp 16   Ht 1.727 m (5' 7.99\")   Wt 110.2 kg (243 lb)   SpO2 (!) 88%   BMI 36.96 kg/m²      Physical 
Notified Dr astudillo of positive blood cultures.   
Nurse attempted to call report to rehab [facility], nurse unavailable, left name and number for them to call back.   
[facility]  Family Medicine        Progress Note      Date:   3/5/2025  Patient name:  Lukasz Keller  Date of admission:  2/28/2025  2:13 PM  MRN:   [id number]  YOB: 1950        Brief HPI    Pulmonary embolism, treatment failure with eliquis, sepsis 2/2 UTI    ASSESSMENT/PLAN     Hospital Problems             Last Modified POA    * (Principal) Pulmonary embolism on right (HCC) 3/3/2025 Yes    Anxiety disorder, unspecified 3/2/2025 Yes    Shortness of breath 3/2/2025 Yes    Chronic heart failure with preserved ejection fraction (HCC) 3/2/2025 Yes    Sepsis secondary to UTI (HCC) 3/2/2025 Yes    UTI due to extended-spectrum beta lactamase (ESBL) producing Escherichia coli 3/2/2025 Yes    Acute pulmonary embolism (HCC) 3/4/2025 Yes         Continue meropenem through 3/7   stop coumadin and transition pt back to eliquis  Continue prozac 20mg, Clonazepam BID  Hold Lantus 15 BID, continue sliding scale  continue Venofer 200mg daily for 5 days, hematology on board  Reduce oxycodone to 7.5, increase frequency to q4h,lidocaine patch for chest wall pain  Accepted at ARU, pending pre-cert      DVT:Lovenox  Code Status: Full Code   ADULT ORAL NUTRITION SUPPLEMENT; Lunch; Frozen Oral Supplement  ADULT ORAL NUTRITION SUPPLEMENT; Dinner; Other Oral Supplement; 2 ICE CREAM MIXED WITH 2 ounce ORANGE JUICE  ADULT DIET; Regular         SUBJECTIVE:     Patient was seen and examined at bedside. Pt still has chest wall pain from broken ribs during last admission. Pt is comfortable and denies dyspnea. No acute events overnight.  Notes, labs & imaging reviewed. Case was discussed with nursing staff.       Review of Systems   Constitutional:  Negative for fever.   Respiratory:  Negative for shortness of breath.    Cardiovascular:  Negative for chest pain.   Gastrointestinal:  Negative for abdominal pain.   Genitourinary:  Negative for difficulty urinating.   Musculoskeletal:         Chest wall pain 
Pharmacy Medication History Note      List of current medications patient is taking is incomplete.    Source of information: [employer], Care Everywhere, [facility], [facility] ([phone])    Patient and patient's family don't know what patient is taking. Patient was discharged from facility on 2/19/25. Patient switched to [facility] after that. Unable to call [facility] because they are closed. Please call tomorrow (3/1/25) to confirm home medication list.    Changes made to medication list:  Medications removed (include reason, ex. therapy complete or physician discontinued, noncompliance):  Lansoprazole 30 mg - therapy complete  Oxycodone ER 10 mg 1 tablet twice daily - wrong formulation of medication in patient's chart    Medications flagged for provider review:  Amiodarone 200 mg - filled on 2/13/25 for 1 tablet daily, but prescription from 2/6/25 is for 1 tablet twice daily  Ferrous sulfate 325 mg EC - unable to confirm if taking  Insulin glargine 20 units twice daily - unable to confirm dosing  Insulin lispro 0-16 units 4 times a day - unable to confirm dosing and specific sliding scale patient is on  Ipratropium-albuterol 0.5 mg-2.5 mg/3mL nebulizer solution - unable to confirm if taking and correct directions  Keppra 500 mg written for liquid (take 5 mL of 100 mg/mL liquid formulation as needed after dialysis on dialysis days), but more recently filled 500 mg tablets with quantity #60 for 30 day supply - unable to confirm dosing and formulation  Quetiapine 25 mg 1 tablet twice daily - unable to confirm if taking    Medications added/doses adjusted:  Changed trazodone 50 mg 1 tablet at night to 100 mg 1 tablet at night  Changed clonazepam 0.5 mg 1 tablet daily to 1 tablet twice daily as needed  Added oxycodone IR 10 mg 1 tablet every 8 hours as needed for pain    Other notes (ex. Recent course of antibiotics, Coumadin dosing):  Per [facility], patient received oxycodone IR 10 mg (quantity #30 for 10 
Pharmacy Note  Warfarin Consult    Lukasz Keller is a 74 y.o. male for whom pharmacy has been consulted to manage warfarin therapy.     Consulting Physician: Dr. Sandhu  Reason for Admission: pulmonary embolsim    Warfarin dose prior to admission: New start, previously on Eliquis  Warfarin indication: Recurrent PE  Target INR range: 2.5 - 3.5     Past Medical History:   Diagnosis Date    Abnormal EKG     Acute respiratory failure (HCC) 03/17/2020    Anesthesia complication 2005    woke up during neck surgery    Anxiety     Dr. Marlow    Cancer ([facility])     Prostate    Colon polyps     Hx of blood clots 06/28/2021    Saddle PE- [facility]    Hyperlipidemia     Dr. Hoffman    Prostate CA ([facility])     Pulmonary emboli ([facility]) 06/28/2021    Per CTA chest, 6-28-21-\" Large saddle pulmonary embolism with extensive thrombus load and severe right heart strain.\"    Pulmonary emboli (HCC) 12/03/2024    SOB (shortness of breath) on exertion     STEMI (ST elevation myocardial infarction) ([facility])     Wears partial dentures     upper & lower    Wellness examination     Dr. Hoffman- PCP                Recent Labs     03/01/25  1156   INR 2.1     Recent Labs     02/28/25  1418 03/01/25  0538   HGB 9.8* 8.2*   HCT 31.5* 25.9*    293       Current warfarin drug-drug interactions: ticagrelor      Date             INR        Dose   3/1/2025          2.1     2.5 mg    Notes:  Due to patients age and elevated INR will give 2.5 mg today. Plan to continue bridging until patient is therapeutic at INR of 2.5 of greater.     Daily PT/INR while inpatient.   Thank you for the consult.  Will continue to follow.     Dolores Barger, PharmD  PGY1 Pharmacy Resident    3/1/2025  12:58 PM   
Pharmacy Note  Warfarin Consult follow-up      Recent Labs     02/28/25  1418 03/01/25  1156 03/02/25  0535   INR 2.2 2.1 1.8     Recent Labs     02/28/25  1418 03/01/25  0538 03/02/25  0535   HGB 9.8* 8.2* 8.2*   HCT 31.5* 25.9* 26.3*    293 234       Significant Drug-Drug Interactions:  New warfarin drug-drug interactions: none  Discontinued drug-drug interactions: none  Current warfarin drug-drug interactions: ticagrelor       Date             INR        Dose given previous day  Dose scheduled for today  3/2/2025            1.8       2.5 mg                               5 mg      Notes:                   Since patient INR decreasing will give 5 mg today. Continue bridging patient with enoxaparin until INR of 2.5 or greater.   Daily PT/INR while inpatient.     Dolores Barger, PharmD  PGY1 Pharmacy Resident    3/2/2025  11:16 AM      
Pharmacy Note  Warfarin Consult follow-up      Recent Labs     03/02/25  0535 03/03/25  0622 03/03/25  0730   INR 1.8 1.7 ADDED ON     Recent Labs     03/01/25  0538 03/02/25  0535 03/03/25  0622   HGB 8.2* 8.2* 8.7*   HCT 25.9* 26.3* 26.5*    234 266       Significant Drug-Drug Interactions:  New warfarin drug-drug interactions: none  Discontinued drug-drug interactions: none  Current warfarin drug-drug interactions: merrem, ticagrelor       Date             INR                      Dose given previous day  Dose scheduled for today  3/3/2025            1.7 (goal 2.5-3.5)                 5 mg    5  mg        Notes:                     INR subtherapeutic, will continue with 5 mg dose and continue Lovenox bridging continues until INR> 2.5.     Daily PT/INR while inpatient.     Rupa StokesD, BCPS 3/3/2025 9:31 AM    
Pharmacy Note  Warfarin Consult follow-up      Recent Labs     03/03/25  0622 03/03/25  0730 03/04/25  0549   INR 1.7 ADDED ON 2.9     Recent Labs     03/02/25  0535 03/03/25  0622 03/04/25  0549   HGB 8.2* 8.7* 8.7*   HCT 26.3* 26.5* 26.0*    266 292       Significant Drug-Drug Interactions:  New warfarin drug-drug interactions: none  Discontinued drug-drug interactions: none  Current warfarin drug-drug interactions: merrem, ticagrelor, Enoxaparin      Date             INR        Dose given previous day  Dose scheduled for today  3/4/2025            2.9          5 mg         1 mg        Notes:       INR 2.9 therapeutic. Given recent PE  - would recommend using Lovenox bridge x 1 more day for INR >2.5.     Given sharp rise in INR will decrease dose to 1 mg dose for today. Pt will likely need lower doses of coumadin based on the response to initial higher dosages.     Daily PT/INR while inpatient.   Rupa StokesD, BCPS 3/4/2025 8:53 AM                  
Physical Medicine & Rehabilitation  Progress Note    Chief complaint: Debility status post sepsis    Subjective:      Still notes some chest discomfort.    ROS:  Denies fevers, chills, sweats.  No chest pain, palpitations, lightheadedness.  Denies coughing, wheezing or shortness of breath.  Denies abdominal pain, nausea, diarrhea or constipation.  No new areas of joint pain.  Denies new areas of numbness or weakness.  Denies new anxiety or depression issues.  No new skin problems.    Rehabilitation:     PT:    Bed mobility  Rolling to Left: Minimal assistance  Rolling to Right: Partial/Moderate assistance  Supine to Sit: Minimal assistance  Sit to Supine: Minimal assistance (assist for LE progression onto bed.)  Scooting: Stand by assistance  Bed Mobility Comments: HOB elevated with use of bedrail, increased time to complete, limited movements due to chest pain with movement from recent hx of cardiac arrest         Transfers  Sit to Stand: Minimal Assistance, 2 Person Assistance (Pt MIN A x 2 initially, progressed to CGA c increased reps (3 reps))  Stand to Sit: Contact guard assistance  Comment: Cues for safe hand placement, cues for body positioning.         Ambulation  Surface: Level tile  Device: Rolling Walker  Other Apparatus: O2 (2 L)  Assistance: Minimal assistance, 2 Person assistance  Quality of Gait: waddle gait, minimal knee flexion and dorsiflexion, forward flexed hip posture.  Gait Deviations: Decreased step length, Slow Karla, Increased BRIDGER  Distance: 6-7 steps forward/toward HOB  Comments: Pt is self limiting, limited by PureWick patient uncomfortable c  pausing/disconnecting for a few minutes.       OT:  Feeding: Setup, Based on clinical judgement  Grooming: Stand by assistance, Based on clinical judgement  UE Bathing: Contact guard assistance  UE Bathing Skilled Clinical Factors: patient completing while seated EOB with increased time for washing underarms due to increased chest pain with UE 
Physical Therapy        Physical Therapy Cancel Note      DATE: 3/4/2025    NAME: Lukasz Keller  MRN: [id number]   : 1950      Patient not seen this date for Physical Therapy due to:    Patient Declined.  Writer attempted in AM and PM.        Electronically signed by Lesa Gill PTA on 3/4/2025 at 4:55 PM    
RN reached out to Dr. Jang regarding patient blood glucose. Ordered to hold long acting insulin.   
[facility]   INPATIENT OCCUPATIONAL THERAPY  PROGRESS NOTE  Date: 3/6/2025  Patient Name: Lukasz Keller       Room:   MRN: [id number]    : 1950  (74 y.o.)  Gender: male   Referring Practitioner: Christal Jang MD  Diagnosis: Acute Pulmonary Embolism      Discharge Recommendations:  Further Occupational Therapy is recommended upon facility discharge.    OT Equipment Recommendations  Other: TBD- has RW, shower chair, manual w/c, grab bars in bathroom; would benefit from reacher/sock aid for dressing    Restrictions/Precautions  Restrictions/Precautions  Restrictions/Precautions: Fall Risk;General Precautions (PEG)  Activity Level: Up as Tolerated;Up with Assist  Required Braces or Orthoses?: No  Implants Present? : Metal implants (bilat TKR)  Position Activity Restriction  Other Position/Activity Restrictions: hx cardiac arrest 2024, peg tube    O2 Device: Nasal cannula  Comment: 1L O2    Subjective  Subjective  Subjective: \"my chest hurts\"  Pain  Pre-Pain: 5  Pain Descriptor: Aching;Sore (chest)  Pain Interventions: Repositioning  Comments: pt states he is willing to work with therapy but \"very sore\"    Objective  Orientation  Overall Orientation Status: Within Functional Limits  Orientation Level: Oriented X4  Cognition  Overall Cognitive Status: WFL  Patient affect:: Normal    Activities of Daily Living  Grooming: Setup  Grooming Skilled Clinical Factors: setup to wash face, seated EOB    Balance  Balance  Sitting Balance: Stand by assistance (seated EOB for 10 min)  Standing Balance: Contact guard assistance  Standing Balance  Time: 2-3 min x3  Activity: functional transfers/standing and 2-3 steps in various directions-limited due to lines  Comment: BUE suppport on RW, no LOB noted    Transfers/Mobility  Bed mobility  Rolling to Left: Contact guard assistance  Rolling to Right: Contact guard assistance  Supine to Sit: Contact guard assistance  Sit to Supine: 
Report called to rehab nwo.   
Sherri RN notified unable to place midline in IR today due to other urgent procedures, writer placed consult for VAT in EPIC.  
[facility]  Family Medicine        Progress Note      Date:   3/7/2025  Patient name:  Lukasz Keller  Date of admission:  2/28/2025  2:13 PM  MRN:   [id number]  YOB: 1950        Brief HPI    Pulmonary embolism, treatment failure with eliquis, sepsis 2/2 UTI    ASSESSMENT/PLAN     Hospital Problems             Last Modified POA    * (Principal) Pulmonary embolism on right (HCC) 3/3/2025 Yes    Anxiety disorder, unspecified 3/2/2025 Yes    Shortness of breath 3/2/2025 Yes    Chronic heart failure with preserved ejection fraction (HCC) 3/2/2025 Yes    Sepsis secondary to UTI ([facility]) 3/2/2025 Yes    UTI due to extended-spectrum beta lactamase (ESBL) producing Escherichia coli 3/2/2025 Yes    Acute pulmonary embolism (HCC) 3/4/2025 Yes         Continue meropenem  Continue prozac 20mg, Clonazepam BID  Hold Lantus 15 BID, continue sliding scale  continue Venofer 200mg daily for 5 days, hematology on board  oxycodone to 7.5, increase frequency to q4h,lidocaine patch for chest wall pain  Accepted at [facility], insurance approved pt      DVT:Lovenox  Code Status: Full Code   ADULT ORAL NUTRITION SUPPLEMENT; Lunch; Frozen Oral Supplement  ADULT ORAL NUTRITION SUPPLEMENT; Dinner; Other Oral Supplement; 2 ICE CREAM MIXED WITH 2 ounce ORANGE JUICE  ADULT DIET; Regular         SUBJECTIVE:     Patient was seen and examined at bedside. Pt still has chest wall pain from broken ribs during last admission. Pt is comfortable and denies dyspnea. No acute events overnight.  Notes, labs & imaging reviewed. Case was discussed with nursing staff.       Review of Systems   Constitutional:  Negative for fever.   Respiratory:  Negative for shortness of breath.    Cardiovascular:  Negative for chest pain.   Gastrointestinal:  Negative for abdominal pain.   Genitourinary:  Negative for difficulty urinating.   Musculoskeletal:         Chest wall pain   Neurological:  Positive for weakness. Negative for headaches. 
[facility]   OCCUPATIONAL THERAPY MISSED TREATMENT NOTE   INPATIENT   Date: 3/4/25  Patient Name: Lukasz Keller       Room:   MRN: [id number]   Account #: [id number]    : 1950  (74 y.o.)  Gender: male   Referring Practitioner: Christal Jang MD  Diagnosis: Acute Pulmonary Embolism             REASON FOR MISSED TREATMENT:  OT treatment deferred. Per ARLET Mcfadden, pt communicated that he has been \"up since 4am\" and is too fatigued to participate in therapy sessions on this date. OT will follow and reattempt as able.    -    1140        Electronically signed by AUSTIN Motta on 3/4/25 at 4:36 PM EST   
Writer agrees with Cindy Butcher RN charting  
     BMI Categories: Obese Class 2 (BMI 35.0 -39.9)    Estimated Daily Nutrient Needs:  Energy Requirements Based On: Formula  Weight Used for Energy Requirements:  (Docmented/estimated wt on admission)  Energy (kcal/day): 8925-9363 based on Roosevelt-St. Jeor with 1.1-1.2 factor     Protein (g/day): 110-132 based on 1-1.2 gm per kg  Method Used for Fluid Requirements: 1 ml/kcal  Fluid (ml/day): or per physician    Nutrition Diagnosis:   Inadequate oral intake related to altered taste perception (food preferences, illness) as evidenced by poor intake prior to admission    Nutrition Interventions:   Food and/or Nutrient Delivery: Continue Current Diet, Continue Oral Nutrition Supplement  Nutrition Education/Counseling: No recommendation at this time  Coordination of Nutrition Care: Continue to monitor while inpatient       Goals:  Goals: Meet at least 75% of estimated needs  Type of Goal: Continue current goal  Previous Goal Met: Goal(s) Achieved    Nutrition Monitoring and Evaluation:   Behavioral-Environmental Outcomes: None Identified  Food/Nutrient Intake Outcomes: Food and Nutrient Intake, Supplement Intake  Physical Signs/Symptoms Outcomes: Biochemical Data, GI Status, Fluid Status or Edema, Skin, Weight    Discharge Planning:    Continue current diet     Bianca Portillo, MS, LD, RD   Contact: [phone]     
(Bilateral, 2024); Cardiac procedure (N/A, 2024); Cardiac procedure (N/A, 2024); Cardiac procedure (N/A, 2024); invasive vascular (N/A, 2024); Cardiac procedure (N/A, 2024); Cardiac procedure (N/A, 2024); IR TUNNELED CVC PLACE WO SQ PORT/PUMP > 5 YEARS (2024); Upper gastrointestinal endoscopy (N/A, 2024); tracheostomy (N/A, 2024); and IR TUNNELED CVC PLACE WO SQ PORT/PUMP > 5 YEARS (2024).    Restrictions  Restrictions/Precautions  Restrictions/Precautions: Fall Risk, General Precautions (PEG)  Activity Level: Up as Tolerated, Up with Assist  Required Braces or Orthoses?: No  Implants Present? : Metal implants (bilat TKR)  Position Activity Restriction  Other Position/Activity Restrictions: hx cardiac arrest 2024, peg tube     Subjective  Subjective  Subjective: Pt resting in bed upon entering room, agreeable to therapy   General  General Comments: RN Marta approved therapy     Pain  Pre-Pain: 5  Post-Pain: 6     Objective  Orientation  Overall Orientation Status: Within Functional Limits          Vitals  SpO2: 90 %  O2 Device: Nasal cannula    Transfers  Transfers  Sit to Stand: Contact guard assistance  Stand to Sit: Contact guard assistance     Mobility      Ambulation  Surface: Level tile  Device: Rolling Walker  Other Apparatus: O2  Assistance: Stand by assistance  Quality of Gait: waddle gait, minimal knee flexion and dorsiflexion, forward flexed hip posture.  Gait Deviations: Decreased step length, Slow Karla, Increased BRIDGER  Distance: 6-7 steps forward/toward HOB  Comments: Pt is self limiting, limited by PureWick patient uncomfortable c  pausing/disconnecting for a few minutes.          Stairs  Stairs/Curb  Stairs?: No    Bed Mobility  Bed mobility  Supine to Sit: Contact guard assistance  Sit to Supine: Partial/Moderate assistance  Scooting: Contact guard assistance  Bed Mobility Comments: max time req for transitions, cuing for tech to  
SUPPLEMENT; Lunch; Frozen Oral Supplement  ADULT ORAL NUTRITION SUPPLEMENT; Dinner; Other Oral Supplement; 2 ICE CREAM MIXED WITH 2 ounce ORANGE JUICE    Anthropometric Measures:  Height: 172.7 cm (5' 7.99\")  Ideal Body Weight (IBW): 154 lbs (70 kg)       Current Body Weight: 110.2 kg (242 lb 15.2 oz), 157.8 % IBW. Weight Source:  (estimated)  Current BMI (kg/m2): 36.9  Usual Body Weight: 132.2 kg (291 lb 7.2 oz) (6/14/24 ( Office); pt states usual wt of 243lb - this likely from several yrs ago, with wt gain noted per chart review - not all appears to be previous gain from fluid.)     % Weight Change (Calculated): -16.6                    BMI Categories: Obese Class 2 (BMI 35.0 -39.9)    Estimated Daily Nutrient Needs:  Energy Requirements Based On: Formula  Weight Used for Energy Requirements:  (Docmented/estimated wt on admission)  Energy (kcal/day): 4041-2016 based on Woodward-St. Jeor with 1.1-1.2 factor     Protein (g/day): 110-132 based on 1-1.2 gm per kg     Fluid (ml/day): per physician    Nutrition Diagnosis:   Inadequate oral intake related to altered taste perception (food preferences, illness) as evidenced by poor intake prior to admission    Nutrition Interventions:   Food and/or Nutrient Delivery: Continue Current Diet, Start Oral Nutrition Supplement     Coordination of Nutrition Care: Continue to monitor while inpatient       Goals:  Goals: Meet at least 75% of estimated needs  Type of Goal: New goal       Nutrition Monitoring and Evaluation:      Food/Nutrient Intake Outcomes: Food and Nutrient Intake, Supplement Intake  Physical Signs/Symptoms Outcomes: Biochemical Data, GI Status, Fluid Status or Edema, Skin, Weight    Discharge Planning:    Too soon to determine     Nella Kenny, JANENE, LD  Contact: [phone]      
          Christal Jang MD   3/6/2025 5:19 PM       
  PT/INR:    Lab Results   Component Value Date/Time    PROTIME 33.4 03/04/2025 05:49 AM    INR 2.9 03/04/2025 05:49 AM     PTT:    Lab Results   Component Value Date/Time    APTT ADDED ON 03/03/2025 07:30 AM           Christal Jang MD   3/4/2025 11:40 PM       
procedure (N/A, 12/4/2024); Cardiac procedure (N/A, 12/5/2024); Cardiac procedure (N/A, 12/9/2024); invasive vascular (N/A, 12/9/2024); Cardiac procedure (N/A, 12/9/2024); Cardiac procedure (N/A, 12/9/2024); IR TUNNELED CVC PLACE WO SQ PORT/PUMP > 5 YEARS (12/19/2024); Upper gastrointestinal endoscopy (N/A, 12/20/2024); tracheostomy (N/A, 12/20/2024); and IR TUNNELED CVC PLACE WO SQ PORT/PUMP > 5 YEARS (12/27/2024).     Medications:    Prior to Admission medications    Medication Sig Start Date End Date Taking? Authorizing Provider   meropenem (MERREM) infusion Infuse 1,000 mg intravenously in the morning and 1,000 mg at noon and 1,000 mg in the evening. Do all this for 4 days. Compound per protocol.. 3/3/25 3/7/25 Yes Charlie Christiansen MD   oxyCODONE HCl (OXY-IR) 10 MG immediate release tablet Take 1 tablet by mouth every 8 hours as needed for Pain.   Yes Ioana Lopez MD   blood glucose monitor strips Test 3 times a day & as needed for symptoms of irregular blood glucose. Dispense sufficient amount for indicated testing frequency plus additional to accommodate PRN testing needs. 2/19/25  Yes Christal Jang MD   clonazePAM (KLONOPIN) 0.5 MG tablet Take 1 tablet by mouth 2 times daily as needed.   Yes Ioana Lopez MD   fluticasone (FLONASE) 50 MCG/ACT nasal spray 1 spray by Each Nostril route daily as needed for Rhinitis   Yes Ioana Lopez MD   ondansetron (ZOFRAN) 4 MG tablet Take 1 tablet by mouth every 8 hours as needed for Nausea or Vomiting   Yes Ioana Lopez MD   pantoprazole (PROTONIX) 40 MG tablet Take 1 tablet by mouth every morning (before breakfast)   Yes Ioana Lopez MD   tamsulosin (FLOMAX) 0.4 MG capsule Take 1 capsule by mouth daily   Yes Ioana Lopez MD   apixaban (ELIQUIS) 5 MG TABS tablet Take 2 tablets by mouth 2 times daily for 6 days, THEN 1 tablet 2 times daily. 12/22/24 3/28/25 Yes Anand Pitmtan MD   levETIRAcetam 
surgeries, pt needs extra assist with upright posture and keeping walker closer, prior to admission pt was not using any AD for ambulation, pt using RW 25ft, SOB with exertion and increased HR 1112  Gait Deviations: Decreased step length, Decreased step height, Decreased head and trunk rotation, Deviated path, Shuffles  Distance: 25ft  Comments: Tactile and manual assist for posture and positioning inside RW. 2nd person cueing pt for safety.          Stairs  Stairs/Curb  Stairs?: No    Bed Mobility  Bed mobility  Rolling to Left: Partial/Moderate assistance  Rolling to Right: Partial/Moderate assistance  Supine to Sit: Partial/Moderate assistance  Sit to Supine:  (Pt left in chair at end of session)  Scootin Person assistance, Substantial/Maximal assistance (Mod Ax2 assisting hips to EOB; Max Ax2 to HOB with pt using bilat LE to assist)  Bed Mobility Comments: Pt requires extra time and verbal/tactile cues for technique and safety.     Balance  Balance  Posture: Fair  Sitting - Static: Fair, +  Sitting - Dynamic: Fair, -  Standing - Static: Fair, -  Standing - Dynamic: Poor, +  Comments: Seated EOB, standing without AD     PT Exercises  Exercise Treatment: Pt's baseline is independently ambulating without AD and pt would like to return to prior level. High functional activities working with pt required 2 Skilled therapists test/challenge pt.  A/AROM Exercises: Seated bilat LE exercises, [id number]  Circulation/Endurance Exercises: STS x4  Static Sitting Balance Exercises: Seated EOB 10-15 min.  Dynamic Sitting Balance Exercises: Seated reaching out of base support unsteady with a right lateral LOB  Static Standing Balance Exercises: Standing tolerance 1-2 minutes x3reps  Dynamic Standing Balance Exercises: Standing reaching out of base of support, posterior LOB  Postural Correction Exercises: Cues for upright posture and hips posteriorly, pt would benefit from mirror therapy for improved feedback.  Breathing 
DME company prior)  Wife was pretty insistent that she wants to take him home with home health  Home oxygen evaluation including with exertion and nocturnal prior to discharge    Electronically signed by Marco Sandhu MD on 3/2/2025 at 11:30 AM      
fair +  Short Term Goal 4: Independent in sit to stand transfers      Plan  Physical Therapy Plan  General Plan: 6-7 times per week  Therapy Duration: 4-7 Days  Specific Instructions for Next Treatment: ther exs and ther activities as tolerated, cotreat with OT  Current Treatment Recommendations: Strengthening, Balance training, Functional mobility training, Transfer training, Gait training   Safety Devices  Type of Devices: Call light within reach, Bed alarm in place, Left in bed       PT Individual Minutes  Time In: 0932  Time Out: 1003  Minutes: 31   Time Code Minutes    Timed Code Treatment Minutes: 8 Minutes       Electronically signed by Liset Valdes PTA on 3/5/25 at 10:49 AM EST       
from continued therapy after discharge  OT Equipment Recommendations  Other: TBD- has RW, shower chair, manual w/c, grab bars in bathroom; would benefit from reacher/sock aid for dressing  Safety Devices  Type of Devices: Nurse notified, Left in chair, Call light within reach    AM-[facility] Daily Activities Inpatient  AM-PAC Daily Activity - Inpatient   How much help is needed for putting on and taking off regular lower body clothing?: A Lot  How much help is needed for bathing (which includes washing, rinsing, drying)?: A Lot  How much help is needed for toileting (which includes using toilet, bedpan, or urinal)?: A Lot  How much help is needed for putting on and taking off regular upper body clothing?: A Little  How much help is needed for taking care of personal grooming?: A Little  How much help for eating meals?: A Little  AM-[facility] Inpatient Daily Activity Raw Score: 15  AM-PAC Inpatient ADL T-Scale Score : 34.69  ADL Inpatient CMS 0-100% Score: 56.46  ADL Inpatient CMS G-Code Modifier : CK    OT Minutes  OT Individual Minutes  Time In: 0910  Time Out: 0958  Minutes: 48    Co-treatment with PT warranted secondary to decreased safety and independence requiring 2 skilled therapy professionals to address individual discipline's goals. OT addressing sitting balance for increased ADL performance, sitting/activity tolerance, environmental safety/scanning, fall prevention, functional mobility for ADL transfers, ability to sequence and follow directions, and functional UE strength.     Electronically signed by BRENDON Martinez on 3/7/25 at 11:58 AM EST   
of breath     Urinary incontinence without sensory awareness     Chronic fatigue     Abdominal wall hernia     Severe obstructive sleep apnea     Chronic heart failure with preserved ejection fraction (HCC)     ST elevation myocardial infarction involving right coronary artery (HCC)     Encounter for palliative care     Goals of care, counseling/discussion     Admitted to intensive care unit     ST elevation myocardial infarction (STEMI) ([facility])     Metabolic acidemia     Respiratory acidosis     Hyperkalemia     Patient receiving ECMO     Bandemia     Elevated procalcitonin     Type 2 diabetes mellitus, with long-term current use of insulin (HCC)     Stented coronary artery     Dysphagia     Seizure-like activity (HCC)     Metabolic encephalopathy     Pneumonia of lower lobe due to Acinetobacter baumannii (HCC)     Enterobacter cloacae pneumonia (HCC)     Type 2 diabetes mellitus with other specified complication, with long-term current use of insulin (HCC)     Pulmonary embolism on right ([facility])     Sepsis secondary to UTI ([facility])     UTI due to extended-spectrum beta lactamase (ESBL) producing Escherichia coli     Acute pulmonary embolism ([facility])      Plan of Treatment:   Stable. No acute CV issues/concerns  Echo reviewed as above and low risk. No plans for inpatient ischemic work-up  Continue PO Brilinta and Coumadin  Continue statin.   No BB d/t chronic hypotension, on midodrine  No volume overload on exam and states he is feeling btter. Will hold off on oral diuretics at this time  Keep K >4 and Mg >2  Discharge planning to ARU if qualifies    Electronically signed by ANGELO Henson CNP on 3/5/2025 at 7:50 AM  [facility] [email or web address]  [phone]          
  Balance  Posture: Fair  Sitting - Static: Fair  Sitting - Dynamic: Fair;-  Standing - Static: Fair;-  Standing - Dynamic: Fair;-                                                            AM-PAC - Mobility    AM-PAC Basic Mobility - Inpatient   How much help is needed turning from your back to your side while in a flat bed without using bedrails?: A Lot  How much help is needed moving from lying on your back to sitting on the side of a flat bed without using bedrails?: Total  How much help is needed moving to and from a bed to a chair?: Total  How much help is needed standing up from a chair using your arms?: Total  How much help is needed walking in hospital room?: Total  How much help is needed climbing 3-5 steps with a railing?: Total  AM-PAC Inpatient Mobility Raw Score : 7  AM-PAC Inpatient T-Scale Score : 26.42  Mobility Inpatient CMS 0-100% Score: 92.36  Mobility Inpatient CMS G-Code Modifier : CM                Goals  Short Term Goals  Time Frame for Short Term Goals: 4-5 sessions  Short Term Goal 1: Improve strength in both lower extremities to 4/5  Short Term Goal 2: Independent in bed mobility  Short Term Goal 3: Improve sitting and standing balance to fair +  Short Term Goal 4: Independent in sit to stand transfers  Long Term Goals  Time Frame for Long Term Goals : 10 sessions  Long Term Goal 1: Ambulate independently 120 ft with a rolling walker  Patient Goals   Patient Goals : return home       Education  Patient Education  Education Given To: Patient  Education Provided: Role of Therapy;Plan of Care  Education Method: Demonstration;Verbal  Education Outcome: Verbalized understanding;Continued education needed      Therapy Time   Individual Concurrent Group Co-treatment   Time In 0851         Time Out 0940         Minutes 49                 Rebecca Diaz, PT         
assistance  Sit to Supine: Minimal assistance  Scooting: Stand by assistance  Bed Mobility Comments: HOB elevated with use of bedrail, increased timer and effort to complete, reports of pain across chest with movement and reaching due to recent hx of cardiac arrest/compressions performed    Comment: o2 sats ranged from mid 80s to low 90s with activity on 2L    Transfers  Sit to stand: Minimal assistance;2 Person assistance  Stand to sit: Minimal assistance;2 Person assistance  Transfer Comments: pt completed three stands from EOB initially MIN x2 progressing to MIN A then CGA with verbal cues for proper hand placement         Functional Mobility: Contact guard assistance  Functional Mobility Skilled Clinical Factors: Pt completed ~3-4 forward/restro steps and then ~4-5 side steps along EOB with min cues for safety with RW.         Patient Education  Patient Education  Education Given To: Patient  Education Provided: Role of Therapy;Plan of Care;Transfer Training;ADL Adaptive Strategies;Energy Conservation;Mobility Training;Fall Prevention Strategies  Education Provided Comments: Continued OT POC, safety during functional transfers/functional mobility, EC/WS techniques with focus on deep breathing and implementation of rest breaks, figure four technique for LBD  Education Method: Verbal;Demonstration  Education Outcome: Verbalized understanding;Demonstrated understanding;Continued education needed    Goals  Short Term Goals  Time Frame for Short Term Goals: By discharge  Short Term Goal 1: Pt will complete bed mobility with SBA to assist with hygiene and reduce risk of skin breakdown  Short Term Goal 2: patient to safely perfrom UB ADLs with set-up and LB dressing with min A with use of AE/adaptive strategies as needed  Short Term Goal 3: patient to safely perform toileting task with CGA with use of DME as needed  Short Term Goal 4: patient to safely perform functional transfers/mobility with SUP with use of least 
for Next Treatment: ther exs and ther activities as tolerated, cotreat with OT  Current Treatment Recommendations: Strengthening, Balance training, Functional mobility training, Transfer training, Gait training   Safety Devices  Type of Devices: All fall risk precautions in place, Nurse notified, Left in bed, Call light within reach, Bed alarm in place       PT Individual Minutes  Time In: 0833  Time Out: 0933  Minutes: 60           Electronically signed by Leonarda Calderon PTA on 3/3/25 at 10:12 AM EST        
    Hepatic Function Panel:   No results for input(s): \"LABALBU\", \"BILIDIR\", \"IBILI\", \"BILITOT\", \"ALKPHOS\", \"ALT\", \"AST\" in the last 72 hours.    Invalid input(s): \"PROT\"    No results for input(s): \"RPR\" in the last 72 hours.  No results for input(s): \"HIV\" in the last 72 hours.  No results for input(s): \"BC\" in the last 72 hours.  Lab Results   Component Value Date/Time    CREATININE 0.8 03/04/2025 05:49 AM    GLUCOSE 81 03/04/2025 05:49 AM    GLUCOSE 90 01/06/2012 08:48 AM       Detailed results:        Thank you for allowing us to participate in the care of this patient.Please call with questions.    This note is created with the assistance of a speech recognition program.  While intending to generate adocument that actually reflects the content of the visit, the document can still have some errors including those of syntax and sound a like substitutions which may escape proof reading.  It such instances, actual meaningcan be extrapolated by contextual diversion.    Charlie Christiansen MD  Office: [phone]  Perfect [facility] / office [phone]        
  BUN 9   CREATININE 0.8     Hepatic Function Panel:   No results for input(s): \"LABALBU\", \"BILIDIR\", \"IBILI\", \"BILITOT\", \"ALKPHOS\", \"ALT\", \"AST\" in the last 72 hours.    Invalid input(s): \"PROT\"    No results for input(s): \"RPR\" in the last 72 hours.  No results for input(s): \"HIV\" in the last 72 hours.  No results for input(s): \"BC\" in the last 72 hours.  Lab Results   Component Value Date/Time    CREATININE 0.8 03/04/2025 05:49 AM    GLUCOSE 81 03/04/2025 05:49 AM    GLUCOSE 90 01/06/2012 08:48 AM       Detailed results:        Thank you for allowing us to participate in the care of this patient.Please call with questions.    This note is created with the assistance of a speech recognition program.  While intending to generate adocument that actually reflects the content of the visit, the document can still have some errors including those of syntax and sound a like substitutions which may escape proof reading.  It such instances, actual meaningcan be extrapolated by contextual diversion.    Charlie Christiansen MD  Office: [phone]  [facility] / office [phone]        
72 hours.  No results for input(s): \"HIV\" in the last 72 hours.  No results for input(s): \"BC\" in the last 72 hours.  Lab Results   Component Value Date/Time    CREATININE 0.9 03/02/2025 05:35 AM    GLUCOSE 91 03/02/2025 05:35 AM    GLUCOSE 90 01/06/2012 08:48 AM       Detailed results:        Thank you for allowing us to participate in the care of this patient.Please call with questions.    This note is created with the assistance of a speech recognition program.  While intending to generate adocument that actually reflects the content of the visit, the document can still have some errors including those of syntax and sound a like substitutions which may escape proof reading.  It such instances, actual meaningcan be extrapolated by contextual diversion.    Charlie Christiansen MD  Office: [phone]  [facility] / office [phone]        
4.86     E/E' Septal 8.04     LA Volume Index BP 24 16 - 34 ml/m2    LVOT Stroke Volume Index 36.5 mL/m2    LA Volume Index MOD A2C 17 16 - 34 ml/m2    LA Volume Index MOD A4C 30 16 - 34 ml/m2    LA Size Index 1.80 cm/m2    LA/AO Root Ratio 1.08     RA Volume Index A4C 19 mL/m2    Ao Root Index 1.67 cm/m2    AV Velocity Ratio 0.77     LVOT:AV VTI Index 0.79     ADAM/BSA VTI 1.5 cm2/m2    ADAM/BSA Peak Velocity 1.4 cm2/m2    EF Physician 55 %    RVIDd 3.7 cm    Est. RA Pressure 3 mmHg    RVSP 25 mmHg         IMAGING DATA:    CT CHEST PULMONARY EMBOLISM W CONTRAST    Result Date: 2/28/2025  EXAMINATION: CTA OF THE CHEST 2/28/2025 3:45 pm TECHNIQUE: CTA of the chest was performed after the administration of intravenous contrast.  Multiplanar reformatted images are provided for review.  MIP images are provided for review. Automated exposure control, iterative reconstruction, and/or weight based adjustment of the mA/kV was utilized to reduce the radiation dose to as low as reasonably achievable. COMPARISON: None. HISTORY: ORDERING SYSTEM PROVIDED HISTORY: hx pe, sob, chest pain TECHNOLOGIST PROVIDED HISTORY: hx pe, sob, chest pain Additional Contrast?->1 Reason for Exam: hx pe, sob, chest pain Additional signs and symptoms: hx pe, sob, chest pain FINDINGS: Pulmonary Arteries: Pulmonary arteries are adequately opacified for evaluation.  Evidence of filling defect within the distal right main pulmonary artery at the junction with the right middle lobe and right lower lobe lobar arteries without extension overall mild burden without evidence for right heart strain.  Main pulmonary artery is normal in caliber. Mediastinum: No evidence of mediastinal lymphadenopathy.  The heart and pericardium demonstrate no acute abnormality.  There is no acute abnormality of the thoracic aorta. Lungs/pleura: The lungs are without acute process.  Subsegmental atelectasis no focal consolidation or pulmonary edema.  No evidence of pleural 
extrapolated by contextual diversion.

## 2025-03-07 NOTE — CARE COORDINATION
Acute Inpatient Rehabilitation referral received via Fax    \"Inpatient Consult to PM&R - Physiatry [[id number]]\" Consult Order Status: PM&R consult completed on 3/3/2025, current recommendation is would benefit from ARU if patient has OT needs.  Dx  is debility and [facility] unable to accept due to 60% compliant medicare rule.     PM&R physiatrist Dr. Hilton Owens on service for PM&R consult.    Updated Rochelle CM: Via [facility] at this time.   
Continuity of Care Form    Patient Name: Lukasz Keller   :  1950  MRN:  [id number]    Admit date:  2025  Discharge date:  3/7/2025    Code Status Order: Full Code   Advance Directives:   Advance Care Flowsheet Documentation             Admitting Physician:  Christal Jang MD  PCP: Christal Jang MD    Discharging Nurse: Bing WU RN  Discharging Hospital Unit/Room#: 2094/2094-01  Discharging Unit Phone Number: [phone]    Emergency Contact:   Extended Emergency Contact Information  Primary Emergency Contact: Anaya Keller  Address: [address] [place] [address]  Home Phone: [phone]  Mobile Phone: [phone]  Relation: Spouse  Secondary Emergency Contact: Dyan Cruzanda  Home Phone: [phone]  Relation: Child    Past Surgical History:  Past Surgical History:   Procedure Laterality Date    ARM SURGERY Right 10/16/2023    ARM LESION BIOPSY EXCISION  - RIGHT ARM LATERALLY performed by Morgan Diaz MD at [facility] OR    BACK SURGERY      x2, hx of hardware exchange- patient states no longer has hardware    BREAST SURGERY Left 10/16/2023    MEDIAL BREAST X 3  LIPOMAS  LEFT performed by Morgan Diaz MD at [facility] OR    CARDIAC PROCEDURE N/A 12/3/2024    Left heart cath / coronary angiography performed by Christiano Smith MD at [facility] CARDIAC CATH LAB    CARDIAC PROCEDURE N/A 12/3/2024    Percutaneous coronary intervention performed by Christiano Smith MD at [facility] CARDIAC CATH LAB    CARDIAC PROCEDURE N/A 12/3/2024    Intravascular ultrasound performed by Christiano Smith MD at [facility] CARDIAC CATH LAB    CARDIAC PROCEDURE Bilateral 2024    Ecmo cath lab performed by Christiano Smith MD at [facility] CARDIAC CATH LAB    CARDIAC PROCEDURE N/A 2024    [place] joey  insertion performed by Christiano Smith MD at [facility] CARDIAC CATH LAB    CARDIAC PROCEDURE N/A 2024    joanna / Intra-aortic balloon pump insertion / rm 1014 performed by Jamal Lenz MD at [facility] 
DISCHARGE PLANNING NOTE:     Spoke with Yady at [facility]. Patient antibiotics will be delivered on the 3 PM sweep.     Patient to have Home O2 evaluation prior to discharge today.    Electronically signed by Rochelle Chavarria RN on 3/4/2025 at 11:34 AM    
DISCHARGE PLANNING NOTE:    IV antibiotic script and flush orders faxed to [facility].    Electronically signed by Rochelle Chavarria RN on 3/3/2025 at 12:15 PM    
DISCHARGE PLANNING NOTE:    LSW following for potential discharge to inpatient rehab. Patient accepted at [facility]. Insurance authorization submitted on 3/4, still pending at this time per Jaycee in admissions. Facility is reaching out to insurance company to inquire about status of pre cert     Message from Jaycee in admissions. [employer] has intent to deny. P2P offered. MD should contact medical director Dr. Samson Kathleen at [phone] option 4. Deadline is 3/7 at 12P.     Dr. Jang notified via [employer].   
DISCHARGE PLANNING NOTE:    LSW following for potential discharge to inpatient rehab. Patient accepted at [facility]. Insurance authorization submitted on 3/4, still pending at this time per Jaycee in admissions.   
DISCHARGE PLANNING NOTE:    Message sent to Dr. Christiansen to see if she has a time frame for how long patient will be on IV Merrem. Awaiting a response.     Electronically signed by Rochelle Chavarria RN on 3/3/2025 at 11:05 AM    
DISCHARGE PLANNING NOTE:    Patient asked that I reached out to Wife Anaya regarding discharge planning. Spoke with Anaya and she states she is willing to learn how to administer IV antibiotics at home. Anaya states absolutely no SNF.     Dr Christiansen states patient will need IV Merrem for a total of 7 days. Asked for paper script.    Electronically signed by Rochelle Chavarria RN on 3/3/2025 at 11:19 AM    
DISCHARGE PLANNING NOTE:    Patient authorization was approved. Patient good to discharge today. Transport paperwork faxed to [facility], waiting to hear back on confirmation of time.    Electronically signed by Rochelle Chavarria RN on 3/7/2025 at 11:23 AM    
DISCHARGE PLANNING NOTE:    Sheryl from [facility] called this writer back and stated they can accept patient. Sheryl will start the authorization today.    Electronically signed by Rochelle Chavarria RN on 3/4/2025 at 3:34 PM    
DISCHARGE PLANNING NOTE:    Spoke with Yady at [facility] and patient would need to leave no later than tomorrow to get IV antibiotics at home. Cost is $224.48 and 80% coverage of supplies.     Called wife Anaya back regarding cost, no answer. Message left to call this writer back.    Spoke with Yennifer from [facility]. Tentative SOC set for tomorrow at 6:00 PM.     Electronically signed by Rochelle Chavarria RN on 3/3/2025 at 11:38 AM    
DISCHARGE PLANNING NOTE:    Spoke with wife Anaya, she is agreeable to cost. Anaya states she can be here tomorrow to learn how to administer IV antibiotics.     Electronically signed by Rochelle Chavarria RN on 3/3/2025 at 11:49 AM    
DISCHARGE PLANNING NOTE:    Wheelchair transport set up with [facility] at 2:30 PM to go to [facility]. Sheryl with [facility] notified. Patient and wife Anaya notified.     Number for Report [phone]      Electronically signed by Rochelle Chavarria RN on 3/7/2025 at 12:30 PM    
DISCHARGE PLANNING NOTE:    Yennifer with [facility] and Yady with [facility] notified that patient now would like to go to inpatient rehab.    Electronically signed by Rochelle Chavarria RN on 3/4/2025 at 12:54 PM    
ONGOING DISCHARGE PLAN:    Patient is alert and oriented x4.    Spoke with patient and wife and they would like to go to inpatient rehab.     1st choice ARU, referral sent. Can not accept at time for debility.    2nd choice [facility].      Will continue to follow for additional discharge needs.    If patient is discharged prior to next notation, then this note serves as note for discharge by case management.    Electronically signed by Rochelle Chavarria RN on 3/4/2025 at 12:00 PM    
ONGOING DISCHARGE PLAN:    Patient is alert and oriented x4.    Spoke with patient regarding discharge plan and patient confirms that plan is still [facility]. Auth approved.    Pulm-PE, Coumadin. Referral to [facility] Clinic Placed. SpO2 92%, 1L. ID-IV Merrem through 3/7. Echo EF 50-55%.     Will continue to follow for additional discharge needs.    If patient is discharged prior to next notation, then this note serves as note for discharge by case management.    Electronically signed by Rochelle Chavarria RN on 3/7/2025 at 11:30 AM    
ONGOING DISCHARGE PLAN:    Patient is alert and oriented x4.    Spoke with patient regarding discharge plan and patient confirms that plan is still home with [facility]. Does not want SNF.    Asked that this writer speak with his wife Anaya regarding IV antibiotics at home.     IV Merrem. ID Consult.    Needs Nocturnal O2 and home O2 evals.     Echo ordered. Vascular duplexes ordered.    Will continue to follow for additional discharge needs.    If patient is discharged prior to next notation, then this note serves as note for discharge by case management.    Electronically signed by Rochelle Chavarria RN on 3/3/2025 at 11:38 AM    
ONGOING DISCHARGE PLAN:    Patient is alert and oriented x4.    Spoke with patient regarding discharge plan and patient confirms that plan is still to discharge to home with [facility]    Started on Merrem per ID will sent referral to [facility]     Home oxygen eval before discharge     Continue with Lovenox with Coumadin     Will continue to follow for additional discharge needs.    If patient is discharged prior to next notation, then this note serves as note for discharge by case management.    Electronically signed by Sofie [place] on 3/2/2025 at 4:11 PM    
choice list with basic dialogue that supports the patient's individualized plan of care/goals and shares the quality data associated with the providers was provided to: Patient   Patient Representative Name:       The Patient and/or Patient Representative Agree with the Discharge Plan? Yes    Dasha Morton RN  Case Management Department  Ph: [phone] Fax: [phone]

## 2025-03-07 NOTE — DISCHARGE SUMMARY
[facility]  Family Medicine      Discharge Summary      NAME:  Lukasz Keller  :  1950  MRN:  [id number]    Admit date:  2025  Discharge date:  3/7/2025    Admitting Physician:  Christal Jang MD    Primary Diagnosis on Admission:   Present on Admission:   Pulmonary embolism on right (HCC)   Sepsis secondary to UTI (HCC)   UTI due to extended-spectrum beta lactamase (ESBL) producing Escherichia coli   Anxiety disorder, unspecified   Chronic heart failure with preserved ejection fraction (HCC)   Shortness of breath   Acute pulmonary embolism (HCC)      Secondary Diagnoses:  has Admitted to intensive care unit and ST elevation myocardial infarction (STEMI) ([facility]) on their pertinent problem list.      Admission Condition:  poor     Discharged Condition: fair      Hospital Course:   The patient was admitted for the management of sepsis secondary to ESBL producing E. Coli secondary to UTI. Pt has recently had a prolonged hospital course after a cardiac arrest and has been deconditioned due to the length of immobility. Pt was treated with meropenem with improvement in his fatigue and hypotension. Pt was able to show some improvement with PT however pt would benefit significantly from ARU.     The patient was seen and examined at bedside today. Pt feels better with no further complaints.  There were no acute events overnight. All relevant notes, labs & imaging were reviewed.  The case was discussed with nursing staff. Vitals and Labs are at pts baseline. All consultants involved during this admission are agreeable to discharge.    Consults:  cardiology, ID, and rehabilitation medicine      Disposition:   ARU    Instructions to Patient:      Follow up with Christal Jang MD in  1-2 weeks    Discharge Medications:       Medication List        START taking these medications      ferrous sulfate 325 (65 Fe) MG EC tablet  Commonly known as: FE TABS 325     FLUoxetine 20 MG capsule  Commonly known as:

## 2025-03-10 LAB
CARDIOLIPIN IGA SER IA-ACNC: 6.3 APL (ref 0–14)
CARDIOLIPIN IGG SER IA-ACNC: 1.4 GPL (ref 0–10)
CARDIOLIPIN IGM SER IA-ACNC: <0.8 MPL (ref 0–10)
DILUTE RUSSELL VIPER VENOM TIME: NORMAL

## 2025-03-19 ENCOUNTER — TELEPHONE (OUTPATIENT)
Dept: INFECTIOUS DISEASES | Age: 75
End: 2025-03-19

## 2025-03-19 NOTE — TELEPHONE ENCOUNTER
Everyone is up to date     End Conversation  Participants2  Admin, [facility]  Wed, Mar 19, 2025, 8:58:48 AM  Charlie Christiansen MD  Wed, Mar 19, 2025, 12:49:43 PM

## 2025-03-19 NOTE — TELEPHONE ENCOUNTER
KrishnaLukasz keane. Male, 74 y.o., 1950 MRN: [id number] A Dr. Salinas from [facility] rehab called and stated that the patient finished IV antibiotics on 3/7/25 and within 3 days they did another culture on patient and UTI is right back and they had to put patient back on meropenem being that was all infection was resistant to and are wanting clarification on how long you think patient should be on this round of antibiotics. He can be reached at [phone]. Thanks Lilli, Mar 17, 2025, 3:27:09 PM  Read   Dr Salinas is calling on this again, urine culture from 3/13 is in the chart. Guera Wed, Mar 19, 2025, 8:58:48 AM  Unread      End Conversation  Participants2  Admin, [facility]  Wed, Mar 19, 2025, 8:58:48 AM  Charlie Christiansen MD  Unread

## 2025-03-21 ENCOUNTER — CARE COORDINATION (OUTPATIENT)
Dept: CARE COORDINATION | Age: 75
End: 2025-03-21

## 2025-03-21 NOTE — CARE COORDINATION
received there Referral from Grace YARBROUGH Nurse in regards to Patient inquiring about a Ramp at their home.     called Patient's Spouse and explained the process to her that writer will mail them out an application to [facility] and then aid them in getting it filled out if they need assistance.     mailed it out to them on today.

## 2025-03-31 ENCOUNTER — CARE COORDINATION (OUTPATIENT)
Dept: CARE COORDINATION | Age: 75
End: 2025-03-31

## 2025-03-31 NOTE — CARE COORDINATION
Ambulatory Care Coordination Note     3/31/2025 3:11 PM     Patient Current Location:  Home: [address] [place] [id number]     This patient was received as a referral from Ambulatory Care Manager .    ACM contacted the spouse/partner by telephone. Verified name and  with spouse/partner as identifiers. Provided introduction to self, and explanation of the ACM role.   Spouse/Partner accepted care management services at this time.          ACM: GRACE ANDRADE RN     Challenges to be reviewed by the provider   Additional needs identified to be addressed with provider No  none               Method of communication with provider: none.    Utilization: Initial Call - N/A    Care Summary Note: Wife stated that Lukasz is doing ok since he has been home. [facility] has started, nursing was out to their home. Waiting on PT and OT. Wife understands PT will help with the wheel chair request. She stated that [facility] is sending out a SW to check on other needs as well.   Patient has a VV with PCP tomorrow.   Per wife he has all of his medications. No concerns today.  She mailed in the application for a ramp to the [facility] center. I did provide her with their number to call and follow up on the application.     Offered patient enrollment in the Remote Patient Monitoring (RPM) program for in-home monitoring: Yes, but did not enroll at this time: continue to address .     Assessments Completed:   General Assessment    Do you have any symptoms that are causing concern?: No          Medications Reviewed:   Not completed during this call: will discuss with PCP tomorrow at appointment.     Advance Care Planning:   Reviewed and current     Care Planning:   Education Documentation  Home Health Care Services, taught by Grace Andrade RN at 3/31/2025  3:10 PM.  Learner: Family, Significant Other  Readiness: Acceptance  Method: Explanation  Response: Verbalizes Understanding    Educate reporting changes in condition, taught by Raymond

## 2025-04-01 ENCOUNTER — TELEPHONE (OUTPATIENT)
Dept: VASCULAR SURGERY | Age: 75
End: 2025-04-01

## 2025-04-01 PROBLEM — Z93.1 STATUS POST INSERTION OF PERCUTANEOUS ENDOSCOPIC GASTROSTOMY (PEG) TUBE (HCC): Status: ACTIVE | Noted: 2025-04-01

## 2025-04-01 NOTE — TELEPHONE ENCOUNTER
Called wife to schedule 6 month follow-up with Dr. Delos Reyes in June. Wife states she will call back to schedule

## 2025-04-02 ENCOUNTER — CARE COORDINATION (OUTPATIENT)
Dept: CARE COORDINATION | Age: 75
End: 2025-04-02

## 2025-04-02 NOTE — CARE COORDINATION
called Patient's Spouse to see if they had received the Application for [facility] that  mailed to them.    She reported that they had not received it as of yet,  will follow back up with them in a week or so.

## 2025-04-07 ENCOUNTER — CARE COORDINATION (OUTPATIENT)
Dept: CARE COORDINATION | Age: 75
End: 2025-04-07

## 2025-04-07 NOTE — CARE COORDINATION
mailed out a New application for [facility] as Patient's Spouse said they never received the First one writer mailed out.    Will follow back up in a week or more.

## 2025-04-16 ENCOUNTER — CARE COORDINATION (OUTPATIENT)
Dept: CARE COORDINATION | Age: 75
End: 2025-04-16

## 2025-04-16 NOTE — CARE COORDINATION
Ambulatory Care Coordination Note     2025 3:42 PM     Patient Current Location:  Home: [address] [place] [id number]     ACM contacted the patient and spouse/partner by telephone. Verified name and  with patient and spouse/partner as identifiers.         ACM: GRACE ANDRADE RN     Challenges to be reviewed by the provider   Additional needs identified to be addressed with provider Yes  medications-Please clarify if patient is supposed to be on Eliquis and Plavix or Brilinta. Your last note mentioned changing to Plavix but wife is giving Eliquis and Brilinta still. Also wife stated his chest is light  red and hot , she will route a picture through my chart to you. Patient thinks it is still from CPR?                Method of communication with provider: chart routing.    Utilization: Patient has not had any utilization since our last call.     Care Summary Note: Concerns and medication questions above routed to PCP.  Medication assistance request sent to Rochelle HILTONLyn   Will follow up with wife regarding PCP recommendations.    Offered patient enrollment in the Remote Patient Monitoring (RPM) program for in-home monitoring: Yes, but did not enroll at this time: will continue to address .     Assessments Completed:   General Assessment    Do you have any symptoms that are causing concern?: Yes  Progression since Onset: Unchanged  Reported Symptoms: Pain (Comment: discomfort on chest from CPR)          Medications Reviewed:   Completed during this call, concerns routed to PCP.    Advance Care Planning:   Reviewed and current     Care Planning:   Education Documentation  General medication information, taught by Grace Andrade, RN at 2025  3:40 PM.  Learner: Significant Other, Patient  Readiness: Acceptance  Method: Explanation  Response: Verbalizes Understanding    Home Health Care Services, taught by Grace Andrade RN at 2025  3:40 PM.  Learner: Significant Other, Patient  Readiness:

## 2025-04-21 NOTE — CARE COORDINATION
Spoke with wife, provided PCP recommendations. She repeated and stated that she understands.  PCP recommendations as follows:  \"He is supposed to be on Eliquis and Plavix, if he still has Brilinta left finish that before starting the Plavix.  She showed me a picture of the redness, they should monitor for now\".    Wife asked about 2 medications refills on his med list. I was able to tell her they have available refills, she will call the pharmacy.      Wife reported they were told by the ability center that they make too much to get a wheel chair ramp.   The person for the wheelchair evaluation is at their home now.

## 2025-04-24 ENCOUNTER — OFFICE VISIT (OUTPATIENT)
Age: 75
End: 2025-04-24

## 2025-04-24 VITALS
WEIGHT: 246 LBS | HEART RATE: 87 BPM | BODY MASS INDEX: 38.61 KG/M2 | SYSTOLIC BLOOD PRESSURE: 109 MMHG | HEIGHT: 67 IN | DIASTOLIC BLOOD PRESSURE: 69 MMHG | OXYGEN SATURATION: 91 % | TEMPERATURE: 98.6 F

## 2025-04-24 DIAGNOSIS — Z86.79 HISTORY OF CORONARY ARTERY DISEASE: ICD-10-CM

## 2025-04-24 DIAGNOSIS — Z93.1 S/P PERCUTANEOUS ENDOSCOPIC GASTROSTOMY (PEG) TUBE PLACEMENT (HCC): ICD-10-CM

## 2025-04-24 DIAGNOSIS — Z79.01 CHRONIC ANTICOAGULATION: ICD-10-CM

## 2025-04-24 DIAGNOSIS — K63.5 BENIGN COLON POLYP: ICD-10-CM

## 2025-04-24 DIAGNOSIS — Z98.890 HISTORY OF TRACHEOSTOMY: Primary | ICD-10-CM

## 2025-04-24 DIAGNOSIS — Z90.49 HISTORY OF PARTIAL COLECTOMY: ICD-10-CM

## 2025-04-24 NOTE — PATIENT INSTRUCTIONS
The office will be calling you soon to schedule PEG tube removal/colonoscopy, please call our office if you do not receive a phone call within 1 week.

## 2025-04-25 NOTE — FLOWSHEET NOTE
Cosigned head to toe assessment in error.   
EKG done at bedside as ordered.    Electronically signed by CASSIDY RABAGO RN on 12/15/2024 at 11:25 AM    
Heparin and Cangrelor gtts held per Dr. Smith for ECMO cannulation.     Electronically signed by Collette Benitez RN on 12/4/2024 at 2:30 PM    
Heparin and Cangrelor gtts restarted per Dr. Smith post ECMO cannulation.     Electronically signed by Collette Benitez RN on 12/4/2024 at 7:29 PM    
Notified Dr. Paz that patient's SBP is in the 60's, HR in the 110's. Titrating up on pressors per order, waiting for response.     RN called Dr. Smith to bedside to assess, Dr. Smith at bedside shortly after while patient was carlos manuel-arrest. Pressors titrated per verbal order. 2L fluid bolus ordered and given per Dr. Smith.Notified Dr. Smith of INR of 5.8, PTT >180. CVC also placed per cardiology. 1 unit FFP ordered and given for INR 5.8.    CT Chest/Abdomen/Pelvis ordered. Patient transported by RN x2 and RT.     Cardiology notified of potassium 6.4, see MAR orders. IM and Nephrology also consulted for further medical management.     Electronically signed by Collette Benitez RN on 12/3/2024 at 7:25 PM      
Notified Internal Medicine that patient's blood glucose is 396. See orders.    Electronically signed by Collette Benitez RN on 12/4/2024 at 3:10 PM    
Patient left [place] to [facility] with paramedic transport.  Report called to Gemma STEELE from [facility].  Communication error noted during report in regards to patient receiving dialysis today when he arrives at [facility].  This was communicated with [facility] representative before had along with  and .  Receiving RN did not appear over the phone to want  a complete extensive report on patient.  This RN did let [facility] receiving know RN to call back to the MICU at [facility] and talk to primary care RN if she would like more report on patient.  
RN called Dr. Bowman to evaluate for sedation, ABG, labs. 150mEq sodium bicarbonate ordered and given per critical care.     2mg Versed bolus given per Dr. Paz for vent compliance.    Also notified Dr. Paz that patient has red-tinged urine and sputum. No other new orders at this time.     Electronically signed by Collette Benitez RN on 12/3/2024 at 4:55 PM    
RN called Dr. Bowman with ABG results, vent settings adjusted by RT per order. 1 amp sodium bicarbonate given per verbal order.     RN also PS Vascular team and cardiology concerning right groin bleeding from CVC site. Vascular en route to bedside to assess.     RN notified Dr. Paz that patient's ionized calcium resulted 1.04 and drop in hemoglobin from previous result. See MAR for replacement orders. Updated Dr. Paz on pressor requirements, no other new orders at this time. RN requested a palliative care consult as per wife, patient was a DNR before this event occurred.    Electronically signed by Collette Benitez RN on 12/4/2024 at 9:20 AM                      
RN notified Dr. Caballero with Nephrology of ABG results, no new orders at this time.     Electronically signed by Collette Benitez RN on 12/4/2024 at 3:17 PM    
RN notified vascular and cardiology about increased bleeding from right groin central line site, en route to bedside to discuss plan.     Electronically signed by Collette Benitez RN on 12/4/2024 at 10:37 AM    
Repeat portable CXR done at bedside by [facility].    Electronically signed by CASSIDY RABAGO RN on 12/14/2024 at 4:52 PM    
25-Apr-2025 12:18

## 2025-04-28 ENCOUNTER — CARE COORDINATION (OUTPATIENT)
Dept: CARE COORDINATION | Age: 75
End: 2025-04-28

## 2025-04-28 NOTE — CARE COORDINATION
Writer called Patient's Spouse to do a follow up Social service call with her to see if she received the Second application mailed out to them.    She reported that they received it and filled it out and turned it in and were told that they are Over income and can not receive any assistance.    Will try another option and follow back up with them in a week or so.

## 2025-04-29 ENCOUNTER — TELEPHONE (OUTPATIENT)
Dept: VASCULAR SURGERY | Age: 75
End: 2025-04-29

## 2025-04-29 NOTE — TELEPHONE ENCOUNTER
Spoke with wife regarding scheduling 6 month follow-up appointment with Dr. Delos Reyes. Wife states that [place] and [facility] are too far to travel. Offered a follow-up at [place] with Dr. Haider. Wife declines an appointment and states they will follow-up with Dr. Sandhu.

## 2025-05-02 ENCOUNTER — TELEPHONE (OUTPATIENT)
Age: 75
End: 2025-05-02

## 2025-05-02 ENCOUNTER — PREP FOR PROCEDURE (OUTPATIENT)
Age: 75
End: 2025-05-02

## 2025-05-02 DIAGNOSIS — Z86.0100 HISTORY OF COLON POLYPS: ICD-10-CM

## 2025-05-02 DIAGNOSIS — Z90.49 HISTORY OF PARTIAL COLECTOMY: ICD-10-CM

## 2025-05-02 PROBLEM — E46 MALNUTRITION: Status: ACTIVE | Noted: 2025-05-02

## 2025-05-03 RX ORDER — ONDANSETRON 4 MG/1
4 TABLET, FILM COATED ORAL EVERY 6 HOURS PRN
Qty: 10 TABLET | Refills: 0 | Status: SHIPPED | OUTPATIENT
Start: 2025-05-03

## 2025-05-03 NOTE — TELEPHONE ENCOUNTER
PAT per anesthesia protocol.  Prep and Zofran PRN sent to patient's pharmacy, signing encounter.  
oz of water over 30 minutes.  5. Approximately 30 minutes after finishing the previous 16 oz of water, drink another 16 oz of water over 30 minutes.      NOTHING TO EAT, DRINK, SMOKE, OR CHEW AFTER THE SECOND PART OF THE PREP IS FINISHED  You may brush your teeth, rinse, gargle, and spit.  Heart or Blood pressure medications ONLY with a small sip of water, unless otherwise directed.  Shower with regular soap and water.    YOU MUST HAVE AN ADULT EITHER DRIVE YOU OR RIDE IN A CAB WITH YOU      MY PROCEDURE IS SCHEDULED FOR:  ENDOSCOPY WITH PERCUTANEOUS ENDOSCOPIC GASTROMY TUBE REMOVAL AND DIAGNOSTIC COLONOSCOPY     Date of Procedure: 2025  Time:10:30 am  Arrival Time:8:30 am     Pre-Admission Testin2025 at 12:30 pm a Nurse from [facility] will call you.    Location:  [facility]     Your scripts have been sent to: [place], [place]    Follow Up Appointment at [facility]: 7/3/2025 at 1:00 pm    Patient fully instructed/mailed 2025    ORDERS PENDING   PLEASE SEND FOR THE EGS PEG TUBE REMOVAL

## 2025-05-05 ENCOUNTER — CARE COORDINATION (OUTPATIENT)
Dept: CARE COORDINATION | Age: 75
End: 2025-05-05

## 2025-05-06 NOTE — CARE COORDINATION
Ambulatory Care Coordination Note     5/6/2025 1:23 PM     ACM outreach attempt by this ACM today to perform care management follow up . ACM was unable to reach the patient by telephone today;   Call dropped several times, unable to contact.      ACM: ELLA DURHAM RN     Care Summary Note: Noted patient was seen by cardiology yesterday 5.5.25.    PCP/Specialist follow up:   Future Appointments         Provider Specialty Dept Phone    6/9/2025 12:30 PM [facility] PAT RM 5 Pre-Admission Testing [phone]    7/3/2025 1:00 PM Morgan Diaz MD General Surgery [phone]    11/3/2025 11:30 AM Christiano Momin MD Cardiology [phone]            Follow Up:   Plan for next ACM outreach in approximately  3-5 days  to complete:  - disease specific assessments  - medication review.

## 2025-05-13 ENCOUNTER — CARE COORDINATION (OUTPATIENT)
Dept: CARE COORDINATION | Age: 75
End: 2025-05-13

## 2025-05-13 NOTE — CARE COORDINATION
Ambulatory Care Coordination Note     2025 10:49 AM     Patient Current Location:  Home: [address] [place] [id number]     ACM contacted the patient's spouse by telephone. Verified name and  with spouse/partner as identifiers.         ACM: GRACE ANDRADE RN     Challenges to be reviewed by the provider   Additional needs identified to be addressed with provider No  none               Method of communication with provider: none.    Utilization: Patient has not had any utilization since our last call.     Care Summary Note: Spoke with patients wife who stated Mike has been sleeping more, not eating well and he is weaker than before. He did see cardiology recently and per wife there were no concerns or medication changes.   I asked if she believes his depression is worsening and she believes \"that could be the issue\".   She was offered a PCP appointment to discuss, she wants to talk to Mike first and will call to schedule if he agrees. Encouraged her to try to have him eat healthy and stay hydrated to help provide energy.   FYI sent to PCP.     Offered patient enrollment in the Remote Patient Monitoring (RPM) program for in-home monitoring: Yes, but did not enroll at this time: wife monitors vitals and reports they are all stable .     Assessments Completed:   General Assessment    Do you have any symptoms that are causing concern?: Yes  Progression since Onset: Gradually Worsening  Reported Symptoms: Fatigue, Weakness (Comment: poor appetite)          Medications Reviewed:   Patient denies any changes with medications and reports taking all medications as prescribed.    Advance Care Planning:   Reviewed and current     Care Planning:   Education Documentation  General medication information, taught by Grace Andrade RN at 2025 10:48 AM.  Learner: Patient  Readiness: Acceptance  Method: Explanation  Response: Verbalizes Understanding    Educate Patient on When to Call for Symptoms, taught by Raymond

## 2025-05-19 ENCOUNTER — CARE COORDINATION (OUTPATIENT)
Dept: CARE COORDINATION | Age: 75
End: 2025-05-19

## 2025-05-19 NOTE — CARE COORDINATION
Writer has exhausted all avenues for Resources for Patient; Writer is signing off on the case at this time.

## 2025-05-29 ENCOUNTER — CLINICAL DOCUMENTATION (OUTPATIENT)
Age: 75
End: 2025-05-29

## 2025-06-09 ENCOUNTER — HOSPITAL ENCOUNTER (OUTPATIENT)
Dept: PREADMISSION TESTING | Age: 75
Discharge: HOME | End: 2025-06-13

## 2025-06-09 VITALS — BODY MASS INDEX: 35.46 KG/M2 | WEIGHT: 234 LBS | HEIGHT: 68 IN

## 2025-06-09 NOTE — PROGRESS NOTES
Pre-op Instructions For Out-Patient Endoscopy Surgery    Medication Instructions:  Please stop herbs and any supplements now (includes vitamins and minerals).    For these medications:  Dulaglutide (Trulicity), Exenatide (Byetta and Bydureon, Liraglutide (Victoza), Lixisenatide (Adlyxin), Semaglutide (Ozempic and Rybelsus), Tirzepatide (Mounjaro, Zepbound,Wegovy)- Stop 1 week prior if taking weekly or 1 day prior if taking every 12 hours or daily.     Please contact your surgeon and prescribing physician for pre-op instructions for any blood thinners. Stop Eliquis 2 days prior, Plavix and Brilinta 5 days prior    If you have inhalers/aerosol treatments at home, please use them the morning of your surgery and bring the inhalers with you to the hospital.    Please take the following medications the morning of your surgery with a sip of water:    Amiodarone, Midodrine, Buspar, Clonazepam    Surgery Instructions:  After midnight before surgery:  Do not eat or drink anything, including water, mints, gum, and hard candy.  You may brush your teeth without swallowing.  No smoking, chewing tobacco, or street drugs.       ** Please Follow Bowel Prep instructions if given by surgeon's office**    Please shower or bathe before surgery.       Please do not wear any cologne, lotion, powder, jewelry, piercings, perfume, makeup, nail polish, hair accessories, or hair spray on the day of surgery.  Wear loose comfortable clothing.    Leave your valuables at home but bring a payment source for any after-surgery prescriptions you plan to fill at [facility].  Bring a storage case for any glasses/contacts.    An adult who is responsible for you MUST remain in the hospital and drive you home and should be with you for the first 24 hours after surgery.     The Day of Surgery:  Arrive at [facility] Surgery Entrance at the time directed by your surgeon and check in at the desk.     If you have a living will or

## 2025-06-10 ENCOUNTER — CARE COORDINATION (OUTPATIENT)
Dept: CARE COORDINATION | Age: 75
End: 2025-06-10

## 2025-06-10 NOTE — CARE COORDINATION
Ambulatory Care Coordination Note     6/10/2025 3:42 PM     Patient Current Location:  Home: [address] [place] [id number]     ACM contacted the spouse/partner by telephone. Verified name and  with spouse/partner as identifiers.         ACM: GRACE ANDRADE RN     Challenges to be reviewed by the provider   Additional needs identified to be addressed with provider Yes  medications- refill pended in separate encounter.                Method of communication with provider: chart routing.    Utilization: Patient has not had any utilization since our last call.     Care Summary Note: Wife denied any needs other than a refill. Refill pended to PCP.   Spouse asked how to get his oxygen picked up . I encouraged her to call the pulmonology office to request /DC order.     Offered patient enrollment in the Remote Patient Monitoring (RPM) program for in-home monitoring: wife monitors vitals and reports they are all stable.     Assessments Completed:   General Assessment    Do you have any symptoms that are causing concern?: No          Medications Reviewed:   Patient denies any changes with medications and reports taking all medications as prescribed.    Advance Care Planning:   Appointment scheduled for this week.     Care Planning:   Education Documentation  General medication information, taught by Grace Andrade, RN at 6/10/2025  3:42 PM.  Learner: Patient  Readiness: Acceptance  Method: Explanation  Response: Verbalizes Understanding    Educate reporting changes in condition, taught by Grace Andrade RN at 6/10/2025  3:42 PM.  Learner: Patient  Readiness: Acceptance  Method: Explanation  Response: Verbalizes Understanding    Educate Patient on When to Call for Symptoms, taught by Grace Andrade, RN at 6/10/2025  3:42 PM.  Learner: Patient  Readiness: Acceptance  Method: Explanation  Response: Verbalizes Understanding    Educate limitations or barriers to activity and/or exercise on discharge, taught by

## 2025-06-12 NOTE — PRE-PROCEDURE INSTRUCTIONS
Have you received your Prep? Any questions with prep instructions? Y  Only Clear Liquid Diet day before.Y  Nothing to eat after midnight day before procedure.Y  Are you taking any blood thinners? If so, you need to Stop.STOPPED  Remove any jewelry and body piercings.Y  Do you wear glasses? If so, please bring a case to store them in.  Are you having any Covid symptoms?N  Do you have any new rashes, infections, etc. that we should be aware of?N  Do you have a ride home the day of surgery? It cannot be a cab or medical transportation.Y  Verify surgery time/date and what time to arrive at hospital.0830     CHIEF COMPLAINT/HISTORY OF PRESENT ILLNESS:  Danyel Mcgovern is a 67 year old male that presents for follow up of abd pain. Pt was lifting a heavy bed onto his truck on Saturday. The following morning he noticed a nodule in his groin area that is painful. The pain has improved but continues to have discomfort and hardness in that area. 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   • Anemia 12/22/1998   • Anxiety    • Ascites 02/11/1999   • Cerebral infarction (CMS/HCC)     TIA   • Diarrhea 02/11/1999   • Diverticulosis of colon (without mention of hemorrhage) 6/9/2009   • Essential (primary) hypertension    • FAILURE HEPATIC, ACUTE    • Fracture     Wrist as child and arm in 1980's  No surgery needed in either   • Hepatitis C    • Hepatitis type C 02/11/1999   • High cholesterol    • History of colonoscopy 06/23/2009    recall colon to 3 years based on hyperplastic pathology.Dr Jimenez   • Hypocalcemia    • Hypopotassemia    • Hypothyroidism    • Left knee pain    • Osteoarthrosis, unspecified whether generalized or localized, unspecified site    • Personal history of colonic polyps 5/26/2009   • Personal Hx Tobacco Use    • Seizure disorder     Due to alcohol withdrawal   • ULCER GASTRIC, ACUTE( Without obstruction) 12/22/1998       Current Outpatient Medications   Medication Sig Dispense Refill   • lisinopril (ZESTRIL) 40 MG tablet Take 0.5 tablets by mouth daily. 45 tablet 3   • atorvastatin (LIPITOR) 20 MG tablet Take 1 tablet by mouth daily. 90 tablet 3     No current facility-administered medications for this visit.       ALLERGIES:  No Known Allergies    PHYSICAL EXAM:  Visit Vitals  /68 (BP Location: LUE - Left upper extremity, Patient Position: Sitting, Cuff Size: Regular)   Pulse 62   Ht 5' 7\"  (1.702 m)   Wt 93 kg   SpO2 95%   BMI 32.11 kg/m²     General:  Alert, in no acute distress.  Lungs:  Clear to auscultation, no wheezing or rhonchi noted.  Heart:  Regular rhythm, S1, S2, no murmur present.  Abdomen:  Soft, nondistended, nontender, no guarding or masses, normoactive bowel sounds, no R lower quadrant mass noted firm not reducible tender tot he touch no surrounding redness  Extremities:  No edema      ASSESMENT-  Suspect groin strain NSAID as needed, will order CT for further evaluation

## 2025-06-13 ENCOUNTER — ANESTHESIA EVENT (OUTPATIENT)
Dept: ENDOSCOPY | Age: 75
End: 2025-06-13
Payer: MEDICARE

## 2025-06-13 ENCOUNTER — CLINICAL DOCUMENTATION (OUTPATIENT)
Age: 75
End: 2025-06-13

## 2025-06-16 ENCOUNTER — ANESTHESIA (OUTPATIENT)
Dept: ENDOSCOPY | Age: 75
End: 2025-06-16
Payer: MEDICARE

## 2025-06-16 ENCOUNTER — HOSPITAL ENCOUNTER (OUTPATIENT)
Age: 75
Setting detail: OUTPATIENT SURGERY
Discharge: HOME | End: 2025-06-16
Attending: SURGERY | Admitting: SURGERY
Payer: MEDICARE

## 2025-06-16 VITALS
WEIGHT: 234 LBS | BODY MASS INDEX: 35.46 KG/M2 | HEART RATE: 98 BPM | HEIGHT: 68 IN | OXYGEN SATURATION: 92 % | SYSTOLIC BLOOD PRESSURE: 132 MMHG | DIASTOLIC BLOOD PRESSURE: 79 MMHG | TEMPERATURE: 99 F | RESPIRATION RATE: 16 BRPM

## 2025-06-16 DIAGNOSIS — E46 MALNUTRITION: ICD-10-CM

## 2025-06-16 DIAGNOSIS — Z90.49 HISTORY OF PARTIAL COLECTOMY: ICD-10-CM

## 2025-06-16 DIAGNOSIS — Z86.0100 HISTORY OF COLON POLYPS: ICD-10-CM

## 2025-06-16 LAB — GLUCOSE BLD-MCNC: 99 MG/DL (ref 75–110)

## 2025-06-16 PROCEDURE — 7100000000 HC PACU RECOVERY - FIRST 15 MIN: Performed by: SURGERY

## 2025-06-16 PROCEDURE — 88305 TISSUE EXAM BY PATHOLOGIST: CPT

## 2025-06-16 PROCEDURE — 3609038000 HC PEG TUBE REMOVAL: Performed by: SURGERY

## 2025-06-16 PROCEDURE — 3700000001 HC ADD 15 MINUTES (ANESTHESIA): Performed by: SURGERY

## 2025-06-16 PROCEDURE — 3700000000 HC ANESTHESIA ATTENDED CARE: Performed by: SURGERY

## 2025-06-16 PROCEDURE — 2580000003 HC RX 258: Performed by: ANESTHESIOLOGY

## 2025-06-16 PROCEDURE — 7100000011 HC PHASE II RECOVERY - ADDTL 15 MIN: Performed by: SURGERY

## 2025-06-16 PROCEDURE — 6370000000 HC RX 637 (ALT 250 FOR IP): Performed by: ANESTHESIOLOGY

## 2025-06-16 PROCEDURE — 7100000001 HC PACU RECOVERY - ADDTL 15 MIN: Performed by: SURGERY

## 2025-06-16 PROCEDURE — 7100000030 HC ASPR PHASE II RECOVERY - FIRST 15 MIN: Performed by: SURGERY

## 2025-06-16 PROCEDURE — 2500000003 HC RX 250 WO HCPCS: Performed by: NURSE ANESTHETIST, CERTIFIED REGISTERED

## 2025-06-16 PROCEDURE — 7100000031 HC ASPR PHASE II RECOVERY - ADDTL 15 MIN: Performed by: SURGERY

## 2025-06-16 PROCEDURE — 2709999900 HC NON-CHARGEABLE SUPPLY: Performed by: SURGERY

## 2025-06-16 PROCEDURE — 82947 ASSAY GLUCOSE BLOOD QUANT: CPT

## 2025-06-16 PROCEDURE — 6360000002 HC RX W HCPCS: Performed by: NURSE ANESTHETIST, CERTIFIED REGISTERED

## 2025-06-16 PROCEDURE — 7100000010 HC PHASE II RECOVERY - FIRST 15 MIN: Performed by: SURGERY

## 2025-06-16 PROCEDURE — 3609027000 HC COLONOSCOPY: Performed by: SURGERY

## 2025-06-16 RX ORDER — EPHEDRINE SULFATE/0.9% NACL/PF 25 MG/5 ML
SYRINGE (ML) INTRAVENOUS
Status: DISCONTINUED | OUTPATIENT
Start: 2025-06-16 | End: 2025-06-16 | Stop reason: SDUPTHER

## 2025-06-16 RX ORDER — ROCURONIUM BROMIDE 10 MG/ML
INJECTION, SOLUTION INTRAVENOUS
Status: DISCONTINUED | OUTPATIENT
Start: 2025-06-16 | End: 2025-06-16 | Stop reason: SDUPTHER

## 2025-06-16 RX ORDER — ESOMEPRAZOLE MAGNESIUM 40 MG/1
40 CAPSULE, DELAYED RELEASE ORAL 2 TIMES DAILY
Qty: 120 CAPSULE | Refills: 0 | Status: SHIPPED | OUTPATIENT
Start: 2025-06-16 | End: 2025-08-15

## 2025-06-16 RX ORDER — SODIUM CHLORIDE 9 MG/ML
INJECTION, SOLUTION INTRAVENOUS CONTINUOUS
Status: DISCONTINUED | OUTPATIENT
Start: 2025-06-16 | End: 2025-06-16 | Stop reason: HOSPADM

## 2025-06-16 RX ORDER — SUCRALFATE 1 G/1
1 TABLET ORAL 4 TIMES DAILY
Qty: 240 TABLET | Refills: 0 | Status: SHIPPED | OUTPATIENT
Start: 2025-06-16 | End: 2025-08-15

## 2025-06-16 RX ORDER — ONDANSETRON 2 MG/ML
INJECTION INTRAMUSCULAR; INTRAVENOUS
Status: DISCONTINUED | OUTPATIENT
Start: 2025-06-16 | End: 2025-06-16 | Stop reason: SDUPTHER

## 2025-06-16 RX ORDER — SODIUM CHLORIDE 9 MG/ML
INJECTION, SOLUTION INTRAVENOUS PRN
Status: DISCONTINUED | OUTPATIENT
Start: 2025-06-16 | End: 2025-06-16 | Stop reason: HOSPADM

## 2025-06-16 RX ORDER — SODIUM CHLORIDE 0.9 % (FLUSH) 0.9 %
5-40 SYRINGE (ML) INJECTION PRN
Status: DISCONTINUED | OUTPATIENT
Start: 2025-06-16 | End: 2025-06-16 | Stop reason: HOSPADM

## 2025-06-16 RX ORDER — FENTANYL CITRATE 50 UG/ML
INJECTION, SOLUTION INTRAMUSCULAR; INTRAVENOUS
Status: DISCONTINUED | OUTPATIENT
Start: 2025-06-16 | End: 2025-06-16 | Stop reason: SDUPTHER

## 2025-06-16 RX ORDER — DEXAMETHASONE SODIUM PHOSPHATE 4 MG/ML
INJECTION, SOLUTION INTRA-ARTICULAR; INTRALESIONAL; INTRAMUSCULAR; INTRAVENOUS; SOFT TISSUE
Status: DISCONTINUED | OUTPATIENT
Start: 2025-06-16 | End: 2025-06-16 | Stop reason: SDUPTHER

## 2025-06-16 RX ORDER — SODIUM CHLORIDE 0.9 % (FLUSH) 0.9 %
5-40 SYRINGE (ML) INJECTION EVERY 12 HOURS SCHEDULED
Status: DISCONTINUED | OUTPATIENT
Start: 2025-06-16 | End: 2025-06-16 | Stop reason: HOSPADM

## 2025-06-16 RX ORDER — PROPOFOL 10 MG/ML
INJECTION, EMULSION INTRAVENOUS
Status: DISCONTINUED | OUTPATIENT
Start: 2025-06-16 | End: 2025-06-16 | Stop reason: SDUPTHER

## 2025-06-16 RX ORDER — LIDOCAINE HYDROCHLORIDE 10 MG/ML
INJECTION, SOLUTION EPIDURAL; INFILTRATION; INTRACAUDAL; PERINEURAL
Status: DISCONTINUED | OUTPATIENT
Start: 2025-06-16 | End: 2025-06-16 | Stop reason: SDUPTHER

## 2025-06-16 RX ORDER — LIDOCAINE HYDROCHLORIDE 10 MG/ML
1 INJECTION, SOLUTION EPIDURAL; INFILTRATION; INTRACAUDAL; PERINEURAL
Status: DISCONTINUED | OUTPATIENT
Start: 2025-06-16 | End: 2025-06-16 | Stop reason: HOSPADM

## 2025-06-16 RX ORDER — OXYCODONE HYDROCHLORIDE 5 MG/1
5 TABLET ORAL ONCE
Refills: 0 | Status: COMPLETED | OUTPATIENT
Start: 2025-06-16 | End: 2025-06-16

## 2025-06-16 RX ADMIN — EPHEDRINE SULFATE 10 MG: 5 INJECTION INTRAVENOUS at 12:24

## 2025-06-16 RX ADMIN — DEXAMETHASONE SODIUM PHOSPHATE 4 MG: 4 INJECTION, SOLUTION INTRAMUSCULAR; INTRAVENOUS at 11:57

## 2025-06-16 RX ADMIN — OXYCODONE HYDROCHLORIDE 5 MG: 5 TABLET ORAL at 14:31

## 2025-06-16 RX ADMIN — LIDOCAINE HYDROCHLORIDE 50 MG: 10 INJECTION, SOLUTION EPIDURAL; INFILTRATION; INTRACAUDAL; PERINEURAL at 11:53

## 2025-06-16 RX ADMIN — PROPOFOL 150 MG: 10 INJECTION, EMULSION INTRAVENOUS at 11:53

## 2025-06-16 RX ADMIN — FENTANYL CITRATE 25 MCG: 50 INJECTION INTRAMUSCULAR; INTRAVENOUS at 11:53

## 2025-06-16 RX ADMIN — ROCURONIUM BROMIDE 50 MG: 50 INJECTION INTRAVENOUS at 11:53

## 2025-06-16 RX ADMIN — ONDANSETRON 4 MG: 2 INJECTION, SOLUTION INTRAMUSCULAR; INTRAVENOUS at 11:57

## 2025-06-16 RX ADMIN — SODIUM CHLORIDE: 0.9 INJECTION, SOLUTION INTRAVENOUS at 09:44

## 2025-06-16 ASSESSMENT — PAIN DESCRIPTION - ORIENTATION: ORIENTATION: LEFT;UPPER

## 2025-06-16 ASSESSMENT — PAIN SCALES - GENERAL
PAINLEVEL_OUTOF10: 5
PAINLEVEL_OUTOF10: 5

## 2025-06-16 ASSESSMENT — ENCOUNTER SYMPTOMS
SHORTNESS OF BREATH: 1
SORE THROAT: 0
SHORTNESS OF BREATH: 1
COUGH: 0

## 2025-06-16 ASSESSMENT — PAIN DESCRIPTION - DESCRIPTORS
DESCRIPTORS: BURNING
DESCRIPTORS: BURNING
DESCRIPTORS: SORE

## 2025-06-16 ASSESSMENT — PAIN DESCRIPTION - LOCATION: LOCATION: ABDOMEN

## 2025-06-16 ASSESSMENT — PAIN DESCRIPTION - PAIN TYPE: TYPE: SURGICAL PAIN

## 2025-06-16 ASSESSMENT — PAIN - FUNCTIONAL ASSESSMENT
PAIN_FUNCTIONAL_ASSESSMENT: 0-10

## 2025-06-16 NOTE — DISCHARGE INSTRUCTIONS
DISCHARGE INSTRUCTIONS FOR COLONOSCOPY AND EGD    In order to continue your care at home, please follow the instructions below.    For General Anesthesia:  Do not drink any alcoholic beverages or make any legal or important decisions for 24 hours.    Do not drive or operate machinery for 24 hours.  You may return to work after 24 hours.        Diet    Drink plenty of fluids after surgery, unless you are on a fluid restriction.   After general anesthesia, start out eating lightly (broth, soup, bread, etc.) advancing as tolerated to your usual diet.  Try to avoid spicy or greasy/fatty foods for 48 hours due to the EGD.   Avoid milk/milk product for several hours.     If your gag reflex has not returned but you are able to swallow, cut food into small bites, chew food thoroughly and drink fluids carefully for the next 2 hours.    Medications  Take medications as ordered by your surgeon.    Activities  Limit your activities for 24 hours.  Walk around to pass gas.  You may shower.    Call your surgeon for the following:  If you have abdominal pain that is not relieved by passing gas.   For an oral temperature (by mouth) is 101 degrees or higher, chills or excessive sweating.  You have increasing and progressive bleeding or drainage from surgery area.  Persistent nausea or vomiting  Vomiting blood (may be red or black)  Rectal bleeding (may be red, maroon, or black) or change in your bowel habits.  Severe sore throat or neck pain  If you are unable to urinate within 8 hours of surgery  Redness or swelling at the IV site.  For any questions or concerns you may have    Repeat colonoscopy if patient is willing with a 2-day prep with Sutab     DISCHARGE INSTRUCTIONS FOR OUTPATIENT SURGERY    In order to continue your care at home, please follow the instructions below.    Medications  Take medications as instructed by your surgeon.   Please do not take prescribed pain medication with alcoholic beverages.  When cleared to drive

## 2025-06-16 NOTE — PROGRESS NOTES
CLINICAL PHARMACY NOTE: MEDS TO BEDS    Total # of Prescriptions Filled: 1   The following medications were delivered to the patient:  Sucralfate 1 GM Tablets    Additional Documentation: Anaya, his wife, picked up 1 prescription at the pharmacy at 2:48 p.m on 06/16/2025    The Nexium needed a prior authorization and Dr. Diaz was made aware. Patient also stated that he had Nexium at home.

## 2025-06-16 NOTE — FLOWSHEET NOTE
Pharmacy called and said that the nexium script required a pre authorization for insurance. RN attempted to call Dr. Diaz office without success. RN spoke in person with Dr. Diaz and he said to have the patient take his prilosec 40 twice a day or  nexium over the counter. Patient and wife updated and agreeable to taking home prilosec  twice a day.

## 2025-06-16 NOTE — OP NOTE
[facility]   Morgan Diaz MD, FACS  Nina Jimenez, APRN-CNP  [address], [address]  [place] [address]  P: [phone], F: [phone]    PROCEDURE NOTE    DATE OF PROCEDURE: 6/16/2025    SURGEON: Morgan Diaz MD    ASSISTANT: None    PREOPERATIVE DIAGNOSIS: History of colon polyps.  History of right hemicolectomy    POSTOPERATIVE DIAGNOSIS: Poor prep with limited visualization.  Picture documentation in the chart.  Prominent internal hemorrhoids.  Sigmoid diverticulosis.    OPERATION: Total colonoscopy to ileocolonic anastomosis    ANESTHESIA: General    ESTIMATED BLOOD LOSS: None    COMPLICATIONS: None     SPECIMENS:  Was Not Obtained    HISTORY: The patient is a 74 y.o. year old male with history of above preop diagnosis.  I recommended colonoscopy with possible biopsy or polypectomy and I explained the risk, benefits, expected outcome, and alternatives to the procedure.  Risks included but are not limited to bleeding, infection, respiratory distress, hypotension, and perforation of the colon and possibility of missing a lesion.  The patient understands and is in agreement.      PROCEDURE: The patient was given IV conscious sedation.  The patient's SPO2 remained above 90% throughout the procedure. Digital rectal exam was normal.  The colonoscope was inserted through the anus into the rectum and advanced under direct vision to the cecum without difficulty.  Terminal ileum was examined for approximately 2 inches.  The prep was poor.      Findings:  Terminal ileum: normal    Cecum/Ascending colon: Status post right hemicolectomy.  Poor prep    Transverse colon: Limited visualization poor prep    Descending/Sigmoid colon: abnormal: Sigmoid diverticulosis.  Poor prep    Rectum/Anus: examined in normal and retroflexed positions and was abnormal: Prominent internal hemorrhoids    Withdrawal Time was (minutes): 14      The colon was decompressed.  While withdrawing the

## 2025-06-16 NOTE — ANESTHESIA PRE PROCEDURE
Department of Anesthesiology  Preprocedure Note       Name:  Lukasz Keller   Age:  74 y.o.  :  1950                                          MRN:  [id number]         Date:  2025      Surgeon: Surgeon(s):  Morgan Diaz MD    Procedure: Procedure(s):  REMOVAL PERCUTANEOUS ENDOSCOPIC GASTROSTOMY TUBE REMOVAL WITH EGD  COLONOSCOPY DIAGNOSTIC    Medications prior to admission:   Prior to Admission medications    Medication Sig Start Date End Date Taking? Authorizing Provider   albuterol (PROVENTIL) (2.5 MG/3ML) 0.083% nebulizer solution Take 3 mLs by nebulization every 4 hours as needed for Wheezing or Shortness of Breath 6/10/25   Christal Jang MD   Multiple Vitamin (MULTIVITAMIN) capsule Take 1 capsule by mouth daily    ProviderIoana MD   amiodarone (CORDARONE) 200 MG tablet Take 1 tablet by mouth daily 25   Ioana Lopez MD   apixaban (ELIQUIS) 5 MG TABS tablet Take 1 tablet by mouth 2 times daily 25   Christiano Momin MD   midodrine (PROAMATINE) 5 MG tablet GIVE TWO (2) TABLET(S) ORAL THREE TIMES DAILY 25   Christal Jang MD   BRILINTA 90 MG TABS tablet GIVE ONE (1) TABLET(S) ORAL TWICE DAILY 25   Christal Jang MD   FEROSUL 325 (65 Fe) MG tablet TAKE ONE (1) TABLET BY MOUTH DAILY (WITH BREAKFAST) 25   Christal Jang MD   clonazePAM (KLONOPIN) 0.5 MG tablet Take 1 tablet by mouth 2 times daily as needed for Anxiety for up to 90 days. Max Daily Amount: 1 mg  Patient taking differently: Take 1 tablet by mouth in the morning and at bedtime. 25  Christal Jang MD   Sodium Sulfate-Mag Sulfate-KCl 1619-817-088 MG TABS Take by mouth as directed for bowel prep. 5/3/25   Nina Jimenez, APRN - CNP   FLUoxetine (PROZAC) 20 MG capsule Take 1 capsule by mouth daily 4/15/25 7/14/25  Christal Jang MD   busPIRone (BUSPAR) 5 MG tablet Take 1 tablet by mouth 3 times daily 4/15/25 10/12/25  Christal Jang MD   Lactobacillus 0.05-0.05 MG TABS

## 2025-06-16 NOTE — OP NOTE
[facility]   Morgan Diaz MD, FACS  Nina HILTONLyn Tony, APRN-CNP  [address], [address]  [place] [address]  P: [phone], F: [phone]      ESOPHAGOGASTRODUODENOSCOPY   ( EGD )  DATE OF PROCEDURE: 6/16/2025     SURGEON: Morgan Diaz MD    ASSISTANT: None    PREOPERATIVE DIAGNOSIS: History of malnutrition.  History of PEG tube placement December 2024.  PEG tube no longer required.    POSTOPERATIVE DIAGNOSIS: Same.  Irregular Z-line.  Erosive distal gastritis.  Duodenitis.    OPERATION: Upper GI endoscopy with Biopsy.  Removal of percutaneous endoscopic gastrostomy/PEG tube    ANESTHESIA: Moderate Sedation     ESTIMATED BLOOD LOSS: None    COMPLICATIONS: None.     SPECIMENS:  Was Obtained: GE junction biopsy.  Antral biopsy    HISTORY: The patient is a 74 y.o. year old male with history of above preop diagnosis.  I recommended esophagogastroduodenoscopy with possible biopsy and I explained the risk, benefits, expected outcome, and alternatives to the procedure.  Risks included but are not limited to bleeding, infection, respiratory distress, hypotension, and perforation of the esophagus, stomach, or duodenum.  Patient understands and is in agreement.    PROCEDURE: The patient was given IV conscious sedation.  The patient's SPO2 remained above 90% throughout the procedure. Cetacaine spray given.  Patient placed in left lateral position.  Olympus  videogastroscope was inserted orally under vision into the esophagus without difficulty and advanced into the stomach then through the pylorus up to the second part of duodenum.      Findings:    Retropharyngeal area was grossly normal appearing    Esophagus: No active esophagitis mass or obstruction.  Irregular Z-line biopsies obtained    Stomach:    Fundus and Cardia Examined in Retroflexed View: Grossly normal    Body: PEG tube in place.  Gastritis    Antrum: abnormal: Erosive gastritis biopsies obtained    Duodenum:

## 2025-06-16 NOTE — H&P
HISTORY and PHYSICAL  [facility]       NAME:  Lukasz Keller  MRN: [id number]   YOB: 1950   Date: 6/16/2025   Age: 74 y.o.  Gender: male       COMPLAINT AND PRESENT HISTORY:       Lukasz Keller is 74 y.o.  male, here for     Procedure(s):  REMOVAL PERCUTANEOUS ENDOSCOPIC GASTROSTOMY TUBE REMOVAL WITH EGD  COLONOSCOPY DIAGNOSTIC    Pre-Op Diagnosis Codes:      * Malnutrition [E46]     * History of colon polyps [Z86.0100]     * History of partial colectomy [Z90.49]    Below italics is a portion of office visit note by Dr. Diaz, dated 04/24/25: Reviewed   HISTORY OF PRESENT ILLNESS: 74 y.o. male presents for a follow-up visit regarding removal of PEG tube.  Patient with history of acute MI cardiac arrest.  Patient had a CABG done.  Currently is on Eliquis and Plavix potentially.  He is not using the PEG tube.  Patient had a tracheostomy and PEG tube placed in December 2024 at [facility].  Patient with history of partial colectomy.  Last colonoscopy was in April 2022 which revealed evidence of internal hemorrhoids sigmoid diverticulosis and patent anastomosis.  Patient is on Eliquis not sure he if he is on Plavix at this time     DATE OF PROCEDURE: 4/25/2022     SURGEON: Morgan Diaz MD     ASSISTANT: None     PREOPERATIVE DIAGNOSIS: History of multiple colon polyps.  Previous colectomy 2021.     POSTOPERATIVE DIAGNOSIS: Grade 1 internal hemorrhoid.  Sigmoid diverticulosis.  Patent ileocolonic anastomosis.     OPERATION: Total colonoscopy to ileocolonic anastomosis with intubation of terminal ileum.  Resolution clip application x1 just distal to the anastomosis.     Date of Procedure: 12/20/2024     Pre-Op Diagnosis Codes:      * Respiratory failure with hypoxia, unspecified chronicity [J96.91]     Post-Op Diagnosis: Same       Procedure(s):  ESOPHAGOGASTRODUODENOSCOPY PERCUTANEOUS ENDOSCOPIC GASTROSTOMY TUBE INSERTION- GI SCHEDULED  TRACHEOTOMY

## 2025-06-16 NOTE — ANESTHESIA POSTPROCEDURE EVALUATION
Department of Anesthesiology  Postprocedure Note    Patient: Lukasz Keller  MRN: [id number]  YOB: 1950  Date of evaluation: 6/16/2025    Procedure Summary       Date: 06/16/25 Room / Location: [place] [address] / [facility]    Anesthesia Start: 1143 Anesthesia Stop: 1252    Procedures:       PERCUTANEOUS ENDOSCOPIC GASTROSTOMY TUBE REMOVAL WITH EGD (Abdomen)      COLONOSCOPY DIAGNOSTIC (Rectum) Diagnosis:       Malnutrition      History of colon polyps      History of partial colectomy      (Malnutrition [E46])      (History of colon polyps [Z86.0100])      (History of partial colectomy [Z90.49])    Surgeons: Morgan Diaz MD Responsible Provider: Erick Fields MD    Anesthesia Type: general ASA Status: 3            Anesthesia Type: No value filed.    Kaity Phase I: Kaity Score: 10    Kaity Phase II: Kaity Score: 10    Anesthesia Post Evaluation    Comments: POST- ANESTHESIA EVALUATION       Pt Name: Lukasz Keller  MRN: [id number]  YOB: 1950  Date of evaluation: 6/16/2025  Time:  4:09 PM      /79   Pulse 98   Temp 99 °F (37.2 °C) (Infrared)   Resp 16   Ht 1.727 m (5' 8\")   Wt 106.1 kg (234 lb)   SpO2 92%   BMI 35.58 kg/m²      Consciousness Level  Awake  Cardiopulmonary Status  Stable  Pain Adequately Treated YES  Nausea / Vomiting  NO  Adequate Hydration  YES  Anesthesia Related Complications NONE      Electronically signed by Erick Fields MD on 6/16/2025 at 4:09 PM           No notable events documented.

## 2025-06-17 DIAGNOSIS — Z85.46 HISTORY OF MALIGNANT NEOPLASM OF PROSTATE: Primary | ICD-10-CM

## 2025-06-17 DIAGNOSIS — Z90.79 S/P PROSTATECTOMY: ICD-10-CM

## 2025-06-17 LAB — SURGICAL PATHOLOGY REPORT: NORMAL

## 2025-06-25 ENCOUNTER — HOSPITAL ENCOUNTER (OUTPATIENT)
Age: 75
Discharge: HOME | End: 2025-06-25
Payer: MEDICARE

## 2025-06-25 DIAGNOSIS — Z90.79 S/P PROSTATECTOMY: ICD-10-CM

## 2025-06-25 DIAGNOSIS — Z85.46 HISTORY OF MALIGNANT NEOPLASM OF PROSTATE: ICD-10-CM

## 2025-06-25 LAB — PSA SERPL-MCNC: <0.03 NG/ML (ref 0–4)

## 2025-06-25 PROCEDURE — 36415 COLL VENOUS BLD VENIPUNCTURE: CPT

## 2025-06-25 PROCEDURE — 84153 ASSAY OF PSA TOTAL: CPT

## 2025-06-26 ENCOUNTER — OFFICE VISIT (OUTPATIENT)
Age: 75
End: 2025-06-26
Payer: MEDICARE

## 2025-06-26 VITALS
SYSTOLIC BLOOD PRESSURE: 136 MMHG | DIASTOLIC BLOOD PRESSURE: 84 MMHG | HEART RATE: 61 BPM | BODY MASS INDEX: 35.98 KG/M2 | OXYGEN SATURATION: 96 % | HEIGHT: 68 IN | WEIGHT: 237.4 LBS | TEMPERATURE: 98.1 F

## 2025-06-26 DIAGNOSIS — K57.90 DIVERTICULOSIS: ICD-10-CM

## 2025-06-26 DIAGNOSIS — K21.9 GASTROESOPHAGEAL REFLUX DISEASE, UNSPECIFIED WHETHER ESOPHAGITIS PRESENT: Primary | ICD-10-CM

## 2025-06-26 DIAGNOSIS — K64.9 HEMORRHOIDS, UNSPECIFIED HEMORRHOID TYPE: ICD-10-CM

## 2025-06-26 DIAGNOSIS — K22.70 BARRETT'S ESOPHAGUS WITHOUT DYSPLASIA: ICD-10-CM

## 2025-06-26 DIAGNOSIS — Z90.49 HISTORY OF PARTIAL COLECTOMY: ICD-10-CM

## 2025-06-26 DIAGNOSIS — Z43.1 PEG (PERCUTANEOUS ENDOSCOPIC GASTROSTOMY) ADJUSTMENT/REPLACEMENT/REMOVAL (HCC): ICD-10-CM

## 2025-06-26 DIAGNOSIS — Z86.0100 HISTORY OF COLON POLYPS: ICD-10-CM

## 2025-06-26 PROCEDURE — 99214 OFFICE O/P EST MOD 30 MIN: CPT | Performed by: NURSE PRACTITIONER

## 2025-06-26 PROCEDURE — 1123F ACP DISCUSS/DSCN MKR DOCD: CPT | Performed by: NURSE PRACTITIONER

## 2025-06-26 ASSESSMENT — ENCOUNTER SYMPTOMS
SORE THROAT: 0
COUGH: 0
SHORTNESS OF BREATH: 0
RHINORRHEA: 0

## 2025-06-26 NOTE — PROGRESS NOTES
[facility] General Surgery   Morgan Diaz MD, FACS  Nina MLyn Tony, APRN-CNP  [address], [address]  [place] [address]  P: [phone], F: [phone]    General and Robotic Surgery  Follow-Up Visit Note               PATIENT NAME: Lukasz Keller   :  1950   MRN: [id number]   PCP:  Christal Jang MD     TODAY'S DATE: 2025    Chief Complaint   Patient presents with    Follow-up     PERCUTANEOUS ENDOSCOPIC GASTROSTOMY TUBE REMOVAL WITH EGD/ Colonoscopy on 2025 Repeat colonoscopy if patient is willing with a 2-day prep with Sutab.       History of Present Illness  The patient is a 74-year-old male who presents for follow-up after having his PEG tube removed on 2025. An EGD and colonoscopy were also performed.    He reports no complications following the removal of the PEG tube, although he notes the presence of a residual hole at the site which appears to be healing well. There has been no drainage from the site for approximately one week. He is not experiencing any pain, nausea, vomiting, fever, or chills. Bowel movements are regular, and he maintains adequate hydration and nutrition. He sleeps in a recliner with his head elevated.    During the EGD, the retropharyngeal area appeared normal, and the esophagus showed no active esophagitis, mass, or obstruction. Biopsies were taken, revealing mild chronic gastritis and duodenitis. The colonoscopy preparation was challenging, resulting in suboptimal visualization. The right hemicolectomy site appeared normal, while the transverse colon showed no abnormalities. The left side of the colon exhibited diverticulosis, and the rectal anal area had prominent internal hemorrhoids. Pathology reports indicated mild chronic gastritis, moderate to severe chronic inflammation at the GE junction, and Romero's esophagus.    He recalls an incident during the colonoscopy preparation where he experienced difficulty swallowing

## 2025-06-29 PROBLEM — Z43.1 PEG (PERCUTANEOUS ENDOSCOPIC GASTROSTOMY) ADJUSTMENT/REPLACEMENT/REMOVAL (HCC): Status: ACTIVE | Noted: 2025-06-29

## 2025-06-29 PROBLEM — K21.9 GASTROESOPHAGEAL REFLUX DISEASE: Status: ACTIVE | Noted: 2025-06-29

## 2025-06-29 PROBLEM — K22.70 BARRETT'S ESOPHAGUS WITHOUT DYSPLASIA: Status: ACTIVE | Noted: 2025-06-29

## 2025-06-29 PROBLEM — K57.90 DIVERTICULOSIS: Status: ACTIVE | Noted: 2025-06-29

## 2025-06-29 PROBLEM — K64.9 HEMORRHOIDS: Status: ACTIVE | Noted: 2025-06-29

## 2025-06-30 ENCOUNTER — OFFICE VISIT (OUTPATIENT)
Dept: UROLOGY | Age: 75
End: 2025-06-30

## 2025-06-30 VITALS — SYSTOLIC BLOOD PRESSURE: 132 MMHG | HEART RATE: 72 BPM | DIASTOLIC BLOOD PRESSURE: 68 MMHG | TEMPERATURE: 97.5 F

## 2025-06-30 DIAGNOSIS — Z85.46 HISTORY OF MALIGNANT NEOPLASM OF PROSTATE: Primary | ICD-10-CM

## 2025-06-30 DIAGNOSIS — N39.3 STRESS INCONTINENCE, MALE: ICD-10-CM

## 2025-06-30 DIAGNOSIS — Z90.79 S/P PROSTATECTOMY: ICD-10-CM

## 2025-06-30 ASSESSMENT — ENCOUNTER SYMPTOMS
SHORTNESS OF BREATH: 0
BACK PAIN: 0
ABDOMINAL PAIN: 0

## 2025-06-30 NOTE — PROGRESS NOTES
[facility]  [address]  [facility] [address]  Dept: [phone]    [facility] Office Note - Established    Patient:  Lukasz Keller  YOB: 1950  Date: 6/30/2025    The patient is a 74 y.o. male who presents todayfor evaluation of the following problems:   Chief Complaint   Patient presents with    Follow-up      6 month PSA         HPI  This is a 74-year-old gentleman with prostate cancer.  He did have a robotic prostatectomy 5 years ago.  His PSA remains undetectable.  He is continuing to have stress incontinence but it is tolerable.    Summary of old records: N/A    Additional History: N/A    Procedures Today: N/A    Urinalysis today:  No results found for this visit on 06/30/25.  Last several PSA's:  Lab Results   Component Value Date    PSA <0.03 06/25/2025    PSA <0.03 09/30/2024    PSA <0.03 03/20/2024     Last total testosterone:  No results found for: \"TESTOSTERONE\"    AUA Symptom Score (6/30/2025):                               Last BUN and creatinine:  Lab Results   Component Value Date    BUN 7 (L) 03/07/2025     Lab Results   Component Value Date    CREATININE 0.8 03/07/2025       Additional Lab/Culture results: none    Imaging Reviewed during this Office Visit: none  (results were independently reviewed by physician and radiology report verified)    PAST MEDICAL, FAMILY AND SOCIAL HISTORY UPDATE:  Past Medical History:   Diagnosis Date    Abnormal EKG     Acute respiratory failure (HCC) 03/17/2020    Anesthesia complication 2005    woke up during neck surgery    Anxiety     Dr. Marlow    Cancer (HCC)     Prostate    Colon polyps     Hx of blood clots 06/28/2021    [facility]    Hyperlipidemia     Dr. Hoffman    PONCE (obstructive sleep apnea)     does not wear CPAP, does not have one yet    Prostate CA (HCC)     Pulmonary emboli (HCC) 06/28/2021    Per CTA chest, 6-28-21-\" Large

## 2025-07-11 ENCOUNTER — CARE COORDINATION (OUTPATIENT)
Dept: CARE COORDINATION | Age: 75
End: 2025-07-11

## 2025-07-11 NOTE — CARE COORDINATION
Ambulatory Care Coordination Note     2025 11:25 AM     Patient Current Location:  Home: [address] [place] [id number]     Spouse/Partner contacted the spouse/partner by telephone. Verified name and  with spouse/partner as identifiers.         ACM: GRACE ANDRADE RN     Challenges to be reviewed by the provider   Additional needs identified to be addressed with provider No  NA               Method of communication with provider: none.    Utilization: Patient has not had any utilization since our last call.     Care Summary Note: Spouse stated that Mike was out cutting the grass. Verified that he has all of his medications.   Mentioned pulmonology ordered a sleep study but they had not heard from [facility] yet. I called [facility] sleep lab who stated they do not have an order. Spoke with Dr Sandhu office who stated it will be a home sleep study and the manager at the office is working on a pre auth from insurance. They will call to schedule once completed.   Spoke with wife and provided information. No other needs today per wife.     Offered patient enrollment in the Remote Patient Monitoring (RPM) program for in-home monitoring: Yes, but did not enroll at this time: declined previously.     Assessments Completed:   General Assessment    Do you have any symptoms that are causing concern?: No          Medications Reviewed:   Patient denies any changes with medications and reports taking all medications as prescribed.    Advance Care Planning:   Reviewed and current     Care Planning:   Education Documentation  General medication information, taught by Grace Andrade, RN at 2025 11:21 AM.  Learner: Significant Other  Readiness: Acceptance  Method: Explanation  Response: Verbalizes Understanding    Educate reporting changes in condition, taught by Grace Andrade, RN at 2025 11:21 AM.  Learner: Significant Other  Readiness: Acceptance  Method: Explanation  Response: Verbalizes Understanding    Educate Patient

## 2025-08-11 ENCOUNTER — CARE COORDINATION (OUTPATIENT)
Dept: CARE COORDINATION | Age: 75
End: 2025-08-11

## 2025-08-22 ENCOUNTER — HOSPITAL ENCOUNTER (OUTPATIENT)
Dept: SLEEP CENTER | Age: 75
Discharge: HOME | End: 2025-08-24
Payer: MEDICARE

## 2025-08-22 PROCEDURE — G0399 HOME SLEEP TEST/TYPE 3 PORTA: HCPCS

## (undated) DEVICE — CATH BLLN ANGIO 2X20MM SC EUPHORA RX

## (undated) DEVICE — STRAP ARMBRD W1.5XL32IN FOAM STR YET SFT W/ HK AND LOOP

## (undated) DEVICE — CATH BLLN ANGIO 4.50X8MM NC EUPHORIA RX

## (undated) DEVICE — CHLORAPREP 26ML ORANGE

## (undated) DEVICE — AIRSEAL 12 MM ACCESS PORT AND PALM GRIP OBTURATOR WITH BLADELESS OPTICAL TIP, 120 MM LENGTH: Brand: AIRSEAL

## (undated) DEVICE — Device

## (undated) DEVICE — INTRI24™ INTRODUCER SHEATH (SHEATH): Brand: INTRI24 INTRODUCER SHEATH

## (undated) DEVICE — POUCH INSTR W6.75XL11.5IN FRST 2 PKT ADH FOR ORTH AND

## (undated) DEVICE — BITEBLOCK 54FR W/ DENT RIM BLOX

## (undated) DEVICE — GOWN,AURORA,NONREINFORCED,LARGE: Brand: MEDLINE

## (undated) DEVICE — GLOVE ORANGE PI 7 1/2   MSG9075

## (undated) DEVICE — GAUZE,SPONGE,FLUFF,6"X6.75",STRL,5/TRAY: Brand: MEDLINE

## (undated) DEVICE — INTRODUCER SHTH STD 8 FRX11 CM FLX KINK RESIST CANN AVNT +

## (undated) DEVICE — FORCEPS BX L240CM WRK CHN 2.8MM STD CAP W/ NDL MIC MESH

## (undated) DEVICE — CATHETER GUID 7FR 0.081IN COR STD JUDKINS R 4 MID

## (undated) DEVICE — GLOVE SURG SZ 7 CRM LTX FREE POLYISOPRENE POLYMER BEAD ANTI

## (undated) DEVICE — SHEET, T, LAPAROTOMY, STERILE: Brand: MEDLINE

## (undated) DEVICE — PROTECTOR ULN NRV PUR FOAM HK LOOP STRP ANATOMICALLY

## (undated) DEVICE — METER,URINE,400ML,DRAIN BAG,L/F,LL: Brand: MEDLINE

## (undated) DEVICE — TOWEL,OR,DSP,ST,NATURAL,DLX,4/PK,20PK/CS: Brand: MEDLINE

## (undated) DEVICE — SUTURE VCRL SZ 0 L27IN ABSRB UD L36MM CT-1 1/2 CIR J260H

## (undated) DEVICE — CATH BLLN ANGIO 3.50X27MM NC EUPHORIA RX

## (undated) DEVICE — SINGLE PORT MANIFOLD: Brand: NEPTUNE 2

## (undated) DEVICE — SUTURE V-LOC 180 SZ 3-0 L6IN ABSRB GRN L17MM CV-23 1/2 CIR VLOCL0804

## (undated) DEVICE — TIP COVER ACCESSORY

## (undated) DEVICE — ARM DRAPE

## (undated) DEVICE — MITT SURG PREP L ADH DISPOSABLE

## (undated) DEVICE — CATHETER URETH 18FR BLLN 30CC 2 W F INF CTRL BARDX

## (undated) DEVICE — DRESSING TRNSPAR W5XL4.5IN FLM SHT SEMIPERMEABLE WIND

## (undated) DEVICE — GOWN,AURORA,NONRNF,XL,30/CS: Brand: MEDLINE

## (undated) DEVICE — BAG SPEC REM 224ML W4XL6IN DIA10MM 1 HND GYN DISP ENDOPCH

## (undated) DEVICE — GOWN,POLY REINFORCED,LG: Brand: MEDLINE

## (undated) DEVICE — 40580 - THE PINK PAD - ADVANCED TRENDELENBURG POSITIONING KIT: Brand: 40580 - THE PINK PAD - ADVANCED TRENDELENBURG POSITIONING KIT

## (undated) DEVICE — TUBING, SUCTION, 3/16" X 10', STRAIGHT: Brand: MEDLINE

## (undated) DEVICE — BLADELESS OBTURATOR: Brand: WECK VISTA

## (undated) DEVICE — SHEET,DRAPE,70X100,STERILE: Brand: MEDLINE

## (undated) DEVICE — CATHETER IABP 8FR 50CC FBROPT CONN W INSRT KT TWO STATLOK

## (undated) DEVICE — SPONGE LAP W18XL18IN WHT COT 4 PLY FLD STRUNG RADPQ DISP ST 2 PER PACK

## (undated) DEVICE — MARKER,SKIN,WI/RULER AND LABELS: Brand: MEDLINE

## (undated) DEVICE — ELECTRO LUBE IS A SINGLE PATIENT USE DEVICE THAT IS INTENDED TO BE USED ON ELECTROSURGICAL ELECTRODES TO REDUCE STICKING.: Brand: KEY SURGICAL ELECTRO LUBE

## (undated) DEVICE — MIC* SAFETY PERCUTANEOUS ENDOSCOPIC GASTROSTOMY PEG KIT - 20 FR - PULL: Brand: MIC PEG TUBE

## (undated) DEVICE — TOWEL,OR,DSP,ST,BLUE,STD,6/PK,12PK/CS: Brand: MEDLINE

## (undated) DEVICE — MICROSURGICAL DILATATION DEVICE: Brand: WOLVERINE CORONARY CUTTING BALLOON

## (undated) DEVICE — SURGICEL ENDOSCP APPL

## (undated) DEVICE — 3M™ IOBAN™ 2 ANTIMICROBIAL INCISE DRAPE 6650EZ: Brand: IOBAN™ 2

## (undated) DEVICE — SPONGE DRN W4XL4IN RAYON/POLYESTER 6 PLY NONWOVEN PRECUT 2 PER PK

## (undated) DEVICE — RUNTHROUGH NS EXTRA FLOPPY PTCA GUIDEWIRE: Brand: RUNTHROUGH

## (undated) DEVICE — PERCLOSE PROGLIDE™ SUTURE-MEDIATED CLOSURE SYSTEM: Brand: PERCLOSE PROGLIDE™

## (undated) DEVICE — C-ARM: Brand: UNBRANDED

## (undated) DEVICE — SHEET,DRAPE,53X77,STERILE: Brand: MEDLINE

## (undated) DEVICE — SUTURE VICRYL + SZ 3-0 L27IN ABSRB UD L26MM SH 1/2 CIR VCP416H

## (undated) DEVICE — SOLUTION IV 1000ML 0.9% SOD CHL PH 5 INJ USP VIAFLX PLAS

## (undated) DEVICE — SUTURE PROL SZ 2-0 L30IN NONABSORBABLE BLU L26MM CT-1 1/2 8423H

## (undated) DEVICE — PAD,ABDOMINAL,8"X7.5",ST,LF,20/BX: Brand: MEDLINE INDUSTRIES, INC.

## (undated) DEVICE — CANNULA PERF AD 17FR 31.8CM LENGTH FEM ART VENT BIO MEDICUS

## (undated) DEVICE — FLOWTRIEVER THROMBECTOMY SYSTEM PRICE PER PROCEDURE

## (undated) DEVICE — DECANTER BAG 9": Brand: MEDLINE INDUSTRIES, INC.

## (undated) DEVICE — CANNULA SEAL

## (undated) DEVICE — GARMENT,MEDLINE,DVT,INT,CALF,MED, GEN2: Brand: MEDLINE

## (undated) DEVICE — SOLUTION IRRIG 1000ML 0.9% SOD CHL USP POUR PLAS BTL

## (undated) DEVICE — ADHESIVE SKIN CLSR 0.7ML TOP DERMBND ADV

## (undated) DEVICE — SYRINGE MED 20ML STD CLR PLAS LUERLOCK TIP N CTRL DISP

## (undated) DEVICE — DEFENDO AIR WATER SUCTION AND BIOPSY VALVE KIT FOR  OLYMPUS: Brand: DEFENDO AIR/WATER/SUCTION AND BIOPSY VALVE

## (undated) DEVICE — SUTURE ETHIBOND SZ 1 L18IN NONABSORBABLE CTX L48MM 1/2 CIR CX30D

## (undated) DEVICE — DRESSING TRNSPAR W2XL2.75IN FLM SHT SEMIPERMEABLE WIND

## (undated) DEVICE — ANGIOGRAPHIC CATHETER: Brand: EXPO™

## (undated) DEVICE — RADIFOCUS GLIDEWIRE ADVANTAGE GUIDEWIRE: Brand: GLIDEWIRE ADVANTAGE

## (undated) DEVICE — CATHETER ANGIOPLSTY OTW 035 6 FRX135 CM 8X40 MM EVERCROSS

## (undated) DEVICE — INTRODUCER SHTH 7FR CANN L11CM 0.038IN STD ORNG W/ MINI

## (undated) DEVICE — SUTURE VCRL + SZ 3-0 L27IN ABSRB UD L26MM SH 1/2 CIR VCP416H

## (undated) DEVICE — GUIDEWIRE VASC L150CM DIA0.025IN TIP DIA3MM L7MM INTVASC S

## (undated) DEVICE — TUBING SUCT 8FR MAL ALUM SHFT FN CAP VENT UNIV CONN W/ OBT

## (undated) DEVICE — CATHETER ANGIO 6FR L100CM DIA0.056IN FR4 CRV VASC ACCS EXPO

## (undated) DEVICE — SYRINGE MED 50ML LUERLOCK TIP

## (undated) DEVICE — SINGLE USE AIR/WATER, SUCTION AND BIOPSY VALVES SET: Brand: ORCAPOD™

## (undated) DEVICE — GLOVE SURG SZ 75 CRM LTX FREE POLYISOPRENE POLYMER BEAD ANTI

## (undated) DEVICE — KIT MICRO INTRO 4FR STIFF 40CM NIGHTENALL TUNGSTEN 7SMT

## (undated) DEVICE — Device: Brand: TRIEVER24

## (undated) DEVICE — ELECTRODE PT RET AD L9FT HI MOIST COND ADH HYDRGEL CORDED

## (undated) DEVICE — BLADE,CARBON-STEEL,11,STRL,DISPOSABLE,TB: Brand: MEDLINE

## (undated) DEVICE — DRAPE,T,LAPARO,TRANS,STERILE: Brand: MEDLINE

## (undated) DEVICE — SUTURE VICRYL SZ 2-0 L27IN ABSRB UD L26MM SH 1/2 CIR J417H

## (undated) DEVICE — SUTURE MONOCRYL SZ 4-0 L18IN ABSRB UD L16MM PC-3 3/8 CIR PRIM Y845G

## (undated) DEVICE — NEPTUNE E-SEP SMOKE EVACUATION PENCIL, COATED, 70MM BLADE, PUSH BUTTON SWITCH: Brand: NEPTUNE E-SEP

## (undated) DEVICE — SURGICAL PROCEDURE TRAY DRSG CHNG DISP

## (undated) DEVICE — SCISSOR SURG METZ CRV TIP

## (undated) DEVICE — TRAY SURG CUST CRD CATH TOLEDO

## (undated) DEVICE — CATHETER ANGIO 6FR L110CM ID0.056IN VENT PIG CRV ROBUST

## (undated) DEVICE — GUIDEWIRE VASC L260CM DIA0.035IN RAD 3MM J TIP L7CM PTFE

## (undated) DEVICE — CATH BLLN ANGIO 3.50X20MM NC EUPHORIA RX

## (undated) DEVICE — TRI-LUMEN FILTERED TUBE SET WITH ACTIVATED CHARCOAL FILTER: Brand: AIRSEAL

## (undated) DEVICE — SPONGE GZ W4XL4IN RAYON POLY CVR W/NONWOVEN FAB STRL 2/PK

## (undated) DEVICE — GLIDESHEATH SLENDER STAINLESS STEEL KIT: Brand: GLIDESHEATH SLENDER

## (undated) DEVICE — GUIDEWIRE 35/260/FC/PTFE/3J: Brand: GUIDEWIRE

## (undated) DEVICE — APPLICATOR MEDICATED 10.5 CC SOLUTION HI LT ORNG CHLORAPREP

## (undated) DEVICE — CATHETER BLLN 1.5MM X 15MM TAKERU RX GWIRE 0.014IN

## (undated) DEVICE — GLOVE ORTHO 7 1/2   MSG9475

## (undated) DEVICE — TUBE TRACH AD SZ 8 L79MM OD122MM ID85MM DK BLU PVC CUF CANN

## (undated) DEVICE — GLOVE SURG SZ 6 CRM LTX FREE POLYISOPRENE POLYMER BEAD ANTI

## (undated) DEVICE — SWAN-GANZ CCOMBO V THERMODILUTION CATHETER: Brand: SWAN-GANZ CCOMBO V

## (undated) DEVICE — SOLUTION IRRIG 1000ML STRL H2O USP PLAS POUR BTL

## (undated) DEVICE — FLOWSAVER: Brand: FLOWSAVER

## (undated) DEVICE — CATH BLLN ANGIO 3X8MM NC EUPHORIA RX

## (undated) DEVICE — ST. CHARLES BASIC SET UP: Brand: MEDLINE INDUSTRIES, INC.

## (undated) DEVICE — CONNECTOR,TUBING,5-IN-1,NON-STERILE: Brand: MEDLINE INDUSTRIES, INC.

## (undated) DEVICE — TUBE TRACH AD L105MM OD13.3MM ID8MM PROX EXTN CUF HI VOL LO

## (undated) DEVICE — SUTURE VCRL SZ 0 L18IN ABSRB VLT L36MM CT-1 1/2 CIR J740D

## (undated) DEVICE — BLADE CLIPPER GEN PURP NS

## (undated) DEVICE — ENDOSCOPIC KIT 1.1+ 10 FT OP4 CA DE

## (undated) DEVICE — GEL US 20GM NONIRRITATING OVERWRAPPED FILE PCH TRNSMIT

## (undated) DEVICE — AGENT HEMSTAT 3GM OXIDIZED REGENERATED CELOS ABSRB FOR CONT (ORDER MULTIPLES OF 5EA)

## (undated) DEVICE — CATHETER URETH 20FR BLLN 30CC 2 W F INF CTRL BARDX

## (undated) DEVICE — 3M™ STERI-STRIP™ COMPOUND BENZOIN TINCTURE 40 BAGS/CARTON 4 CARTONS/CASE C1544: Brand: 3M™ STERI-STRIP™

## (undated) DEVICE — CATH BLLN ANGIO 2X10MM SC EUPHORA RX

## (undated) DEVICE — SUTURE PERMA-HAND SZ 0 L30IN NONABSORBABLE BLK L36MM CT-1 424H

## (undated) DEVICE — ANGIO-SEAL VIP VASCULAR CLOSURE DEVICE: Brand: ANGIO-SEAL

## (undated) DEVICE — SUTURE V-LOC 180 SZ 0 L9IN ABSRB GRN GS-21 L37MM 1/2 CIR VLOCL0346

## (undated) DEVICE — COVER LT HNDL BLU PLAS

## (undated) DEVICE — GLOVE ORANGE PI 7   MSG9070

## (undated) DEVICE — PINNACLE INTRODUCER SHEATH: Brand: PINNACLE

## (undated) DEVICE — CANISTER SUCTION VAC PORTABLE 1000 CC FOR INDIGO SYS ENGINE

## (undated) DEVICE — GLOVE SURG SZ 7 L12IN FNGR THK79MIL GRN LTX FREE

## (undated) DEVICE — PROVE COVER: Brand: UNBRANDED

## (undated) DEVICE — KIT CLN UP LIN W/ STD SAHARA TBL SHT 40X60IN DRAW/LIFT SHT

## (undated) DEVICE — GUIDEWIRE VASC L260CM DIA0.035IN L7CM DIA3MM J TIP AMPLATZ SUPER STIFF

## (undated) DEVICE — CATHETER GUID 6FR L150CM RAP EXCHG L25CM TIP 5.1FR PUSHROD

## (undated) DEVICE — MERCY HEALTH ST CHARLES: Brand: MEDLINE INDUSTRIES, INC.

## (undated) DEVICE — STRAP,CATHETER,ELASTIC,HOOK&LOOP: Brand: MEDLINE

## (undated) DEVICE — CATHETER THROMCTMY INDIGO CAT PENUMBRA 140CM RX L LUMN ASPIR TBNG

## (undated) DEVICE — SUTURE MCRYL SZ 4-0 L18IN ABSRB UD L16MM PC-3 3/8 CIR PRIM Y845G

## (undated) DEVICE — SOLUTION ANTIFOG VIS SYS CLEARIFY LAPSCP

## (undated) DEVICE — GOWN,SIRUS,NONRNF,SETINSLV,XL,20/CS: Brand: MEDLINE

## (undated) DEVICE — COVER,LIGHT HANDLE,FLX,2/PK: Brand: MEDLINE INDUSTRIES, INC.

## (undated) DEVICE — GUIDEWIRE: Brand: AMPLATZ SUPER STIFF™

## (undated) DEVICE — GAUZE,SPONGE,4"X4",16PLY,STRL,LF,10/TRAY: Brand: MEDLINE

## (undated) DEVICE — CATH BLLN ANGIO 2.50X15MM SC EUPHORA RX

## (undated) DEVICE — INTRODUCER SHTH 6FR L11CM 0.038IN STD SIDEPRT EXTN 3 W

## (undated) DEVICE — GUIDEWIRE WITH ICE™ HYDROPHILIC COATING: Brand: V-18™ CONTROL WIRE™

## (undated) DEVICE — PAD,NON-ADHERENT,3X8,STERILE,LF,1/PK: Brand: MEDLINE

## (undated) DEVICE — CLIP LIG L235CM RESOL 360 BX/20

## (undated) DEVICE — BLUE RHINO G2-MULTI PERCUTANEOUS TRACHEOSTOMY INTRODUCER TRAY: Brand: BLUE RHINO

## (undated) DEVICE — LARGE BORE 60 CC SYRINGE: Brand: LARGE BORE 60 CC SYRINGE

## (undated) DEVICE — CONTAINER,SPECIMEN,4OZ,OR STRL: Brand: MEDLINE

## (undated) DEVICE — STRAP,POSITIONING,KNEE/BODY,FOAM,4X60": Brand: MEDLINE

## (undated) DEVICE — Device: Brand: EAGLE EYE PLATINUM RX DIGITAL IVUS CATHETER

## (undated) DEVICE — CANNULA PERF 25FR L30IN MULTISTAGE FEM VEN W/ INSRT KT

## (undated) DEVICE — ENDO KIT W/SYRINGE: Brand: MEDLINE INDUSTRIES, INC.

## (undated) DEVICE — SYRINGE MED 30ML STD CLR PLAS LUERLOCK TIP N CTRL DISP